# Patient Record
Sex: FEMALE | Race: BLACK OR AFRICAN AMERICAN | Employment: OTHER | URBAN - METROPOLITAN AREA
[De-identification: names, ages, dates, MRNs, and addresses within clinical notes are randomized per-mention and may not be internally consistent; named-entity substitution may affect disease eponyms.]

---

## 2017-01-14 ENCOUNTER — HOSPITAL ENCOUNTER (OUTPATIENT)
Dept: CT IMAGING | Age: 62
Discharge: HOME | End: 2017-01-14
Attending: PHYSICIAN ASSISTANT
Payer: MEDICARE

## 2017-01-14 DIAGNOSIS — K57.90 DIVERTICULOSIS: ICD-10-CM

## 2017-01-14 DIAGNOSIS — R19.4 FREQUENT BOWEL MOVEMENTS: ICD-10-CM

## 2017-01-14 DIAGNOSIS — R10.32 ABDOMINAL PAIN, LEFT LOWER QUADRANT: ICD-10-CM

## 2017-01-14 DIAGNOSIS — K59.09 OTHER CONSTIPATION: ICD-10-CM

## 2017-01-14 LAB — CREAT BLD-MCNC: 1.2 MG/DL (ref 0.6–1.3)

## 2017-01-14 PROCEDURE — 74011250636 HC RX REV CODE- 250/636: Performed by: PHYSICIAN ASSISTANT

## 2017-01-14 PROCEDURE — 74011636320 HC RX REV CODE- 636/320: Performed by: PHYSICIAN ASSISTANT

## 2017-01-14 PROCEDURE — 82565 ASSAY OF CREATININE: CPT

## 2017-01-14 PROCEDURE — 74177 CT ABD & PELVIS W/CONTRAST: CPT

## 2017-01-14 RX ORDER — SODIUM CHLORIDE 9 MG/ML
50 INJECTION, SOLUTION INTRAVENOUS
Status: COMPLETED | OUTPATIENT
Start: 2017-01-14 | End: 2017-01-14

## 2017-01-14 RX ORDER — SODIUM CHLORIDE 0.9 % (FLUSH) 0.9 %
10 SYRINGE (ML) INJECTION
Status: COMPLETED | OUTPATIENT
Start: 2017-01-14 | End: 2017-01-14

## 2017-01-14 RX ADMIN — SODIUM CHLORIDE 50 ML/HR: 900 INJECTION, SOLUTION INTRAVENOUS at 13:51

## 2017-01-14 RX ADMIN — Medication 10 ML: at 13:51

## 2017-01-14 RX ADMIN — IOPAMIDOL 100 ML: 755 INJECTION, SOLUTION INTRAVENOUS at 13:50

## 2017-02-14 ENCOUNTER — OFFICE VISIT (OUTPATIENT)
Dept: NEUROLOGY | Age: 62
End: 2017-02-14

## 2017-02-14 VITALS
WEIGHT: 238 LBS | HEIGHT: 67 IN | SYSTOLIC BLOOD PRESSURE: 118 MMHG | OXYGEN SATURATION: 96 % | DIASTOLIC BLOOD PRESSURE: 78 MMHG | HEART RATE: 74 BPM | BODY MASS INDEX: 37.35 KG/M2

## 2017-02-14 DIAGNOSIS — G56.01 CARPAL TUNNEL SYNDROME OF RIGHT WRIST: Primary | ICD-10-CM

## 2017-02-14 DIAGNOSIS — G89.29 CHRONIC RIGHT-SIDED LOW BACK PAIN WITH RIGHT-SIDED SCIATICA: ICD-10-CM

## 2017-02-14 DIAGNOSIS — M54.41 CHRONIC RIGHT-SIDED LOW BACK PAIN WITH RIGHT-SIDED SCIATICA: ICD-10-CM

## 2017-02-14 RX ORDER — GABAPENTIN 400 MG/1
CAPSULE ORAL
Qty: 180 CAP | Refills: 2 | Status: ON HOLD | OUTPATIENT
Start: 2017-02-14 | End: 2020-11-19

## 2017-02-14 NOTE — LETTER
2/14/2017 11:12 AM 
 
Patient:    Ulysses Abate YOB: 1955 Date of Visit:    2/14/2017 Dear Mariola Peterson MD 
 
Thank you for referring Ms. Tasia Baig to me for evaluation/treatment. Below are the relevant portions of my assessment and plan of care. Neurology follow-up note REFERRED BY: 
Mariola Peterson MD 
 
02/14/17 Chief Complaint Patient presents with  Follow-up  
  back/neuropathy Subjective Ulysses Abate is a 64 y.o. female who presented to the neurology office for management of back pain. To recap, the patient has been having back pain since 2014 . He does have intermittent exacerbation of right sided back pain with radiation to the right leg. When this happens, she does have 10/10 in severity, sharp and shooting and then she has to have bed rest and uses ice packs for a week and then it resolves. She is on lyrica 150 mg po bid. She is symptom free at this time. She had a MRI of the L spine and it was normal.  
 
Current Outpatient Prescriptions Medication Sig  
 gabapentin (NEURONTIN) 400 mg capsule 400 mg p.o. 3 times daily and 1200 mg p.o. nightly  diclofenac (VOLTAREN) 1 % gel Apply  to affected area as needed.  carvedilol (COREG) 25 mg tablet Take 25 mg by mouth two (2) times daily (with meals).  bumetanide (BUMEX) 2 mg tablet Take 2 mg by mouth two (2) times a day.  digoxin (LANOXIN) 0.125 mg tablet Take 0.125 mg by mouth daily.  DULoxetine (CYMBALTA) 60 mg capsule Take 60 mg by mouth daily.  linaclotide (LINZESS) 290 mcg cap capsule Take 290 mcg by mouth Daily (before breakfast).  Omeprazole delayed release (PRILOSEC D/R) 20 mg tablet Take 20 mg by mouth two (2) times a day.  zolpidem (AMBIEN) 10 mg tablet Take 10 mg by mouth nightly as needed for Sleep.  temazepam (RESTORIL) 15 mg capsule Take 15 mg by mouth nightly as needed for Sleep.  cholecalciferol, vitamin D3, 2,000 unit tab Take 2,000 Units by mouth.  cyanocobalamin 1,000 mcg tablet Take 1,000 mcg by mouth daily.  colchicine 0.6 mg tablet Take 0.6 mg by mouth daily.  allopurinol (ZYLOPRIM) 300 mg tablet Take 300 mg by mouth daily.  hyoscyamine (ANASPAZ, LEVSIN) 0.125 mg tablet Take 125 mcg by mouth every four (4) hours as needed for Cramping.  albuterol (PROVENTIL HFA, VENTOLIN HFA) 90 mcg/actuation inhaler Take 1 Puff by inhalation as needed.  spironolactone (ALDACTONE) 25 mg tablet Take  by mouth two (2) times a day.  pregabalin (LYRICA) 150 mg capsule Take 150 mg by mouth two (2) times a day.  amLODIPine-benazepril (LOTREL) 5-20 mg per capsule Take 1 Cap by mouth daily. No current facility-administered medications for this visit. REVIEW OF SYSTEMS:  
A ten system review of constitutional, cardiovascular, respiratory, musculoskeletal, endocrine, skin, SHEENT, genitourinary, psychiatric and neurologic systems was obtained and is unremarkable except as stated in HPI EXAMINATION:  
Visit Vitals  /78  Pulse 74  Ht 5' 7\" (1.702 m)  Wt 238 lb (108 kg)  SpO2 96%  BMI 37.28 kg/m2 General:  
General appearance: Pt is in no acute distress Distal pulses are preserved Neurological Examination:  
Mental Status: AAO x3. Speech is fluent. Follows commands, has normal fund of knowledge, attention, short term recall, comprehension and insight. Cranial Nerves: Visual fields are full. PERRL, Extraocular movements are full. Facial sensation intact V1- V3. Facial movement intact, symmetric. Hearing intact to conversation. Palate elevates symmetrically. Shoulder shrug symmetric. Tongue midline. Motor: Strength is 5/5 in all 4 ext. No atrophy. Tone: Normal 
 
Sensation: Decreased vib distally in the right lower ext. Coordination/Cerebellar: Intact to finger-nose-finger Gait: Romberg is negative and casual gait is normal.  
 
Laboratory review:  
Results for orders placed or performed during the hospital encounter of 01/14/17 POC CREATININE Result Value Ref Range Creatinine (POC) 1.2 0.6 - 1.3 MG/DL GFR-AA (POC) 55 (L) >60 ml/min/1.73m2 GFR, non-AA (POC) 46 (L) >60 ml/min/1.73m2 Imaging review:  
8/3/16 MRI L spine Minimal degenerative disease. Mild left L5-S1 neural foraminal stenosis, stable. EMG/nerve conduction study performed in November 2016 is remarkable for right carpal tunnel syndrome. There is no electrophysiological evidence of a right lumbar radiculopathy. Documentation review: 
None Assessment/Plan:  
Abril Raymundo is a 64 y.o. female who presented to the neurology office for management of intermittent back pain with radiation to the right lower ext. MRI of the lumbar spine and EMG/nerve conduction study have been unremarkable for the back pain but it did show right median neuropathy at the wrist.  She is asymptomatic from that. Her back pain has worsened since last visit and this 5-6 out of 10 in severity. She is presently taking gabapentin 300 mg 3 times a day and 1200 mg at night. I do plan to increase the dosage of gabapentin to 400 mg 3 times a day and 1200 mg at night. Follow-up in 3 months ICD-10-CM ICD-9-CM 1. Carpal tunnel syndrome of right wrist G56.01 354.0 2. Chronic right-sided low back pain with right-sided sciatica M54.41 724.2 gabapentin (NEURONTIN) 400 mg capsule G89.29 724.3   
  338.29 Thank you for allowing me to participate in the care of Ms. Aldana. Please feel free to contact me if you have any questions. Nicholas Rausch MD 
Neurologist and Clinical Neurophysiologist 
 
CC: Maru Lewis MD 
Fax: [phone] This note will not be viewable in [address] If you have questions, please do not hesitate to call me.   I look forward to following Ms. Aldana along with you. Sincerely, Jadyn Collins MD

## 2017-02-14 NOTE — PATIENT INSTRUCTIONS
1.  Increase dosage of gabapentin to 400 mg 3 times a day and 1200 mg at night  2. Follow-up in 3 months      A Healthy Lifestyle: Care Instructions  Your Care Instructions  A healthy lifestyle can help you feel good, stay at a healthy weight, and have plenty of energy for both work and play. A healthy lifestyle is something you can share with your whole family. A healthy lifestyle also can lower your risk for serious health problems, such as high blood pressure, heart disease, and diabetes. You can follow a few steps listed below to improve your health and the health of your family. Follow-up care is a key part of your treatment and safety. Be sure to make and go to all appointments, and call your doctor if you are having problems. Its also a good idea to know your test results and keep a list of the medicines you take. How can you care for yourself at home? · Do not eat too much sugar, fat, or fast foods. You can still have dessert and treats now and then. The goal is moderation. · Start small to improve your eating habits. Pay attention to portion sizes, drink less juice and soda pop, and eat more fruits and vegetables. ¨ Eat a healthy amount of food. A 3-ounce serving of meat, for example, is about the size of a deck of cards. Fill the rest of your plate with vegetables and whole grains. ¨ Limit the amount of soda and sports drinks you have every day. Drink more water when you are thirsty. ¨ Eat at least 5 servings of fruits and vegetables every day. It may seem like a lot, but it is not hard to reach this goal. A serving or helping is 1 piece of fruit, 1 cup of vegetables, or 2 cups of leafy, raw vegetables. Have an apple or some carrot sticks as an afternoon snack instead of a candy bar. Try to have fruits and/or vegetables at every meal.  · Make exercise part of your daily routine. You may want to start with simple activities, such as walking, bicycling, or slow swimming.  Try to be active 30 to 60 minutes every day. You do not need to do all 30 to 60 minutes all at once. For example, you can exercise 3 times a day for 10 or 20 minutes. Moderate exercise is safe for most people, but it is always a good idea to talk to your doctor before starting an exercise program.  · Keep moving. Sandra Limb the lawn, work in the garden, or [employer]. Take the stairs instead of the elevator at work. · If you smoke, quit. People who smoke have an increased risk for heart attack, stroke, cancer, and other lung illnesses. Quitting is hard, but there are ways to boost your chance of quitting tobacco for good. ¨ Use nicotine gum, patches, or lozenges. ¨ Ask your doctor about stop-smoking programs and medicines. ¨ Keep trying. In addition to reducing your risk of diseases in the future, you will notice some benefits soon after you stop using tobacco. If you have shortness of breath or asthma symptoms, they will likely get better within a few weeks after you quit. · Limit how much alcohol you drink. Moderate amounts of alcohol (up to 2 drinks a day for men, 1 drink a day for women) are okay. But drinking too much can lead to liver problems, high blood pressure, and other health problems. Family health  If you have a family, there are many things you can do together to improve your health. · Eat meals together as a family as often as possible. · Eat healthy foods. This includes fruits, vegetables, lean meats and dairy, and whole grains. · Include your family in your fitness plan. Most people think of activities such as jogging or tennis as the way to fitness, but there are many ways you and your family can be more active. Anything that makes you breathe hard and gets your heart pumping is exercise. Here are some tips:  ¨ Walk to do errands or to take your child to school or the bus. ¨ Go for a family bike ride after dinner instead of watching TV. Where can you learn more?   Go to http://jaspreet-thierno.info/. Enter I769 in the search box to learn more about \"A Healthy Lifestyle: Care Instructions. \"  Current as of: July 26, 2016  Content Version: 11.1  © 3102-8675 [employer]. Care instructions adapted under license by [employer] (which disclaims liability or warranty for this information). If you have questions about a medical condition or this instruction, always ask your healthcare professional. [address] any warranty or liability for your use of this information.

## 2017-02-14 NOTE — PROGRESS NOTES
Neurology follow-up note      REFERRED BY:  Gino Mendoza MD    02/14/17    Chief Complaint   Patient presents with    Follow-up     back/neuropathy       Subjective  Dio Landa is a 64 y.o. female who presented to the neurology office for management of back pain. To recap, the patient has been having back pain since 2014 . He does have intermittent exacerbation of right sided back pain with radiation to the right leg. When this happens, she does have 10/10 in severity, sharp and shooting and then she has to have bed rest and uses ice packs for a week and then it resolves. She is on lyrica 150 mg po bid. She is symptom free at this time. She had a MRI of the L spine and it was normal.     Current Outpatient Prescriptions   Medication Sig    gabapentin (NEURONTIN) 400 mg capsule 400 mg p.o. 3 times daily and 1200 mg p.o. nightly    diclofenac (VOLTAREN) 1 % gel Apply  to affected area as needed.  carvedilol (COREG) 25 mg tablet Take 25 mg by mouth two (2) times daily (with meals).  bumetanide (BUMEX) 2 mg tablet Take 2 mg by mouth two (2) times a day.  digoxin (LANOXIN) 0.125 mg tablet Take 0.125 mg by mouth daily.  DULoxetine (CYMBALTA) 60 mg capsule Take 60 mg by mouth daily.  linaclotide (LINZESS) 290 mcg cap capsule Take 290 mcg by mouth Daily (before breakfast).  Omeprazole delayed release (PRILOSEC D/R) 20 mg tablet Take 20 mg by mouth two (2) times a day.  zolpidem (AMBIEN) 10 mg tablet Take 10 mg by mouth nightly as needed for Sleep.  temazepam (RESTORIL) 15 mg capsule Take 15 mg by mouth nightly as needed for Sleep.  cholecalciferol, vitamin D3, 2,000 unit tab Take 2,000 Units by mouth.  cyanocobalamin 1,000 mcg tablet Take 1,000 mcg by mouth daily.  colchicine 0.6 mg tablet Take 0.6 mg by mouth daily.  allopurinol (ZYLOPRIM) 300 mg tablet Take 300 mg by mouth daily.     hyoscyamine (ANASPAZ, LEVSIN) 0.125 mg tablet Take 125 mcg by mouth every four (4) hours as needed for Cramping.  albuterol (PROVENTIL HFA, VENTOLIN HFA) 90 mcg/actuation inhaler Take 1 Puff by inhalation as needed.  spironolactone (ALDACTONE) 25 mg tablet Take  by mouth two (2) times a day.  pregabalin (LYRICA) 150 mg capsule Take 150 mg by mouth two (2) times a day.  amLODIPine-benazepril (LOTREL) 5-20 mg per capsule Take 1 Cap by mouth daily. No current facility-administered medications for this visit. REVIEW OF SYSTEMS:   A ten system review of constitutional, cardiovascular, respiratory, musculoskeletal, endocrine, skin, SHEENT, genitourinary, psychiatric and neurologic systems was obtained and is unremarkable except as stated in HPI     EXAMINATION:   Visit Vitals    /78    Pulse 74    Ht 5' 7\" (1.702 m)    Wt 238 lb (108 kg)    SpO2 96%    BMI 37.28 kg/m2        General:   General appearance: Pt is in no acute distress   Distal pulses are preserved    Neurological Examination:   Mental Status: AAO x3. Speech is fluent. Follows commands, has normal fund of knowledge, attention, short term recall, comprehension and insight. Cranial Nerves: Visual fields are full. PERRL, Extraocular movements are full. Facial sensation intact V1- V3. Facial movement intact, symmetric. Hearing intact to conversation. Palate elevates symmetrically. Shoulder shrug symmetric. Tongue midline. Motor: Strength is 5/5 in all 4 ext. No atrophy. Tone: Normal    Sensation: Decreased vib distally in the right lower ext. Coordination/Cerebellar: Intact to finger-nose-finger     Gait: Romberg is negative and casual gait is normal.     Laboratory review:   Results for orders placed or performed during the hospital encounter of 01/14/17   POC CREATININE   Result Value Ref Range    Creatinine (POC) 1.2 0.6 - 1.3 MG/DL    GFR-AA (POC) 55 (L) >60 ml/min/1.73m2    GFR, non-AA (POC) 46 (L) >60 ml/min/1.73m2       Imaging review:   8/3/16  MRI L spine  Minimal degenerative disease. Mild left L5-S1 neural foraminal stenosis, stable. EMG/nerve conduction study performed in November 2016 is remarkable for right carpal tunnel syndrome. There is no electrophysiological evidence of a right lumbar radiculopathy. Documentation review:  None    Assessment/Plan:   Blanca Chakraborty is a 64 y.o. female who presented to the neurology office for management of intermittent back pain with radiation to the right lower ext. MRI of the lumbar spine and EMG/nerve conduction study have been unremarkable for the back pain but it did show right median neuropathy at the wrist.  She is asymptomatic from that. Her back pain has worsened since last visit and this 5-6 out of 10 in severity. She is presently taking gabapentin 300 mg 3 times a day and 1200 mg at night. I do plan to increase the dosage of gabapentin to 400 mg 3 times a day and 1200 mg at night. Follow-up in 3 months       ICD-10-CM ICD-9-CM    1. Carpal tunnel syndrome of right wrist G56.01 354.0    2. Chronic right-sided low back pain with right-sided sciatica M54.41 724.2 gabapentin (NEURONTIN) 400 mg capsule    G89.29 724.3      338.29             Thank you for allowing me to participate in the care of Ms. Aldana. Please feel free to contact me if you have any questions. Janet Cooper MD  Neurologist and Clinical Neurophysiologist    CC: Colleen Lyons MD  Fax: [phone]    This note will not be viewable in [address]

## 2017-02-14 NOTE — MR AVS SNAPSHOT
Visit Information Date & Time Provider Department Dept. Phone Encounter #  
 2/14/2017 10:40 AM Brian Woodward MD [facility] [phone] [id number] Upcoming Health Maintenance Date Due Hepatitis C Screening 1955 Pneumococcal 19-64 Medium Risk (1 of 1 - PPSV23) 10/17/1974 DTaP/Tdap/Td series (1 - Tdap) 10/17/1976 PAP AKA CERVICAL CYTOLOGY 10/17/1976 BREAST CANCER SCRN MAMMOGRAM 10/17/2005 FOBT Q 1 YEAR AGE 50-75 10/17/2005 ZOSTER VACCINE AGE 60> 10/17/2015 INFLUENZA AGE 9 TO ADULT 8/1/2016 Allergies as of 2/14/2017  Review Complete On: 2/14/2017 By: Gwen Danielle Severity Noted Reaction Type Reactions Codeine  05/13/2010    Itching Demerol [Meperidine]  08/27/2012    Nausea Only Ivp [Fd And C Blue No.1]  05/13/2010    Itching Minoxidil  05/13/2010    Itching Penicillamine  05/13/2010   Side Effect Itching, Other (comments) Drops blood pressure Percocet [Oxycodone-acetaminophen]  06/19/2014   Systemic Nausea and Vomiting Current Immunizations  Never Reviewed No immunizations on file. Not reviewed this visit Vitals BP Pulse Height(growth percentile) Weight(growth percentile) SpO2 BMI  
 118/78 74 5' 7\" (1.702 m) 238 lb (108 kg) 96% 37.28 kg/m2 OB Status Smoking Status Hysterectomy Never Smoker BMI and BSA Data Body Mass Index Body Surface Area  
 37.28 kg/m 2 2.26 m 2 Preferred Pharmacy Pharmacy Name Phone [address] [place] [address] [phone] Your Updated Medication List  
  
   
This list is accurate as of: 2/14/17 10:56 AM.  Always use your most recent med list.  
  
  
  
  
 albuterol 90 mcg/actuation inhaler Commonly known as:  PROVENTIL HFA, VENTOLIN HFA, PROAIR HFA Take 1 Puff by inhalation as needed. allopurinol 300 mg tablet Commonly known as:  Jaky Tabor  
 Take 300 mg by mouth daily. amLODIPine-benazepril 5-20 mg per capsule Commonly known as:  Yesenia Muff Take 1 Cap by mouth daily. bumetanide 2 mg tablet Commonly known as:  Nataliya Aliza Take 2 mg by mouth two (2) times a day. carvedilol 25 mg tablet Commonly known as:  Sandra Thurman Take 25 mg by mouth two (2) times daily (with meals). cholecalciferol (vitamin D3) 2,000 unit Tab Take 2,000 Units by mouth.  
  
 colchicine 0.6 mg tablet Take 0.6 mg by mouth daily. cyanocobalamin 1,000 mcg tablet Take 1,000 mcg by mouth daily. digoxin 0.125 mg tablet Commonly known as:  LANOXIN Take 0.125 mg by mouth daily. DULoxetine 60 mg capsule Commonly known as:  CYMBALTA Take 60 mg by mouth daily. hyoscyamine 0.125 mg tablet Commonly known as:  Lazarus Fredrick Take 125 mcg by mouth every four (4) hours as needed for Cramping.  
  
 linaclotide 290 mcg Cap capsule Commonly known as:  [place] Take 290 mcg by mouth Daily (before breakfast). LYRICA 150 mg capsule Generic drug:  pregabalin Take 150 mg by mouth two (2) times a day. NEURONTIN 300 mg capsule Generic drug:  gabapentin Take 300 mg by mouth daily. Indications: 1200 mg at bedtime daily Omeprazole delayed release 20 mg tablet Commonly known as:  PRILOSEC D/R Take 20 mg by mouth two (2) times a day. spironolactone 25 mg tablet Commonly known as:  ALDACTONE Take  by mouth two (2) times a day. temazepam 15 mg capsule Commonly known as:  RESTORIL Take 15 mg by mouth nightly as needed for Sleep. VOLTAREN 1 % Gel Generic drug:  diclofenac Apply  to affected area as needed. zolpidem 10 mg tablet Commonly known as:  AMBIEN Take 10 mg by mouth nightly as needed for Sleep. Patient Instructions 1. Increase dosage of gabapentin to 400 mg 3 times a day and 1200 mg at night 2. Follow-up in 3 months A Healthy Lifestyle: Care Instructions Your Care Instructions A healthy lifestyle can help you feel good, stay at a healthy weight, and have plenty of energy for both work and play. A healthy lifestyle is something you can share with your whole family. A healthy lifestyle also can lower your risk for serious health problems, such as high blood pressure, heart disease, and diabetes. You can follow a few steps listed below to improve your health and the health of your family. Follow-up care is a key part of your treatment and safety. Be sure to make and go to all appointments, and call your doctor if you are having problems. Its also a good idea to know your test results and keep a list of the medicines you take. How can you care for yourself at home? · Do not eat too much sugar, fat, or fast foods. You can still have dessert and treats now and then. The goal is moderation. · Start small to improve your eating habits. Pay attention to portion sizes, drink less juice and soda pop, and eat more fruits and vegetables. ¨ Eat a healthy amount of food. A 3-ounce serving of meat, for example, is about the size of a deck of cards. Fill the rest of your plate with vegetables and whole grains. ¨ Limit the amount of soda and sports drinks you have every day. Drink more water when you are thirsty. ¨ Eat at least 5 servings of fruits and vegetables every day. It may seem like a lot, but it is not hard to reach this goal. A serving or helping is 1 piece of fruit, 1 cup of vegetables, or 2 cups of leafy, raw vegetables. Have an apple or some carrot sticks as an afternoon snack instead of a candy bar. Try to have fruits and/or vegetables at every meal. 
· Make exercise part of your daily routine. You may want to start with simple activities, such as walking, bicycling, or slow swimming. Try to be active 30 to 60 minutes every day.  You do not need to do all 30 to 60 minutes all at once. For example, you can exercise 3 times a day for 10 or 20 minutes. Moderate exercise is safe for most people, but it is always a good idea to talk to your doctor before starting an exercise program. 
· Keep moving. [place] the lawn, work in the garden, or Web[employer]. Take the stairs instead of the elevator at work. · If you smoke, quit. People who smoke have an increased risk for heart attack, stroke, cancer, and other lung illnesses. Quitting is hard, but there are ways to boost your chance of quitting tobacco for good. ¨ Use nicotine gum, patches, or lozenges. ¨ Ask your doctor about stop-smoking programs and medicines. ¨ Keep trying. In addition to reducing your risk of diseases in the future, you will notice some benefits soon after you stop using tobacco. If you have shortness of breath or asthma symptoms, they will likely get better within a few weeks after you quit. · Limit how much alcohol you drink. Moderate amounts of alcohol (up to 2 drinks a day for men, 1 drink a day for women) are okay. But drinking too much can lead to liver problems, high blood pressure, and other health problems. Family health If you have a family, there are many things you can do together to improve your health. · Eat meals together as a family as often as possible. · Eat healthy foods. This includes fruits, vegetables, lean meats and dairy, and whole grains. · Include your family in your fitness plan. Most people think of activities such as jogging or tennis as the way to fitness, but there are many ways you and your family can be more active. Anything that makes you breathe hard and gets your heart pumping is exercise. Here are some tips: 
¨ Walk to do errands or to take your child to school or the bus. ¨ Go for a family bike ride after dinner instead of watching TV. Where can you learn more? Go to http://jaspreet-thierno.info/. Enter I536 in the search box to learn more about \"A Healthy Lifestyle: Care Instructions. \" Current as of: July 26, 2016 Content Version: 11.1 © 7825-4525 [employer]. Care instructions adapted under license by [employer] (which disclaims liability or warranty for this information). If you have questions about a medical condition or this instruction, always ask your healthcare professional. [address] any warranty or liability for your use of this information. Introducing [facility]! Dear Navi Altamirano: 
Thank you for requesting a Inspiris account. Our records indicate that you already have an active Inspiris account. You can access your account anytime at [email or web address] Did you know that you can access your hospital and ER discharge instructions at any time in Inspiris? You can also review all of your test results from your hospital stay or ER visit. Additional Information If you have questions, please visit the Frequently Asked Questions section of the Inspiris website at [email or web address] Remember, Inspiris is NOT to be used for urgent needs. For medical emergencies, dial 911. Now available from your iPhone and Android! Please provide this summary of care documentation to your next provider. Your primary care clinician is listed as Ani Farrell. If you have any questions after today's visit, please call [phone].

## 2017-03-22 RX ORDER — LIDOCAINE 50 MG/G
1 PATCH TOPICAL
COMMUNITY

## 2017-03-23 ENCOUNTER — ANESTHESIA EVENT (OUTPATIENT)
Dept: SURGERY | Age: 62
End: 2017-03-23
Payer: MEDICARE

## 2017-03-23 ENCOUNTER — HOSPITAL ENCOUNTER (OUTPATIENT)
Dept: SURGERY | Age: 62
Setting detail: OUTPATIENT SURGERY
Discharge: HOME | End: 2017-03-23
Payer: MEDICARE

## 2017-03-23 VITALS
TEMPERATURE: 98.4 F | OXYGEN SATURATION: 95 % | DIASTOLIC BLOOD PRESSURE: 67 MMHG | RESPIRATION RATE: 16 BRPM | SYSTOLIC BLOOD PRESSURE: 123 MMHG | HEART RATE: 77 BPM

## 2017-03-23 LAB
ANION GAP BLD CALC-SCNC: 7 MMOL/L (ref 5–15)
BUN SERPL-MCNC: 22 MG/DL (ref 6–20)
BUN/CREAT SERPL: 18 (ref 12–20)
CALCIUM SERPL-MCNC: 9.7 MG/DL (ref 8.5–10.1)
CHLORIDE SERPL-SCNC: 105 MMOL/L (ref 97–108)
CO2 SERPL-SCNC: 30 MMOL/L (ref 21–32)
CREAT SERPL-MCNC: 1.2 MG/DL (ref 0.55–1.02)
GLUCOSE SERPL-MCNC: 93 MG/DL (ref 65–100)
POTASSIUM SERPL-SCNC: 3.9 MMOL/L (ref 3.5–5.1)
SODIUM SERPL-SCNC: 142 MMOL/L (ref 136–145)

## 2017-03-23 PROCEDURE — 36415 COLL VENOUS BLD VENIPUNCTURE: CPT | Performed by: ANESTHESIOLOGY

## 2017-03-23 PROCEDURE — 80048 BASIC METABOLIC PNL TOTAL CA: CPT | Performed by: ANESTHESIOLOGY

## 2017-03-24 ENCOUNTER — ANESTHESIA (OUTPATIENT)
Dept: SURGERY | Age: 62
End: 2017-03-24
Payer: MEDICARE

## 2017-03-24 ENCOUNTER — HOSPITAL ENCOUNTER (OUTPATIENT)
Age: 62
Setting detail: OUTPATIENT SURGERY
Discharge: HOME | End: 2017-03-24
Attending: SPECIALIST | Admitting: SPECIALIST
Payer: MEDICARE

## 2017-03-24 VITALS
HEIGHT: 67 IN | WEIGHT: 233 LBS | BODY MASS INDEX: 36.57 KG/M2 | DIASTOLIC BLOOD PRESSURE: 68 MMHG | RESPIRATION RATE: 12 BRPM | OXYGEN SATURATION: 100 % | TEMPERATURE: 97.6 F | SYSTOLIC BLOOD PRESSURE: 108 MMHG | HEART RATE: 70 BPM

## 2017-03-24 PROBLEM — R19.7 DIARRHEA: Status: ACTIVE | Noted: 2017-03-24

## 2017-03-24 PROBLEM — R10.13 EPIGASTRIC PAIN: Status: ACTIVE | Noted: 2017-03-24

## 2017-03-24 PROCEDURE — 77030021352 HC CBL LD SYS DISP COVD -B: Performed by: SPECIALIST

## 2017-03-24 PROCEDURE — 74011000250 HC RX REV CODE- 250

## 2017-03-24 PROCEDURE — 88312 SPECIAL STAINS GROUP 1: CPT | Performed by: SPECIALIST

## 2017-03-24 PROCEDURE — 77030019988 HC FCPS ENDOSC DISP BSC -B: Performed by: SPECIALIST

## 2017-03-24 PROCEDURE — 76210000046 HC AMBSU PH II REC FIRST 0.5 HR: Performed by: SPECIALIST

## 2017-03-24 PROCEDURE — 76060000073 HC AMB SURG ANES FIRST 0.5 HR: Performed by: SPECIALIST

## 2017-03-24 PROCEDURE — 76210000040 HC AMBSU PH I REC FIRST 0.5 HR: Performed by: SPECIALIST

## 2017-03-24 PROCEDURE — 88112 CYTOPATH CELL ENHANCE TECH: CPT | Performed by: SPECIALIST

## 2017-03-24 PROCEDURE — 74011250636 HC RX REV CODE- 250/636: Performed by: ANESTHESIOLOGY

## 2017-03-24 PROCEDURE — 77030020255 HC SOL INJ LR 1000ML BG: Performed by: SPECIALIST

## 2017-03-24 PROCEDURE — 76030000002 HC AMB SURG OR TIME FIRST 0.: Performed by: SPECIALIST

## 2017-03-24 PROCEDURE — 74011250636 HC RX REV CODE- 250/636

## 2017-03-24 PROCEDURE — 88305 TISSUE EXAM BY PATHOLOGIST: CPT | Performed by: SPECIALIST

## 2017-03-24 RX ORDER — SODIUM CHLORIDE 9 MG/ML
75 INJECTION, SOLUTION INTRAVENOUS CONTINUOUS
Status: DISCONTINUED | OUTPATIENT
Start: 2017-03-24 | End: 2017-03-24 | Stop reason: HOSPADM

## 2017-03-24 RX ORDER — SODIUM CHLORIDE 0.9 % (FLUSH) 0.9 %
5-10 SYRINGE (ML) INJECTION AS NEEDED
Status: DISCONTINUED | OUTPATIENT
Start: 2017-03-24 | End: 2017-03-24 | Stop reason: HOSPADM

## 2017-03-24 RX ORDER — SODIUM CHLORIDE, SODIUM LACTATE, POTASSIUM CHLORIDE, CALCIUM CHLORIDE 600; 310; 30; 20 MG/100ML; MG/100ML; MG/100ML; MG/100ML
25 INJECTION, SOLUTION INTRAVENOUS CONTINUOUS
Status: DISCONTINUED | OUTPATIENT
Start: 2017-03-24 | End: 2017-03-24 | Stop reason: HOSPADM

## 2017-03-24 RX ORDER — SODIUM CHLORIDE 0.9 % (FLUSH) 0.9 %
5-10 SYRINGE (ML) INJECTION EVERY 8 HOURS
Status: DISCONTINUED | OUTPATIENT
Start: 2017-03-24 | End: 2017-03-24 | Stop reason: HOSPADM

## 2017-03-24 RX ORDER — ONDANSETRON 2 MG/ML
4 INJECTION INTRAMUSCULAR; INTRAVENOUS AS NEEDED
Status: DISCONTINUED | OUTPATIENT
Start: 2017-03-24 | End: 2017-03-24 | Stop reason: HOSPADM

## 2017-03-24 RX ORDER — LIDOCAINE HYDROCHLORIDE 10 MG/ML
0.1 INJECTION, SOLUTION EPIDURAL; INFILTRATION; INTRACAUDAL; PERINEURAL AS NEEDED
Status: DISCONTINUED | OUTPATIENT
Start: 2017-03-24 | End: 2017-03-24 | Stop reason: HOSPADM

## 2017-03-24 RX ORDER — FLUMAZENIL 0.1 MG/ML
0.2 INJECTION INTRAVENOUS
Status: DISCONTINUED | OUTPATIENT
Start: 2017-03-24 | End: 2017-03-24 | Stop reason: HOSPADM

## 2017-03-24 RX ORDER — FLUCONAZOLE 100 MG/1
100 TABLET ORAL DAILY
Qty: 14 TAB | Refills: 0 | Status: SHIPPED | OUTPATIENT
Start: 2017-03-24 | End: 2017-04-07

## 2017-03-24 RX ORDER — PROPOFOL 10 MG/ML
INJECTION, EMULSION INTRAVENOUS AS NEEDED
Status: DISCONTINUED | OUTPATIENT
Start: 2017-03-24 | End: 2017-03-24 | Stop reason: HOSPADM

## 2017-03-24 RX ORDER — ATROPINE SULFATE 0.1 MG/ML
0.5 INJECTION INTRAVENOUS
Status: DISCONTINUED | OUTPATIENT
Start: 2017-03-24 | End: 2017-03-24 | Stop reason: HOSPADM

## 2017-03-24 RX ORDER — FENTANYL CITRATE 50 UG/ML
25 INJECTION, SOLUTION INTRAMUSCULAR; INTRAVENOUS
Status: DISCONTINUED | OUTPATIENT
Start: 2017-03-24 | End: 2017-03-24 | Stop reason: HOSPADM

## 2017-03-24 RX ORDER — DIPHENHYDRAMINE HYDROCHLORIDE 50 MG/ML
12.5 INJECTION, SOLUTION INTRAMUSCULAR; INTRAVENOUS AS NEEDED
Status: DISCONTINUED | OUTPATIENT
Start: 2017-03-24 | End: 2017-03-24 | Stop reason: HOSPADM

## 2017-03-24 RX ORDER — LIDOCAINE HYDROCHLORIDE 20 MG/ML
INJECTION, SOLUTION EPIDURAL; INFILTRATION; INTRACAUDAL; PERINEURAL AS NEEDED
Status: DISCONTINUED | OUTPATIENT
Start: 2017-03-24 | End: 2017-03-24 | Stop reason: HOSPADM

## 2017-03-24 RX ORDER — COLCHICINE 0.6 MG/1
0.6 TABLET ORAL DAILY
Qty: 1 TAB | Refills: 0 | Status: ON HOLD | OUTPATIENT
Start: 2017-04-15 | End: 2020-11-19

## 2017-03-24 RX ORDER — DEXTROMETHORPHAN/PSEUDOEPHED 2.5-7.5/.8
1.2 DROPS ORAL
Status: DISCONTINUED | OUTPATIENT
Start: 2017-03-24 | End: 2017-03-24 | Stop reason: HOSPADM

## 2017-03-24 RX ADMIN — PROPOFOL 220 MG: 10 INJECTION, EMULSION INTRAVENOUS at 10:26

## 2017-03-24 RX ADMIN — LIDOCAINE HYDROCHLORIDE 40 MG: 20 INJECTION, SOLUTION EPIDURAL; INFILTRATION; INTRACAUDAL; PERINEURAL at 10:06

## 2017-03-24 RX ADMIN — SODIUM CHLORIDE, SODIUM LACTATE, POTASSIUM CHLORIDE, AND CALCIUM CHLORIDE 25 ML/HR: 600; 310; 30; 20 INJECTION, SOLUTION INTRAVENOUS at 08:30

## 2017-03-24 NOTE — PERIOP NOTES
Patient: Daysi Crews MRN: [id number]  SSN: [id number]   YOB: 1955  Age: 64 y.o. Sex: female     Patient is status post Procedure(s):  ESOPHAGOGASTRODUODENOSCOPY (EGD)  FLEXIBLE SIGMOIDOSCOPY   ESOPHAGOGASTRODUODENAL (EGD) BIOPSY  ENDOSCOPIC ESOPHAGEAL BRUSHING  COLON BIOPSY. Surgeon(s) and Role:     * Karen Jaramillo MD - Primary                      Peripheral IV 03/24/17 Right Wrist (Active)   Site Assessment Clean, dry, & intact 3/24/2017  8:37 AM   Phlebitis Assessment 0 3/24/2017  8:37 AM   Infiltration Assessment 0 3/24/2017  8:37 AM   Dressing Status New 3/24/2017  8:37 AM   Dressing Type Tape;Transparent 3/24/2017  8:37 AM   Hub Color/Line Status Blue; Infusing 3/24/2017  8:37 AM

## 2017-03-24 NOTE — ANESTHESIA PREPROCEDURE EVALUATION
Anesthetic History   No history of anesthetic complications            Review of Systems / Medical History  Patient summary reviewed, nursing notes reviewed and pertinent labs reviewed    Pulmonary        Sleep apnea: CPAP    Asthma        Neuro/Psych         TIA and psychiatric history (depression)     Cardiovascular    Hypertension          CAD    Exercise tolerance: >4 METS  Comments: Nonischemic Cardiomyopathy  Last EF 45-50%   GI/Hepatic/Renal     GERD: well controlled           Endo/Other        Arthritis     Other Findings   Comments: Fibromyalgia  Chronic pain           Physical Exam    Airway  Mallampati: I  TM Distance: 4 - 6 cm  Neck ROM: normal range of motion   Mouth opening: Normal     Cardiovascular    Rhythm: regular  Rate: normal      Pertinent negatives: No murmur   Dental  No notable dental hx       Pulmonary  Breath sounds clear to auscultation               Abdominal  GI exam deferred       Other Findings            Anesthetic Plan    ASA: 2  Anesthesia type: general and total IV anesthesia          Induction: Intravenous      Took BB yesterday at 2100

## 2017-03-24 NOTE — DISCHARGE INSTRUCTIONS
Daysi Crews  [id number]  1955              Procedure  Discharge Instructions:      Discomfort:  Redness at IV site- apply warm compress to area; if redness or soreness persist- contact your physician  There may be a slight amount of blood passed from the rectum  Gaseous discomfort- walking, belching will help relieve any discomfort  You may not operate a vehicle for 24 hours  You may not engage in an occupation involving machinery or appliances for rest of today  You may not drink alcoholic beverages for at least 24hours  Avoid making any critical decisions for at least 24 hour  DIET:   You may resume your normal diet today. You should not overeat or \"feast\" today as your abdomen may become distended or uncomfortable. MEDICATIONS:   I reconciled this list from the list you gave us when you came today for the procedure. Please clarify with me, your primary care physician and the nurse who is discharging you if we have any discrepancies. Aspirin and or non-steroidal medication (Ibuprofen, Motrin, naproxen, etc.) is ok in limited quantities. ACTIVITY:  You may resume your normal daily activities it is recommended that you spend the remainder of the day resting -  avoid any strenuous activity. CALL M.D. ANY SIGN OF:  Increasing pain, nausea, vomiting  Abdominal distension (swelling)  New increased bleeding (oral or rectal)  Fever (chills)  Pain in chest area  Bloody discharge from nose or mouth  Shortness of breath          Follow-up Instructions:   Call Dr. Asia Gould for the results of  biopsy in approximately one week  Telephone #  [phone]  Follow up visit Dr Christal Faulkner as previously scheduled. Stop your colchicine while on the new med diflucan for the yeast infection. Take diflucan first dose tonight.       Karen Jaramillo MD  10:33 AM  3/24/2017       DO NOT TAKE SLEEPING MEDICATIONS OR ANTIANXIETY MEDICATIONS WHILE TAKING NARCOTIC PAIN MEDICATIONS,  ESPECIALLY THE NIGHT OF ANESTHESIA. CPAP PATIENTS BE SURE TO WEAR MACHINE WHENEVER NAPPING OR SLEEPING. DISCHARGE SUMMARY from Nurse    The following personal items collected during your admission are returned to you:   Dental Appliance: Dental Appliances: None  Vision: Visual Aid: Glasses  Hearing Aid:    Jewelry: Jewelry: None  Clothing: Clothing: Footwear, Pants, Shirt, Socks, Undergarments, With patient  Other Valuables: Other Valuables: None  Valuables sent to safe:        PATIENT INSTRUCTIONS:    After General Anesthesia or Intravenous Sedation, for 24 hours or while taking prescription Narcotics:        Someone should be with you for the next 24 hours. For your own safety, a responsible adult must drive you home. · Limit your activities  · Recommended activity: Rest today, up with assistance today. Do not climb stairs or shower unattended for the next 24 hours. · Please start with a soft bland diet and advance as tolerated (no nausea) to regular diet. · If you have a sore throat you should try the following: fluids, warm salt water gargles, or throat lozenges. If it does not improve after several days please follow up with your primary physician. · Do not drive and operate hazardous machinery  · Do not make important personal or business decisions  · Do  not drink alcoholic beverages  · If you have not urinated within 8 hours after discharge, please contact your surgeon on call. Report the following to your surgeon:  · Excessive pain, swelling, redness or odor of or around the surgical area  · Temperature over 100.5  · Nausea and vomiting lasting longer than 4 hours or if unable to take medications  · Any signs of decreased circulation or nerve impairment to extremity: change in color, persistent  numbness, tingling, coldness or increase pain      · You will receive a Post Operative Call from one of the Recovery Room Nurses on the day after your surgery to check on you.  It is very important for us to know how you are recovering after your surgery. If you have an issue or need to speak with someone, please call your surgeon, do not wait for the post operative call. · You may receive an e-mail or letter in the mail from [facility] regarding your experience with us in the Ambulatory Surgery Unit. Your feedback is valuable to us and we appreciate your participation in the survey. · If the above instructions are not adequate, please contact Jamshid Ramírez RN, [facility] anesthesia Nurse Manager or our Anesthesiologist, at [phone]. If you are having problems after your surgery, call the physician at his office number. · We wish you a speedy recovery ? What to do at Home:      *  Please give a list of your current medications to your Primary Care Provider. *  Please update this list whenever your medications are discontinued, doses are      changed, or new medications (including over-the-counter products) are added. *  Please carry medication information at all times in case of emergency situations. These are general instructions for a healthy lifestyle:    No smoking/ No tobacco products/ Avoid exposure to second hand smoke    Surgeon General's Warning:  Quitting smoking now greatly reduces serious risk to your health. Obesity, smoking, and sedentary lifestyle greatly increases your risk for illness    A healthy diet, regular physical exercise & weight monitoring are important for maintaining a healthy lifestyle    You may be retaining fluid if you have a history of heart failure or if you experience any of the following symptoms:  Weight gain of 3 pounds or more overnight or 5 pounds in a week, increased swelling in our hands or feet or shortness of breath while lying flat in bed. Please call your doctor as soon as you notice any of these symptoms; do not wait until your next office visit.     Recognize signs and symptoms of STROKE:    F-face looks uneven    A-arms unable to move or move even    S-speech slurred or non-existent    T-time-call 911 as soon as signs and symptoms begin-DO NOT go       Back to bed or wait to see if you get better-TIME IS BRAIN. If you have not received your influenza and/or pneumococcal vaccine, please follow up with your primary care physician. The discharge information has been reviewed with the patient and caregiver. The patient and caregiver verbalized understanding.

## 2017-03-24 NOTE — PERIOP NOTES
Daysi Crews  1955  [id number]    Situation:  Verbal report given from: ERMELINDA Alainz RN and MELANY Kang CRNA  Procedure: Procedure(s):  ESOPHAGOGASTRODUODENOSCOPY (EGD)  FLEXIBLE SIGMOIDOSCOPY   ESOPHAGOGASTRODUODENAL (EGD) BIOPSY  ENDOSCOPIC ESOPHAGEAL BRUSHING  COLON BIOPSY    Background:    Preoperative diagnosis: LLQ PAIN, IRREGULAR BOWEL HABITS, DIVERTICULITIS    Postoperative diagnosis: GASTRIC POLYP, ESOPHAGEAL CANDIDA, POSSIBLE BARRETTS, FORMED STOOL AT 15CM    :  Dr. Asia Gould    Assistant(s): Circ-1: Cooper Brito RN  Circ-2: Haritha Higginbotham RN  Scrub Tech-1: [facility] [employer]    Specimens:   ID Type Source Tests Collected by Time Destination   1 : DUODENUM BIOPSY Preservative Duodenum  Karen Jaramillo MD 3/24/2017 1010 Pathology   2 : STOMACH BIOPSY Preservative Stomach  Karen Jaramillo MD 3/24/2017 1013 Pathology   3 : [address] [place]  Karen Jaramillo MD 3/24/2017 1016 Pathology   4 : MID ESOPHAGUS BIOPSY Preservative Esophagus, Mid  Karen Jaramillo MD 3/24/2017 1017 Pathology   5 : RECTOSIGMOID BIOPSY Preservative Colon, Amanda Murray MD 3/24/2017 1022 Pathology   1 : ESOPHAGEAL BRUSHINGS Fresh Esophageal Brushing  Karen Jaramillo MD 3/24/2017 1015 Cytology       Assessment:  Intra-procedure medications   Propofol 220 mg      Anesthesia gave intra-procedure sedation and medications, see anesthesia flow sheet     Intravenous fluids: LR@ KVO     Vital signs stable     Abdominal assessment: round and soft       Recommendation:    Permission to share finding with family or friend yes    All side rails up, bed in low position, wheels locked. Nurse at bedside. 1052 D/C to home via w/c accompanied to car per RN.  All belongings (clothing, glasses) with pt

## 2017-03-24 NOTE — H&P
Pre-endoscopy H and P    The patient was seen and examined in the [facility] pre op. The airway was assessed and docuemented. The problem list, past medical history, and medications were reviewed. The history is:  Pain has come and gone in the past but has been constant for 3 months. At first it was LLQ. Now it seems more LUQ. It keeps her awake. She can't eat or leave the house due to pain. At last visit, I empirically Rx'd Cipro and Flagyl for possible diverticulitis. It has not helped pain at all. In fact it seems to be causing diarrhea and urgency and Flagyl is causing metallic taste and queasiness. No vomiting. CT abd/pelvis with contrast 1/14/17 - IMPRESSION:1. Extensive diverticulosis coli. No evidence of diverticulitis. 2. Small nonobstructive stone in the left kidney. No hydronephrosis. 3. Status post hysterectomy and right oophorectomy. 2.3 cm cyst in the left ovary. She followed up with OBGYN. They saw something on left side but were not sure what it was. ?Bowel but it didn't move. It didn't look like a cyst. She was referred back here and is to follow up with OBGYN in 6 weeks. Discussed laparoscopy if it doesn't resolve. Colonoscopy 8/23/16 - Findings:1. Scope advanced to the cecum. 2. Preparation was fair with diffuse stool balls in the area of severe sigmoiddiverticulosis. 3. Small internal hemorrhoids. 4. No polyps seen. 10 year recall. She has taken Linzess 290mcg for hx of constipation. She had run out of it so we Rx'd it at last visit. When she takes it she has to stay home due to diarrhea. She takes it on an empty stomach. She is taking it twice a week. Since taking Linzess and having more frequent stools, pain has not improved at all. She is stooling 4-5 times per day. [place] type 6-7. No nocturnal stools. No blood or mucous in stools. The odor is not like it was before (less foul). Stool is darker but not black. She does not feel any better.  She feels like she's been in a boxing match, like someone was pummeling her stomach. The past few days, she feels like there is a baby's arm moving in LUQ. When she eats, she gets pain in LUQ. It gets hard and swells. She is to the point she doesn't like to eat. If she bends over or gets out of car, her abd really hurts. EGD scheduled 4/21/17. When she lays in bed, she can't lay flat in back or on side due to cramping in left back. She has a sleep number bed and has tried changing the numbers. Has tried heating pad. She is retired and denies muscle strain. No blood in urine. She has seen Dr. Viridiana Hernandez in the past for crystals she saw in urine but urinalysis was reportedly negative. She does have fibromyalgia. Patient Active Problem List   Diagnosis Code    Knee osteoarthritis M17.9    Chronic systolic heart failure (HCC) I50.22     Social History     Social History    Marital status:      Spouse name: N/A    Number of children: N/A    Years of education: N/A     Occupational History    Not on file. Social History Main Topics    Smoking status: Never Smoker    Smokeless tobacco: Never Used    Alcohol use No    Drug use: No    Sexual activity: No     Other Topics Concern    Not on file     Social History Narrative     Past Medical History:   Diagnosis Date    Arrhythmia     per pt, treated with med    Arthritis     Asthma     CAD (coronary artery disease)     EF=33 1/3    Chronic pain     fibromyalgia    GERD (gastroesophageal reflux disease)     ibs    Heart failure (HCC)     cardiomyopathy    Hypertension     Psychiatric disorder     h/o severe depression    Stroke ([facility] [address]     possible TIA    Unspecified sleep apnea     wears CPAP     The patient has a family history of na    Prior to Admission Medications   Prescriptions Last Dose Informant Patient Reported? Taking? DULoxetine (CYMBALTA) 60 mg capsule   Yes Yes   Sig: Take 60 mg by mouth daily.    Omeprazole delayed release (PRILOSEC D/R) 20 mg tablet 3/23/2017 at Unknown time  Yes Yes   Sig: Take 20 mg by mouth two (2) times a day. albuterol (PROVENTIL HFA, VENTOLIN HFA) 90 mcg/actuation inhaler 2017 at Unknown time  Yes Yes   Sig: Take 1 Puff by inhalation as needed. allopurinol (ZYLOPRIM) 300 mg tablet 3/23/2017 at Unknown time  Yes Yes   Sig: Take 300 mg by mouth daily. amLODIPine-benazepril (LOTREL) 5-20 mg per capsule 3/23/2017 at Unknown time  Yes Yes   Sig: Take 1 Cap by mouth daily. bumetanide (BUMEX) 2 mg tablet 3/23/2017 at Unknown time  Yes Yes   Sig: Take 2 mg by mouth two (2) times a day. carvedilol (COREG) 25 mg tablet 3/23/2017 at 2100  Yes Yes   Sig: Take 25 mg by mouth two (2) times daily (with meals). cholecalciferol, vitamin D3, 2,000 unit tab 3/23/2017 at Unknown time  Yes Yes   Sig: Take 2,000 Units by mouth daily. colchicine 0.6 mg tablet Unknown at Unknown time  Yes No   Sig: Take 0.6 mg by mouth daily. cyanocobalamin 1,000 mcg tablet 3/23/2017 at Unknown time  Yes Yes   Sig: Take 1,000 mcg by mouth daily. diclofenac (VOLTAREN) 1 % gel Unknown at Unknown time  Yes No   Sig: Apply  to affected area as needed. digoxin (LANOXIN) 0.125 mg tablet 3/22/2017  Yes Yes   Sig: Take 0.125 mg by mouth daily. gabapentin (NEURONTIN) 400 mg capsule 3/17/2017 at Unknown time  No Yes   Si mg p.o. 3 times daily and 1200 mg p.o. nightly   hyoscyamine (ANASPAZ, LEVSIN) 0.125 mg tablet Unknown at Unknown time  Yes No   Sig: Take 125 mcg by mouth every four (4) hours as needed for Cramping.   lidocaine (LIDODERM) 5 % Unknown at Unknown time  Yes No   Si Patch by TransDERmal route every twenty-four (24) hours. Apply patch to the affected area for 12 hours a day and remove for 12 hours a day. linaclotide (LINZESS) 290 mcg cap capsule 3/23/2017 at Unknown time  Yes Yes   Sig: Take 290 mcg by mouth Daily (before breakfast).    pregabalin (LYRICA) 150 mg capsule 3/23/2017 at Unknown time  Yes Yes   Sig: Take 150 mg by mouth two (2) times a day. spironolactone (ALDACTONE) 25 mg tablet 3/23/2017 at Unknown time  Yes Yes   Sig: Take  by mouth two (2) times a day. temazepam (RESTORIL) 15 mg capsule 3/23/2017 at Unknown time  Yes Yes   Sig: Take 15 mg by mouth nightly as needed for Sleep.   zolpidem (AMBIEN) 10 mg tablet 3/23/2017 at Unknown time  Yes Yes   Sig: Take 10 mg by mouth nightly as needed for Sleep. Facility-Administered Medications: None           The review of systems is:  negative for shortness of breath or chest pain      The heart, lungs, and mental status were satisfactory for the administration of anesthesia sedation and for the procedure. I discussed with the patient the objectives, risks, consequences and alternatives to the procedure.       Brian Fair MD  3/24/2017  8:56 AM

## 2017-03-24 NOTE — IP AVS SNAPSHOT
[address] [facility]. 
[phone] Patient: Shannan Cartwright MRN: [id number] :1955 You are allergic to the following Allergen Reactions Codeine Itching Demerol (Meperidine) Nausea Only Ivp (Fd And C Blue No.1) Itching Minoxidil Itching Penicillamine Itching Other (comments) Drops blood pressure Percocet (Oxycodone-Acetaminophen) Nausea and Vomiting Recent Documentation Height Weight Breastfeeding? BMI OB Status Smoking Status 1.702 m 105.7 kg No 36.49 kg/m2 Hysterectomy Never Smoker Emergency Contacts Name Discharge Info Relation Home Work Mobile ,April  Daughter [21] [phone] [id number] [facility] CAREGIVER [3] Spouse [3] [phone] About your hospitalization You were admitted on:  2017 You last received care in the:  [facility] PACU You were discharged on:  2017 Unit phone number:  [phone] Why you were hospitalized Your primary diagnosis was:  Not on File Your diagnoses also included:  Diarrhea, Epigastric Pain Providers Seen During Your Hospitalizations Provider Role Specialty Primary office phone Renetta Davenport MD Attending Provider Gastroenterology [phone] Your Primary Care Physician (PCP) Primary Care Physician Office Phone Office Fax Franciscael Record A [phone] Follow-up Information Follow up With Details Comments Contact Info Flossie Goldberg, MD [address] 
 
[facility] [phone] 
[phone] Current Discharge Medication List  
  
START taking these medications Dose & Instructions Dispensing Information Comments Morning Noon Evening Bedtime  
 fluconazole 100 mg tablet Commonly known as:  DIFLUCAN  
   
 Your last dose was: Your next dose is:    
   
   
 Dose:  100 mg Take 1 Tab by mouth daily for 14 days. FDA advises cautious prescribing of oral fluconazole in pregnancy. Quantity:  14 Tab Refills:  0 CONTINUE these medications which have NOT CHANGED Dose & Instructions Dispensing Information Comments Morning Noon Evening Bedtime  
 albuterol 90 mcg/actuation inhaler Commonly known as:  PROVENTIL HFA, VENTOLIN HFA, PROAIR HFA Your last dose was: Your next dose is:    
   
   
 Dose:  1 Puff Take 1 Puff by inhalation as needed. Refills:  0  
     
   
   
   
  
 allopurinol 300 mg tablet Commonly known as:  Too Needle Your last dose was: Your next dose is:    
   
   
 Dose:  300 mg Take 300 mg by mouth daily. Refills:  0  
     
   
   
   
  
 amLODIPine-benazepril 5-20 mg per capsule Commonly known as:  Jayme Dubin Your last dose was: Your next dose is:    
   
   
 Dose:  1 Cap Take 1 Cap by mouth daily. Refills:  0  
     
   
   
   
  
 bumetanide 2 mg tablet Commonly known as:  Mathew Maury Your last dose was: Your next dose is:    
   
   
 Dose:  2 mg Take 2 mg by mouth two (2) times a day. Refills:  0  
     
   
   
   
  
 carvedilol 25 mg tablet Commonly known as:  Layman Godfrey Your last dose was: Your next dose is:    
   
   
 Dose:  25 mg Take 25 mg by mouth two (2) times daily (with meals). Refills:  0  
     
   
   
   
  
 cholecalciferol (vitamin D3) 2,000 unit Tab Your last dose was: Your next dose is:    
   
   
 Dose:  2000 Units Take 2,000 Units by mouth daily. Refills:  0  
     
   
   
   
  
 colchicine 0.6 mg tablet Start taking on:  4/15/2017 Your last dose was: Your next dose is:    
   
   
 Dose:  0.6 mg Take 1 Tab by mouth daily. Quantity:  1 Tab Refills:  0 cyanocobalamin 1,000 mcg tablet Your last dose was: Your next dose is:    
   
   
 Dose:  1000 mcg Take 1,000 mcg by mouth daily. Refills:  0  
     
   
   
   
  
 digoxin 0.125 mg tablet Commonly known as:  LANOXIN Your last dose was: Your next dose is:    
   
   
 Dose:  0.125 mg Take 0.125 mg by mouth daily. Refills:  0 DULoxetine 60 mg capsule Commonly known as:  CYMBALTA Your last dose was: Your next dose is:    
   
   
 Dose:  60 mg Take 60 mg by mouth daily. Refills:  0  
     
   
   
   
  
 gabapentin 400 mg capsule Commonly known as:  NEURONTIN Your last dose was: Your next dose is:    
   
   
 400 mg p.o. 3 times daily and 1200 mg p.o. nightly Quantity:  180 Cap Refills:  2  
     
   
   
   
  
 hyoscyamine 0.125 mg tablet Commonly known as:  Marlane Mate Your last dose was: Your next dose is:    
   
   
 Dose:  125 mcg Take 125 mcg by mouth every four (4) hours as needed for Cramping. Refills:  0  
     
   
   
   
  
 lidocaine 5 % Commonly known as:  Malva Eddie Your last dose was: Your next dose is:    
   
   
 Dose:  1 Patch 1 Patch by TransDERmal route every twenty-four (24) hours. Apply patch to the affected area for 12 hours a day and remove for 12 hours a day. Refills:  0  
     
   
   
   
  
 linaclotide 290 mcg Cap capsule Commonly known as:  Yola Hurley Your last dose was: Your next dose is:    
   
   
 Dose:  290 mcg Take 290 mcg by mouth Daily (before breakfast). Refills:  0 LYRICA 150 mg capsule Generic drug:  pregabalin Your last dose was: Your next dose is:    
   
   
 Dose:  150 mg Take 150 mg by mouth two (2) times a day. Refills:  0 Omeprazole delayed release 20 mg tablet Commonly known as:  PRILOSEC D/R Your last dose was: Your next dose is:    
   
   
 Dose:  20 mg Take 20 mg by mouth two (2) times a day. Refills:  0  
     
   
   
   
  
 spironolactone 25 mg tablet Commonly known as:  ALDACTONE Your last dose was: Your next dose is: Take  by mouth two (2) times a day. Refills:  0  
     
   
   
   
  
 temazepam 15 mg capsule Commonly known as:  RESTORIL Your last dose was: Your next dose is:    
   
   
 Dose:  15 mg Take 15 mg by mouth nightly as needed for Sleep. Refills:  0 VOLTAREN 1 % Gel Generic drug:  diclofenac Your last dose was: Your next dose is:    
   
   
 Apply  to affected area as needed. Refills:  0  
     
   
   
   
  
 zolpidem 10 mg tablet Commonly known as:  AMBIEN Your last dose was: Your next dose is:    
   
   
 Dose:  10 mg Take 10 mg by mouth nightly as needed for Sleep. Refills:  0 Where to Get Your Medications Information on where to get these meds will be given to you by the nurse or doctor. ! Ask your nurse or doctor about these medications  
  colchicine 0.6 mg tablet  
 fluconazole 100 mg tablet Discharge Instructions Emre Huntley [id number] 
1955 Procedure  Discharge Instructions:   
 
Discomfort: 
Redness at IV site- apply warm compress to area; if redness or soreness persist- contact your physician There may be a slight amount of blood passed from the rectum Gaseous discomfort- walking, belching will help relieve any discomfort You may not operate a vehicle for 24 hours You may not engage in an occupation involving machinery or appliances for rest of today You may not drink alcoholic beverages for at least 24hours Avoid making any critical decisions for at least 24 hour DIET: 
 You may resume your normal diet today.   You should not overeat or \"feast\" today as your abdomen may become distended or uncomfortable. MEDICATIONS: 
 I reconciled this list from the list you gave us when you came today for the procedure. Please clarify with me, your primary care physician and the nurse who is discharging you if we have any discrepancies. Aspirin and or non-steroidal medication (Ibuprofen, Motrin, naproxen, etc.) is ok in limited quantities. ACTIVITY: 
You may resume your normal daily activities it is recommended that you spend the remainder of the day resting -  avoid any strenuous activity. CALL M.D. ANY SIGN OF: Increasing pain, nausea, vomiting Abdominal distension (swelling) New increased bleeding (oral or rectal) Fever (chills) Pain in chest area Bloody discharge from nose or mouth Shortness of breath Follow-up Instructions: 
 Call Dr. Dash Velazco for the results of  biopsy in approximately one week Telephone #  [phone] Follow up visit Dr Alcira Perry as previously scheduled. Stop your colchicine while on the new med diflucan for the yeast infection. Take diflucan first dose tonight. Jun Ruvalcaba MD 
10:33 AM 
3/24/2017 DO NOT TAKE SLEEPING MEDICATIONS OR ANTIANXIETY MEDICATIONS WHILE TAKING NARCOTIC PAIN MEDICATIONS,  ESPECIALLY THE NIGHT OF ANESTHESIA. CPAP PATIENTS BE SURE TO WEAR MACHINE WHENEVER NAPPING OR SLEEPING. DISCHARGE SUMMARY from Nurse The following personal items collected during your admission are returned to you:  
Dental Appliance: Dental Appliances: None Vision: Visual Aid: Glasses Hearing Aid:   
Jewelry: Jewelry: None Clothing: Clothing: Footwear, Pants, Shirt, Socks, Undergarments, With patient Other Valuables: Other Valuables: None Valuables sent to safe:   
 
 
PATIENT INSTRUCTIONS: 
 
 
F-face looks uneven A-arms unable to move or move even S-speech slurred or non-existent T-time-call 911 as soon as signs and symptoms begin-DO NOT go Back to bed or wait to see if you get better-TIME IS BRAIN. If you have not received your influenza and/or pneumococcal vaccine, please follow up with your primary care physician. The discharge information has been reviewed with the patient and caregiver. The patient and caregiver verbalized understanding. Discharge Instructions Attachments/References FLUCONAZOLE (BY MOUTH) (ENGLISH) COLCHICINE (BY MOUTH) (ENGLISH) Discharge Orders None Introducing [facility] [place] [facility]! Dear Cele Feldman: 
Thank you for requesting a [email or web address] account. Our records indicate that you already have an active [email or web address] account. You can access your account anytime at [email or web address] Did you know that you can access your hospital and ER discharge instructions at any time in [email or web address]? You can also review all of your test results from your hospital stay or ER visit. Additional Information If you have questions, please visit the Frequently Asked Questions section of the [email or web address] website at [email or web address] Remember, [email or web address] is NOT to be used for urgent needs. For medical emergencies, dial 911. Now available from your iPhone and Android! General Information Please provide this summary of care documentation to your next provider. Patient Signature:  ____________________________________________________________ Date:  ____________________________________________________________  
  
Reena Susan Provider Signature:  ____________________________________________________________ Date:  ____________________________________________________________ More Information Fluconazole (By mouth) Fluconazole (avms-EVE-c-zole) Prevents and treats fungal infections. Brand Name(s):Diflucan There may be other brand names for this medicine. When This Medicine Should Not Be Used: This medicine is not right for everyone. Do not use it if you had an allergic reaction to fluconazole, or if you are pregnant. How to Use This Medicine:  
Liquid, Tablet · Your doctor will tell you how much medicine to use. Do not use more than directed. · Oral liquid: Shake well just before each use. Measure the oral liquid medicine with a marked measuring spoon, oral syringe, or medicine cup. · Take all of the medicine in your prescription to clear up your infection, even if you feel better after the first few doses. · Read and follow the patient instructions that come with this medicine. Talk to your doctor or pharmacist if you have any questions. · Missed dose: Take a dose as soon as you remember. If it is almost time for your next dose, wait until then and take a regular dose. Do not take extra medicine to make up for a missed dose. · Store the medicine in a closed container at room temperature, away from heat, moisture, and direct light. Store the oral liquid in the refrigerator or at room temperature and use it within 14 days. Do not freeze. Drugs and Foods to Avoid: Ask your doctor or pharmacist before using any other medicine, including over-the-counter medicines, vitamins, and herbal products. · Do not use this medicine together with astemizole, cisapride, erythromycin, pimozide, quinidine, or terfenadine. · Some foods and medicines can affect how fluconazole works. Tell your doctor if you are using cimetidine, midazolam, prednisone, rifabutin, rifampin, theophylline, tofacitinib, triazolam, vitamin A supplements, or voriconazole. Also tell your doctor if you are using any of the following: ¨ A blood thinner (such as warfarin) ¨ A diuretic or \"water pill\" (such as hydrochlorothiazide), or blood pressure medicine (such as amlodipine, felodipine, isradipine, losartan, nifedipine) ¨ Birth control pills ¨ Cancer medicine (cyclophosphamide, vinblastine, vincristine) ¨ Diabetes medicine that you take by mouth (glipizide, glyburide, tolbutamide) ¨ Medicine to lower cholesterol (atorvastatin, fluvastatin, simvastatin) ¨ Medicine to treat depression (amitriptyline, nortriptyline) ¨ Medicine to treat HIV/AIDS (saquinavir, zidovudine) ¨ Medicine to treat malaria (halofantrine) ¨ Medicine to treat seizures (carbamazepine, phenytoin) ¨ Medicine that weakens the immune system (cyclosporine, sirolimus, tacrolimus) ¨ Narcotic pain medicine (alfentanil, fentanyl, methadone) ¨ Pain or arthritis medicine (aspirin, celecoxib, diclofenac, ibuprofen, naproxen) Warnings While Using This Medicine: · It is not safe to take this medicine during pregnancy. It could harm an unborn baby. Tell your doctor right away if you become pregnant. · Tell your doctor if you are breastfeeding, or if you have kidney disease, liver disease, heart disease, heart rhythm problems, cancer, or HIV/AIDS. · This medicine may cause the following problems:  
¨ Liver problems ¨ Serious skin reactions ¨ Changes in heart rhythm, such as a condition called QT prolongation · This medicine may make you dizzy or drowsy. Do not drive or do anything that could be dangerous until you know how this medicine affects you. · Call your doctor if your symptoms do not improve or if they get worse. · Keep all medicine out of the reach of children. Never share your medicine with anyone. Possible Side Effects While Using This Medicine:  
Call your doctor right away if you notice any of these side effects: · Allergic reaction: Itching or hives, swelling in your face or hands, swelling or tingling in your mouth or throat, chest tightness, trouble breathing · Blistering, peeling, or red skin rash · Dark urine or pale stools, nausea, vomiting, loss of appetite, stomach pain, yellow skin or eyes · Fast, pounding, or uneven heartbeat · Unusual bleeding, bruising, or weakness If you notice these less serious side effects, talk with your doctor:  
· Headache · Mild nausea, vomiting, stomach pain, or diarrhea If you notice other side effects that you think are caused by this medicine, tell your doctor. Call your doctor for medical advice about side effects. You may report side effects to FDA at 8-621-FDA-4825 © 2016 [address] is for End User's use only and may not be sold, redistributed or otherwise used for commercial purposes. The above information is an  only. It is not intended as medical advice for individual conditions or treatments. Talk to your doctor, nurse or pharmacist before following any medical regimen to see if it is safe and effective for you. Colchicine (By mouth) Colchicine (EVW-etf-rjve) Treats and prevents gout attacks. Also treats familial Mediterranean fever (FMF). Brand Name(s):Colcrys, Charlion Due There may be other brand names for this medicine. When This Medicine Should Not Be Used: This medicine is generally considered safe for most people. Talk to your doctor if you have concerns. How to Use This Medicine:  
Capsule, Tablet · Take your medicine as directed. Your dose may need to be changed several times to find what works best for you. · Keep using this medicine for the full treatment time, even if you feel better after the first few doses. · This medicine should come with a Medication Guide. Ask your pharmacist for a copy if you do not have one. · Missed dose: Take a dose as soon as you remember. If it is almost time for your next dose, wait until then and take a regular dose. Do not take extra medicine to make up for a missed dose. · Store the medicine in a closed container at room temperature, away from heat, moisture, and direct light. Drugs and Foods to Avoid: Ask your doctor or pharmacist before using any other medicine, including over-the-counter medicines, vitamins, and herbal products. · Some medicines and foods can affect how colchicine works. Tell your doctor if you are using any of the following: 
¨ Aprepitant, cyclosporine, digoxin, diltiazem, nefazodone, ranolazine, verapamil ¨ Medicine to treat HIV or AIDS ¨ Medicine to treat an infection (such as clarithromycin, erythromycin, itraconazole, ketoconazole) ¨ Medicine to lower cholesterol (such as atorvastatin, bezafibrate, fenofibrate, fenofibric acid, fluvastatin, gemfibrozil, lovastatin, pravastatin, simvastatin) · Do not eat grapefruit or drink grapefruit juice while you are using this medicine. Warnings While Using This Medicine: · Tell your doctor if you are pregnant or breastfeeding, or if you have kidney disease, liver disease, anemia, bleeding problems, or muscle problems. · This medicine may cause muscle problems. · This medicine may make you bleed, bruise, or get infections more easily. Take precautions to prevent illness and injury. Wash your hands often. · Your doctor will do lab tests at regular visits to check on the effects of this medicine. Keep all appointments. · Keep all medicine out of the reach of children. Never share your medicine with anyone. Possible Side Effects While Using This Medicine:  
Call your doctor right away if you notice any of these side effects: · Allergic reaction: Itching or hives, swelling in your face or hands, swelling or tingling in your mouth or throat, chest tightness, trouble breathing · Bloody or black tarry stools, red or dark brown urine · Fever, chills, cough, sore throat, body aches · Muscle pain, tenderness, or weakness · Numbness or tingling in your hands or feet · Pale or gray lips, tongue, or palms · Severe diarrhea or vomiting · Unusual bleeding, bruising, or weakness If you notice these less serious side effects, talk with your doctor: · Mild diarrhea or vomiting, nausea, stomach pain or cramps If you notice other side effects that you think are caused by this medicine, tell your doctor. Call your doctor for medical advice about side effects. You may report side effects to FDA at 9-029-FDA-9167 © 2016 [address] is for End User's use only and may not be sold, redistributed or otherwise used for commercial purposes. The above information is an  only. It is not intended as medical advice for individual conditions or treatments.  Talk to your doctor, nurse or pharmacist before following any medical regimen to see if it is safe and effective for you.

## 2017-03-24 NOTE — ANESTHESIA POSTPROCEDURE EVALUATION
Post-Anesthesia Evaluation and Assessment    Patient: Derrick Ortega MRN: [id number]  SSN: [id number]    YOB: 1955  Age: 64 y.o. Sex: female       Cardiovascular Function/Vital Signs  Visit Vitals    /68    Pulse 70    Temp 36.4 °C (97.6 °F)    Resp 12    Ht 5' 7\" (1.702 m)    Wt 105.7 kg (233 lb)    SpO2 100%    Breastfeeding No    BMI 36.49 kg/m2       Patient is status post general, total IV anesthesia anesthesia for Procedure(s):  ESOPHAGOGASTRODUODENOSCOPY (EGD)  FLEXIBLE SIGMOIDOSCOPY   ESOPHAGOGASTRODUODENAL (EGD) BIOPSY  ENDOSCOPIC ESOPHAGEAL BRUSHING  COLON BIOPSY. Nausea/Vomiting: None    Postoperative hydration reviewed and adequate. Pain:  Pain Scale 1: Numeric (0 - 10) (03/24/17 1047)  Pain Intensity 1: 0 (03/24/17 1047)   Managed    Neurological Status:   Neuro  Neurologic State: Alert (03/24/17 1047)  LUE Motor Response: Purposeful (03/24/17 1047)  LLE Motor Response: Purposeful (03/24/17 1047)  RUE Motor Response: Purposeful (03/24/17 1047)  RLE Motor Response: Purposeful (03/24/17 1047)   At baseline    Mental Status and Level of Consciousness: Arousable    Pulmonary Status:   O2 Device: Room air (03/24/17 1032)   Adequate oxygenation and airway patent    Complications related to anesthesia: None    Post-anesthesia assessment completed.  No concerns    Signed By: Moustapha Higginbotham MD     March 24, 2017

## 2017-05-03 ENCOUNTER — HOSPITAL ENCOUNTER (OUTPATIENT)
Age: 62
Setting detail: OUTPATIENT SURGERY
Discharge: HOME | End: 2017-05-03
Attending: SPECIALIST | Admitting: SPECIALIST
Payer: MEDICARE

## 2017-05-03 VITALS
OXYGEN SATURATION: 100 % | SYSTOLIC BLOOD PRESSURE: 141 MMHG | HEIGHT: 67 IN | HEART RATE: 67 BPM | BODY MASS INDEX: 36.88 KG/M2 | RESPIRATION RATE: 18 BRPM | DIASTOLIC BLOOD PRESSURE: 72 MMHG | WEIGHT: 235 LBS

## 2017-05-03 PROCEDURE — 76040000019: Performed by: SPECIALIST

## 2017-05-03 PROCEDURE — 77030020268 HC MISC GENERAL SUPPLY: Performed by: SPECIALIST

## 2017-05-03 NOTE — IP AVS SNAPSHOT
[address] [facility] 
[phone] Patient: Sinai Bunch MRN: [id number] :1955 You are allergic to the following Allergen Reactions Codeine Itching Demerol (Meperidine) Nausea Only Ivp (Fd And C Blue No.1) Itching Minoxidil Itching Penicillamine Itching Other (comments) Drops blood pressure Percocet (Oxycodone-Acetaminophen) Nausea and Vomiting Recent Documentation Height Weight Breastfeeding? BMI OB Status Smoking Status 1.702 m 106.6 kg No 36.81 kg/m2 Hysterectomy Never Smoker Emergency Contacts Name Discharge Info Relation Home Work Mobile ,April  Daughter [21] [phone] [address] [facility] CAREGIVER [3] Spouse [3] [phone] About your hospitalization You were admitted on:  May 3, 2017 You last received care in the:  [facility] You were discharged on:  May 3, 2017 Unit phone number:  [phone] Why you were hospitalized Your primary diagnosis was:  Not on File Providers Seen During Your Hospitalizations Provider Role Specialty Primary office phone Reed Delcid MD Attending Provider Gastroenterology [phone] Your Primary Care Physician (PCP) Primary Care Physician Office Phone Office Fax Belén WEBER [phone] Follow-up Information None Your Appointments Monday May 15, 2017  1:40 PM EDT Follow Up with Kaylee Cool MD  
Neurology Clinic at [address], 
[address] [place] [address], [address] [facility]  
[phone] Current Discharge Medication List  
  
ASK your doctor about these medications Dose & Instructions Dispensing Information Comments Morning Noon Evening Bedtime albuterol 90 mcg/actuation inhaler Commonly known as:  PROVENTIL HFA, VENTOLIN HFA, PROAIR HFA Your last dose was: Your next dose is:    
   
   
 Dose:  1 Puff Take 1 Puff by inhalation as needed. Refills:  0  
     
   
   
   
  
 allopurinol 300 mg tablet Commonly known as:  Jeovany Wallacen Your last dose was: Your next dose is:    
   
   
 Dose:  300 mg Take 300 mg by mouth daily. Refills:  0  
     
   
   
   
  
 amLODIPine-benazepril 5-20 mg per capsule Commonly known as:  Robyn Kevan Your last dose was: Your next dose is:    
   
   
 Dose:  1 Cap Take 1 Cap by mouth daily. Refills:  0  
     
   
   
   
  
 bumetanide 2 mg tablet Commonly known as:  Beloit Golds Your last dose was: Your next dose is:    
   
   
 Dose:  2 mg Take 2 mg by mouth two (2) times a day. Refills:  0  
     
   
   
   
  
 carvedilol 25 mg tablet Commonly known as:  Urban Paulette Your last dose was: Your next dose is:    
   
   
 Dose:  25 mg Take 25 mg by mouth two (2) times daily (with meals). Refills:  0  
     
   
   
   
  
 cholecalciferol (vitamin D3) 2,000 unit Tab Your last dose was: Your next dose is:    
   
   
 Dose:  2000 Units Take 2,000 Units by mouth daily. Refills:  0  
     
   
   
   
  
 colchicine 0.6 mg tablet Your last dose was: Your next dose is:    
   
   
 Dose:  0.6 mg Take 1 Tab by mouth daily. Quantity:  1 Tab Refills:  0  
     
   
   
   
  
 cyanocobalamin 1,000 mcg tablet Your last dose was: Your next dose is:    
   
   
 Dose:  1000 mcg Take 1,000 mcg by mouth daily. Refills:  0  
     
   
   
   
  
 digoxin 0.125 mg tablet Commonly known as:  LANOXIN Your last dose was: Your next dose is:    
   
   
 Dose:  0.125 mg Take 0.125 mg by mouth daily. Refills:  0 DULoxetine 60 mg capsule Commonly known as:  CYMBALTA Your last dose was: Your next dose is:    
   
   
 Dose:  60 mg Take 60 mg by mouth daily. Refills:  0  
     
   
   
   
  
 gabapentin 400 mg capsule Commonly known as:  NEURONTIN Your last dose was: Your next dose is:    
   
   
 400 mg p.o. 3 times daily and 1200 mg p.o. nightly Quantity:  180 Cap Refills:  2  
     
   
   
   
  
 hyoscyamine 0.125 mg tablet Commonly known as:  Marianne Newer Your last dose was: Your next dose is:    
   
   
 Dose:  125 mcg Take 125 mcg by mouth every four (4) hours as needed for Cramping. Refills:  0  
     
   
   
   
  
 lidocaine 5 % Commonly known as:  Sandra Linker Your last dose was: Your next dose is:    
   
   
 Dose:  1 Patch 1 Patch by TransDERmal route every twenty-four (24) hours. Apply patch to the affected area for 12 hours a day and remove for 12 hours a day. Refills:  0  
     
   
   
   
  
 linaclotide 290 mcg Cap capsule Commonly known as:  Minor Harjeet Your last dose was: Your next dose is:    
   
   
 Dose:  290 mcg Take 290 mcg by mouth Daily (before breakfast). Refills:  0 Liraglutide 0.6 mg/0.1 mL (18 mg/3 mL) sub-q pen Commonly known as:  Allison Bucy Your last dose was: Your next dose is:    
   
   
 Dose:  0.6 mg  
0.6 mg by SubCUTAneous route daily. Refills:  0 LYRICA 150 mg capsule Generic drug:  pregabalin Your last dose was: Your next dose is:    
   
   
 Dose:  150 mg Take 150 mg by mouth two (2) times a day. Refills:  0 Omeprazole delayed release 20 mg tablet Commonly known as:  PRILOSEC D/R Your last dose was: Your next dose is:    
   
   
 Dose:  20 mg Take 20 mg by mouth two (2) times a day. Refills:  0  
     
   
   
   
  
 spironolactone 25 mg tablet Commonly known as:  ALDACTONE Your last dose was: Your next dose is: Take  by mouth two (2) times a day. Refills:  0  
     
   
   
   
  
 temazepam 15 mg capsule Commonly known as:  RESTORIL Your last dose was: Your next dose is:    
   
   
 Dose:  15 mg Take 15 mg by mouth nightly as needed for Sleep. Refills:  0 VOLTAREN 1 % Gel Generic drug:  diclofenac Your last dose was: Your next dose is:    
   
   
 Apply  to affected area as needed. Refills:  0  
     
   
   
   
  
 zolpidem 10 mg tablet Commonly known as:  AMBIEN Your last dose was: Your next dose is:    
   
   
 Dose:  10 mg Take 10 mg by mouth nightly as needed for Sleep. Refills:  0 Discharge Instructions Roni Chatterjee [id number] 
1955 MANOMETRY DISCHARGE INSTRUCTION You may resume your regular diet as tolerated. You may resume your normal daily activities. Call your Physician if you have any complications or questions. [facility] Activation Thank you for requesting access to [facility]. Please follow the instructions below to securely access and download your online medical record. [facility] allows you to send messages to your doctor, view your test results, renew your prescriptions, schedule appointments, and more. How Do I Sign Up? 1. In your internet browser, go to [email or web address] 
2. Click on the First Time User? Click Here link in the Sign In box. You will be redirect to the New Member Sign Up page. 3. Enter your [facility] Access Code exactly as it appears below. You will not need to use this code after youve completed the sign-up process. If you do not sign up before the expiration date, you must request a new code. [facility] Access Code: Activation code not generated Current [facility] Status: Active (This is the date your [facility] access code will ) 4. Enter the last four digits of your Social Security Number (xxxx) and Date of Birth (mm/dd/yyyy) as indicated and click Submit. You will be taken to the next sign-up page. 5. Create a [employer] ID. This will be your [employer] login ID and cannot be changed, so think of one that is secure and easy to remember. 6. Create a [employer] password. You can change your password at any time. 7. Enter your Password Reset Question and Answer. This can be used at a later time if you forget your password. 8. Enter your e-mail address. You will receive e-mail notification when new information is available in [address] 9. Click Sign Up. You can now view and download portions of your medical record. 10. Click the Download Summary menu link to download a portable copy of your medical information. Additional Information If you have questions, please visit the Frequently Asked Questions section of the [employer] website at [email or web address] Remember, [employer] is NOT to be used for urgent needs. For medical emergencies, dial 911. Discharge Orders None [facility]! Dear Yeison Mccartney: 
Thank you for requesting a [employer] account. Our records indicate that you already have an active [employer] account. You can access your account anytime at [email or web address] Did you know that you can access your hospital and ER discharge instructions at any time in [employer]? You can also review all of your test results from your hospital stay or ER visit. Additional Information If you have questions, please visit the Frequently Asked Questions section of the [employer] website at [email or web address] Remember, [employer] is NOT to be used for urgent needs. For medical emergencies, dial 911. Now available from your iPhone and Android! General Information Please provide this summary of care documentation to your next provider. Patient Signature:  ____________________________________________________________ Date:  ____________________________________________________________  
  
Kameron Nemesio Provider Signature:  ____________________________________________________________ Date:  ____________________________________________________________

## 2017-05-03 NOTE — PERIOP NOTES
Rectal exam done by Janae Mejia RN. Anal manometry catheter inserted into rectum. Manometry procedure complete. Catheter inserted into rectum. Balloon filled with 40cc of luke warm H2O, and pt escorted to bathroom for 3 min expulsion test.  Pt was not able to expel balloon. Balloon deflated and catheter removed. Pt tolerated procedures well.

## 2017-05-03 NOTE — IP AVS SNAPSHOT
Current Discharge Medication List  
  
ASK your doctor about these medications Dose & Instructions Dispensing Information Comments Morning Noon Evening Bedtime  
 albuterol 90 mcg/actuation inhaler Commonly known as:  PROVENTIL HFA, VENTOLIN HFA, PROAIR HFA Your last dose was: Your next dose is:    
   
   
 Dose:  1 Puff Take 1 Puff by inhalation as needed. Refills:  0  
     
   
   
   
  
 allopurinol 300 mg tablet Commonly known as:  Saul Ernst Your last dose was: Your next dose is:    
   
   
 Dose:  300 mg Take 300 mg by mouth daily. Refills:  0  
     
   
   
   
  
 amLODIPine-benazepril 5-20 mg per capsule Commonly known as:  Yousif Villagran Your last dose was: Your next dose is:    
   
   
 Dose:  1 Cap Take 1 Cap by mouth daily. Refills:  0  
     
   
   
   
  
 bumetanide 2 mg tablet Commonly known as:  Atiya Vela Your last dose was: Your next dose is:    
   
   
 Dose:  2 mg Take 2 mg by mouth two (2) times a day. Refills:  0  
     
   
   
   
  
 carvedilol 25 mg tablet Commonly known as:  Tyra Bosworth Your last dose was: Your next dose is:    
   
   
 Dose:  25 mg Take 25 mg by mouth two (2) times daily (with meals). Refills:  0  
     
   
   
   
  
 cholecalciferol (vitamin D3) 2,000 unit Tab Your last dose was: Your next dose is:    
   
   
 Dose:  2000 Units Take 2,000 Units by mouth daily. Refills:  0  
     
   
   
   
  
 colchicine 0.6 mg tablet Your last dose was: Your next dose is:    
   
   
 Dose:  0.6 mg Take 1 Tab by mouth daily. Quantity:  1 Tab Refills:  0  
     
   
   
   
  
 cyanocobalamin 1,000 mcg tablet Your last dose was: Your next dose is:    
   
   
 Dose:  1000 mcg Take 1,000 mcg by mouth daily. Refills:  0  
     
   
   
   
  
 digoxin 0.125 mg tablet Commonly known as:  LANOXIN  
   
 Your last dose was: Your next dose is:    
   
   
 Dose:  0.125 mg Take 0.125 mg by mouth daily. Refills:  0 DULoxetine 60 mg capsule Commonly known as:  CYMBALTA Your last dose was: Your next dose is:    
   
   
 Dose:  60 mg Take 60 mg by mouth daily. Refills:  0  
     
   
   
   
  
 gabapentin 400 mg capsule Commonly known as:  NEURONTIN Your last dose was: Your next dose is:    
   
   
 400 mg p.o. 3 times daily and 1200 mg p.o. nightly Quantity:  180 Cap Refills:  2  
     
   
   
   
  
 hyoscyamine 0.125 mg tablet Commonly known as:  Julius Plant Your last dose was: Your next dose is:    
   
   
 Dose:  125 mcg Take 125 mcg by mouth every four (4) hours as needed for Cramping. Refills:  0  
     
   
   
   
  
 lidocaine 5 % Commonly known as:  [place] Your last dose was: Your next dose is:    
   
   
 Dose:  1 Patch 1 Patch by TransDERmal route every twenty-four (24) hours. Apply patch to the affected area for 12 hours a day and remove for 12 hours a day. Refills:  0  
     
   
   
   
  
 linaclotide 290 mcg Cap capsule Commonly known as:  Alicia Pierini Your last dose was: Your next dose is:    
   
   
 Dose:  290 mcg Take 290 mcg by mouth Daily (before breakfast). Refills:  0 Liraglutide 0.6 mg/0.1 mL (18 mg/3 mL) sub-q pen Commonly known as:  Tipton Alt Your last dose was: Your next dose is:    
   
   
 Dose:  0.6 mg  
0.6 mg by SubCUTAneous route daily. Refills:  0 LYRICA 150 mg capsule Generic drug:  pregabalin Your last dose was: Your next dose is:    
   
   
 Dose:  150 mg Take 150 mg by mouth two (2) times a day. Refills:  0 Omeprazole delayed release 20 mg tablet Commonly known as:  PRILOSEC D/R Your last dose was: Your next dose is:    
   
   
 Dose:  20 mg Take 20 mg by mouth two (2) times a day. Refills:  0  
     
   
   
   
  
 spironolactone 25 mg tablet Commonly known as:  ALDACTONE Your last dose was: Your next dose is: Take  by mouth two (2) times a day. Refills:  0  
     
   
   
   
  
 temazepam 15 mg capsule Commonly known as:  RESTORIL Your last dose was: Your next dose is:    
   
   
 Dose:  15 mg Take 15 mg by mouth nightly as needed for Sleep. Refills:  0 VOLTAREN 1 % Gel Generic drug:  diclofenac Your last dose was: Your next dose is:    
   
   
 Apply  to affected area as needed. Refills:  0  
     
   
   
   
  
 zolpidem 10 mg tablet Commonly known as:  AMBIEN Your last dose was: Your next dose is:    
   
   
 Dose:  10 mg Take 10 mg by mouth nightly as needed for Sleep. Refills:  0

## 2017-05-04 NOTE — OP NOTES
[address], [address]   OP NOTE       Name:  Shiela Aschoff   MR#:  [id number]   :  1955   Account #:  [de-identified]    Surgery Date:  2017   Date of Adm:  2017       PREOPERATIVE DIAGNOSES    1. Incontinence of feces. 2. Incomplete passage of stool. POSTOPERATIVE DIAGNOSES:    1. Weak voluntary squeeze. 2. No relaxation of sphincter with push maneuver. 3. Failed balloon expulsion. 4. Abnormal sensory findings. See below. PROCEDURES PLANNED    1. Anorectal manometry. 2. Assessment of anorectal function with balloon. ANESTHESIA: None. SPECIMENS: None. ESTIMATED BLOOD LOSS: None. DESCRIPTION OF PROCEDURE: High-resolution anorectal   manometry was performed by the nursing staff with subsequent   interpretation by Dr. Katherin Laureano. Sphincter pressures are measured, rectal   and abdominal reference, mean and max. Normals are above 30   mmHg. Maximum rectal reference pressure 44.3, mean 39.4. Maximum   abdominal reference pressure 53.0, mean 48.0. The patient is then   asked to voluntarily squeeze. Squeeze pressure should increase by   more than 30 mmHg and sustain for more than 10 seconds. Squeeze   pressure is increased to 59.3 mmHg by rectal reference and 69.1   mmHg by abdominal reference. She sustained squeeze for 21.3   minutes. The patient is then asked to push or bear down. Residual anal   pressure is 42.7 mmHg by abdominal reference. This is scored as a   1% relaxation. Intrarectal pressure at this time is 50 mmHg for a   rectoanal pressure differential of -27.6 mmHg. The balloon is then utilized. Balloon expulsion has failed. Rectal anal   inhibitory reflex is present. The first sensation to rectal filling is at 60   mL. Normal should be less than 20 mL. The urge to defecate is at 80   mL, which is normal. Maximum discomfort is at 120 mL, which is low. Normal should be about 180 mL.       The sensory findings demonstrate impaired sensation to first filling, but   heightened sensation to ongoing filling. This may represent chronic   stool in the vault. It may represent an afferent sensory defect. The   failure to voluntarily squeeze may represent a focal weakness or a   more global weakness. Consider transanal ultrasound to look for   defect in the muscle. Failed balloon expulsion and failure to relax with   push maneuver demonstrates some degree of anismus. I recommend the followin. Physical therapy to focus on improving pelvic floor strengthening   and anal relaxation with push as well as improving sensitivity and   increasing squeeze pressure. 2. Defecating MRI. 3. Anal ultrasound as above. If a specific cause of incontinence is   found, consider addressing that specifically as the impaired push   maneuver may be a secondary phenomenon.          MD TAYLOR Purvis / SAMPSON   D:  2017   09:49   T:  2017   10:15   Job #:  [id number]

## 2017-05-15 ENCOUNTER — OFFICE VISIT (OUTPATIENT)
Dept: NEUROLOGY | Age: 62
End: 2017-05-15

## 2017-05-15 VITALS
BODY MASS INDEX: 37.73 KG/M2 | HEART RATE: 76 BPM | WEIGHT: 240.4 LBS | DIASTOLIC BLOOD PRESSURE: 70 MMHG | SYSTOLIC BLOOD PRESSURE: 118 MMHG | HEIGHT: 67 IN

## 2017-05-15 DIAGNOSIS — G56.01 CARPAL TUNNEL SYNDROME OF RIGHT WRIST: ICD-10-CM

## 2017-05-15 DIAGNOSIS — M54.41 CHRONIC RIGHT-SIDED LOW BACK PAIN WITH RIGHT-SIDED SCIATICA: Primary | ICD-10-CM

## 2017-05-15 DIAGNOSIS — G89.29 CHRONIC RIGHT-SIDED LOW BACK PAIN WITH RIGHT-SIDED SCIATICA: Primary | ICD-10-CM

## 2017-05-15 NOTE — LETTER
5/15/2017 2:11 PM 
 
Patient:    Sinai Bunch YOB: 1955 Date of Visit:    5/15/2017 Dear Howard Cameron MD 
 
Thank you for referring Ms. Jeremy Farrell to me for evaluation/treatment. Below are the relevant portions of my assessment and plan of care. Neurology follow-up note REFERRED BY: 
Howard Cameron MD 
 
05/15/17 Chief Complaint Patient presents with  Follow-up Neuropathy some what better Subjective Sinai Bunch is a 64 y.o. female who presented to the neurology office for management of back pain. To recap, the patient has been having back pain since 2014 . He does have intermittent exacerbation of right sided back pain with radiation to the right leg. When this happens, she does have 10/10 in severity, sharp and shooting and then she has to have bed rest and uses ice packs for a week and then it resolves. She is on lyrica 150 mg po bid and gabapentin 400 mg p.o. 3 times daily plus additional 1200 mg p.o. nightly. She did have exacerbation of her symptoms a week ago and is recovering from that. She had a MRI of the L spine and it was normal.  
 
Current Outpatient Prescriptions Medication Sig  Liraglutide (VICTOZA) 0.6 mg/0.1 mL (18 mg/3 mL) sub-q pen 0.6 mg by SubCUTAneous route daily.  colchicine 0.6 mg tablet Take 1 Tab by mouth daily.  lidocaine (LIDODERM) 5 % 1 Patch by TransDERmal route every twenty-four (24) hours. Apply patch to the affected area for 12 hours a day and remove for 12 hours a day.  gabapentin (NEURONTIN) 400 mg capsule 400 mg p.o. 3 times daily and 1200 mg p.o. nightly  diclofenac (VOLTAREN) 1 % gel Apply  to affected area as needed.  amLODIPine-benazepril (LOTREL) 5-20 mg per capsule Take 1 Cap by mouth daily.  carvedilol (COREG) 25 mg tablet Take 25 mg by mouth two (2) times daily (with meals).  bumetanide (BUMEX) 2 mg tablet Take 2 mg by mouth two (2) times a day.  digoxin (LANOXIN) 0.125 mg tablet Take 0.125 mg by mouth daily.  DULoxetine (CYMBALTA) 60 mg capsule Take 60 mg by mouth daily.  linaclotide (LINZESS) 290 mcg cap capsule Take 290 mcg by mouth Daily (before breakfast).  Omeprazole delayed release (PRILOSEC D/R) 20 mg tablet Take 20 mg by mouth two (2) times a day.  zolpidem (AMBIEN) 10 mg tablet Take 10 mg by mouth nightly as needed for Sleep.  temazepam (RESTORIL) 15 mg capsule Take 15 mg by mouth nightly as needed for Sleep.  cholecalciferol, vitamin D3, 2,000 unit tab Take 2,000 Units by mouth daily.  cyanocobalamin 1,000 mcg tablet Take 1,000 mcg by mouth daily.  allopurinol (ZYLOPRIM) 300 mg tablet Take 300 mg by mouth daily.  hyoscyamine (ANASPAZ, LEVSIN) 0.125 mg tablet Take 125 mcg by mouth every four (4) hours as needed for Cramping.  albuterol (PROVENTIL HFA, VENTOLIN HFA) 90 mcg/actuation inhaler Take 1 Puff by inhalation as needed.  spironolactone (ALDACTONE) 25 mg tablet Take  by mouth two (2) times a day.  pregabalin (LYRICA) 150 mg capsule Take 150 mg by mouth two (2) times a day. No current facility-administered medications for this visit. REVIEW OF SYSTEMS:  
A ten system review of constitutional, cardiovascular, respiratory, musculoskeletal, endocrine, skin, SHEENT, genitourinary, psychiatric and neurologic systems was obtained and is unremarkable except as stated in HPI EXAMINATION:  
Visit Vitals  /70  Pulse 76  Ht 5' 7\" (1.702 m)  Wt 240 lb 6.4 oz (109 kg)  BMI 37.65 kg/m2 General:  
General appearance: Pt is in no acute distress Distal pulses are preserved Neurological Examination:  
Mental Status: AAO x3. Speech is fluent. Follows commands, has normal fund of knowledge, attention, short term recall, comprehension and insight. Cranial Nerves: Visual fields are full.  PERRL, Extraocular movements are full. Facial sensation intact V1- V3. Facial movement intact, symmetric. Hearing intact to conversation. Palate elevates symmetrically. Shoulder shrug symmetric. Tongue midline. Motor: Strength is 5/5 in all 4 ext. No atrophy. Tone: Normal 
 
Sensation: Decreased vib distally in the right lower ext. Coordination/Cerebellar: Intact to finger-nose-finger Gait: Casual gait is normal.  
 
Laboratory review:  
Results for orders placed or performed during the hospital encounter of 03/23/17 METABOLIC PANEL, BASIC Result Value Ref Range Sodium 142 136 - 145 mmol/L Potassium 3.9 3.5 - 5.1 mmol/L Chloride 105 97 - 108 mmol/L  
 CO2 30 21 - 32 mmol/L Anion gap 7 5 - 15 mmol/L Glucose 93 65 - 100 mg/dL BUN 22 (H) 6 - 20 MG/DL Creatinine 1.20 (H) 0.55 - 1.02 MG/DL  
 BUN/Creatinine ratio 18 12 - 20 GFR est AA 55 (L) >60 ml/min/1.73m2 GFR est non-AA 46 (L) >60 ml/min/1.73m2 Calcium 9.7 8.5 - 10.1 MG/DL Imaging review:  
8/3/16 MRI L spine Minimal degenerative disease. Mild left L5-S1 neural foraminal stenosis, stable. EMG/nerve conduction study performed in November 2016 is remarkable for right carpal tunnel syndrome. There is no electrophysiological evidence of a right lumbar radiculopathy. Documentation review: 
None Assessment/Plan:  
Bryanna Severino is a 64 y.o. female who presented to the neurology office for management of intermittent back pain with radiation to the right lower ext. MRI of the lumbar spine and EMG/nerve conduction study have been unremarkable for the back pain but it did show right median neuropathy at the wrist.  She is asymptomatic from that. Her back pain has worsened and now is improving. I do plan to continue gabapentin to 400 mg 3 times a day and 1200 mg at night along with Lyrica 150 mg p.o. 2 times daily Follow-up in 6 months ICD-10-CM ICD-9-CM 1.  Chronic right-sided low back pain with right-sided sciatica M54.41 724.2   
 G89.29 724.3   
  338.29   
2. Carpal tunnel syndrome of right wrist G56.01 354.0 Over 25 minutes was spent with the patient and family of which > 50% of the visit was spent counseling on diagnosis, management, and treatment of the diagnosis Thank you for allowing me to participate in the care of Ms. Aldana. Please feel free to contact me if you have any questions. Ayana Welch MD 
Neurologist and Clinical Neurophysiologist 
 
CC: Rene Ayala MD 
Fax: [phone] This note will not be viewable in [address] If you have questions, please do not hesitate to call me. I look forward to following Ms. Aldana along with you. Sincerely, Ayana Welch MD

## 2017-05-15 NOTE — MR AVS SNAPSHOT
Visit Information Date & Time Provider Department Dept. Phone Encounter #  
 5/15/2017  1:40 PM Chantell Crockett MD [facility] [phone] [id number] Upcoming Health Maintenance Date Due Hepatitis C Screening 1955 Pneumococcal 19-64 Medium Risk (1 of 1 - PPSV23) 10/17/1974 DTaP/Tdap/Td series (1 - Tdap) 10/17/1976 PAP AKA CERVICAL CYTOLOGY 10/17/1976 BREAST CANCER SCRN MAMMOGRAM 10/17/2005 FOBT Q 1 YEAR AGE 50-75 10/17/2005 ZOSTER VACCINE AGE 60> 10/17/2015 INFLUENZA AGE 9 TO ADULT 8/1/2017 Allergies as of 5/15/2017  Review Complete On: 5/15/2017 By: Cortez Navarro Severity Noted Reaction Type Reactions Codeine  05/13/2010    Itching Demerol [Meperidine]  08/27/2012    Nausea Only Ivp [Fd And C Blue No.1]  05/13/2010    Itching Minoxidil  05/13/2010    Itching Penicillamine  05/13/2010   Side Effect Itching, Other (comments) Drops blood pressure Percocet [Oxycodone-acetaminophen]  06/19/2014   Systemic Nausea and Vomiting Current Immunizations  Never Reviewed No immunizations on file. Not reviewed this visit You Were Diagnosed With   
  
 Codes Comments Chronic right-sided low back pain with right-sided sciatica    -  Primary ICD-10-CM: M54.41, G89.29 ICD-9-CM: 724.2, 724.3, 338.29 Carpal tunnel syndrome of right wrist     ICD-10-CM: G56.01 
ICD-9-CM: 354.0 Vitals BP Pulse Height(growth percentile) Weight(growth percentile) BMI OB Status 118/70 76 5' 7\" (1.702 m) 240 lb 6.4 oz (109 kg) 37.65 kg/m2 Hysterectomy Smoking Status Never Smoker BMI and BSA Data Body Mass Index Body Surface Area  
 37.65 kg/m 2 2.27 m 2 Preferred Pharmacy Pharmacy Name Phone [address] [phone] Your Updated Medication List  
  
   
 This list is accurate as of: 5/15/17  1:54 PM.  Always use your most recent med list.  
  
  
  
  
 albuterol 90 mcg/actuation inhaler Commonly known as:  PROVENTIL HFA, VENTOLIN HFA, PROAIR HFA Take 1 Puff by inhalation as needed. allopurinol 300 mg tablet Commonly known as:  Wharton Calender Take 300 mg by mouth daily. amLODIPine-benazepril 5-20 mg per capsule Commonly known as:  Lyndal Shines Take 1 Cap by mouth daily. bumetanide 2 mg tablet Commonly known as:  Beckey Pancoast Take 2 mg by mouth two (2) times a day. carvedilol 25 mg tablet Commonly known as:  Jaime Shy Take 25 mg by mouth two (2) times daily (with meals). cholecalciferol (vitamin D3) 2,000 unit Tab Take 2,000 Units by mouth daily. colchicine 0.6 mg tablet Take 1 Tab by mouth daily. cyanocobalamin 1,000 mcg tablet Take 1,000 mcg by mouth daily. digoxin 0.125 mg tablet Commonly known as:  LANOXIN Take 0.125 mg by mouth daily. DULoxetine 60 mg capsule Commonly known as:  CYMBALTA Take 60 mg by mouth daily. gabapentin 400 mg capsule Commonly known as:  NEURONTIN  
400 mg p.o. 3 times daily and 1200 mg p.o. nightly  
  
 hyoscyamine 0.125 mg tablet Commonly known as:  Morris Plant Take 125 mcg by mouth every four (4) hours as needed for Cramping.  
  
 lidocaine 5 % Commonly known as:  LIDODERM  
1 Patch by TransDERmal route every twenty-four (24) hours. Apply patch to the affected area for 12 hours a day and remove for 12 hours a day. linaclotide 290 mcg Cap capsule Commonly known as:  Alicia Pierini Take 290 mcg by mouth Daily (before breakfast). Liraglutide 0.6 mg/0.1 mL (18 mg/3 mL) sub-q pen Commonly known as:  VICTOZA  
0.6 mg by SubCUTAneous route daily. LYRICA 150 mg capsule Generic drug:  pregabalin Take 150 mg by mouth two (2) times a day. Omeprazole delayed release 20 mg tablet Commonly known as:  PRILOSEC D/R  
 Take 20 mg by mouth two (2) times a day. spironolactone 25 mg tablet Commonly known as:  ALDACTONE Take  by mouth two (2) times a day. temazepam 15 mg capsule Commonly known as:  RESTORIL Take 15 mg by mouth nightly as needed for Sleep. VOLTAREN 1 % Gel Generic drug:  diclofenac Apply  to affected area as needed. zolpidem 10 mg tablet Commonly known as:  AMBIEN Take 10 mg by mouth nightly as needed for Sleep. Patient Instructions 1. Follow-up in 6 months A Healthy Lifestyle: Care Instructions Your Care Instructions A healthy lifestyle can help you feel good, stay at a healthy weight, and have plenty of energy for both work and play. A healthy lifestyle is something you can share with your whole family. A healthy lifestyle also can lower your risk for serious health problems, such as high blood pressure, heart disease, and diabetes. You can follow a few steps listed below to improve your health and the health of your family. Follow-up care is a key part of your treatment and safety. Be sure to make and go to all appointments, and call your doctor if you are having problems. Its also a good idea to know your test results and keep a list of the medicines you take. How can you care for yourself at home? · Do not eat too much sugar, fat, or fast foods. You can still have dessert and treats now and then. The goal is moderation. · Start small to improve your eating habits. Pay attention to portion sizes, drink less juice and soda pop, and eat more fruits and vegetables. ¨ Eat a healthy amount of food. A 3-ounce serving of meat, for example, is about the size of a deck of cards. Fill the rest of your plate with vegetables and whole grains. ¨ Limit the amount of soda and sports drinks you have every day. Drink more water when you are thirsty. ¨ Eat at least 5 servings of fruits and vegetables every day.  It may seem like a lot, but it is not hard to reach this goal. A serving or helping is 1 piece of fruit, 1 cup of vegetables, or 2 cups of leafy, raw vegetables. Have an apple or some carrot sticks as an afternoon snack instead of a candy bar. Try to have fruits and/or vegetables at every meal. 
· Make exercise part of your daily routine. You may want to start with simple activities, such as walking, bicycling, or slow swimming. Try to be active 30 to 60 minutes every day. You do not need to do all 30 to 60 minutes all at once. For example, you can exercise 3 times a day for 10 or 20 minutes. Moderate exercise is safe for most people, but it is always a good idea to talk to your doctor before starting an exercise program. 
· Keep moving. Juan Wally the lawn, work in the garden, or [place]. Take the stairs instead of the elevator at work. · If you smoke, quit. People who smoke have an increased risk for heart attack, stroke, cancer, and other lung illnesses. Quitting is hard, but there are ways to boost your chance of quitting tobacco for good. ¨ Use nicotine gum, patches, or lozenges. ¨ Ask your doctor about stop-smoking programs and medicines. ¨ Keep trying. In addition to reducing your risk of diseases in the future, you will notice some benefits soon after you stop using tobacco. If you have shortness of breath or asthma symptoms, they will likely get better within a few weeks after you quit. · Limit how much alcohol you drink. Moderate amounts of alcohol (up to 2 drinks a day for men, 1 drink a day for women) are okay. But drinking too much can lead to liver problems, high blood pressure, and other health problems. Family health If you have a family, there are many things you can do together to improve your health. · Eat meals together as a family as often as possible. · Eat healthy foods. This includes fruits, vegetables, lean meats and dairy, and whole grains. · Include your family in your fitness plan. Most people think of activities such as jogging or tennis as the way to fitness, but there are many ways you and your family can be more active. Anything that makes you breathe hard and gets your heart pumping is exercise. Here are some tips: 
¨ Walk to do errands or to take your child to school or the bus. ¨ Go for a family bike ride after dinner instead of watching TV. Where can you learn more? Go to http://jaspreet-thierno.info/. Enter G218 in the search box to learn more about \"A Healthy Lifestyle: Care Instructions. \" Current as of: July 26, 2016 Content Version: 11.2 © 9035-1490 [employer]. Care instructions adapted under license by [employer] (which disclaims liability or warranty for this information). If you have questions about a medical condition or this instruction, always ask your healthcare professional. [place] [address] any warranty or liability for your use of this information. Introducing [address]! Dear Anand Edmonds: 
Thank you for requesting a DueDil account. Our records indicate that you already have an active DueDil account. You can access your account anytime at [email or web address] Did you know that you can access your hospital and ER discharge instructions at any time in DueDil? You can also review all of your test results from your hospital stay or ER visit. Additional Information If you have questions, please visit the Frequently Asked Questions section of the DueDil website at [email or web address] Remember, DueDil is NOT to be used for urgent needs. For medical emergencies, dial 911. Now available from your iPhone and Android! Please provide this summary of care documentation to your next provider. Your primary care clinician is listed as Andrews Martin.  If you have any questions after today's visit, please call [phone].

## 2017-05-15 NOTE — PATIENT INSTRUCTIONS
1.  Follow-up in 6 months      A Healthy Lifestyle: Care Instructions  Your Care Instructions  A healthy lifestyle can help you feel good, stay at a healthy weight, and have plenty of energy for both work and play. A healthy lifestyle is something you can share with your whole family. A healthy lifestyle also can lower your risk for serious health problems, such as high blood pressure, heart disease, and diabetes. You can follow a few steps listed below to improve your health and the health of your family. Follow-up care is a key part of your treatment and safety. Be sure to make and go to all appointments, and call your doctor if you are having problems. Its also a good idea to know your test results and keep a list of the medicines you take. How can you care for yourself at home? · Do not eat too much sugar, fat, or fast foods. You can still have dessert and treats now and then. The goal is moderation. · Start small to improve your eating habits. Pay attention to portion sizes, drink less juice and soda pop, and eat more fruits and vegetables. ¨ Eat a healthy amount of food. A 3-ounce serving of meat, for example, is about the size of a deck of cards. Fill the rest of your plate with vegetables and whole grains. ¨ Limit the amount of soda and sports drinks you have every day. Drink more water when you are thirsty. ¨ Eat at least 5 servings of fruits and vegetables every day. It may seem like a lot, but it is not hard to reach this goal. A serving or helping is 1 piece of fruit, 1 cup of vegetables, or 2 cups of leafy, raw vegetables. Have an apple or some carrot sticks as an afternoon snack instead of a candy bar. Try to have fruits and/or vegetables at every meal.  · Make exercise part of your daily routine. You may want to start with simple activities, such as walking, bicycling, or slow swimming. Try to be active 30 to 60 minutes every day. You do not need to do all 30 to 60 minutes all at once.  For example, you can exercise 3 times a day for 10 or 20 minutes. Moderate exercise is safe for most people, but it is always a good idea to talk to your doctor before starting an exercise program.  · Keep moving. Cecilia Cambridge the lawn, work in the garden, or [place]. Take the stairs instead of the elevator at work. · If you smoke, quit. People who smoke have an increased risk for heart attack, stroke, cancer, and other lung illnesses. Quitting is hard, but there are ways to boost your chance of quitting tobacco for good. ¨ Use nicotine gum, patches, or lozenges. ¨ Ask your doctor about stop-smoking programs and medicines. ¨ Keep trying. In addition to reducing your risk of diseases in the future, you will notice some benefits soon after you stop using tobacco. If you have shortness of breath or asthma symptoms, they will likely get better within a few weeks after you quit. · Limit how much alcohol you drink. Moderate amounts of alcohol (up to 2 drinks a day for men, 1 drink a day for women) are okay. But drinking too much can lead to liver problems, high blood pressure, and other health problems. Family health  If you have a family, there are many things you can do together to improve your health. · Eat meals together as a family as often as possible. · Eat healthy foods. This includes fruits, vegetables, lean meats and dairy, and whole grains. · Include your family in your fitness plan. Most people think of activities such as jogging or tennis as the way to fitness, but there are many ways you and your family can be more active. Anything that makes you breathe hard and gets your heart pumping is exercise. Here are some tips:  ¨ Walk to do errands or to take your child to school or the bus. ¨ Go for a family bike ride after dinner instead of watching TV. Where can you learn more? Go to http://jaspreet-thierno.info/.   Enter X316 in the search box to learn more about \"A Healthy Lifestyle: Care Instructions. \"  Current as of: July 26, 2016  Content Version: 11.2  © 6409-1485 [employer], [employer]. Care instructions adapted under license by [employer] (which disclaims liability or warranty for this information). If you have questions about a medical condition or this instruction, always ask your healthcare professional. [place] [address] any warranty or liability for your use of this information.

## 2017-05-15 NOTE — PROGRESS NOTES
Neurology follow-up note      REFERRED BY:  Nery Chavez MD    05/15/17    Chief Complaint   Patient presents with    Follow-up     Neuropathy some what better       Subjective  Clarisa Balderas is a 64 y.o. female who presented to the neurology office for management of back pain. To recap, the patient has been having back pain since 2014 . He does have intermittent exacerbation of right sided back pain with radiation to the right leg. When this happens, she does have 10/10 in severity, sharp and shooting and then she has to have bed rest and uses ice packs for a week and then it resolves. She is on lyrica 150 mg po bid and gabapentin 400 mg p.o. 3 times daily plus additional 1200 mg p.o. nightly. She did have exacerbation of her symptoms a week ago and is recovering from that. She had a MRI of the L spine and it was normal.     Current Outpatient Prescriptions   Medication Sig    Liraglutide (VICTOZA) 0.6 mg/0.1 mL (18 mg/3 mL) sub-q pen 0.6 mg by SubCUTAneous route daily.  colchicine 0.6 mg tablet Take 1 Tab by mouth daily.  lidocaine (LIDODERM) 5 % 1 Patch by TransDERmal route every twenty-four (24) hours. Apply patch to the affected area for 12 hours a day and remove for 12 hours a day.  gabapentin (NEURONTIN) 400 mg capsule 400 mg p.o. 3 times daily and 1200 mg p.o. nightly    diclofenac (VOLTAREN) 1 % gel Apply  to affected area as needed.  amLODIPine-benazepril (LOTREL) 5-20 mg per capsule Take 1 Cap by mouth daily.  carvedilol (COREG) 25 mg tablet Take 25 mg by mouth two (2) times daily (with meals).  bumetanide (BUMEX) 2 mg tablet Take 2 mg by mouth two (2) times a day.  digoxin (LANOXIN) 0.125 mg tablet Take 0.125 mg by mouth daily.  DULoxetine (CYMBALTA) 60 mg capsule Take 60 mg by mouth daily.  linaclotide (LINZESS) 290 mcg cap capsule Take 290 mcg by mouth Daily (before breakfast).     Omeprazole delayed release (PRILOSEC D/R) 20 mg tablet Take 20 mg by mouth two (2) times a day.  zolpidem (AMBIEN) 10 mg tablet Take 10 mg by mouth nightly as needed for Sleep.  temazepam (RESTORIL) 15 mg capsule Take 15 mg by mouth nightly as needed for Sleep.  cholecalciferol, vitamin D3, 2,000 unit tab Take 2,000 Units by mouth daily.  cyanocobalamin 1,000 mcg tablet Take 1,000 mcg by mouth daily.  allopurinol (ZYLOPRIM) 300 mg tablet Take 300 mg by mouth daily.  hyoscyamine (ANASPAZ, LEVSIN) 0.125 mg tablet Take 125 mcg by mouth every four (4) hours as needed for Cramping.  albuterol (PROVENTIL HFA, VENTOLIN HFA) 90 mcg/actuation inhaler Take 1 Puff by inhalation as needed.  spironolactone (ALDACTONE) 25 mg tablet Take  by mouth two (2) times a day.  pregabalin (LYRICA) 150 mg capsule Take 150 mg by mouth two (2) times a day. No current facility-administered medications for this visit. REVIEW OF SYSTEMS:   A ten system review of constitutional, cardiovascular, respiratory, musculoskeletal, endocrine, skin, SHEENT, genitourinary, psychiatric and neurologic systems was obtained and is unremarkable except as stated in HPI     EXAMINATION:   Visit Vitals    /70    Pulse 76    Ht 5' 7\" (1.702 m)    Wt 240 lb 6.4 oz (109 kg)    BMI 37.65 kg/m2        General:   General appearance: Pt is in no acute distress   Distal pulses are preserved    Neurological Examination:   Mental Status: AAO x3. Speech is fluent. Follows commands, has normal fund of knowledge, attention, short term recall, comprehension and insight. Cranial Nerves: Visual fields are full. PERRL, Extraocular movements are full. Facial sensation intact V1- V3. Facial movement intact, symmetric. Hearing intact to conversation. Palate elevates symmetrically. Shoulder shrug symmetric. Tongue midline. Motor: Strength is 5/5 in all 4 ext. No atrophy. Tone: Normal    Sensation: Decreased vib distally in the right lower ext.     Coordination/Cerebellar: Intact to finger-nose-finger     Gait: Casual gait is normal.     Laboratory review:   Results for orders placed or performed during the hospital encounter of 47/91/32   METABOLIC PANEL, BASIC   Result Value Ref Range    Sodium 142 136 - 145 mmol/L    Potassium 3.9 3.5 - 5.1 mmol/L    Chloride 105 97 - 108 mmol/L    CO2 30 21 - 32 mmol/L    Anion gap 7 5 - 15 mmol/L    Glucose 93 65 - 100 mg/dL    BUN 22 (H) 6 - 20 MG/DL    Creatinine 1.20 (H) 0.55 - 1.02 MG/DL    BUN/Creatinine ratio 18 12 - 20      GFR est AA 55 (L) >60 ml/min/1.73m2    GFR est non-AA 46 (L) >60 ml/min/1.73m2    Calcium 9.7 8.5 - 10.1 MG/DL       Imaging review:   8/3/16  MRI L spine  Minimal degenerative disease. Mild left L5-S1 neural foraminal stenosis, stable. EMG/nerve conduction study performed in November 2016 is remarkable for right carpal tunnel syndrome. There is no electrophysiological evidence of a right lumbar radiculopathy. Documentation review:  None    Assessment/Plan:   Davis Pardo is a 64 y.o. female who presented to the neurology office for management of intermittent back pain with radiation to the right lower ext. MRI of the lumbar spine and EMG/nerve conduction study have been unremarkable for the back pain but it did show right median neuropathy at the wrist.  She is asymptomatic from that. Her back pain has worsened and now is improving. I do plan to continue gabapentin to 400 mg 3 times a day and 1200 mg at night along with Lyrica 150 mg p.o. 2 times daily    Follow-up in 6 months       ICD-10-CM ICD-9-CM    1. Chronic right-sided low back pain with right-sided sciatica M54.41 724.2     G89.29 724.3      338.29    2. Carpal tunnel syndrome of right wrist G56.01 354.0      Over 25 minutes was spent with the patient and family of which > 50% of the visit was spent counseling on diagnosis, management, and treatment of the diagnosis         Thank you for allowing me to participate in the care of Ms. Aldana.  Please feel free to contact me if you have any questions. Latasha Sauceda MD  Neurologist and Clinical Neurophysiologist    CC: Chaya Camara MD  Fax: [phone]    This note will not be viewable in [address]

## 2017-06-29 ENCOUNTER — TELEPHONE (OUTPATIENT)
Dept: NEUROLOGY | Age: 62
End: 2017-06-29

## 2017-06-30 ENCOUNTER — HOSPITAL ENCOUNTER (OUTPATIENT)
Dept: GENERAL RADIOLOGY | Age: 62
Discharge: HOME | End: 2017-06-30
Payer: MEDICARE

## 2017-06-30 ENCOUNTER — OFFICE VISIT (OUTPATIENT)
Dept: NEUROLOGY | Age: 62
End: 2017-06-30

## 2017-06-30 VITALS
BODY MASS INDEX: 37.67 KG/M2 | WEIGHT: 240 LBS | SYSTOLIC BLOOD PRESSURE: 110 MMHG | HEART RATE: 70 BPM | DIASTOLIC BLOOD PRESSURE: 70 MMHG | HEIGHT: 67 IN | OXYGEN SATURATION: 98 %

## 2017-06-30 DIAGNOSIS — M62.838 MUSCLE SPASM: ICD-10-CM

## 2017-06-30 DIAGNOSIS — M54.41 CHRONIC RIGHT-SIDED LOW BACK PAIN WITH RIGHT-SIDED SCIATICA: Primary | ICD-10-CM

## 2017-06-30 DIAGNOSIS — M16.11 PRIMARY OSTEOARTHRITIS OF RIGHT HIP: ICD-10-CM

## 2017-06-30 DIAGNOSIS — G89.29 CHRONIC RIGHT-SIDED LOW BACK PAIN WITH RIGHT-SIDED SCIATICA: Primary | ICD-10-CM

## 2017-06-30 PROCEDURE — 73502 X-RAY EXAM HIP UNI 2-3 VIEWS: CPT

## 2017-06-30 NOTE — PATIENT INSTRUCTIONS
you can take the methocarbamol prescription that you have at home for muscle tightness, call the office if you need a new incentive      [facility]   Statement to Patients  April 1, 2014      In an effort to ensure the large volume of patient prescription refills is processed in the most efficient and expeditious manner, we are asking our patients to assist us by calling your Pharmacy for all prescription refills, this will include also your  Mail Order Pharmacy. The pharmacy will contact our office electronically to continue the refill process. Please do not wait until the last minute to call your pharmacy. We need at least 48 hours (2days) to fill prescriptions. We also encourage you to call your pharmacy before going to  your prescription to make sure it is ready. With regard to controlled substance prescription refill requests (narcotic refills) that need to be picked up at our office, we ask your cooperation by providing us with at least 72 hours (3days) notice that you will need a refill. We will not refill narcotic prescription refill requests after 4:00pm on any weekday, Monday through Thursday, or after 2:00pm on Fridays, or on the weekends. We encourage everyone to explore another way of getting your prescription refill request processed using [employer], our patient web portal through our electronic medical record system. [employer] is an efficient and effective way to communicate your medication request directly to the office and  downloadable as an drew on your smart phone . [employer] also features a review functionality that allows you to view your medication list as well as leave messages for your physician. Are you ready to get connected? If so please review the attatched instructions or speak to any of our staff to get you set up right away! Thank you so much for your cooperation.  Should you have any questions please contact our Practice Administrator. The Physicians and Staff,  [facility]          A Healthy Lifestyle: Care Instructions  Your Care Instructions  A healthy lifestyle can help you feel good, stay at a healthy weight, and have plenty of energy for both work and play. A healthy lifestyle is something you can share with your whole family. A healthy lifestyle also can lower your risk for serious health problems, such as high blood pressure, heart disease, and diabetes. You can follow a few steps listed below to improve your health and the health of your family. Follow-up care is a key part of your treatment and safety. Be sure to make and go to all appointments, and call your doctor if you are having problems. Its also a good idea to know your test results and keep a list of the medicines you take. How can you care for yourself at home? · Do not eat too much sugar, fat, or fast foods. You can still have dessert and treats now and then. The goal is moderation. · Start small to improve your eating habits. Pay attention to portion sizes, drink less juice and soda pop, and eat more fruits and vegetables. ¨ Eat a healthy amount of food. A 3-ounce serving of meat, for example, is about the size of a deck of cards. Fill the rest of your plate with vegetables and whole grains. ¨ Limit the amount of soda and sports drinks you have every day. Drink more water when you are thirsty. ¨ Eat at least 5 servings of fruits and vegetables every day. It may seem like a lot, but it is not hard to reach this goal. A serving or helping is 1 piece of fruit, 1 cup of vegetables, or 2 cups of leafy, raw vegetables. Have an apple or some carrot sticks as an afternoon snack instead of a candy bar. Try to have fruits and/or vegetables at every meal.  · Make exercise part of your daily routine. You may want to start with simple activities, such as walking, bicycling, or slow swimming. Try to be active 30 to 60 minutes every day. You do not need to do all 30 to 60 minutes all at once. For example, you can exercise 3 times a day for 10 or 20 minutes. Moderate exercise is safe for most people, but it is always a good idea to talk to your doctor before starting an exercise program.  · Keep moving. Raymundo No the lawn, work in the garden, or [employer]. Take the stairs instead of the elevator at work. · If you smoke, quit. People who smoke have an increased risk for heart attack, stroke, cancer, and other lung illnesses. Quitting is hard, but there are ways to boost your chance of quitting tobacco for good. ¨ Use nicotine gum, patches, or lozenges. ¨ Ask your doctor about stop-smoking programs and medicines. ¨ Keep trying. In addition to reducing your risk of diseases in the future, you will notice some benefits soon after you stop using tobacco. If you have shortness of breath or asthma symptoms, they will likely get better within a few weeks after you quit. · Limit how much alcohol you drink. Moderate amounts of alcohol (up to 2 drinks a day for men, 1 drink a day for women) are okay. But drinking too much can lead to liver problems, high blood pressure, and other health problems. Family health  If you have a family, there are many things you can do together to improve your health. · Eat meals together as a family as often as possible. · Eat healthy foods. This includes fruits, vegetables, lean meats and dairy, and whole grains. · Include your family in your fitness plan. Most people think of activities such as jogging or tennis as the way to fitness, but there are many ways you and your family can be more active. Anything that makes you breathe hard and gets your heart pumping is exercise. Here are some tips:  ¨ Walk to do errands or to take your child to school or the bus. ¨ Go for a family bike ride after dinner instead of watching TV. Where can you learn more? Go to http://jaspreet-thierno.info/.   Enter V964 in the search box to learn more about \"A Healthy Lifestyle: Care Instructions. \"  Current as of: July 26, 2016  Content Version: 11.3  © 2585-0376 [employer]. Care instructions adapted under license by [employer] (which disclaims liability or warranty for this information). If you have questions about a medical condition or this instruction, always ask your healthcare professional. [place] [address] any warranty or liability for your use of this information.

## 2017-06-30 NOTE — PROGRESS NOTES
Date:            2017    Name:  Keven Sauceda  :  1955  MRN:  [id number]     PCP:  Nikki Salgado MD    Chief Complaint   Patient presents with    Follow-up    Leg Pain    Back Pain         HISTORY OF PRESENT ILLNESS:  Odessaorrleticia Delarosa is a 64 y.o., female who presents today for follow up for back pain. EMG was normal aside from carpal tunnel, MRI showed minimal degenerative changes. She has been struggling with this pain for years, it is in the right side of her back and radiates down into her right leg. It was recently well controlled until she had a significant flare yesterday, she was in bed, got up to go to the bathroom and could not move. Pain shot down her hip and leg and she could only drag her foot. She could not sit on the toilet, it hurt so much. She has done a lot of PT for her pain, tries to keep up with exercises and they don't help much. She tried a lidocaine patch when her pain flared yesterday, which did not help. She tries to use ice, heat. Took a Tylenol with this last spell which helped a little. She also got some relief from putting a frozen steak on her hip. Her whole buttock is sore today and pain still limits her walking. She has tried flexeril, cymbalta, lyrica, gabapentin (she is still on the last 3, knows that she is not supposed to take gabapentin and lyrica together). Has seen a chiropractor, tried hydrotherapy, epidural injections. Sees a rheumatologist for gout and arthritis. Saw Dr Tarsha Matute for pain management before and got minimal pain relief from epidural injection so we sent her to us. MRI Results (most recent):    Results from [place] encounter on 16   MRI LUMB SPINE WO CONT   Narrative **Final Report**      ICD Codes / Adm. Diagnosis: 724.2  724.3 / Lumbago  Sciatica  Examination:  MR Janet Gordon CON  - [id number] - Aug  3 2016  7:26AM  Accession No:  [id number]  Reason:  Low back pain, Sciatica      REPORT:  MRI OF THE LUMBAR SPINE WITHOUT CONTRAST: 8/3/2016 7:26 AM    INDICATION:  Low back pain, Sciatica . Lower back pain radiating down right   leg with numbness and inability to move right leg. 5/2/2014. TECHNIQUE: Multiplanar and multisequence MRI was obtained of the lumbar   spine without contrast.    COMPARISON: None. FINDINGS:   Lumbar alignment is normal. Bone marrow signal intensity is normal.   Vertebral body heights are preserved. The visualized cord is normal in   caliber and signal intensity. The conus medullaris terminates at the L1-L2   level. The paraspinal soft tissues are normal.     L1-L2: No herniation or stenosis. L2-L3: No herniation or stenosis. Facet osteoarthritis. L3-L4: Facet osteoarthritis and a minimal disc bulge do not cause canal or   neural foraminal stenosis. L4-L5: No herniation or stenosis. Facet osteoarthritis. L5-S1: Facet osteoarthritis causes mild left neural foraminal stenosis. IMPRESSION: Minimal degenerative disease. Mild left L5-S1 neural foraminal   stenosis, stable. Signing/Reading Doctor: Neda Werner ([id number])    Approved: Neda Werner ([id number])  Aug  3 2016  8:09AM                                    5.15.2017 recap  Arpita Winters is a 64 y.o. female who presented to the neurology office for management of back pain.      To recap, the patient has been having back pain since 2014 . He does have intermittent exacerbation of right sided back pain with radiation to the right leg. When this happens, she does have 10/10 in severity, sharp and shooting and then she has to have bed rest and uses ice packs for a week and then it resolves.  She is on lyrica 150 mg po bid and gabapentin 400 mg p.o. 3 times daily plus additional 1200 mg p.o. nightly.      She did have exacerbation of her symptoms a week ago and is recovering from that.     She had a MRI of the L spine and it was normal.      Current Outpatient Prescriptions   Medication Sig    Liraglutide (VICTOZA) 0.6 mg/0.1 mL (18 mg/3 mL) sub-q pen 0.6 mg by SubCUTAneous route daily.  colchicine 0.6 mg tablet Take 1 Tab by mouth daily.  lidocaine (LIDODERM) 5 % 1 Patch by TransDERmal route every twenty-four (24) hours. Apply patch to the affected area for 12 hours a day and remove for 12 hours a day.  gabapentin (NEURONTIN) 400 mg capsule 400 mg p.o. 3 times daily and 1200 mg p.o. nightly    diclofenac (VOLTAREN) 1 % gel Apply  to affected area as needed.  amLODIPine-benazepril (LOTREL) 5-20 mg per capsule Take 1 Cap by mouth daily.  carvedilol (COREG) 25 mg tablet Take 25 mg by mouth two (2) times daily (with meals).  bumetanide (BUMEX) 2 mg tablet Take 2 mg by mouth two (2) times a day.  digoxin (LANOXIN) 0.125 mg tablet Take 0.125 mg by mouth daily.  DULoxetine (CYMBALTA) 60 mg capsule Take 60 mg by mouth daily.  linaclotide (LINZESS) 290 mcg cap capsule Take 290 mcg by mouth Daily (before breakfast).  Omeprazole delayed release (PRILOSEC D/R) 20 mg tablet Take 20 mg by mouth two (2) times a day.  zolpidem (AMBIEN) 10 mg tablet Take 10 mg by mouth nightly as needed for Sleep.  temazepam (RESTORIL) 15 mg capsule Take 15 mg by mouth nightly as needed for Sleep.  cholecalciferol, vitamin D3, 2,000 unit tab Take 2,000 Units by mouth daily.  cyanocobalamin 1,000 mcg tablet Take 1,000 mcg by mouth daily.  allopurinol (ZYLOPRIM) 300 mg tablet Take 300 mg by mouth daily.  hyoscyamine (ANASPAZ, LEVSIN) 0.125 mg tablet Take 125 mcg by mouth every four (4) hours as needed for Cramping.  albuterol (PROVENTIL HFA, VENTOLIN HFA) 90 mcg/actuation inhaler Take 1 Puff by inhalation as needed.  spironolactone (ALDACTONE) 25 mg tablet Take  by mouth two (2) times a day.  pregabalin (LYRICA) 150 mg capsule Take 150 mg by mouth two (2) times a day. No current facility-administered medications for this visit.       Allergies   Allergen Reactions    Codeine Itching    Demerol [Meperidine] Nausea Only    Ivp [Fd And C Blue No.1] Itching    Minoxidil Itching    Penicillamine Itching and Other (comments)     Drops blood pressure    Percocet [Oxycodone-Acetaminophen] Nausea and Vomiting     Past Medical History:   Diagnosis Date    Arrhythmia     per pt, treated with med    Arthritis     Asthma     CAD (coronary artery disease)     EF=33 1/3    Chronic pain     fibromyalgia    Diarrhea 3/24/2017    Epigastric pain 3/24/2017    GERD (gastroesophageal reflux disease)     ibs    Heart failure (HCC)     cardiomyopathy    Hypertension     Psychiatric disorder     h/o severe depression    Stroke ([facility] [address]     possible TIA    Unspecified sleep apnea     wears CPAP     Past Surgical History:   Procedure Laterality Date    COLONOSCOPY N/A 8/23/2016    COLONOSCOPY performed by Ryan Kovacs MD at hospitals ENDOSCOPY    FLEXIBLE SIGMOIDOSCOPY N/A 3/24/2017    FLEXIBLE SIGMOIDOSCOPY  performed by Radha Alvarado MD at hospitals AMBULATORY OR    HX APPENDECTOMY      HX GYN      hysterectomy    HX HEENT      left eye surgery    HX KNEE ARTHROSCOPY Right     HX KNEE REPLACEMENT Right     HX OTHER SURGICAL      hand surgery left (carpal tunnel, reconstruction of small finger)    SIGMOIDOSCOPY,BIOPSY  3/24/2017          Social History     Social History    Marital status:      Spouse name: N/A    Number of children: N/A    Years of education: N/A     Occupational History    Not on file.      Social History Main Topics    Smoking status: Never Smoker    Smokeless tobacco: Never Used    Alcohol use No    Drug use: No    Sexual activity: No     Other Topics Concern    Not on file     Social History Narrative     Family History   Problem Relation Age of Onset    Diabetes Mother          PHYSICAL EXAMINATION:    Visit Vitals    /70    Pulse 70    Ht 5' 7\" (1.702 m)    Wt 240 lb (108.9 kg)    SpO2 98%    BMI 37.59 kg/m2     General:  Well defined, obese, and groomed individual in no acute distress. Neck: Supple, nontender, no bruits, no pain with resistance to active range of motion. Heart: Regular rate and rhythm, no murmurs, rub, or gallop. Normal S1S2. Lungs:  Clear to auscultation bilaterally with equal chest expansion, no cough, no wheeze  Musculoskeletal:  Extremities revealed no edema and had full range of motion of joints. Psych:  Good mood and bright affect    NEUROLOGICAL EXAMINATION:     Mental Status:   Alert and oriented to person, place, and time with recent and remote memory intact. Attention span and concentration are normal. Speech is fluent with a full fund of knowledge. Cranial Nerves:    II, III, IV, VI:  Visual acuity grossly intact. Pupils are equal, round, and reactive to light. Extra-ocular movements are full and fluid. No ptosis or nystagmus. V-XII: Hearing is grossly intact. Facial features are symmetric, with normal sensation and strength. The palate rises symmetrically and the tongue protrudes midline. Sternocleidomastoids 5/5. Motor Examination: Normal tone, bulk, and strength, 5/5 muscle strength throughout, unable to test right leg due to pain. Coordination:  Finger to nose testing was normal.   No resting or intention tremor  Gait and Station:  Steady while walking and with tandem walking. Normal arm swing. No pronator drift. No muscle wasting or fasciculations noted. ASSESSMENT AND PLAN    ICD-10-CM ICD-9-CM    1. Chronic right-sided low back pain with right-sided sciatica M54.41 724.2 REFERRAL TO PAIN MANAGEMENT    G89.29 724.3      338.29    2. Muscle spasm M62.838 728.85    3. Primary osteoarthritis of right hip M16.11 715.15 XR HIP RT W OR WO PELV 2-3 VWS     60-year-old female seen in follow-up for  Right-sided back pain with sciatica. MRI and EMG were unremarkable, she does have minimal degenerative changes at  L5-S1. Has tried epidural injections, Flexeril, gabapentin, Cymbalta, Lyrica.   She is currently on the last 3 together without much pain relief. Uses ice, heat, hydrotherapy, exercises provided by PT in the past.  Despite all this, she still has periodic flares that are incapacitating. She is suffering from 1 of those today. Also has arthritis, follows with rheumatology. 1.  Discussed with patient that due to  unremarkable MRI and EMG, and lack of response to typical neuropathic pain medication that there are limited amounts of treatment options for us left to try. She would benefit from seeing a good pain management provider, will refer to Dr. Aidan Arellano and discussed that she may benefit from doing PT again  2. We will get x-ray of right hip  to rule out arthritis in the joint  3. Patient can continue current medications, add Flexeril is not working she can try methocarbamol she already has at home    Follow-up in 5 months as scheduled with Dr. Nadeen England, call sooner with concerns      Hellen Love NP    This note was created using voice recognition software. Despite editing, there may be syntax errors.

## 2017-06-30 NOTE — LETTER
2017 3:38 PM 
 
RE:    Davis Pardo [address] [facility] Thank you for agreeing to see [facility] I am referring my patient to you for evaluation of Pain Management. Please see her 
pertinent patient information below. Date:            2017 Name:  Viky Lorenzo 
:  1955 MRN:  [id number] PCP:  Claudetta Lango, MD 
 
Chief Complaint Patient presents with  Follow-up  Leg Pain  Back Pain HISTORY OF PRESENT ILLNESS: 
Davis Pardo is a 64 y.o., female who presents today for follow up for back pain. EMG was normal aside from carpal tunnel, MRI showed minimal degenerative changes. She has been struggling with this pain for years, it is in the right side of her back and radiates down into her right leg. It was recently well controlled until she had a significant flare yesterday, she was in bed, got up to go to the bathroom and could not move. Pain shot down her hip and leg and she could only drag her foot. She could not sit on the toilet, it hurt so much. She has done a lot of PT for her pain, tries to keep up with exercises and they don't help much. She tried a lidocaine patch when her pain flared yesterday, which did not help. She tries to use ice, heat. Took a Tylenol with this last spell which helped a little. She also got some relief from putting a frozen steak on her hip. Her whole buttock is sore today and pain still limits her walking. She has tried flexeril, cymbalta, lyrica, gabapentin (she is still on the last 3, knows that she is not supposed to take gabapentin and lyrica together). Has seen a chiropractor, tried hydrotherapy, epidural injections. Sees a rheumatologist for gout and arthritis. Saw Dr Magdalena Christianson for pain management before and got minimal pain relief from epidural injection so we sent her to us. MRI Results (most recent): 
 
Results from Hospital Encounter encounter on 16 MRI LUMB SPINE WO CONT Narrative **Final Report** 
 
 
ICD Codes / Adm. Diagnosis: 724.2  724.3 / Lumbago  Sciatica Examination:  MR MAVERICK SPINE WO CON  - [id number] - Aug  3 2016  7:26AM 
Accession No:  [id number] Reason:  Low back pain, Sciatica REPORT: 
MRI OF THE LUMBAR SPINE WITHOUT CONTRAST: 8/3/2016 7:26 AM 
 
INDICATION:  Low back pain, Sciatica . Lower back pain radiating down right  
leg with numbness and inability to move right leg. 5/2/2014. TECHNIQUE: Multiplanar and multisequence MRI was obtained of the lumbar  
spine without contrast. 
 
COMPARISON: None. FINDINGS:  
Lumbar alignment is normal. Bone marrow signal intensity is normal.  
Vertebral body heights are preserved. The visualized cord is normal in  
caliber and signal intensity. The conus medullaris terminates at the L1-L2  
level. The paraspinal soft tissues are normal.  
 
L1-L2: No herniation or stenosis. L2-L3: No herniation or stenosis. Facet osteoarthritis. L3-L4: Facet osteoarthritis and a minimal disc bulge do not cause canal or  
neural foraminal stenosis. L4-L5: No herniation or stenosis. Facet osteoarthritis. L5-S1: Facet osteoarthritis causes mild left neural foraminal stenosis. IMPRESSION: Minimal degenerative disease. Mild left L5-S1 neural foraminal  
stenosis, stable. Signing/Reading Doctor: Brady Aleman ([id number]) Approved: Brady Aleman ([id number])  Aug  3 2016  8:09AM                   
 
 
   
 
 
 
 
5.15.2017 cornelio New is a 64 y.o. female who presented to the neurology office for management of back pain.  
  
To cornelio, the patient has been having back pain since 2014 . He does have intermittent exacerbation of right sided back pain with radiation to the right leg. When this happens, she does have 10/10 in severity, sharp and shooting and then she has to have bed rest and uses ice packs for a week and then it resolves.  She is on lyrica 150 mg po bid and gabapentin 400 mg p.o. 3 times daily plus additional 1200 mg p.o. nightly.  
  
She did have exacerbation of her symptoms a week ago and is recovering from that. 
  
She had a MRI of the L spine and it was normal.  
  
Current Outpatient Prescriptions Medication Sig  Liraglutide (VICTOZA) 0.6 mg/0.1 mL (18 mg/3 mL) sub-q pen 0.6 mg by SubCUTAneous route daily.  colchicine 0.6 mg tablet Take 1 Tab by mouth daily.  lidocaine (LIDODERM) 5 % 1 Patch by TransDERmal route every twenty-four (24) hours. Apply patch to the affected area for 12 hours a day and remove for 12 hours a day.  gabapentin (NEURONTIN) 400 mg capsule 400 mg p.o. 3 times daily and 1200 mg p.o. nightly  diclofenac (VOLTAREN) 1 % gel Apply  to affected area as needed.  amLODIPine-benazepril (LOTREL) 5-20 mg per capsule Take 1 Cap by mouth daily.  carvedilol (COREG) 25 mg tablet Take 25 mg by mouth two (2) times daily (with meals).  bumetanide (BUMEX) 2 mg tablet Take 2 mg by mouth two (2) times a day.  digoxin (LANOXIN) 0.125 mg tablet Take 0.125 mg by mouth daily.  DULoxetine (CYMBALTA) 60 mg capsule Take 60 mg by mouth daily.  linaclotide (LINZESS) 290 mcg cap capsule Take 290 mcg by mouth Daily (before breakfast).  Omeprazole delayed release (PRILOSEC D/R) 20 mg tablet Take 20 mg by mouth two (2) times a day.  zolpidem (AMBIEN) 10 mg tablet Take 10 mg by mouth nightly as needed for Sleep.  temazepam (RESTORIL) 15 mg capsule Take 15 mg by mouth nightly as needed for Sleep.  cholecalciferol, vitamin D3, 2,000 unit tab Take 2,000 Units by mouth daily.  cyanocobalamin 1,000 mcg tablet Take 1,000 mcg by mouth daily.  allopurinol (ZYLOPRIM) 300 mg tablet Take 300 mg by mouth daily.  hyoscyamine (ANASPAZ, LEVSIN) 0.125 mg tablet Take 125 mcg by mouth every four (4) hours as needed for Cramping.  albuterol (PROVENTIL HFA, VENTOLIN HFA) 90 mcg/actuation inhaler Take 1 Puff by inhalation as needed.  spironolactone (ALDACTONE) 25 mg tablet Take  by mouth two (2) times a day.  pregabalin (LYRICA) 150 mg capsule Take 150 mg by mouth two (2) times a day. No current facility-administered medications for this visit. Allergies Allergen Reactions  Codeine Itching  Demerol [Meperidine] Nausea Only  Ivp [Fd And C Blue No.1] Itching  Minoxidil Itching  Penicillamine Itching and Other (comments) Drops blood pressure  Percocet [Oxycodone-Acetaminophen] Nausea and Vomiting Past Medical History:  
Diagnosis Date  Arrhythmia   
 per pt, treated with med  Arthritis  Asthma  CAD (coronary artery disease) EF=33 1/3  Chronic pain   
 fibromyalgia  Diarrhea 3/24/2017  Epigastric pain 3/24/2017  GERD (gastroesophageal reflux disease)   
 ibs  
 Heart failure (HCC)   
 cardiomyopathy  Hypertension  Psychiatric disorder   
 h/o severe depression  Stroke [facility])   
 possible TIA  Unspecified sleep apnea   
 wears CPAP Past Surgical History:  
Procedure Laterality Date  COLONOSCOPY N/A 8/23/2016 COLONOSCOPY performed by Teresa Souza MD at [facility] ENDOSCOPY  FLEXIBLE SIGMOIDOSCOPY N/A 3/24/2017 FLEXIBLE SIGMOIDOSCOPY  performed by Mercedez Coleman MD at [facility] AMBULATORY OR  
 HX APPENDECTOMY  HX GYN    
 hysterectomy  HX HEENT    
 left eye surgery  HX KNEE ARTHROSCOPY Right  HX KNEE REPLACEMENT Right  HX OTHER SURGICAL    
 hand surgery left (carpal tunnel, reconstruction of small finger)  SIGMOIDOSCOPY,BIOPSY  3/24/2017 Social History Social History  Marital status:  Spouse name: N/A  
 Number of children: N/A  
 Years of education: N/A Occupational History  Not on file. Social History Main Topics  Smoking status: Never Smoker  Smokeless tobacco: Never Used  Alcohol use No  
 Drug use: No  
 Sexual activity: No  
 
Other Topics Concern  Not on file Social History Narrative Family History Problem Relation Age of Onset  Diabetes Mother PHYSICAL EXAMINATION:   
Visit Vitals  /70  Pulse 70  
 Ht 5' 7\" (1.702 m)  Wt 240 lb (108.9 kg)  SpO2 98%  BMI 37.59 kg/m2 General:  Well defined, obese, and groomed individual in no acute distress. Neck: Supple, nontender, no bruits, no pain with resistance to active range of motion. Heart: Regular rate and rhythm, no murmurs, rub, or gallop. Normal S1S2. Lungs:  Clear to auscultation bilaterally with equal chest expansion, no cough, no wheeze Musculoskeletal:  Extremities revealed no edema and had full range of motion of joints. Psych:  Good mood and bright affect NEUROLOGICAL EXAMINATION:    
Mental Status:   Alert and oriented to person, place, and time with recent and remote memory intact. Attention span and concentration are normal. Speech is fluent with a full fund of knowledge. Cranial Nerves:   
II, III, IV, VI:  Visual acuity grossly intact. Pupils are equal, round, and reactive to light. Extra-ocular movements are full and fluid. No ptosis or nystagmus. V-XII: Hearing is grossly intact. Facial features are symmetric, with normal sensation and strength. The palate rises symmetrically and the tongue protrudes midline. Sternocleidomastoids 5/5. Motor Examination: Normal tone, bulk, and strength, 5/5 muscle strength throughout, unable to test right leg due to pain. Coordination:  Finger to nose testing was normal.   No resting or intention tremor Gait and Station:  Steady while walking and with tandem walking. Normal arm swing. No pronator drift. No muscle wasting or fasciculations noted. ASSESSMENT AND PLAN 
  ICD-10-CM ICD-9-CM 1. Chronic right-sided low back pain with right-sided sciatica M54.41 724.2 REFERRAL TO PAIN MANAGEMENT  
 G89.29 724.3   
  338.29   
2.  Muscle spasm M62.838 728.85   
 3. Primary osteoarthritis of right hip M16.11 715.15 XR HIP RT W OR WO PELV 2-3 VWS  
 
60-year-old female seen in follow-up for  Right-sided back pain with sciatica. MRI and EMG were unremarkable, she does have minimal degenerative changes at  L5-S1. Has tried epidural injections, Flexeril, gabapentin, Cymbalta, Lyrica. She is currently on the last 3 together without much pain relief. Uses ice, heat, hydrotherapy, exercises provided by PT in the past.  Despite all this, she still has periodic flares that are incapacitating. She is suffering from 1 of those today. Also has arthritis, follows with rheumatology. 1.  Discussed with patient that due to  unremarkable MRI and EMG, and lack of response to typical neuropathic pain medication that there are limited amounts of treatment options for us left to try. She would benefit from seeing a good pain management provider, will refer to Dr. Steph Pham and discussed that she may benefit from doing PT again 2. We will get x-ray of right hip  to rule out arthritis in the joint 3. Patient can continue current medications, add Flexeril is not working she can try methocarbamol she already has at home Follow-up in 5 months as scheduled with Dr. Erin Osborne, call sooner with concerns Jori Johnson NP This note was created using voice recognition software. Despite editing, there may be syntax errors. I appreciate your assistance in Ms. Aldana's care  and look forward to your findings and recommendations. Sincerely, Florencio Jalloh NP Patient Registration [address] [facility] Contact Information Patient ID: [id number] Last Name: Milind Kaur Name: 
First Name: [facility] Phone: 
Middle Name: D Employer Information Sex: F YOB: 1955 Name: 
Social Security No.: [id number] Phone: 
Address: [address] Guarantor Information (to whom statements are sent) Zip: [address] Name: Felecia Link City: [address] [place] [address] Street: [place] Address: [address] [place] [address], [address] [place] [address] Street Home Phone: [phone] Phone: ( ) _______ - ______________ Work Phone: Other: 
Mobile Phone: [phone] Patient Referred by: ______________________ Marital Status:  Patient PCP: ________________________ Primary Insurance Information Insurance Plan Name: [employer] ([address] [place] [address],[address]) Address to [place] [address] Insurance Phone Number: [phone] [address] Policy Information Policy Rudd Patient's relationship to policy rudd: Self Last Name:IVETTE 
ID/Certification No.: [id number] First Name:SANDRITA Policy/Group No.: Middle Name: NANCY Issue Date: 11/01/2005 Address:[address] [place] [address] LN Exp Date: [facility]:[place] State:[place] [id number] Copay Amount: ___________________ Social Sec Number: [id number] Co-insurance Percent:__________________________________ YOB: 1955 Sex: ______________ Employer: 
 
Secondary Insurance Information Insurance Plan Name: [facility] - Medicare Supplement) Address to Send Claims: [place] Insurance Phone Number: [phone] [address] [place] [address], [address] [place] [address] Policy Information Policy Rudd Patient's relationship to policy rudd: Self Last Name:IVETTE 
ID/Certification No.: [id number] First Name:SANDRITA Policy/Group No.: Middle Name: D Issue Date: Address:[address] [place] [address] LN Exp Date: [facility]: [facility] State:[place] [id number] Copay Amount: ______________________ Social Sec Number: [id number] Co-insurance Percent:__________________________________ YOB: 1955 Sex: ____________ Employer: 
ASSIGNMENT AND RELEASE: 
I hereby assign my insurance benefits to be paid directly to the physician. I understand that I am financially responsible for all non-covered services. I authorize the physician to release any information required to process this claim.  
Signed_____________________________________________________________________ __________________________ Date:_________________________

## 2017-06-30 NOTE — MR AVS SNAPSHOT
Visit Information Date & Time Provider Department Dept. Phone Encounter #  
 6/30/2017  1:00 PM Anum Christopher NP [facility] [phone] [id number] Your Appointments 11/15/2017  1:40 PM  
Follow Up with Conrad Grover MD  
[facility] [address]) Appt Note: [address], 
[address], [address] [id number]  
[address], [address], [address] [address] Upcoming Health Maintenance Date Due Hepatitis C Screening 1955 Pneumococcal 19-64 Medium Risk (1 of 1 - PPSV23) 10/17/1974 DTaP/Tdap/Td series (1 - Tdap) 10/17/1976 PAP AKA CERVICAL CYTOLOGY 10/17/1976 BREAST CANCER SCRN MAMMOGRAM 10/17/2005 FOBT Q 1 YEAR AGE 50-75 10/17/2005 ZOSTER VACCINE AGE 60> 10/17/2015 INFLUENZA AGE 9 TO ADULT 8/1/2017 Allergies as of 6/30/2017  Review Complete On: 6/30/2017 By: Avelina Wetzel LPN Severity Noted Reaction Type Reactions Codeine  05/13/2010    Itching Demerol [Meperidine]  08/27/2012    Nausea Only Ivp [Fd And C Blue No.1]  05/13/2010    Itching Minoxidil  05/13/2010    Itching Penicillamine  05/13/2010   Side Effect Itching, Other (comments) Drops blood pressure Percocet [Oxycodone-acetaminophen]  06/19/2014   Systemic Nausea and Vomiting Current Immunizations  Never Reviewed No immunizations on file. Not reviewed this visit You Were Diagnosed With   
  
 Codes Comments Chronic right-sided low back pain with right-sided sciatica    -  Primary ICD-10-CM: M54.41, G89.29 ICD-9-CM: 724.2, 724.3, 338.29 Muscle spasm     ICD-10-CM: M42.155 ICD-9-CM: 728.85 Primary osteoarthritis of right hip     ICD-10-CM: M16.11 
ICD-9-CM: 715.15 Vitals BP Pulse Height(growth percentile) Weight(growth percentile) SpO2 BMI 110/70 70 5' 7\" (1.702 m) 240 lb (108.9 kg) 98% 37.59 kg/m2 OB Status Smoking Status Hysterectomy Never Smoker Vitals History BMI and BSA Data Body Mass Index Body Surface Area  
 37.59 kg/m 2 2.27 m 2 Preferred Pharmacy Pharmacy Name Phone [facility] [phone] Your Updated Medication List  
  
   
This list is accurate as of: 6/30/17  1:24 PM.  Always use your most recent med list.  
  
  
  
  
 albuterol 90 mcg/actuation inhaler Commonly known as:  PROVENTIL HFA, VENTOLIN HFA, PROAIR HFA Take 1 Puff by inhalation as needed. allopurinol 300 mg tablet Commonly known as:  Ruma Maxon Take 300 mg by mouth daily. amLODIPine-benazepril 5-20 mg per capsule Commonly known as:  Salima Villa Take 1 Cap by mouth daily. bumetanide 2 mg tablet Commonly known as:  Johan Ludwig Take 2 mg by mouth two (2) times a day. carvedilol 25 mg tablet Commonly known as:  Ildefonso Pulse Take 25 mg by mouth two (2) times daily (with meals). cholecalciferol (vitamin D3) 2,000 unit Tab Take 2,000 Units by mouth daily. colchicine 0.6 mg tablet Take 1 Tab by mouth daily. cyanocobalamin 1,000 mcg tablet Take 1,000 mcg by mouth daily. digoxin 0.125 mg tablet Commonly known as:  LANOXIN Take 0.125 mg by mouth daily. DULoxetine 60 mg capsule Commonly known as:  CYMBALTA Take 60 mg by mouth daily. gabapentin 400 mg capsule Commonly known as:  NEURONTIN  
400 mg p.o. 3 times daily and 1200 mg p.o. nightly  
  
 hyoscyamine 0.125 mg tablet Commonly known as:  Bill Costain Take 125 mcg by mouth every four (4) hours as needed for Cramping.  
  
 lidocaine 5 % Commonly known as:  LIDODERM  
1 Patch by TransDERmal route every twenty-four (24) hours. Apply patch to the affected area for 12 hours a day and remove for 12 hours a day. linaclotide 290 mcg Cap capsule Commonly known as:  Darolyn Bathe Take 290 mcg by mouth Daily (before breakfast). Liraglutide 0.6 mg/0.1 mL (18 mg/3 mL) sub-q pen Commonly known as:  VICTOZA  
0.6 mg by SubCUTAneous route daily. LYRICA 150 mg capsule Generic drug:  pregabalin Take 150 mg by mouth two (2) times a day. Omeprazole delayed release 20 mg tablet Commonly known as:  PRILOSEC D/R Take 20 mg by mouth two (2) times a day. spironolactone 25 mg tablet Commonly known as:  ALDACTONE Take  by mouth two (2) times a day. temazepam 15 mg capsule Commonly known as:  RESTORIL Take 15 mg by mouth nightly as needed for Sleep. VOLTAREN 1 % Gel Generic drug:  diclofenac Apply  to affected area as needed. zolpidem 10 mg tablet Commonly known as:  AMBIEN Take 10 mg by mouth nightly as needed for Sleep. We Performed the Following REFERRAL TO PAIN MANAGEMENT [RIY072 Custom] Comments: Low back pain with sciatic radiation, minimal degenerative disease on MRI, normal EMG Sheela Carnes DO Interventional spine and pain management [facility], [address] To-Do List   
 06/30/2017 Imaging:  XR HIP RT W OR WO PELV 2-3 VWS Referral Information Referral ID Referred By Referred To  
  
 [id number] Kim Zepeda V Not Available Visits Status Start Date End Date 1 New Request 6/30/17 6/30/18 If your referral has a status of pending review or denied, additional information will be sent to support the outcome of this decision. Patient Instructions   
 you can take the methocarbamol prescription that you have at home for muscle tightness, call the office if you need a new incentive PRESCRIPTION REFILL POLICY [facility] Statement to Patients April 1, 2014 In an effort to ensure the large volume of patient prescription refills is processed in the most efficient and expeditious manner, we are asking our patients to assist us by calling your Pharmacy for all prescription refills, this will include also your  Mail Order Pharmacy. The pharmacy will contact our office electronically to continue the refill process. Please do not wait until the last minute to call your pharmacy. We need at least 48 hours (2days) to fill prescriptions. We also encourage you to call your pharmacy before going to  your prescription to make sure it is ready. With regard to controlled substance prescription refill requests (narcotic refills) that need to be picked up at our office, we ask your cooperation by providing us with at least 72 hours (3days) notice that you will need a refill. We will not refill narcotic prescription refill requests after 4:00pm on any weekday, Monday through Thursday, or after 2:00pm on Fridays, or on the weekends. We encourage everyone to explore another way of getting your prescription refill request processed using [employer], our patient web portal through our electronic medical record system. [employer] is an efficient and effective way to communicate your medication request directly to the office and  downloadable as an drew on your smart phone . [employer] also features a review functionality that allows you to view your medication list as well as leave messages for your physician. Are you ready to get connected? If so please review the attatched instructions or speak to any of our staff to get you set up right away! Thank you so much for your cooperation. Should you have any questions please contact our Practice Administrator. The Physicians and Staff,  [facility] A Healthy Lifestyle: Care Instructions Your Care Instructions A healthy lifestyle can help you feel good, stay at a healthy weight, and have plenty of energy for both work and play.  A healthy lifestyle is something you can share with your whole family. A healthy lifestyle also can lower your risk for serious health problems, such as high blood pressure, heart disease, and diabetes. You can follow a few steps listed below to improve your health and the health of your family. Follow-up care is a key part of your treatment and safety. Be sure to make and go to all appointments, and call your doctor if you are having problems. Its also a good idea to know your test results and keep a list of the medicines you take. How can you care for yourself at home? · Do not eat too much sugar, fat, or fast foods. You can still have dessert and treats now and then. The goal is moderation. · Start small to improve your eating habits. Pay attention to portion sizes, drink less juice and soda pop, and eat more fruits and vegetables. ¨ Eat a healthy amount of food. A 3-ounce serving of meat, for example, is about the size of a deck of cards. Fill the rest of your plate with vegetables and whole grains. ¨ Limit the amount of soda and sports drinks you have every day. Drink more water when you are thirsty. ¨ Eat at least 5 servings of fruits and vegetables every day. It may seem like a lot, but it is not hard to reach this goal. A serving or helping is 1 piece of fruit, 1 cup of vegetables, or 2 cups of leafy, raw vegetables. Have an apple or some carrot sticks as an afternoon snack instead of a candy bar. Try to have fruits and/or vegetables at every meal. 
· Make exercise part of your daily routine. You may want to start with simple activities, such as walking, bicycling, or slow swimming. Try to be active 30 to 60 minutes every day. You do not need to do all 30 to 60 minutes all at once. For example, you can exercise 3 times a day for 10 or 20 minutes.  Moderate exercise is safe for most people, but it is always a good idea to talk to your doctor before starting an exercise program. 
 · Keep moving. Gaby Gold the lawn, work in the garden, or [employer]. Take the stairs instead of the elevator at work. · If you smoke, quit. People who smoke have an increased risk for heart attack, stroke, cancer, and other lung illnesses. Quitting is hard, but there are ways to boost your chance of quitting tobacco for good. ¨ Use nicotine gum, patches, or lozenges. ¨ Ask your doctor about stop-smoking programs and medicines. ¨ Keep trying. In addition to reducing your risk of diseases in the future, you will notice some benefits soon after you stop using tobacco. If you have shortness of breath or asthma symptoms, they will likely get better within a few weeks after you quit. · Limit how much alcohol you drink. Moderate amounts of alcohol (up to 2 drinks a day for men, 1 drink a day for women) are okay. But drinking too much can lead to liver problems, high blood pressure, and other health problems. Family health If you have a family, there are many things you can do together to improve your health. · Eat meals together as a family as often as possible. · Eat healthy foods. This includes fruits, vegetables, lean meats and dairy, and whole grains. · Include your family in your fitness plan. Most people think of activities such as jogging or tennis as the way to fitness, but there are many ways you and your family can be more active. Anything that makes you breathe hard and gets your heart pumping is exercise. Here are some tips: 
¨ Walk to do errands or to take your child to school or the bus. ¨ Go for a family bike ride after dinner instead of watching TV. Where can you learn more? Go to http://jaspreet-thierno.info/. Enter G535 in the search box to learn more about \"A Healthy Lifestyle: Care Instructions. \" Current as of: July 26, 2016 Content Version: 11.3 © 2394-1541 [employer].  Care instructions adapted under license by [address] (which disclaims liability or warranty for this information). If you have questions about a medical condition or this instruction, always ask your healthcare professional. [address] any warranty or liability for your use of this information. Introducing [facility]! Dear Rylie Do: 
Thank you for requesting a [employer] account. Our records indicate that you already have an active [employer] account. You can access your account anytime at [email or web address] Did you know that you can access your hospital and ER discharge instructions at any time in [employer]? You can also review all of your test results from your hospital stay or ER visit. Additional Information If you have questions, please visit the Frequently Asked Questions section of the [employer] website at [email or web address] Remember, [employer] is NOT to be used for urgent needs. For medical emergencies, dial 911. Now available from your iPhone and Android! Please provide this summary of care documentation to your next provider. Your primary care clinician is listed as Thu Reyes. If you have any questions after today's visit, please call [phone].

## 2017-07-21 ENCOUNTER — APPOINTMENT (OUTPATIENT)
Dept: ULTRASOUND IMAGING | Age: 62
End: 2017-07-21
Attending: INTERNAL MEDICINE
Payer: MEDICARE

## 2017-07-21 ENCOUNTER — ANESTHESIA (OUTPATIENT)
Dept: ENDOSCOPY | Age: 62
End: 2017-07-21
Payer: MEDICARE

## 2017-07-21 ENCOUNTER — ANESTHESIA EVENT (OUTPATIENT)
Dept: ENDOSCOPY | Age: 62
End: 2017-07-21
Payer: MEDICARE

## 2017-07-21 ENCOUNTER — HOSPITAL ENCOUNTER (OUTPATIENT)
Age: 62
Setting detail: OUTPATIENT SURGERY
Discharge: HOME | End: 2017-07-21
Attending: INTERNAL MEDICINE | Admitting: INTERNAL MEDICINE
Payer: MEDICARE

## 2017-07-21 VITALS
RESPIRATION RATE: 18 BRPM | WEIGHT: 240 LBS | SYSTOLIC BLOOD PRESSURE: 133 MMHG | HEART RATE: 62 BPM | BODY MASS INDEX: 37.59 KG/M2 | OXYGEN SATURATION: 100 % | TEMPERATURE: 97.7 F | DIASTOLIC BLOOD PRESSURE: 71 MMHG

## 2017-07-21 PROCEDURE — 74011000258 HC RX REV CODE- 258

## 2017-07-21 PROCEDURE — 74011250636 HC RX REV CODE- 250/636

## 2017-07-21 PROCEDURE — 76060000031 HC ANESTHESIA FIRST 0.5 HR: Performed by: INTERNAL MEDICINE

## 2017-07-21 PROCEDURE — 76040000019: Performed by: INTERNAL MEDICINE

## 2017-07-21 PROCEDURE — 74011000250 HC RX REV CODE- 250

## 2017-07-21 RX ORDER — FENTANYL CITRATE 50 UG/ML
100 INJECTION, SOLUTION INTRAMUSCULAR; INTRAVENOUS
Status: DISCONTINUED | OUTPATIENT
Start: 2017-07-21 | End: 2017-07-21 | Stop reason: HOSPADM

## 2017-07-21 RX ORDER — SODIUM CHLORIDE 9 MG/ML
INJECTION, SOLUTION INTRAVENOUS
Status: DISCONTINUED | OUTPATIENT
Start: 2017-07-21 | End: 2017-07-21 | Stop reason: HOSPADM

## 2017-07-21 RX ORDER — SODIUM CHLORIDE 0.9 % (FLUSH) 0.9 %
5-10 SYRINGE (ML) INJECTION EVERY 8 HOURS
Status: DISCONTINUED | OUTPATIENT
Start: 2017-07-21 | End: 2017-07-21 | Stop reason: HOSPADM

## 2017-07-21 RX ORDER — EPINEPHRINE 0.1 MG/ML
1 INJECTION INTRACARDIAC; INTRAVENOUS
Status: DISCONTINUED | OUTPATIENT
Start: 2017-07-21 | End: 2017-07-21 | Stop reason: HOSPADM

## 2017-07-21 RX ORDER — FLUMAZENIL 0.1 MG/ML
0.2 INJECTION INTRAVENOUS
Status: DISCONTINUED | OUTPATIENT
Start: 2017-07-21 | End: 2017-07-21 | Stop reason: HOSPADM

## 2017-07-21 RX ORDER — NALOXONE HYDROCHLORIDE 0.4 MG/ML
0.4 INJECTION, SOLUTION INTRAMUSCULAR; INTRAVENOUS; SUBCUTANEOUS
Status: DISCONTINUED | OUTPATIENT
Start: 2017-07-21 | End: 2017-07-21 | Stop reason: HOSPADM

## 2017-07-21 RX ORDER — SODIUM CHLORIDE 0.9 % (FLUSH) 0.9 %
5-10 SYRINGE (ML) INJECTION AS NEEDED
Status: DISCONTINUED | OUTPATIENT
Start: 2017-07-21 | End: 2017-07-21 | Stop reason: HOSPADM

## 2017-07-21 RX ORDER — MIDAZOLAM HYDROCHLORIDE 1 MG/ML
.25-1 INJECTION, SOLUTION INTRAMUSCULAR; INTRAVENOUS
Status: DISCONTINUED | OUTPATIENT
Start: 2017-07-21 | End: 2017-07-21 | Stop reason: HOSPADM

## 2017-07-21 RX ORDER — LIDOCAINE HYDROCHLORIDE 20 MG/ML
INJECTION, SOLUTION EPIDURAL; INFILTRATION; INTRACAUDAL; PERINEURAL AS NEEDED
Status: DISCONTINUED | OUTPATIENT
Start: 2017-07-21 | End: 2017-07-21 | Stop reason: HOSPADM

## 2017-07-21 RX ORDER — PROPOFOL 10 MG/ML
INJECTION, EMULSION INTRAVENOUS AS NEEDED
Status: DISCONTINUED | OUTPATIENT
Start: 2017-07-21 | End: 2017-07-21 | Stop reason: HOSPADM

## 2017-07-21 RX ORDER — ATROPINE SULFATE 0.1 MG/ML
0.5 INJECTION INTRAVENOUS
Status: DISCONTINUED | OUTPATIENT
Start: 2017-07-21 | End: 2017-07-21 | Stop reason: HOSPADM

## 2017-07-21 RX ORDER — DEXTROMETHORPHAN/PSEUDOEPHED 2.5-7.5/.8
1.2 DROPS ORAL
Status: DISCONTINUED | OUTPATIENT
Start: 2017-07-21 | End: 2017-07-21 | Stop reason: HOSPADM

## 2017-07-21 RX ORDER — SODIUM CHLORIDE 9 MG/ML
50 INJECTION, SOLUTION INTRAVENOUS CONTINUOUS
Status: DISCONTINUED | OUTPATIENT
Start: 2017-07-21 | End: 2017-07-21 | Stop reason: HOSPADM

## 2017-07-21 RX ADMIN — PROPOFOL 20 MG: 10 INJECTION, EMULSION INTRAVENOUS at 11:17

## 2017-07-21 RX ADMIN — LIDOCAINE HYDROCHLORIDE 50 MG: 20 INJECTION, SOLUTION EPIDURAL; INFILTRATION; INTRACAUDAL; PERINEURAL at 11:16

## 2017-07-21 RX ADMIN — PROPOFOL 20 MG: 10 INJECTION, EMULSION INTRAVENOUS at 11:19

## 2017-07-21 RX ADMIN — PROPOFOL 20 MG: 10 INJECTION, EMULSION INTRAVENOUS at 11:21

## 2017-07-21 RX ADMIN — PROPOFOL 60 MG: 10 INJECTION, EMULSION INTRAVENOUS at 11:16

## 2017-07-21 RX ADMIN — SODIUM CHLORIDE: 9 INJECTION, SOLUTION INTRAVENOUS at 11:10

## 2017-07-21 NOTE — DISCHARGE INSTRUCTIONS
[address]  [address] [facility] [address], [address] [facility]                                  Erika Cruz  [id number]  1955    COLON DISCHARGE INSTRUCTIONS    DISCOMFORT:  Redness at IV site- apply warm compress to area; if redness or soreness persist- contact your physician  There may be a slight amount of blood passed from the rectum  Gaseous discomfort- walking, belching will help relieve any discomfort  You may not operate a vehicle for 12 hours  You may not  engage in an occupation involving machinery or appliances for rest of today  You may not  drink alcoholic beverages for at least 12 hours  Avoid making any critical decisions for at least 24 hour    DIET:   High fiber diet. - however -  remember your colon is empty and a heavy meal will produce gas. Avoid these foods:  vegetables, fried / greasy foods, carbonated drinks for today     ACTIVITY:  It is recommended that you spend the remainder of the day resting -  avoid any strenuous activity. CALL M.D. ANY SIGN OF:   Increasing pain, nausea, vomiting  Abdominal distension (swelling)  New increased bleeding (oral or rectal)  Fever (chills)  Pain in chest area  Bloody discharge from nose or mouth  Shortness of breath  Post procedure diagnosis:  Normal EUS    Follow-up Instructions:    Call Dr. Jace Paiz for any questions or problems. If we took a biopsy please call the office within 2 weeks to discuss your                             pathology results.  Telephone # [phone]

## 2017-07-21 NOTE — H&P
[address] [facility] [address]  [address], [address]   [phone]                                History and Physical     NAME: Harshil Gerard   :  1955   MRN:  [id number]     HPI:  The patient was seen and examined.     Past Surgical History:   Procedure Laterality Date    COLONOSCOPY N/A 2016    COLONOSCOPY performed by Rosa Coffman MD at [place] [address] N/A 3/24/2017    FLEXIBLE SIGMOIDOSCOPY  performed by Adalid Ash MD at [facility] AMBULATORY OR    HX APPENDECTOMY      HX GYN      hysterectomy    HX HEENT      left eye surgery    HX KNEE ARTHROSCOPY Right     HX KNEE REPLACEMENT Right     HX OTHER SURGICAL      hand surgery left (carpal tunnel, reconstruction of small finger)    SIGMOIDOSCOPY,BIOPSY  3/24/2017          Past Medical History:   Diagnosis Date    Arrhythmia     per pt, treated with med    Arthritis     Asthma     CAD (coronary artery disease)     EF=33 1/3    Chronic pain     fibromyalgia    Diarrhea 3/24/2017    Epigastric pain 3/24/2017    GERD (gastroesophageal reflux disease)     ibs    Heart failure (HCC)     cardiomyopathy    Hypertension     Psychiatric disorder     h/o severe depression    Stroke ([facility] [address]     possible TIA    Unspecified sleep apnea     wears CPAP     Social History   Substance Use Topics    Smoking status: Never Smoker    Smokeless tobacco: Never Used    Alcohol use No     Allergies   Allergen Reactions    Codeine Itching    Demerol [Meperidine] Nausea Only    Ivp [Fd And C Blue No.1] Itching    Minoxidil Itching    Penicillamine Itching and Other (comments)     Drops blood pressure    Percocet [Oxycodone-Acetaminophen] Nausea and Vomiting     Family History   Problem Relation Age of Onset    Diabetes Mother      Current Facility-Administered Medications   Medication Dose Route Frequency    0.9% sodium chloride infusion  50 mL/hr IntraVENous CONTINUOUS    sodium chloride (NS) flush 5-10 mL  5-10 mL IntraVENous Q8H    sodium chloride (NS) flush 5-10 mL  5-10 mL IntraVENous PRN    midazolam (VERSED) injection 0.25-10 mg  0.25-10 mg IntraVENous Multiple    fentaNYL citrate (PF) injection 100 mcg  100 mcg IntraVENous MULTIPLE DOSE GIVEN    naloxone (NARCAN) injection 0.4 mg  0.4 mg IntraVENous Multiple    flumazenil (ROMAZICON) 0.1 mg/mL injection 0.2 mg  0.2 mg IntraVENous Multiple    simethicone (MYLICON) 96DX/9.3BM oral drops 80 mg  1.2 mL Oral Multiple    atropine injection 0.5 mg  0.5 mg IntraVENous ONCE PRN    EPINEPHrine (ADRENALIN) 0.1 mg/mL syringe 1 mg  1 mg Endoscopically ONCE PRN         PHYSICAL EXAM:  General: WD, WN. Alert, cooperative, no acute distress    HEENT: NC, Atraumatic. PERRLA, EOMI. Anicteric sclerae. Lungs:  CTA Bilaterally. No Wheezing/Rhonchi/Rales. Heart:  Regular  rhythm,  No murmur, No Rubs, No Gallops  Abdomen: Soft, Non distended, Non tender.  +Bowel sounds, no HSM  Extremities: No c/c/e  Neurologic:  CN 2-12 gi, Alert and oriented X 3. No acute neurological distress   Psych:   Good insight. Not anxious nor agitated. The heart, lungs and mental status were satisfactory for the administration of MAC sedation and for the procedure.       Mallampati score: 2       Assessment:   · Outlet dysfunction constipation    Plan:   · Endoscopic procedure  · MAC sedation   ·

## 2017-07-21 NOTE — ANESTHESIA POSTPROCEDURE EVALUATION
Post-Anesthesia Evaluation and Assessment    Patient: Kiara Melissa MRN: [id number]  SSN: [id number]    YOB: 1955  Age: 64 y.o. Sex: female       Cardiovascular Function/Vital Signs  Visit Vitals    /66    Pulse 64    Temp 36.5 °C (97.7 °F)    Resp 17    Wt 108.9 kg (240 lb)    SpO2 96%    BMI 37.59 kg/m2       Patient is status post MAC anesthesia for Procedure(s):  RECTAL EUS. Nausea/Vomiting: None    Postoperative hydration reviewed and adequate. Pain:  Pain Scale 1: Numeric (0 - 10) (07/21/17 1134)  Pain Intensity 1: 0 (07/21/17 1134)   Managed    Neurological Status: At baseline    Mental Status and Level of Consciousness: Arousable    Pulmonary Status:   O2 Device: Room air (07/21/17 1134)   Adequate oxygenation and airway patent    Complications related to anesthesia: None    Post-anesthesia assessment completed.  No concerns    Signed By: Aneta Garcia MD     July 21, 2017

## 2017-07-21 NOTE — PROCEDURES
[address]  [address], [address]   [phone]                                 Flexible sigmoidoscopy with rectal endoscopic ultrasound     NAME:  John Allen   :   1955   MRN:   [id number]       Date/Time:  2017     Procedure Name: Flexible sigmoidoscopy with rectal endoscopic ultrasound     Indications: Outlet dysfunction constipation    : Rajeev Anderson MD    Referring Provider: -Manan Brady MD, -Kia Quintero MD    Anethesia/Sedation:  MAC anesthesia      Procedure Details   After infom consent was obtained for the procedure, with all risks and benefits of procedure explained the patient was taken to the endoscopy suite and placed in the left lateral decubitus position. Initially passed the rectal radial echoendoscope to 20 cm. The iliac vessels were seen and were normal, and there was no adenopathy appreciated. No perirectal mass, lymph nodes were identified. The endoscope was withdrawn, and then the standard endoscope was passed. The patient tolerated the procedure well. Findings:   Endoscopic exam:   Rectum recto-sigmoid were normal. Small internal hemorrhoids were seen in retroflexion. EUS findings:     Internal anal sphincter: normal  External anal sphincter: normal  Puborectalis: normal   A cyst was seen and could possible be in the left ovary. Thorough examination of left ovary was not performed. No mass lesion or lymph node were seen    Specimen Removed:  None    Complications: None. EBL:  None.     Recommendations:   -Continue current medications  -Follow up with Dr Godfrey Alexandra as scheduled  -Follow up with Ob/Gyn     Rajeev Anderson MD  2017  11:31 AM

## 2017-07-21 NOTE — ANESTHESIA PREPROCEDURE EVALUATION
Anesthetic History   No history of anesthetic complications            Review of Systems / Medical History  Patient summary reviewed, nursing notes reviewed and pertinent labs reviewed    Pulmonary        Sleep apnea    Asthma        Neuro/Psych       CVA  TIA and psychiatric history     Cardiovascular    Hypertension          CAD      Comments: 2014-Left ventricle: Systolic function was normal. Ejection fraction was  estimated to be 45 % in the range of 40 % to 50 %. There were no regional  wall motion abnormalities.      GI/Hepatic/Renal     GERD          Comments: incontinent Endo/Other        Obesity and arthritis     Other Findings            Physical Exam    Airway  Mallampati: II    Neck ROM: normal range of motion   Mouth opening: Normal     Cardiovascular    Rhythm: regular  Rate: normal         Dental  No notable dental hx       Pulmonary  Breath sounds clear to auscultation               Abdominal         Other Findings            Anesthetic Plan    ASA: 3  Anesthesia type: MAC          Induction: Intravenous  Anesthetic plan and risks discussed with: Patient

## 2017-07-21 NOTE — ROUTINE PROCESS
Fernanda Raymundo  1955  [id number]    Situation:  Verbal report received from: Lawrence Valerio RN  Procedure: Procedure(s):  RECTAL EUS    Background:    Preoperative diagnosis: INCONTINENCE OF FECES  Postoperative diagnosis: Normal EUS    :  Dr. Rubén Saleh  Assistant(s): Endoscopy Technician-1: Dakota Robbins IV  Endoscopy RN-1: Libertad Villavicencio RN    Specimens: * No specimens in log *  H. Pylori  no    Assessment:  Intra-procedure medications     Anesthesia gave intra-procedure sedation and medications, see anesthesia flow sheet yes    Intravenous fluids: NS@ KVO     Vital signs stable     Abdominal assessment: round and soft     Recommendation:  Discharge patient per MD order.     Family or Friend   Permission to share finding with family or friend yes

## 2017-07-21 NOTE — PROGRESS NOTES

## 2017-08-29 ENCOUNTER — HOSPITAL ENCOUNTER (OUTPATIENT)
Dept: MRI IMAGING | Age: 62
Discharge: HOME | End: 2017-08-29
Attending: PODIATRIST
Payer: MEDICARE

## 2017-08-29 DIAGNOSIS — M76.822 POSTERIOR TIBIAL TENDINITIS, LEFT: ICD-10-CM

## 2017-08-29 DIAGNOSIS — M79.672 LEFT FOOT PAIN: ICD-10-CM

## 2017-08-29 DIAGNOSIS — M21.962 DEFORMITY, FOOT ACQUIRED, LEFT: ICD-10-CM

## 2017-08-29 PROCEDURE — 73718 MRI LOWER EXTREMITY W/O DYE: CPT

## 2017-11-27 ENCOUNTER — HOSPITAL ENCOUNTER (OUTPATIENT)
Dept: GENERAL RADIOLOGY | Age: 62
Discharge: HOME | End: 2017-11-27
Payer: MEDICARE

## 2017-11-27 DIAGNOSIS — R06.02 SOB (SHORTNESS OF BREATH): ICD-10-CM

## 2017-11-27 PROCEDURE — 71020 XR CHEST PA LAT: CPT

## 2018-03-01 ENCOUNTER — APPOINTMENT (OUTPATIENT)
Dept: GENERAL RADIOLOGY | Age: 63
End: 2018-03-01
Attending: EMERGENCY MEDICINE
Payer: MEDICARE

## 2018-03-01 ENCOUNTER — APPOINTMENT (OUTPATIENT)
Dept: CT IMAGING | Age: 63
End: 2018-03-01
Attending: EMERGENCY MEDICINE
Payer: MEDICARE

## 2018-03-01 ENCOUNTER — HOSPITAL ENCOUNTER (EMERGENCY)
Age: 63
Discharge: HOME | End: 2018-03-02
Attending: EMERGENCY MEDICINE | Admitting: EMERGENCY MEDICINE
Payer: MEDICARE

## 2018-03-01 DIAGNOSIS — N39.0 URINARY TRACT INFECTION WITHOUT HEMATURIA, SITE UNSPECIFIED: ICD-10-CM

## 2018-03-01 DIAGNOSIS — K59.00 CONSTIPATION, UNSPECIFIED CONSTIPATION TYPE: Primary | ICD-10-CM

## 2018-03-01 DIAGNOSIS — R10.32 ABDOMINAL PAIN, LLQ (LEFT LOWER QUADRANT): ICD-10-CM

## 2018-03-01 LAB
ALBUMIN SERPL-MCNC: 3.6 G/DL (ref 3.5–5)
ALBUMIN/GLOB SERPL: 0.9 {RATIO} (ref 1.1–2.2)
ALP SERPL-CCNC: 71 U/L (ref 45–117)
ALT SERPL-CCNC: 21 U/L (ref 12–78)
ANION GAP SERPL CALC-SCNC: 6 MMOL/L (ref 5–15)
APPEARANCE UR: CLEAR
AST SERPL-CCNC: 10 U/L (ref 15–37)
BACTERIA URNS QL MICRO: ABNORMAL /HPF
BASOPHILS # BLD: 0 K/UL (ref 0–0.1)
BASOPHILS NFR BLD: 0 % (ref 0–1)
BILIRUB SERPL-MCNC: 0.2 MG/DL (ref 0.2–1)
BILIRUB UR QL: NEGATIVE
BUN SERPL-MCNC: 19 MG/DL (ref 6–20)
BUN/CREAT SERPL: 17 (ref 12–20)
CALCIUM SERPL-MCNC: 9.7 MG/DL (ref 8.5–10.1)
CHLORIDE SERPL-SCNC: 104 MMOL/L (ref 97–108)
CO2 SERPL-SCNC: 29 MMOL/L (ref 21–32)
COLOR UR: ABNORMAL
CREAT SERPL-MCNC: 1.11 MG/DL (ref 0.55–1.02)
DIFFERENTIAL METHOD BLD: NORMAL
EOSINOPHIL # BLD: 0.2 K/UL (ref 0–0.4)
EOSINOPHIL NFR BLD: 3 % (ref 0–7)
EPITH CASTS URNS QL MICRO: ABNORMAL /LPF
ERYTHROCYTE [DISTWIDTH] IN BLOOD BY AUTOMATED COUNT: 13.6 % (ref 11.5–14.5)
GLOBULIN SER CALC-MCNC: 4 G/DL (ref 2–4)
GLUCOSE SERPL-MCNC: 136 MG/DL (ref 65–100)
GLUCOSE UR STRIP.AUTO-MCNC: NEGATIVE MG/DL
HCT VFR BLD AUTO: 35.7 % (ref 35–47)
HGB BLD-MCNC: 11.7 G/DL (ref 11.5–16)
HGB UR QL STRIP: NEGATIVE
HYALINE CASTS URNS QL MICRO: ABNORMAL /LPF (ref 0–5)
IMM GRANULOCYTES # BLD: 0 K/UL (ref 0–0.04)
IMM GRANULOCYTES NFR BLD AUTO: 0 % (ref 0–0.5)
KETONES UR QL STRIP.AUTO: NEGATIVE MG/DL
LEUKOCYTE ESTERASE UR QL STRIP.AUTO: ABNORMAL
LYMPHOCYTES # BLD: 2 K/UL (ref 0.8–3.5)
LYMPHOCYTES NFR BLD: 26 % (ref 12–49)
MCH RBC QN AUTO: 29.5 PG (ref 26–34)
MCHC RBC AUTO-ENTMCNC: 32.8 G/DL (ref 30–36.5)
MCV RBC AUTO: 90.2 FL (ref 80–99)
MONOCYTES # BLD: 0.6 K/UL (ref 0–1)
MONOCYTES NFR BLD: 7 % (ref 5–13)
NEUTS SEG # BLD: 4.9 K/UL (ref 1.8–8)
NEUTS SEG NFR BLD: 63 % (ref 32–75)
NITRITE UR QL STRIP.AUTO: NEGATIVE
NRBC # BLD: 0 K/UL (ref 0–0.01)
NRBC BLD-RTO: 0 PER 100 WBC
PH UR STRIP: 5.5 [PH] (ref 5–8)
PLATELET # BLD AUTO: 275 K/UL (ref 150–400)
PMV BLD AUTO: 11 FL (ref 8.9–12.9)
POTASSIUM SERPL-SCNC: 3.7 MMOL/L (ref 3.5–5.1)
PROT SERPL-MCNC: 7.6 G/DL (ref 6.4–8.2)
PROT UR STRIP-MCNC: NEGATIVE MG/DL
RBC # BLD AUTO: 3.96 M/UL (ref 3.8–5.2)
RBC #/AREA URNS HPF: ABNORMAL /HPF (ref 0–5)
SODIUM SERPL-SCNC: 139 MMOL/L (ref 136–145)
SP GR UR REFRACTOMETRY: 1.02 (ref 1–1.03)
UA: UC IF INDICATED,UAUC: ABNORMAL
UROBILINOGEN UR QL STRIP.AUTO: 0.2 EU/DL (ref 0.2–1)
WBC # BLD AUTO: 7.7 K/UL (ref 3.6–11)
WBC URNS QL MICRO: ABNORMAL /HPF (ref 0–4)

## 2018-03-01 PROCEDURE — 80162 ASSAY OF DIGOXIN TOTAL: CPT | Performed by: EMERGENCY MEDICINE

## 2018-03-01 PROCEDURE — 74011250637 HC RX REV CODE- 250/637: Performed by: EMERGENCY MEDICINE

## 2018-03-01 PROCEDURE — 96375 TX/PRO/DX INJ NEW DRUG ADDON: CPT

## 2018-03-01 PROCEDURE — 87086 URINE CULTURE/COLONY COUNT: CPT | Performed by: EMERGENCY MEDICINE

## 2018-03-01 PROCEDURE — 85025 COMPLETE CBC W/AUTO DIFF WBC: CPT | Performed by: EMERGENCY MEDICINE

## 2018-03-01 PROCEDURE — 80053 COMPREHEN METABOLIC PANEL: CPT | Performed by: EMERGENCY MEDICINE

## 2018-03-01 PROCEDURE — 99285 EMERGENCY DEPT VISIT HI MDM: CPT

## 2018-03-01 PROCEDURE — 74022 RADEX COMPL AQT ABD SERIES: CPT

## 2018-03-01 PROCEDURE — 96374 THER/PROPH/DIAG INJ IV PUSH: CPT

## 2018-03-01 PROCEDURE — 74011250636 HC RX REV CODE- 250/636: Performed by: EMERGENCY MEDICINE

## 2018-03-01 PROCEDURE — 81001 URINALYSIS AUTO W/SCOPE: CPT | Performed by: EMERGENCY MEDICINE

## 2018-03-01 PROCEDURE — 93005 ELECTROCARDIOGRAM TRACING: CPT

## 2018-03-01 PROCEDURE — 36415 COLL VENOUS BLD VENIPUNCTURE: CPT | Performed by: EMERGENCY MEDICINE

## 2018-03-01 RX ORDER — SODIUM CHLORIDE 0.9 % (FLUSH) 0.9 %
5-10 SYRINGE (ML) INJECTION AS NEEDED
Status: DISCONTINUED | OUTPATIENT
Start: 2018-03-01 | End: 2018-03-02 | Stop reason: HOSPADM

## 2018-03-01 RX ORDER — LEVOFLOXACIN 5 MG/ML
750 INJECTION, SOLUTION INTRAVENOUS
Status: DISCONTINUED | OUTPATIENT
Start: 2018-03-01 | End: 2018-03-01

## 2018-03-01 RX ORDER — ONDANSETRON 2 MG/ML
4 INJECTION INTRAMUSCULAR; INTRAVENOUS
Status: COMPLETED | OUTPATIENT
Start: 2018-03-01 | End: 2018-03-01

## 2018-03-01 RX ORDER — KETOROLAC TROMETHAMINE 30 MG/ML
15 INJECTION, SOLUTION INTRAMUSCULAR; INTRAVENOUS
Status: COMPLETED | OUTPATIENT
Start: 2018-03-01 | End: 2018-03-01

## 2018-03-01 RX ORDER — CIPROFLOXACIN 500 MG/1
500 TABLET ORAL
Status: COMPLETED | OUTPATIENT
Start: 2018-03-01 | End: 2018-03-01

## 2018-03-01 RX ADMIN — ONDANSETRON 4 MG: 2 INJECTION INTRAMUSCULAR; INTRAVENOUS at 22:49

## 2018-03-01 RX ADMIN — KETOROLAC TROMETHAMINE 15 MG: 30 INJECTION, SOLUTION INTRAMUSCULAR at 22:50

## 2018-03-01 RX ADMIN — CIPROFLOXACIN HYDROCHLORIDE 500 MG: 500 TABLET, FILM COATED ORAL at 23:33

## 2018-03-02 VITALS
DIASTOLIC BLOOD PRESSURE: 75 MMHG | OXYGEN SATURATION: 95 % | RESPIRATION RATE: 15 BRPM | HEIGHT: 67 IN | WEIGHT: 248.46 LBS | BODY MASS INDEX: 39 KG/M2 | HEART RATE: 70 BPM | TEMPERATURE: 98.4 F | SYSTOLIC BLOOD PRESSURE: 129 MMHG

## 2018-03-02 LAB
ATRIAL RATE: 85 BPM
CALCULATED P AXIS, ECG09: 37 DEGREES
CALCULATED R AXIS, ECG10: 4 DEGREES
CALCULATED T AXIS, ECG11: 37 DEGREES
DIAGNOSIS, 93000: NORMAL
DIGOXIN SERPL-MCNC: <0.2 NG/ML (ref 0.9–2)
P-R INTERVAL, ECG05: 192 MS
Q-T INTERVAL, ECG07: 386 MS
QRS DURATION, ECG06: 118 MS
QTC CALCULATION (BEZET), ECG08: 459 MS
VENTRICULAR RATE, ECG03: 85 BPM

## 2018-03-02 RX ORDER — DICYCLOMINE HYDROCHLORIDE 20 MG/1
20 TABLET ORAL EVERY 6 HOURS
Qty: 20 TAB | Refills: 0 | Status: SHIPPED | OUTPATIENT
Start: 2018-03-02 | End: 2018-03-08

## 2018-03-02 RX ORDER — CIPROFLOXACIN 500 MG/1
500 TABLET ORAL 2 TIMES DAILY
Qty: 10 TAB | Refills: 0 | Status: SHIPPED | OUTPATIENT
Start: 2018-03-02 | End: 2018-03-07

## 2018-03-02 RX ORDER — ONDANSETRON 4 MG/1
4 TABLET, ORALLY DISINTEGRATING ORAL
Qty: 20 TAB | Refills: 0 | Status: SHIPPED | OUTPATIENT
Start: 2018-03-02 | End: 2018-03-26

## 2018-03-02 RX ORDER — POLYETHYLENE GLYCOL 3350 17 G/17G
17 POWDER, FOR SOLUTION ORAL DAILY
Qty: 510 G | Refills: 0 | Status: SHIPPED | OUTPATIENT
Start: 2018-03-02 | End: 2018-08-09

## 2018-03-02 RX ORDER — FACIAL-BODY WIPES
10 EACH TOPICAL DAILY
Qty: 10 SUPPOSITORY | Refills: 0 | Status: SHIPPED | OUTPATIENT
Start: 2018-03-02 | End: 2018-08-09

## 2018-03-02 NOTE — ED NOTES
Dr. Dayna Escobar at bedside to provide discharge paperwork. Vital signs stable. Pt in no apparent distress at this time. Mental status at baseline. Ambulatory to waiting room with steady gate, discharge paperwork in hand. Accompanied by . Patient declined wheelchair.

## 2018-03-02 NOTE — ED PROVIDER NOTES
EMERGENCY DEPARTMENT HISTORY AND PHYSICAL EXAM      Date: 3/1/2018  Patient Name: Helen Rodríguez    History of Presenting Illness     Chief Complaint   Patient presents with    Abdominal Pain     LLQ pain onset 2/18/18, pt states some mild nausea       History Provided By: Patient    HPI: Helen Rodríguez, 58 y.o. female with PMHx significant for cardiomyopathy, fibromyalgia, HTN, CAD, stroke, asthma, depression, and GERD, presents via wheelchair to the ED with cc of an acute onset of constant, dull with intermittent episodes of stabbing LLQ abdominal pain progressively worsening over the last 2 weeks. The pt reports associated sx of SOB with episodes of stabbing pain, mild nausea, and left lower back pain as well. She expresses that over the last 2 weeks the pt has transitioned into these episodes of stabbing pain noting that she has some left lower back pain intermittently that is relieved by passing flatus. She endorses that her LBM was earlier this afternoon and her discomfort is sometimes exacerbated with movement and has found no relief with Tylenol. The pt discloses a h/o similar sx ~1 year ago and after an EGD and colonoscopy the pt's etiology of sx was still unclear. She notes a h/o a hysterectomy and appendicitis but denies any other GI surgeries. She denies any fevers, chills, chest pain, vomiting, diarrhea, dysuria, hematuria, or weight changes. PCP: Mario Smith MD    There are no other complaints, changes, or physical findings at this time. Current Outpatient Prescriptions   Medication Sig Dispense Refill    plecanatide (TRULANCE) 3 mg tab Take 3 mg by mouth.  colchicine 0.6 mg tablet Take 1 Tab by mouth daily. 1 Tab 0    lidocaine (LIDODERM) 5 % 1 Patch by TransDERmal route every twenty-four (24) hours. Apply patch to the affected area for 12 hours a day and remove for 12 hours a day.  gabapentin (NEURONTIN) 400 mg capsule 400 mg p.o. 3 times daily and 1200 mg p.o. nightly 180 Cap 2    diclofenac (VOLTAREN) 1 % gel Apply  to affected area as needed.  amLODIPine-benazepril (LOTREL) 5-20 mg per capsule Take 1 Cap by mouth daily.  carvedilol (COREG) 25 mg tablet Take 25 mg by mouth two (2) times daily (with meals).  bumetanide (BUMEX) 2 mg tablet Take 2 mg by mouth two (2) times a day.  digoxin (LANOXIN) 0.125 mg tablet Take 0.125 mg by mouth daily.  DULoxetine (CYMBALTA) 60 mg capsule Take 60 mg by mouth daily.  Omeprazole delayed release (PRILOSEC D/R) 20 mg tablet Take 20 mg by mouth two (2) times a day.  zolpidem (AMBIEN) 10 mg tablet Take 10 mg by mouth nightly as needed for Sleep.  temazepam (RESTORIL) 15 mg capsule Take 15 mg by mouth nightly as needed for Sleep.  cholecalciferol, vitamin D3, 2,000 unit tab Take 2,000 Units by mouth daily.  cyanocobalamin 1,000 mcg tablet Take 1,000 mcg by mouth daily.  allopurinol (ZYLOPRIM) 300 mg tablet Take 300 mg by mouth daily.  hyoscyamine (ANASPAZ, LEVSIN) 0.125 mg tablet Take 125 mcg by mouth every four (4) hours as needed for Cramping.  albuterol (PROVENTIL HFA, VENTOLIN HFA) 90 mcg/actuation inhaler Take 1 Puff by inhalation as needed.  spironolactone (ALDACTONE) 25 mg tablet Take  by mouth two (2) times a day.  pregabalin (LYRICA) 150 mg capsule Take 150 mg by mouth two (2) times a day.          Past History     Past Medical History:  Past Medical History:   Diagnosis Date    Arrhythmia     per pt, treated with med    Arthritis     Asthma     CAD (coronary artery disease)     EF=33 1/3    Chronic pain     fibromyalgia    Diarrhea 3/24/2017    Epigastric pain 3/24/2017    GERD (gastroesophageal reflux disease)     ibs    Heart failure (HCC)     cardiomyopathy    Hypertension     Psychiatric disorder     h/o severe depression    Stroke ([facility] [address]     possible TIA    Unspecified sleep apnea     wears CPAP       Past Surgical History:  Past Surgical History:   Procedure Laterality Date    COLONOSCOPY N/A 8/23/2016    COLONOSCOPY performed by Ray Avila MD at [place] [address] N/A 3/24/2017    FLEXIBLE SIGMOIDOSCOPY  performed by Donald Goodrich MD at [facility] AMBULATORY OR    HX APPENDECTOMY      HX GYN      hysterectomy    HX HEENT      left eye surgery    HX KNEE ARTHROSCOPY Right     HX KNEE REPLACEMENT Right     HX OTHER SURGICAL      hand surgery left (carpal tunnel, reconstruction of small finger)    SIGMOIDOSCOPY,BIOPSY  3/24/2017            Family History:  Family History   Problem Relation Age of Onset    Diabetes Mother        Social History:  Social History   Substance Use Topics    Smoking status: Never Smoker    Smokeless tobacco: Never Used    Alcohol use No       Allergies: Allergies   Allergen Reactions    Codeine Itching    Demerol [Meperidine] Nausea Only    Ivp [Fd And C Blue No.1] Itching    Minoxidil Itching    Penicillamine Itching and Other (comments)     Drops blood pressure    Percocet [Oxycodone-Acetaminophen] Nausea and Vomiting         Review of Systems   Review of Systems   Constitutional: Negative. Negative for chills, fever and unexpected weight change. HENT: Negative. Negative for congestion, facial swelling, rhinorrhea, sore throat, trouble swallowing and voice change. Eyes: Negative. Respiratory: Positive for shortness of breath. Negative for apnea, cough, chest tightness and wheezing. Cardiovascular: Negative. Negative for chest pain, palpitations and leg swelling. Gastrointestinal: Positive for abdominal pain (LLQ ) and nausea. Negative for abdominal distention, blood in stool, constipation, diarrhea and vomiting. Endocrine: Negative. Negative for cold intolerance, heat intolerance and polyuria. Genitourinary: Negative. Negative for difficulty urinating, dysuria, flank pain, frequency, hematuria and urgency.    Musculoskeletal: Positive for back pain (left lower ). Negative for arthralgias, myalgias, neck pain and neck stiffness. Skin: Negative. Negative for color change and rash. Neurological: Negative. Negative for dizziness, syncope, facial asymmetry, speech difficulty, weakness, light-headedness, numbness and headaches. Hematological: Negative. Does not bruise/bleed easily. Psychiatric/Behavioral: Negative. Negative for confusion and self-injury. The patient is not nervous/anxious. Physical Exam   Physical Exam   Constitutional: She is oriented to person, place, and time. She appears well-developed and well-nourished. No distress. HENT:   Head: Normocephalic and atraumatic. Mouth/Throat: Oropharynx is clear and moist. No oropharyngeal exudate. Eyes: Conjunctivae and EOM are normal. Pupils are equal, round, and reactive to light. Neck: Normal range of motion. Cardiovascular: Normal rate, regular rhythm and normal heart sounds. Exam reveals no gallop and no friction rub. No murmur heard. Pulmonary/Chest: Effort normal and breath sounds normal. No respiratory distress. She has no wheezes. She has no rales. She exhibits no tenderness. Abdominal: Soft. Bowel sounds are normal. She exhibits no distension and no mass. There is tenderness in the left lower quadrant. There is no rebound, no guarding and no CVA tenderness. Left flank tenderness   Musculoskeletal: Normal range of motion. She exhibits edema (1+ peripheral edema ). She exhibits no tenderness or deformity. Neurological: She is alert and oriented to person, place, and time. She displays normal reflexes. No cranial nerve deficit. She exhibits normal muscle tone. Coordination normal.   Skin: Skin is warm. No rash noted. She is not diaphoretic. Psychiatric: She has a normal mood and affect. Nursing note and vitals reviewed.         Diagnostic Study Results     Labs -     Recent Results (from the past 12 hour(s))   URINALYSIS W/ REFLEX CULTURE    Collection Time: 03/01/18  9:50 PM   Result Value Ref Range    Color YELLOW/STRAW      Appearance CLEAR CLEAR      Specific gravity 1.019 1.003 - 1.030      pH (UA) 5.5 5.0 - 8.0      Protein NEGATIVE  NEG mg/dL    Glucose NEGATIVE  NEG mg/dL    Ketone NEGATIVE  NEG mg/dL    Bilirubin NEGATIVE  NEG      Blood NEGATIVE  NEG      Urobilinogen 0.2 0.2 - 1.0 EU/dL    Nitrites NEGATIVE  NEG      Leukocyte Esterase SMALL (A) NEG      WBC 5-10 0 - 4 /hpf    RBC 0-5 0 - 5 /hpf    Epithelial cells MODERATE (A) FEW /lpf    Bacteria 1+ (A) NEG /hpf    UA:UC IF INDICATED URINE CULTURE ORDERED (A) CNI      Hyaline cast 0-2 0 - 5 /lpf   EKG, 12 LEAD, INITIAL    Collection Time: 03/01/18  9:50 PM   Result Value Ref Range    Ventricular Rate 85 BPM    Atrial Rate 85 BPM    P-R Interval 192 ms    QRS Duration 118 ms    Q-T Interval 386 ms    QTC Calculation (Bezet) 459 ms    Calculated P Axis 37 degrees    Calculated R Axis 4 degrees    Calculated T Axis 37 degrees    Diagnosis       Normal sinus rhythm  Nonspecific intraventricular conduction delay  Nonspecific T wave abnormality  When compared with ECG of 30-JUN-2014 22:56,  Vent.  rate has increased BY  37 BPM  Criteria for Lateral infarct are no longer present  Borderline criteria for Inferior infarct are no longer present  T wave inversion no longer evident in Inferior leads  Nonspecific T wave abnormality has replaced inverted T waves in Lateral leads  QT has lengthened     CBC WITH AUTOMATED DIFF    Collection Time: 03/01/18 10:15 PM   Result Value Ref Range    WBC 7.7 3.6 - 11.0 K/uL    RBC 3.96 3.80 - 5.20 M/uL    HGB 11.7 11.5 - 16.0 g/dL    HCT 35.7 35.0 - 47.0 %    MCV 90.2 80.0 - 99.0 FL    MCH 29.5 26.0 - 34.0 PG    MCHC 32.8 30.0 - 36.5 g/dL    RDW 13.6 11.5 - 14.5 %    PLATELET 556 867 - 663 K/uL    MPV 11.0 8.9 - 12.9 FL    NRBC 0.0 0  WBC    ABSOLUTE NRBC 0.00 0.00 - 0.01 K/uL    NEUTROPHILS 63 32 - 75 %    LYMPHOCYTES 26 12 - 49 %    MONOCYTES 7 5 - 13 %    EOSINOPHILS 3 0 - 7 % BASOPHILS 0 0 - 1 %    IMMATURE GRANULOCYTES 0 0.0 - 0.5 %    ABS. NEUTROPHILS 4.9 1.8 - 8.0 K/UL    ABS. LYMPHOCYTES 2.0 0.8 - 3.5 K/UL    ABS. MONOCYTES 0.6 0.0 - 1.0 K/UL    ABS. EOSINOPHILS 0.2 0.0 - 0.4 K/UL    ABS. BASOPHILS 0.0 0.0 - 0.1 K/UL    ABS. IMM. GRANS. 0.0 0.00 - 0.04 K/UL    DF AUTOMATED     METABOLIC PANEL, COMPREHENSIVE    Collection Time: 03/01/18 10:15 PM   Result Value Ref Range    Sodium 139 136 - 145 mmol/L    Potassium 3.7 3.5 - 5.1 mmol/L    Chloride 104 97 - 108 mmol/L    CO2 29 21 - 32 mmol/L    Anion gap 6 5 - 15 mmol/L    Glucose 136 (H) 65 - 100 mg/dL    BUN 19 6 - 20 MG/DL    Creatinine 1.11 (H) 0.55 - 1.02 MG/DL    BUN/Creatinine ratio 17 12 - 20      GFR est AA >60 >60 ml/min/1.73m2    GFR est non-AA 50 (L) >60 ml/min/1.73m2    Calcium 9.7 8.5 - 10.1 MG/DL    Bilirubin, total 0.2 0.2 - 1.0 MG/DL    ALT (SGPT) 21 12 - 78 U/L    AST (SGOT) 10 (L) 15 - 37 U/L    Alk. phosphatase 71 45 - 117 U/L    Protein, total 7.6 6.4 - 8.2 g/dL    Albumin 3.6 3.5 - 5.0 g/dL    Globulin 4.0 2.0 - 4.0 g/dL    A-G Ratio 0.9 (L) 1.1 - 2.2     DIGOXIN    Collection Time: 03/01/18 10:15 PM   Result Value Ref Range    Digoxin level <0.2 (L) 0.90 - 2.00 NG/ML       Radiologic Studies -   CXR Results  (Last 48 hours)               03/01/18 2248  XR ABD ACUTE W 1 V CHEST Final result    Impression:  IMPRESSION: Constipated pattern. Narrative:  History: Shortness of breath and abdomen pain. EXAM:  XR ABD ACUTE W 1 V CHEST       INDICATION:  sob, abd pain       COMPARISON: None. FINDINGS: The upright chest radiograph demonstrates clear lungs and normal   cardiac and mediastinal contours. There is no pleural effusion or free air under   the diaphragm. Supine and upright views of the abdomen demonstrate moderate stool in the colon. There is no free intraperitoneal air. No soft tissue masses or pathologic   calcifications are identified.  The bones are within normal limits. Medical Decision Making   I am the first provider for this patient. I reviewed the vital signs, available nursing notes, past medical history, past surgical history, family history and social history. Vital Signs-Reviewed the patient's vital signs. Patient Vitals for the past 12 hrs:   Temp Pulse Resp BP SpO2   03/02/18 0015 - 70 15 129/75 95 %   03/01/18 2345 - 75 16 137/82 94 %   03/01/18 2330 - 73 14 120/71 94 %   03/01/18 2315 - 81 18 130/75 94 %   03/01/18 2300 - 76 14 122/78 95 %   03/01/18 2251 - 75 15 - 97 %   03/01/18 2249 - - - 126/75 -   03/01/18 2112 98.4 °F (36.9 °C) 93 16 163/75 96 %       Pulse Oximetry Analysis - 96% on room air    Cardiac Monitor:   Rate: 93 bpm  Rhythm: Normal Sinus Rhythm      EKG interpretation: (Preliminary)  2150  Rhythm: normal sinus rhythm; and regular . Rate (approx.): 85; Axis: normal; MN interval: normal; QRS interval: normal ; ST/T wave: non specific T-wave abnormality; Other findings: non-ischemic. Written by Naren Mercedes ED Scribe, as dictated by Nolberto Ravi MD.    Records Reviewed: Nursing Notes, Old Medical Records, Previous Radiology Studies and Previous Laboratory Studies    Provider Notes (Medical Decision Making):     DDx: obstruction, Diverticulitis, Renal colic, Pyelonephritis. 58year old female presenting with left flank pain and LLQ pain x 2 weeks. The pt is afebrile with stable vitals and her exam is non peritoneal. Will check screening labs, UA, and reassess the need for CT imaging. ED Course:   Initial assessment performed. The patients presenting problems have been discussed, and they are in agreement with the care plan formulated and outlined with them. I have encouraged them to ask questions as they arise throughout their visit. Progress Notes:    11:28 PM  The pt has been re-evaluated. She was updated on lab and imaging findings. Pt is stable for discharge after abx have been completed.      12:09 AM  The pt has been re-evaluated. abx have been finished and the pt is without complaints at this time. She is stable for discharge. Critical Care Time: 0 minutes    Disposition:  Discharge Note:  12:08 AM  The patient is ready for discharge. The patient's signs, symptoms, diagnosis, and discharge instruction have been discussed and the patient has conveyed their understanding. The patient is to follow up as recommended or return to the ER should their symptoms worsen. Plan has been discussed and the patient is in agreement. Written by Mila Alonzo ED Scribe, as dictated by Lianna Rodney MD    PLAN:  1. Current Discharge Medication List        2. Follow-up Information     Follow up With Details Comments Contact Info    [facility] EMERGENCY DEPT   [address] [place], [place] [id number]  [address] [place] [address], [address] [place] [id number]  [phone]      Hanna Deleon MD  As needed, If symptoms worsen [address] [facility] [address] [phone]          Return to ED if worse     Diagnosis     Clinical Impression:   1. Constipation, unspecified constipation type    2. Abdominal pain, LLQ (left lower quadrant)    3. Urinary tract infection without hematuria, site unspecified        Attestations:    Attestation: This note is prepared by Soraida Loja. Clinton, acting as Scribe for Lianna Rodney MD.      Lianna Rodney MD: The scribe's documentation has been prepared under my direction and personally reviewed by me in its entirety. I confirm that the note above accurately reflects all work, treatment, procedures, and medical decision making performed by me. This note will not be viewable in [address]

## 2018-03-02 NOTE — DISCHARGE INSTRUCTIONS
Thank you! Thank you for allowing us to provide you with excellent care today. We hope we addressed all of your concerns and needs. We strive to provide excellent quality care in the Emergency Department. You will receive a survey after your visit to evaluate the care you were provided. Please rate us a level 5 (excellent), as anything less than excellent does not meet our goals. If you feel that you have not received excellent quality care or timely care, please ask to speak to the nurse manager. Please choose us in the future for your continued health care needs. ------------------------------------------------------------------------------------------------------------  The exam and treatment you received in the Emergency Department were for an urgent problem and are not intended as complete care. It is important that you follow up with a doctor, nurse practitioner, or physician assistant for ongoing care. If your symptoms become worse or you do not improve as expected and you are unable to reach your usual health care provider, you should return to the Emergency Department. We are available 24 hours a day. Please take your discharge instructions with you when you go to your follow-up appointment. If you have any problem arranging a follow-up appointment, contact the Emergency Department immediately. If a prescription has been provided, please have it filled as soon as possible to prevent a delay in treatment. Read the entire medication instruction sheet provided to you by the pharmacy. If you have any questions or reservations about taking the medication due to side effects or interactions with other medications, please call your primary care physician or contact the ER to speak with the charge nurse. Make an appointment with your family doctor or the physician you were referred to for follow-up of this visit as instructed on your discharge paperwork, as this is mandatory follow-up. Return to the ER if you are unable to be seen or if you are unable to be seen in a timely manner. If you have any problem arranging the follow-up visit, contact the Emergency Department immediately. Constipation: Care Instructions  Your Care Instructions    Constipation means that you have a hard time passing stools (bowel movements). People pass stools from 3 times a day to once every 3 days. What is normal for you may be different. Constipation may occur with pain in the rectum and cramping. The pain may get worse when you try to pass stools. Sometimes there are small amounts of bright red blood on toilet paper or the surface of stools. This is because of enlarged veins near the rectum (hemorrhoids). A few changes in your diet and lifestyle may help you avoid ongoing constipation. Your doctor may also prescribe medicine to help loosen your stool. Some medicines can cause constipation. These include pain medicines and antidepressants. Tell your doctor about all the medicines you take. Your doctor may want to make a medicine change to ease your symptoms. Follow-up care is a key part of your treatment and safety. Be sure to make and go to all appointments, and call your doctor if you are having problems. It's also a good idea to know your test results and keep a list of the medicines you take. How can you care for yourself at home? · Drink plenty of fluids, enough so that your urine is light yellow or clear like water. If you have kidney, heart, or liver disease and have to limit fluids, talk with your doctor before you increase the amount of fluids you drink. · Include high-fiber foods in your diet each day. These include fruits, vegetables, beans, and whole grains. · Get at least 30 minutes of exercise on most days of the week. Walking is a good choice. You also may want to do other activities, such as running, swimming, cycling, or playing tennis or team sports.   · Take a fiber supplement, such as Citrucel or Metamucil, every day. Read and follow all instructions on the label. · Schedule time each day for a bowel movement. A daily routine may help. Take your time having your bowel movement. · Support your feet with a small step stool when you sit on the toilet. This helps flex your hips and places your pelvis in a squatting position. · Your doctor may recommend an over-the-counter laxative to relieve your constipation. Examples are Milk of Magnesia and MiraLax. Read and follow all instructions on the label. Do not use laxatives on a long-term basis. When should you call for help? Call your doctor now or seek immediate medical care if:  ? · You have new or worse belly pain. ? · You have new or worse nausea or vomiting. ? · You have blood in your stools. ? Watch closely for changes in your health, and be sure to contact your doctor if:  ? · Your constipation is getting worse. ? · You do not get better as expected. Where can you learn more? Go to http://jaspreet-thierno.info/. Enter 21 [id number] in the search box to learn more about \"Constipation: Care Instructions. \"  Current as of: March 20, 2017  Content Version: 11.4  © 3012-6130 [employer]. Care instructions adapted under license by [employer] (which disclaims liability or warranty for this information). If you have questions about a medical condition or this instruction, always ask your healthcare professional. [place] [address] any warranty or liability for your use of this information. Abdominal Pain: Care Instructions  Your Care Instructions    Abdominal pain has many possible causes. Some aren't serious and get better on their own in a few days. Others need more testing and treatment. If your pain continues or gets worse, you need to be rechecked and may need more tests to find out what is wrong. You may need surgery to correct the problem.   Don't ignore new symptoms, such as fever, nausea and vomiting, urination problems, pain that gets worse, and dizziness. These may be signs of a more serious problem. Your doctor may have recommended a follow-up visit in the next 8 to 12 hours. If you are not getting better, you may need more tests or treatment. The doctor has checked you carefully, but problems can develop later. If you notice any problems or new symptoms, get medical treatment right away. Follow-up care is a key part of your treatment and safety. Be sure to make and go to all appointments, and call your doctor if you are having problems. It's also a good idea to know your test results and keep a list of the medicines you take. How can you care for yourself at home? · Rest until you feel better. · To prevent dehydration, drink plenty of fluids, enough so that your urine is light yellow or clear like water. Choose water and other caffeine-free clear liquids until you feel better. If you have kidney, heart, or liver disease and have to limit fluids, talk with your doctor before you increase the amount of fluids you drink. · If your stomach is upset, eat mild foods, such as rice, dry toast or crackers, bananas, and applesauce. Try eating several small meals instead of two or three large ones. · Wait until 48 hours after all symptoms have gone away before you have spicy foods, alcohol, and drinks that contain caffeine. · Do not eat foods that are high in fat. · Avoid anti-inflammatory medicines such as aspirin, ibuprofen (Advil, Motrin), and naproxen (Aleve). These can cause stomach upset. Talk to your doctor if you take daily aspirin for another health problem. When should you call for help? Call 911 anytime you think you may need emergency care. For example, call if:  ? · You passed out (lost consciousness). ? · You pass maroon or very bloody stools. ? · You vomit blood or what looks like coffee grounds. ? · You have new, severe belly pain.    ?Call your doctor now or seek immediate medical care if:  ? · Your pain gets worse, especially if it becomes focused in one area of your belly. ? · You have a new or higher fever. ? · Your stools are black and look like tar, or they have streaks of blood. ? · You have unexpected vaginal bleeding. ? · You have symptoms of a urinary tract infection. These may include:  ¨ Pain when you urinate. ¨ Urinating more often than usual.  ¨ Blood in your urine. ? · You are dizzy or lightheaded, or you feel like you may faint. ? Watch closely for changes in your health, and be sure to contact your doctor if:  ? · You are not getting better after 1 day (24 hours). Where can you learn more? Go to http://jaspreet-thierno.info/. Enter Y163 in the search box to learn more about \"Abdominal Pain: Care Instructions. \"  Current as of: March 20, 2017  Content Version: 11.4  © 4765-9422 [employer]. Care instructions adapted under license by [employer] (which disclaims liability or warranty for this information). If you have questions about a medical condition or this instruction, always ask your healthcare professional. [address] any warranty or liability for your use of this information.

## 2018-03-02 NOTE — ED TRIAGE NOTES
Patient arrives ambulatory to ED with  with a report of lower abdominal pain mostly LLQ pain onset 2/18/18, pt states some mild nausea. Patient denies vomiting.

## 2018-03-03 LAB
BACTERIA SPEC CULT: NORMAL
CC UR VC: NORMAL
SERVICE CMNT-IMP: NORMAL

## 2018-03-06 ENCOUNTER — HOSPITAL ENCOUNTER (OUTPATIENT)
Dept: CT IMAGING | Age: 63
Discharge: HOME | End: 2018-03-06
Attending: INTERNAL MEDICINE
Payer: MEDICARE

## 2018-03-06 DIAGNOSIS — R06.02 SHORTNESS OF BREATH: ICD-10-CM

## 2018-03-06 PROCEDURE — 71250 CT THORAX DX C-: CPT

## 2018-03-26 ENCOUNTER — OFFICE VISIT (OUTPATIENT)
Dept: NEUROLOGY | Age: 63
End: 2018-03-26

## 2018-03-26 VITALS
BODY MASS INDEX: 38.92 KG/M2 | WEIGHT: 248 LBS | SYSTOLIC BLOOD PRESSURE: 124 MMHG | DIASTOLIC BLOOD PRESSURE: 78 MMHG | OXYGEN SATURATION: 95 % | HEART RATE: 80 BPM | HEIGHT: 67 IN

## 2018-03-26 DIAGNOSIS — G56.01 CARPAL TUNNEL SYNDROME OF RIGHT WRIST: ICD-10-CM

## 2018-03-26 DIAGNOSIS — G89.29 CHRONIC RIGHT-SIDED LOW BACK PAIN WITH RIGHT-SIDED SCIATICA: Primary | ICD-10-CM

## 2018-03-26 DIAGNOSIS — M54.41 CHRONIC RIGHT-SIDED LOW BACK PAIN WITH RIGHT-SIDED SCIATICA: Primary | ICD-10-CM

## 2018-03-26 DIAGNOSIS — M62.838 MUSCLE SPASM: ICD-10-CM

## 2018-03-26 RX ORDER — DULOXETIN HYDROCHLORIDE 30 MG/1
30 CAPSULE, DELAYED RELEASE ORAL DAILY
Qty: 30 CAP | Refills: 2 | Status: SHIPPED | OUTPATIENT
Start: 2018-03-26 | End: 2018-08-09

## 2018-03-26 NOTE — LETTER
3/26/2018 1:28 PM 
 
Patient:    Edelmira Echevarria YOB: 1955 Date of Visit:    3/26/2018 Dear Db Farr MD 
 
Thank you for referring Ms. Sofía Gonzalez to me for evaluation/treatment. Below are the relevant portions of my assessment and plan of care. Neurology follow-up note REFERRED BY: 
Db Farr MD 
 
03/26/18 Chief Complaint Patient presents with  Follow-up Neuropathy Subjective Edelmira Echevarria is a 58 y.o. female who presented to the neurology office for management of back pain. To recap, the patient has been having back pain since 2014 . He does have intermittent exacerbation of right sided back pain with radiation to the right leg. When this happens, she does have 10/10 in severity, sharp and shooting and then she has to have bed rest and uses ice packs for a week and then it resolves. She is on lyrica 150 mg po bid and gabapentin 400 mg p.o. 3 times daily plus additional 1200 mg p.o. nightly. She did have exacerbation of her symptoms a week ago and is recovering from that. She had a MRI of the L spine and it was normal.  
 
Current Outpatient Prescriptions Medication Sig  DULoxetine (CYMBALTA) 30 mg capsule Take 1 Cap by mouth daily.  polyethylene glycol (MIRALAX) 17 gram/dose powder Take 17 g by mouth daily. 1 tablespoon with 8 oz of water daily  plecanatide (TRULANCE) 3 mg tab Take 3 mg by mouth.  lidocaine (LIDODERM) 5 % 1 Patch by TransDERmal route every twenty-four (24) hours. Apply patch to the affected area for 12 hours a day and remove for 12 hours a day.  gabapentin (NEURONTIN) 400 mg capsule 400 mg p.o. 3 times daily and 1200 mg p.o. nightly  diclofenac (VOLTAREN) 1 % gel Apply  to affected area as needed.  amLODIPine-benazepril (LOTREL) 5-20 mg per capsule Take 1 Cap by mouth daily.  carvedilol (COREG) 25 mg tablet Take 25 mg by mouth two (2) times daily (with meals).  bumetanide (BUMEX) 2 mg tablet Take 2 mg by mouth two (2) times a day.  DULoxetine (CYMBALTA) 60 mg capsule Take 60 mg by mouth daily.  zolpidem (AMBIEN) 10 mg tablet Take 10 mg by mouth nightly as needed for Sleep.  cholecalciferol, vitamin D3, 2,000 unit tab Take 2,000 Units by mouth daily.  cyanocobalamin 1,000 mcg tablet Take 1,000 mcg by mouth daily.  hyoscyamine (ANASPAZ, LEVSIN) 0.125 mg tablet Take 125 mcg by mouth every four (4) hours as needed for Cramping.  albuterol (PROVENTIL HFA, VENTOLIN HFA) 90 mcg/actuation inhaler Take 1 Puff by inhalation as needed.  spironolactone (ALDACTONE) 25 mg tablet Take  by mouth two (2) times a day.  pregabalin (LYRICA) 150 mg capsule Take 150 mg by mouth two (2) times a day.  bisacodyl (DULCOLAX, BISACODYL,) 10 mg suppository Insert 10 mg into rectum daily.  colchicine 0.6 mg tablet Take 1 Tab by mouth daily. No current facility-administered medications for this visit. REVIEW OF SYSTEMS:  
A ten system review of constitutional, cardiovascular, respiratory, musculoskeletal, endocrine, skin, SHEENT, genitourinary, psychiatric and neurologic systems was obtained and is unremarkable except as stated in HPI EXAMINATION:  
Visit Vitals  /78  Pulse 80  
 Ht 5' 7\" (1.702 m)  Wt 248 lb (112.5 kg)  SpO2 95%  BMI 38.84 kg/m2 General:  
General appearance: Pt is in no acute distress Distal pulses are preserved Neurological Examination:  
Mental Status: AAO x3. Speech is fluent. Follows commands, has normal fund of knowledge, attention, short term recall, comprehension and insight. Cranial Nerves: Visual fields are full. PERRL, Extraocular movements are full. Facial sensation intact V1- V3. Facial movement intact, symmetric. Hearing intact to conversation. Palate elevates symmetrically. Shoulder shrug symmetric. Tongue midline. Motor: Strength is 5/5 in all 4 ext. No atrophy.   
 
Tone: Normal 
 
 Sensation: Decreased vib distally in the right lower ext. Coordination/Cerebellar: Intact to finger-nose-finger Gait: Casual gait is normal.  
 
Laboratory review:  
Results for orders placed or performed during the hospital encounter of 03/01/18 CULTURE, URINE Result Value Ref Range Special Requests: NO SPECIAL REQUESTS Reflexed from [id number] [place] Count <1,000 CFU/ML Culture result: NO GROWTH 1 DAY    
CBC WITH AUTOMATED DIFF Result Value Ref Range WBC 7.7 3.6 - 11.0 K/uL  
 RBC 3.96 3.80 - 5.20 M/uL  
 HGB 11.7 11.5 - 16.0 g/dL HCT 35.7 35.0 - 47.0 % MCV 90.2 80.0 - 99.0 FL  
 MCH 29.5 26.0 - 34.0 PG  
 MCHC 32.8 30.0 - 36.5 g/dL  
 RDW 13.6 11.5 - 14.5 % PLATELET 737 997 - 789 K/uL MPV 11.0 8.9 - 12.9 FL  
 NRBC 0.0 0  WBC ABSOLUTE NRBC 0.00 0.00 - 0.01 K/uL NEUTROPHILS 63 32 - 75 % LYMPHOCYTES 26 12 - 49 % MONOCYTES 7 5 - 13 % EOSINOPHILS 3 0 - 7 % BASOPHILS 0 0 - 1 % IMMATURE GRANULOCYTES 0 0.0 - 0.5 % ABS. NEUTROPHILS 4.9 1.8 - 8.0 K/UL  
 ABS. LYMPHOCYTES 2.0 0.8 - 3.5 K/UL  
 ABS. MONOCYTES 0.6 0.0 - 1.0 K/UL  
 ABS. EOSINOPHILS 0.2 0.0 - 0.4 K/UL  
 ABS. BASOPHILS 0.0 0.0 - 0.1 K/UL  
 ABS. IMM. GRANS. 0.0 0.00 - 0.04 K/UL  
 DF AUTOMATED METABOLIC PANEL, COMPREHENSIVE Result Value Ref Range Sodium 139 136 - 145 mmol/L Potassium 3.7 3.5 - 5.1 mmol/L Chloride 104 97 - 108 mmol/L  
 CO2 29 21 - 32 mmol/L Anion gap 6 5 - 15 mmol/L Glucose 136 (H) 65 - 100 mg/dL BUN 19 6 - 20 MG/DL Creatinine 1.11 (H) 0.55 - 1.02 MG/DL  
 BUN/Creatinine ratio 17 12 - 20 GFR est AA >60 >60 ml/min/1.73m2 GFR est non-AA 50 (L) >60 ml/min/1.73m2 Calcium 9.7 8.5 - 10.1 MG/DL Bilirubin, total 0.2 0.2 - 1.0 MG/DL  
 ALT (SGPT) 21 12 - 78 U/L  
 AST (SGOT) 10 (L) 15 - 37 U/L Alk. phosphatase 71 45 - 117 U/L Protein, total 7.6 6.4 - 8.2 g/dL Albumin 3.6 3.5 - 5.0 g/dL Globulin 4.0 2.0 - 4.0 g/dL A-G Ratio 0.9 (L) 1.1 - 2.2 URINALYSIS W/ REFLEX CULTURE Result Value Ref Range Color YELLOW/STRAW Appearance CLEAR CLEAR Specific gravity 1.019 1.003 - 1.030    
 pH (UA) 5.5 5.0 - 8.0 Protein NEGATIVE  NEG mg/dL Glucose NEGATIVE  NEG mg/dL Ketone NEGATIVE  NEG mg/dL Bilirubin NEGATIVE  NEG Blood NEGATIVE  NEG Urobilinogen 0.2 0.2 - 1.0 EU/dL Nitrites NEGATIVE  NEG Leukocyte Esterase SMALL (A) NEG    
 WBC 5-10 0 - 4 /hpf  
 RBC 0-5 0 - 5 /hpf Epithelial cells MODERATE (A) FEW /lpf Bacteria 1+ (A) NEG /hpf  
 UA:UC IF INDICATED URINE CULTURE ORDERED (A) CNI Hyaline cast 0-2 0 - 5 /lpf DIGOXIN Result Value Ref Range Digoxin level <0.2 (L) 0.90 - 2.00 NG/ML  
EKG, 12 LEAD, INITIAL Result Value Ref Range Ventricular Rate 85 BPM  
 Atrial Rate 85 BPM  
 P-R Interval 192 ms QRS Duration 118 ms Q-T Interval 386 ms QTC Calculation (Bezet) 459 ms Calculated P Axis 37 degrees Calculated R Axis 4 degrees Calculated T Axis 37 degrees Diagnosis Normal sinus rhythm Nonspecific intraventricular conduction delay Nonspecific T wave abnormality Confirmed by Gardenia Calloway ([id number]) on 3/2/2018 8:31:54 AM 
  
 
 
Imaging review:  
8/3/16 MRI L spine Minimal degenerative disease. Mild left L5-S1 neural foraminal stenosis, stable. EMG/nerve conduction study performed in November 2016 is remarkable for right carpal tunnel syndrome. There is no electrophysiological evidence of a right lumbar radiculopathy. Documentation review: 
None Assessment/Plan:  
Hanna He is a 58 y.o. female who presented to the neurology office for management of intermittent back pain with radiation to the right lower ext. MRI of the lumbar spine and EMG/nerve conduction study have been unremarkable for the back pain but it did show right median neuropathy at the wrist.  She is asymptomatic from that. Her back pain has worsened. Continue gabapentin to 400 mg 3 times a day and 1200 mg at night along with Lyrica 150 mg p.o. 2 times daily. The patient is taking Cymbalta 60 mg every day. I do plan to add 30 mg of Cymbalta so that she takes 30 mg in the morning and 60 mg at night. Follow-up in 6 months ICD-10-CM ICD-9-CM 1. Chronic right-sided low back pain with right-sided sciatica M54.41 724.2 DULoxetine (CYMBALTA) 30 mg capsule G89.29 724.3   
  338.29   
2. Muscle spasm M62.838 728.85   
3. Carpal tunnel syndrome of right wrist G56.01 354.0 Over 25 minutes was spent with the patient and family of which > 50% of the visit was spent counseling on diagnosis, management, and treatment of the diagnosis Thank you for allowing me to participate in the care of Ms. Aldana. Please feel free to contact me if you have any questions. Denise Sabillon MD 
Neurologist and Clinical Neurophysiologist 
 
CC: Buddy Barrett MD 
Fax: [phone] This note will not be viewable in [address] If you have questions, please do not hesitate to call me. I look forward to following Ms. Aldana along with you. Sincerely, Denise Sabillon MD

## 2018-03-26 NOTE — PROGRESS NOTES
Neurology follow-up note      REFERRED BY:  Suri Mace MD    03/26/18    Chief Complaint   Patient presents with    Follow-up     Neuropathy       Subjective  Charles Fajardo is a 58 y.o. female who presented to the neurology office for management of back pain. To recap, the patient has been having back pain since 2014 . He does have intermittent exacerbation of right sided back pain with radiation to the right leg. When this happens, she does have 10/10 in severity, sharp and shooting and then she has to have bed rest and uses ice packs for a week and then it resolves. She is on lyrica 150 mg po bid and gabapentin 400 mg p.o. 3 times daily plus additional 1200 mg p.o. nightly. She did have exacerbation of her symptoms a week ago and is recovering from that. She had a MRI of the L spine and it was normal.     Current Outpatient Prescriptions   Medication Sig    DULoxetine (CYMBALTA) 30 mg capsule Take 1 Cap by mouth daily.  polyethylene glycol (MIRALAX) 17 gram/dose powder Take 17 g by mouth daily. 1 tablespoon with 8 oz of water daily    plecanatide (TRULANCE) 3 mg tab Take 3 mg by mouth.  lidocaine (LIDODERM) 5 % 1 Patch by TransDERmal route every twenty-four (24) hours. Apply patch to the affected area for 12 hours a day and remove for 12 hours a day.  gabapentin (NEURONTIN) 400 mg capsule 400 mg p.o. 3 times daily and 1200 mg p.o. nightly    diclofenac (VOLTAREN) 1 % gel Apply  to affected area as needed.  amLODIPine-benazepril (LOTREL) 5-20 mg per capsule Take 1 Cap by mouth daily.  carvedilol (COREG) 25 mg tablet Take 25 mg by mouth two (2) times daily (with meals).  bumetanide (BUMEX) 2 mg tablet Take 2 mg by mouth two (2) times a day.  DULoxetine (CYMBALTA) 60 mg capsule Take 60 mg by mouth daily.  zolpidem (AMBIEN) 10 mg tablet Take 10 mg by mouth nightly as needed for Sleep.  cholecalciferol, vitamin D3, 2,000 unit tab Take 2,000 Units by mouth daily.     cyanocobalamin 1,000 mcg tablet Take 1,000 mcg by mouth daily.  hyoscyamine (ANASPAZ, LEVSIN) 0.125 mg tablet Take 125 mcg by mouth every four (4) hours as needed for Cramping.  albuterol (PROVENTIL HFA, VENTOLIN HFA) 90 mcg/actuation inhaler Take 1 Puff by inhalation as needed.  spironolactone (ALDACTONE) 25 mg tablet Take  by mouth two (2) times a day.  pregabalin (LYRICA) 150 mg capsule Take 150 mg by mouth two (2) times a day.  bisacodyl (DULCOLAX, BISACODYL,) 10 mg suppository Insert 10 mg into rectum daily.  colchicine 0.6 mg tablet Take 1 Tab by mouth daily. No current facility-administered medications for this visit. REVIEW OF SYSTEMS:   A ten system review of constitutional, cardiovascular, respiratory, musculoskeletal, endocrine, skin, SHEENT, genitourinary, psychiatric and neurologic systems was obtained and is unremarkable except as stated in HPI     EXAMINATION:   Visit Vitals    /78    Pulse 80    Ht 5' 7\" (1.702 m)    Wt 248 lb (112.5 kg)    SpO2 95%    BMI 38.84 kg/m2        General:   General appearance: Pt is in no acute distress   Distal pulses are preserved    Neurological Examination:   Mental Status: AAO x3. Speech is fluent. Follows commands, has normal fund of knowledge, attention, short term recall, comprehension and insight. Cranial Nerves: Visual fields are full. PERRL, Extraocular movements are full. Facial sensation intact V1- V3. Facial movement intact, symmetric. Hearing intact to conversation. Palate elevates symmetrically. Shoulder shrug symmetric. Tongue midline. Motor: Strength is 5/5 in all 4 ext. No atrophy. Tone: Normal    Sensation: Decreased vib distally in the right lower ext.     Coordination/Cerebellar: Intact to finger-nose-finger     Gait: Casual gait is normal.     Laboratory review:   Results for orders placed or performed during the hospital encounter of 03/01/18   CULTURE, URINE   Result Value Ref Range    Special Requests: NO SPECIAL REQUESTS  Reflexed from [id number]        [place] Count <1,000 CFU/ML      Culture result: NO GROWTH 1 DAY     CBC WITH AUTOMATED DIFF   Result Value Ref Range    WBC 7.7 3.6 - 11.0 K/uL    RBC 3.96 3.80 - 5.20 M/uL    HGB 11.7 11.5 - 16.0 g/dL    HCT 35.7 35.0 - 47.0 %    MCV 90.2 80.0 - 99.0 FL    MCH 29.5 26.0 - 34.0 PG    MCHC 32.8 30.0 - 36.5 g/dL    RDW 13.6 11.5 - 14.5 %    PLATELET 310 347 - 292 K/uL    MPV 11.0 8.9 - 12.9 FL    NRBC 0.0 0  WBC    ABSOLUTE NRBC 0.00 0.00 - 0.01 K/uL    NEUTROPHILS 63 32 - 75 %    LYMPHOCYTES 26 12 - 49 %    MONOCYTES 7 5 - 13 %    EOSINOPHILS 3 0 - 7 %    BASOPHILS 0 0 - 1 %    IMMATURE GRANULOCYTES 0 0.0 - 0.5 %    ABS. NEUTROPHILS 4.9 1.8 - 8.0 K/UL    ABS. LYMPHOCYTES 2.0 0.8 - 3.5 K/UL    ABS. MONOCYTES 0.6 0.0 - 1.0 K/UL    ABS. EOSINOPHILS 0.2 0.0 - 0.4 K/UL    ABS. BASOPHILS 0.0 0.0 - 0.1 K/UL    ABS. IMM. GRANS. 0.0 0.00 - 0.04 K/UL    DF AUTOMATED     METABOLIC PANEL, COMPREHENSIVE   Result Value Ref Range    Sodium 139 136 - 145 mmol/L    Potassium 3.7 3.5 - 5.1 mmol/L    Chloride 104 97 - 108 mmol/L    CO2 29 21 - 32 mmol/L    Anion gap 6 5 - 15 mmol/L    Glucose 136 (H) 65 - 100 mg/dL    BUN 19 6 - 20 MG/DL    Creatinine 1.11 (H) 0.55 - 1.02 MG/DL    BUN/Creatinine ratio 17 12 - 20      GFR est AA >60 >60 ml/min/1.73m2    GFR est non-AA 50 (L) >60 ml/min/1.73m2    Calcium 9.7 8.5 - 10.1 MG/DL    Bilirubin, total 0.2 0.2 - 1.0 MG/DL    ALT (SGPT) 21 12 - 78 U/L    AST (SGOT) 10 (L) 15 - 37 U/L    Alk.  phosphatase 71 45 - 117 U/L    Protein, total 7.6 6.4 - 8.2 g/dL    Albumin 3.6 3.5 - 5.0 g/dL    Globulin 4.0 2.0 - 4.0 g/dL    A-G Ratio 0.9 (L) 1.1 - 2.2     URINALYSIS W/ REFLEX CULTURE   Result Value Ref Range    Color YELLOW/STRAW      Appearance CLEAR CLEAR      Specific gravity 1.019 1.003 - 1.030      pH (UA) 5.5 5.0 - 8.0      Protein NEGATIVE  NEG mg/dL    Glucose NEGATIVE  NEG mg/dL    Ketone NEGATIVE  NEG mg/dL    Bilirubin NEGATIVE  NEG      Blood NEGATIVE  NEG      Urobilinogen 0.2 0.2 - 1.0 EU/dL    Nitrites NEGATIVE  NEG      Leukocyte Esterase SMALL (A) NEG      WBC 5-10 0 - 4 /hpf    RBC 0-5 0 - 5 /hpf    Epithelial cells MODERATE (A) FEW /lpf    Bacteria 1+ (A) NEG /hpf    UA:UC IF INDICATED URINE CULTURE ORDERED (A) CNI      Hyaline cast 0-2 0 - 5 /lpf   DIGOXIN   Result Value Ref Range    Digoxin level <0.2 (L) 0.90 - 2.00 NG/ML   EKG, 12 LEAD, INITIAL   Result Value Ref Range    Ventricular Rate 85 BPM    Atrial Rate 85 BPM    P-R Interval 192 ms    QRS Duration 118 ms    Q-T Interval 386 ms    QTC Calculation (Bezet) 459 ms    Calculated P Axis 37 degrees    Calculated R Axis 4 degrees    Calculated T Axis 37 degrees    Diagnosis       Normal sinus rhythm  Nonspecific intraventricular conduction delay  Nonspecific T wave abnormality    Confirmed by Thuy Hook ([id number]) on 3/2/2018 8:31:54 AM         Imaging review:   8/3/16  MRI L spine  Minimal degenerative disease. Mild left L5-S1 neural foraminal stenosis, stable. EMG/nerve conduction study performed in November 2016 is remarkable for right carpal tunnel syndrome. There is no electrophysiological evidence of a right lumbar radiculopathy. Documentation review:  None    Assessment/Plan:   Charles Fajardo is a 58 y.o. female who presented to the neurology office for management of intermittent back pain with radiation to the right lower ext. MRI of the lumbar spine and EMG/nerve conduction study have been unremarkable for the back pain but it did show right median neuropathy at the wrist.  She is asymptomatic from that. Her back pain has worsened. Continue gabapentin to 400 mg 3 times a day and 1200 mg at night along with Lyrica 150 mg p.o. 2 times daily. The patient is taking Cymbalta 60 mg every day. I do plan to add 30 mg of Cymbalta so that she takes 30 mg in the morning and 60 mg at night. Follow-up in 6 months       ICD-10-CM ICD-9-CM    1.  Chronic right-sided low back pain with right-sided sciatica M54.41 724.2 DULoxetine (CYMBALTA) 30 mg capsule    G89.29 724.3      338.29    2. Muscle spasm M62.838 728.85    3. Carpal tunnel syndrome of right wrist G56.01 354.0      Over 25 minutes was spent with the patient and family of which > 50% of the visit was spent counseling on diagnosis, management, and treatment of the diagnosis         Thank you for allowing me to participate in the care of Ms. Aldana. Please feel free to contact me if you have any questions. Ellen Salinas MD  Neurologist and Clinical Neurophysiologist    CC: Nicole Anderson MD  Fax: [phone]    This note will not be viewable in [address]

## 2018-03-26 NOTE — MR AVS SNAPSHOT
[address], 
[address], [address] [place] [phone] Patient: Helen Rodríguez MRN: [id number] :1955 Visit Information Date & Time Provider Department Dept. Phone Encounter #  
 3/26/2018 11:40 AM Susan Mendoza MD [facility], [place] [phone] [id number] Upcoming Health Maintenance Date Due Hepatitis C Screening 1955 Pneumococcal 19-64 Medium Risk (1 of 1 - PPSV23) 10/17/1974 DTaP/Tdap/Td series (1 - Tdap) 10/17/1976 PAP AKA CERVICAL CYTOLOGY 10/17/1976 BREAST CANCER SCRN MAMMOGRAM 10/17/2005 FOBT Q 1 YEAR AGE 50-75 10/17/2005 ZOSTER VACCINE AGE 60> 2015 Influenza Age 5 to Adult 2017 MEDICARE YEARLY EXAM 3/14/2018 Allergies as of 3/26/2018  Review Complete On: 3/26/2018 By: Gio Urias Severity Noted Reaction Type Reactions Codeine  2010    Itching Demerol [Meperidine]  2012    Nausea Only Ivp [Fd And C Blue No.1]  2010    Itching Minoxidil  2010    Itching Penicillamine  2010   Side Effect Itching, Other (comments) Drops blood pressure Percocet [Oxycodone-acetaminophen]  2014   Systemic Nausea and Vomiting Current Immunizations  Never Reviewed No immunizations on file. Not reviewed this visit Vitals BP Pulse Height(growth percentile) Weight(growth percentile) SpO2 BMI  
 124/78 80 5' 7\" (1.702 m) 248 lb (112.5 kg) 95% 38.84 kg/m2 OB Status Smoking Status Hysterectomy Never Smoker BMI and BSA Data Body Mass Index Body Surface Area  
 38.84 kg/m 2 2.31 m 2 Preferred Pharmacy Pharmacy Name Phone [address] [phone] Your Updated Medication List  
  
   
This list is accurate as of 3/26/18 12:19 PM.  Always use your most recent med list.  
  
  
  
  
 albuterol 90 mcg/actuation inhaler Commonly known as:  PROVENTIL HFA, VENTOLIN HFA, PROAIR HFA Take 1 Puff by inhalation as needed. allopurinol 300 mg tablet Commonly known as:  Niki Paredes Take 300 mg by mouth daily. amLODIPine-benazepril 5-20 mg per capsule Commonly known as:  Dea Shay Take 1 Cap by mouth daily. bisacodyl 10 mg suppository Commonly known as:  DULCOLAX (BISACODYL) Insert 10 mg into rectum daily. bumetanide 2 mg tablet Commonly known as:  Maegan Dryer Take 2 mg by mouth two (2) times a day. carvedilol 25 mg tablet Commonly known as:  Ruthell Fellers Take 25 mg by mouth two (2) times daily (with meals). cholecalciferol (vitamin D3) 2,000 unit Tab Take 2,000 Units by mouth daily. colchicine 0.6 mg tablet Take 1 Tab by mouth daily. cyanocobalamin 1,000 mcg tablet Take 1,000 mcg by mouth daily. digoxin 0.125 mg tablet Commonly known as:  LANOXIN Take 0.125 mg by mouth daily. DULoxetine 60 mg capsule Commonly known as:  CYMBALTA Take 60 mg by mouth daily. gabapentin 400 mg capsule Commonly known as:  NEURONTIN  
400 mg p.o. 3 times daily and 1200 mg p.o. nightly  
  
 hyoscyamine 0.125 mg tablet Commonly known as:  Liliana Case Take 125 mcg by mouth every four (4) hours as needed for Cramping.  
  
 lidocaine 5 % Commonly known as:  LIDODERM  
1 Patch by TransDERmal route every twenty-four (24) hours. Apply patch to the affected area for 12 hours a day and remove for 12 hours a day. LYRICA 150 mg capsule Generic drug:  pregabalin Take 150 mg by mouth two (2) times a day. Omeprazole delayed release 20 mg tablet Commonly known as:  PRILOSEC D/R Take 20 mg by mouth two (2) times a day. ondansetron 4 mg disintegrating tablet Commonly known as:  ZOFRAN ODT Take 1 Tab by mouth every eight (8) hours as needed for Nausea. polyethylene glycol 17 gram/dose powder Commonly known as:  Criselda Gray Take 17 g by mouth daily. 1 tablespoon with 8 oz of water daily  
  
 spironolactone 25 mg tablet Commonly known as:  ALDACTONE Take  by mouth two (2) times a day. temazepam 15 mg capsule Commonly known as:  RESTORIL Take 15 mg by mouth nightly as needed for Sleep. TRULANCE 3 mg Tab Generic drug:  plecanatide Take 3 mg by mouth. VOLTAREN 1 % Gel Generic drug:  diclofenac Apply  to affected area as needed. zolpidem 10 mg tablet Commonly known as:  AMBIEN Take 10 mg by mouth nightly as needed for Sleep. Patient Instructions A Healthy Lifestyle: Care Instructions Your Care Instructions A healthy lifestyle can help you feel good, stay at a healthy weight, and have plenty of energy for both work and play. A healthy lifestyle is something you can share with your whole family. A healthy lifestyle also can lower your risk for serious health problems, such as high blood pressure, heart disease, and diabetes. You can follow a few steps listed below to improve your health and the health of your family. Follow-up care is a key part of your treatment and safety. Be sure to make and go to all appointments, and call your doctor if you are having problems. It's also a good idea to know your test results and keep a list of the medicines you take. How can you care for yourself at home? · Do not eat too much sugar, fat, or fast foods. You can still have dessert and treats now and then. The goal is moderation. · Start small to improve your eating habits. Pay attention to portion sizes, drink less juice and soda pop, and eat more fruits and vegetables. ¨ Eat a healthy amount of food. A 3-ounce serving of meat, for example, is about the size of a deck of cards. Fill the rest of your plate with vegetables and whole grains. ¨ Limit the amount of soda and sports drinks you have every day. Drink more water when you are thirsty. ¨ Eat at least 5 servings of fruits and vegetables every day. It may seem like a lot, but it is not hard to reach this goal. A serving or helping is 1 piece of fruit, 1 cup of vegetables, or 2 cups of leafy, raw vegetables. Have an apple or some carrot sticks as an afternoon snack instead of a candy bar. Try to have fruits and/or vegetables at every meal. 
· Make exercise part of your daily routine. You may want to start with simple activities, such as walking, bicycling, or slow swimming. Try to be active 30 to 60 minutes every day. You do not need to do all 30 to 60 minutes all at once. For example, you can exercise 3 times a day for 10 or 20 minutes. Moderate exercise is safe for most people, but it is always a good idea to talk to your doctor before starting an exercise program. 
· Keep moving. Marleen Garciaagi the lawn, work in the garden, or [employer]. Take the stairs instead of the elevator at work. · If you smoke, quit. People who smoke have an increased risk for heart attack, stroke, cancer, and other lung illnesses. Quitting is hard, but there are ways to boost your chance of quitting tobacco for good. ¨ Use nicotine gum, patches, or lozenges. ¨ Ask your doctor about stop-smoking programs and medicines. ¨ Keep trying. In addition to reducing your risk of diseases in the future, you will notice some benefits soon after you stop using tobacco. If you have shortness of breath or asthma symptoms, they will likely get better within a few weeks after you quit. · Limit how much alcohol you drink. Moderate amounts of alcohol (up to 2 drinks a day for men, 1 drink a day for women) are okay. But drinking too much can lead to liver problems, high blood pressure, and other health problems. Family health If you have a family, there are many things you can do together to improve your health. · Eat meals together as a family as often as possible. · Eat healthy foods.  This includes fruits, vegetables, lean meats and dairy, and whole grains. · Include your family in your fitness plan. Most people think of activities such as jogging or tennis as the way to fitness, but there are many ways you and your family can be more active. Anything that makes you breathe hard and gets your heart pumping is exercise. Here are some tips: 
¨ Walk to do errands or to take your child to school or the bus. ¨ Go for a family bike ride after dinner instead of watching TV. Where can you learn more? Go to http://jaspreet-thierno.info/. Enter U466 in the search box to learn more about \"A Healthy Lifestyle: Care Instructions. \" Current as of: May 12, 2017 Content Version: 11.4 © 9929-6523 POW. Care instructions adapted under license by [employer] (which disclaims liability or warranty for this information). If you have questions about a medical condition or this instruction, always ask your healthcare professional. [place] [address] any warranty or liability for your use of this information. Please be advised there is a $25 fee for all paperwork to be completed from our  providers. This is to be paid by the patient prior to picking up the completed forms. Introducing [address]! Dear Keith Nguyen: 
Thank you for requesting a [facility] account. Our records indicate that you already have an active [facility] account. You can access your account anytime at [email or web address] Did you know that you can access your hospital and ER discharge instructions at any time in [facility]? You can also review all of your test results from your hospital stay or ER visit. Additional Information If you have questions, please visit the Frequently Asked Questions section of the [employer] website at [email or web address] Remember, [facility] is NOT to be used for urgent needs. For medical emergencies, dial 911. Now available from your iPhone and Android! Please provide this summary of care documentation to your next provider. Your primary care clinician is listed as Alonso Kwan. If you have any questions after today's visit, please call [phone].

## 2018-03-26 NOTE — PATIENT INSTRUCTIONS
A Healthy Lifestyle: Care Instructions  Your Care Instructions    A healthy lifestyle can help you feel good, stay at a healthy weight, and have plenty of energy for both work and play. A healthy lifestyle is something you can share with your whole family. A healthy lifestyle also can lower your risk for serious health problems, such as high blood pressure, heart disease, and diabetes. You can follow a few steps listed below to improve your health and the health of your family. Follow-up care is a key part of your treatment and safety. Be sure to make and go to all appointments, and call your doctor if you are having problems. It's also a good idea to know your test results and keep a list of the medicines you take. How can you care for yourself at home? · Do not eat too much sugar, fat, or fast foods. You can still have dessert and treats now and then. The goal is moderation. · Start small to improve your eating habits. Pay attention to portion sizes, drink less juice and soda pop, and eat more fruits and vegetables. ¨ Eat a healthy amount of food. A 3-ounce serving of meat, for example, is about the size of a deck of cards. Fill the rest of your plate with vegetables and whole grains. ¨ Limit the amount of soda and sports drinks you have every day. Drink more water when you are thirsty. ¨ Eat at least 5 servings of fruits and vegetables every day. It may seem like a lot, but it is not hard to reach this goal. A serving or helping is 1 piece of fruit, 1 cup of vegetables, or 2 cups of leafy, raw vegetables. Have an apple or some carrot sticks as an afternoon snack instead of a candy bar. Try to have fruits and/or vegetables at every meal.  · Make exercise part of your daily routine. You may want to start with simple activities, such as walking, bicycling, or slow swimming. Try to be active 30 to 60 minutes every day. You do not need to do all 30 to 60 minutes all at once.  For example, you can exercise 3 times a day for 10 or 20 minutes. Moderate exercise is safe for most people, but it is always a good idea to talk to your doctor before starting an exercise program.  · Keep moving. Delicia Nam the lawn, work in the garden, or [employer]. Take the stairs instead of the elevator at work. · If you smoke, quit. People who smoke have an increased risk for heart attack, stroke, cancer, and other lung illnesses. Quitting is hard, but there are ways to boost your chance of quitting tobacco for good. ¨ Use nicotine gum, patches, or lozenges. ¨ Ask your doctor about stop-smoking programs and medicines. ¨ Keep trying. In addition to reducing your risk of diseases in the future, you will notice some benefits soon after you stop using tobacco. If you have shortness of breath or asthma symptoms, they will likely get better within a few weeks after you quit. · Limit how much alcohol you drink. Moderate amounts of alcohol (up to 2 drinks a day for men, 1 drink a day for women) are okay. But drinking too much can lead to liver problems, high blood pressure, and other health problems. Family health  If you have a family, there are many things you can do together to improve your health. · Eat meals together as a family as often as possible. · Eat healthy foods. This includes fruits, vegetables, lean meats and dairy, and whole grains. · Include your family in your fitness plan. Most people think of activities such as jogging or tennis as the way to fitness, but there are many ways you and your family can be more active. Anything that makes you breathe hard and gets your heart pumping is exercise. Here are some tips:  ¨ Walk to do errands or to take your child to school or the bus. ¨ Go for a family bike ride after dinner instead of watching TV. Where can you learn more? Go to http://jaspreet-thierno.info/. Enter L162 in the search box to learn more about \"A Healthy Lifestyle: Care Instructions. \"  Current as of: May 12, 2017  Content Version: 11.4  © 5060-4125 Healthwise, txtr. Care instructions adapted under license by [employer] (which disclaims liability or warranty for this information). If you have questions about a medical condition or this instruction, always ask your healthcare professional. [address] any warranty or liability for your use of this information. Please be advised there is a $25 fee for all paperwork to be completed from our  providers. This is to be paid by the patient prior to picking up the completed forms.

## 2018-03-29 ENCOUNTER — TELEPHONE (OUTPATIENT)
Dept: NEUROLOGY | Age: 63
End: 2018-03-29

## 2018-03-29 NOTE — TELEPHONE ENCOUNTER
Spoke with patient and she will come to office to  prescription. Ask patient to bring ID. Patient understood.

## 2018-06-06 ENCOUNTER — APPOINTMENT (OUTPATIENT)
Dept: NUCLEAR MEDICINE | Age: 63
End: 2018-06-06
Attending: EMERGENCY MEDICINE
Payer: MEDICARE

## 2018-06-06 ENCOUNTER — APPOINTMENT (OUTPATIENT)
Dept: GENERAL RADIOLOGY | Age: 63
End: 2018-06-06
Attending: PHYSICIAN ASSISTANT
Payer: MEDICARE

## 2018-06-06 ENCOUNTER — HOSPITAL ENCOUNTER (EMERGENCY)
Age: 63
Discharge: HOME | End: 2018-06-06
Attending: EMERGENCY MEDICINE
Payer: MEDICARE

## 2018-06-06 VITALS
HEART RATE: 101 BPM | DIASTOLIC BLOOD PRESSURE: 57 MMHG | HEIGHT: 67 IN | BODY MASS INDEX: 38.27 KG/M2 | RESPIRATION RATE: 19 BRPM | WEIGHT: 243.83 LBS | TEMPERATURE: 98.6 F | OXYGEN SATURATION: 94 % | SYSTOLIC BLOOD PRESSURE: 99 MMHG

## 2018-06-06 DIAGNOSIS — R09.1 PLEURISY: ICD-10-CM

## 2018-06-06 DIAGNOSIS — J18.9 PNEUMONIA OF RIGHT LOWER LOBE DUE TO INFECTIOUS ORGANISM: Primary | ICD-10-CM

## 2018-06-06 LAB
ALBUMIN SERPL-MCNC: 4.2 G/DL (ref 3.5–5)
ALBUMIN/GLOB SERPL: 1.1 {RATIO} (ref 1.1–2.2)
ALP SERPL-CCNC: 71 U/L (ref 45–117)
ALT SERPL-CCNC: 24 U/L (ref 12–78)
ANION GAP SERPL CALC-SCNC: 7 MMOL/L (ref 5–15)
AST SERPL-CCNC: 12 U/L (ref 15–37)
BASOPHILS # BLD: 0 K/UL (ref 0–0.1)
BASOPHILS NFR BLD: 0 % (ref 0–1)
BILIRUB SERPL-MCNC: 0.3 MG/DL (ref 0.2–1)
BNP SERPL-MCNC: 28 PG/ML (ref 0–125)
BUN SERPL-MCNC: 30 MG/DL (ref 6–20)
BUN/CREAT SERPL: 18 (ref 12–20)
CALCIUM SERPL-MCNC: 10.2 MG/DL (ref 8.5–10.1)
CHLORIDE SERPL-SCNC: 104 MMOL/L (ref 97–108)
CK MB CFR SERPL CALC: NORMAL % (ref 0–2.5)
CK MB SERPL-MCNC: <1 NG/ML (ref 5–25)
CK SERPL-CCNC: 168 U/L (ref 26–192)
CO2 SERPL-SCNC: 29 MMOL/L (ref 21–32)
CREAT SERPL-MCNC: 1.68 MG/DL (ref 0.55–1.02)
D DIMER PPP FEU-MCNC: 0.73 MG/L FEU (ref 0–0.65)
DIFFERENTIAL METHOD BLD: NORMAL
EOSINOPHIL # BLD: 0.1 K/UL (ref 0–0.4)
EOSINOPHIL NFR BLD: 1 % (ref 0–7)
ERYTHROCYTE [DISTWIDTH] IN BLOOD BY AUTOMATED COUNT: 13.6 % (ref 11.5–14.5)
GLOBULIN SER CALC-MCNC: 3.9 G/DL (ref 2–4)
GLUCOSE SERPL-MCNC: 104 MG/DL (ref 65–100)
HCT VFR BLD AUTO: 37.3 % (ref 35–47)
HGB BLD-MCNC: 12.6 G/DL (ref 11.5–16)
IMM GRANULOCYTES # BLD: 0 K/UL (ref 0–0.04)
IMM GRANULOCYTES NFR BLD AUTO: 0 % (ref 0–0.5)
LYMPHOCYTES # BLD: 2.4 K/UL (ref 0.8–3.5)
LYMPHOCYTES NFR BLD: 25 % (ref 12–49)
MCH RBC QN AUTO: 30.7 PG (ref 26–34)
MCHC RBC AUTO-ENTMCNC: 33.8 G/DL (ref 30–36.5)
MCV RBC AUTO: 91 FL (ref 80–99)
MONOCYTES # BLD: 0.7 K/UL (ref 0–1)
MONOCYTES NFR BLD: 7 % (ref 5–13)
NEUTS SEG # BLD: 6.2 K/UL (ref 1.8–8)
NEUTS SEG NFR BLD: 66 % (ref 32–75)
NRBC # BLD: 0 K/UL (ref 0–0.01)
NRBC BLD-RTO: 0 PER 100 WBC
PLATELET # BLD AUTO: 280 K/UL (ref 150–400)
PMV BLD AUTO: 10.7 FL (ref 8.9–12.9)
POTASSIUM SERPL-SCNC: 4 MMOL/L (ref 3.5–5.1)
PROT SERPL-MCNC: 8.1 G/DL (ref 6.4–8.2)
RBC # BLD AUTO: 4.1 M/UL (ref 3.8–5.2)
SODIUM SERPL-SCNC: 140 MMOL/L (ref 136–145)
TROPONIN I SERPL-MCNC: <0.04 NG/ML
WBC # BLD AUTO: 9.4 K/UL (ref 3.6–11)

## 2018-06-06 PROCEDURE — 84484 ASSAY OF TROPONIN QUANT: CPT | Performed by: PHYSICIAN ASSISTANT

## 2018-06-06 PROCEDURE — 93005 ELECTROCARDIOGRAM TRACING: CPT

## 2018-06-06 PROCEDURE — 85379 FIBRIN DEGRADATION QUANT: CPT | Performed by: EMERGENCY MEDICINE

## 2018-06-06 PROCEDURE — A9540 TC99M MAA: HCPCS

## 2018-06-06 PROCEDURE — 96374 THER/PROPH/DIAG INJ IV PUSH: CPT

## 2018-06-06 PROCEDURE — 83880 ASSAY OF NATRIURETIC PEPTIDE: CPT | Performed by: PHYSICIAN ASSISTANT

## 2018-06-06 PROCEDURE — 71046 X-RAY EXAM CHEST 2 VIEWS: CPT

## 2018-06-06 PROCEDURE — 74011250636 HC RX REV CODE- 250/636: Performed by: EMERGENCY MEDICINE

## 2018-06-06 PROCEDURE — 96375 TX/PRO/DX INJ NEW DRUG ADDON: CPT

## 2018-06-06 PROCEDURE — 74011250637 HC RX REV CODE- 250/637: Performed by: EMERGENCY MEDICINE

## 2018-06-06 PROCEDURE — 82550 ASSAY OF CK (CPK): CPT | Performed by: PHYSICIAN ASSISTANT

## 2018-06-06 PROCEDURE — 74011250636 HC RX REV CODE- 250/636

## 2018-06-06 PROCEDURE — 36415 COLL VENOUS BLD VENIPUNCTURE: CPT | Performed by: PHYSICIAN ASSISTANT

## 2018-06-06 PROCEDURE — 99285 EMERGENCY DEPT VISIT HI MDM: CPT

## 2018-06-06 PROCEDURE — 80053 COMPREHEN METABOLIC PANEL: CPT | Performed by: PHYSICIAN ASSISTANT

## 2018-06-06 PROCEDURE — 85025 COMPLETE CBC W/AUTO DIFF WBC: CPT | Performed by: PHYSICIAN ASSISTANT

## 2018-06-06 RX ORDER — OXYCODONE HYDROCHLORIDE 5 MG/1
5 TABLET ORAL
Status: COMPLETED | OUTPATIENT
Start: 2018-06-06 | End: 2018-06-06

## 2018-06-06 RX ORDER — LEVOFLOXACIN 500 MG/1
500 TABLET, FILM COATED ORAL DAILY
Qty: 6 TAB | Refills: 0 | Status: SHIPPED | OUTPATIENT
Start: 2018-06-06 | End: 2018-08-09

## 2018-06-06 RX ORDER — OXYCODONE HYDROCHLORIDE 5 MG/1
5 TABLET ORAL
Qty: 8 TAB | Refills: 0 | Status: SHIPPED | OUTPATIENT
Start: 2018-06-06 | End: 2018-11-12

## 2018-06-06 RX ORDER — LEVOFLOXACIN 750 MG/1
750 TABLET ORAL
Status: COMPLETED | OUTPATIENT
Start: 2018-06-06 | End: 2018-06-06

## 2018-06-06 RX ORDER — PREDNISONE 20 MG/1
40 TABLET ORAL DAILY
Qty: 10 TAB | Refills: 0 | Status: SHIPPED | OUTPATIENT
Start: 2018-06-06 | End: 2018-06-11

## 2018-06-06 RX ORDER — ONDANSETRON 2 MG/ML
INJECTION INTRAMUSCULAR; INTRAVENOUS
Status: DISCONTINUED
Start: 2018-06-06 | End: 2018-06-07 | Stop reason: HOSPADM

## 2018-06-06 RX ORDER — ONDANSETRON 2 MG/ML
4 INJECTION INTRAMUSCULAR; INTRAVENOUS
Status: COMPLETED | OUTPATIENT
Start: 2018-06-06 | End: 2018-06-06

## 2018-06-06 RX ORDER — MORPHINE SULFATE 4 MG/ML
4 INJECTION INTRAVENOUS
Status: COMPLETED | OUTPATIENT
Start: 2018-06-06 | End: 2018-06-06

## 2018-06-06 RX ORDER — MORPHINE SULFATE 10 MG/ML
INJECTION, SOLUTION INTRAMUSCULAR; INTRAVENOUS
Status: COMPLETED
Start: 2018-06-06 | End: 2018-06-06

## 2018-06-06 RX ADMIN — LEVOFLOXACIN 750 MG: 750 TABLET, FILM COATED ORAL at 23:26

## 2018-06-06 RX ADMIN — MORPHINE SULFATE 4 MG: 10 INJECTION INTRAVENOUS at 21:26

## 2018-06-06 RX ADMIN — OXYCODONE HYDROCHLORIDE 5 MG: 5 TABLET ORAL at 23:26

## 2018-06-06 RX ADMIN — ONDANSETRON 4 MG: 2 INJECTION, SOLUTION INTRAMUSCULAR; INTRAVENOUS at 21:25

## 2018-06-06 RX ADMIN — MORPHINE SULFATE 4 MG: 4 INJECTION INTRAVENOUS at 21:22

## 2018-06-06 NOTE — ED PROVIDER NOTES
5:00 PM    I have seen and evaluated this patient in the Express Care portion of triage. Pt c/o back pain, CP, and SOB onset 2 days now. She reports that last night, she took Flexeril with no relief. The patients care will begin now and orders have been placed. This patient will be seen and provided further care in the Emergency Room.     Written by Domingo Abel, a scribe for NARINDER Sotelo. EMERGENCY DEPARTMENT HISTORY AND PHYSICAL EXAM      Date: 6/6/2018  Patient Name: John Allen    History of Presenting Illness     Chief Complaint   Patient presents with    Back Pain     Ambulatory w/ referral from  for CHF exacerbation from chest xray. Pt reports R sided back pain that started this morning & radiates around to R chest. Pt also reports increased SOB & fatigue.  Shortness of Breath    Fatigue       History Provided By: Patient    HPI: John Allen, 58 y.o. female with PMHx significant for heart failure, arthitis, HTN, CAD, stroke, asthma, arrhythmia, GERD, and diarrhea, presents ambulatory to the ED with cc of new onset R side back pain that started this morning alongside SOB. Per the pt, her back pain is worse with deep inspiration. She informs that Toradol helped her pain. She states that her SOB is secondary to movement. She reports that she has no hx of blood clots. Pt states has a hx of heart problems. Pt reports that she is allergic to IV dye. She denies having any recent traveling. Pt specifically denies having any abd pain, fever, or coughing. Pt does not endorse smoking tobacco and does not drink alcohol. Chief Complaint: back pain  Duration: today Hours  Location: R back  Modifying Factors: Toradol  Associated Symptoms:  SOB     There are no other complaints, changes, or physical findings at this time. PCP: Kia Quintero MD    Current Outpatient Prescriptions   Medication Sig Dispense Refill    DULoxetine (CYMBALTA) 30 mg capsule Take 1 Cap by mouth daily.  30 Cap 2    polyethylene glycol (MIRALAX) 17 gram/dose powder Take 17 g by mouth daily. 1 tablespoon with 8 oz of water daily 510 g 0    bisacodyl (DULCOLAX, BISACODYL,) 10 mg suppository Insert 10 mg into rectum daily. 10 Suppository 0    plecanatide (TRULANCE) 3 mg tab Take 3 mg by mouth.  colchicine 0.6 mg tablet Take 1 Tab by mouth daily. 1 Tab 0    lidocaine (LIDODERM) 5 % 1 Patch by TransDERmal route every twenty-four (24) hours. Apply patch to the affected area for 12 hours a day and remove for 12 hours a day.  gabapentin (NEURONTIN) 400 mg capsule 400 mg p.o. 3 times daily and 1200 mg p.o. nightly 180 Cap 2    diclofenac (VOLTAREN) 1 % gel Apply  to affected area as needed.  amLODIPine-benazepril (LOTREL) 5-20 mg per capsule Take 1 Cap by mouth daily.  carvedilol (COREG) 25 mg tablet Take 25 mg by mouth two (2) times daily (with meals).  bumetanide (BUMEX) 2 mg tablet Take 2 mg by mouth two (2) times a day.  DULoxetine (CYMBALTA) 60 mg capsule Take 60 mg by mouth daily.  zolpidem (AMBIEN) 10 mg tablet Take 10 mg by mouth nightly as needed for Sleep.  cholecalciferol, vitamin D3, 2,000 unit tab Take 2,000 Units by mouth daily.  cyanocobalamin 1,000 mcg tablet Take 1,000 mcg by mouth daily.  hyoscyamine (ANASPAZ, LEVSIN) 0.125 mg tablet Take 125 mcg by mouth every four (4) hours as needed for Cramping.  albuterol (PROVENTIL HFA, VENTOLIN HFA) 90 mcg/actuation inhaler Take 1 Puff by inhalation as needed.  spironolactone (ALDACTONE) 25 mg tablet Take  by mouth two (2) times a day.  pregabalin (LYRICA) 150 mg capsule Take 150 mg by mouth two (2) times a day.          Past History     Past Medical History:  Past Medical History:   Diagnosis Date    Arrhythmia     per pt, treated with med    Arthritis     Asthma     CAD (coronary artery disease)     EF=33 1/3    Chronic pain     fibromyalgia    Diarrhea 3/24/2017    Epigastric pain 3/24/2017    GERD (gastroesophageal reflux disease)     ibs    Heart failure (HCC)     cardiomyopathy    Hypertension     Psychiatric disorder     h/o severe depression    Stroke ([facility] [address]     possible TIA    Unspecified sleep apnea     wears CPAP       Past Surgical History:  Past Surgical History:   Procedure Laterality Date    COLONOSCOPY N/A 8/23/2016    COLONOSCOPY performed by Sung Sheikh MD at [place] [address] N/A 3/24/2017    FLEXIBLE SIGMOIDOSCOPY  performed by Becca Meadows MD at [facility] AM[facility] OR    HX APPENDECTOMY      HX GYN      hysterectomy    HX HEENT      left eye surgery    HX KNEE ARTHROSCOPY Right     HX KNEE REPLACEMENT Right     HX OTHER SURGICAL      hand surgery left (carpal tunnel, reconstruction of small finger)    SIGMOIDOSCOPY,BIOPSY  3/24/2017            Family History:  Family History   Problem Relation Age of Onset    Diabetes Mother        Social History:  Social History   Substance Use Topics    Smoking status: Never Smoker    Smokeless tobacco: Never Used    Alcohol use No       Allergies: Allergies   Allergen Reactions    Codeine Itching    Demerol [Meperidine] Nausea Only    Ivp [Fd And C Blue No.1] Itching    Minoxidil Itching    Penicillamine Itching and Other (comments)     Drops blood pressure    Percocet [Oxycodone-Acetaminophen] Nausea and Vomiting         Review of Systems   Review of Systems   Constitutional: Negative. Negative for appetite change, chills, fatigue and fever. HENT: Negative. Negative for congestion, rhinorrhea, sinus pressure and sore throat. Eyes: Negative. Respiratory: Positive for shortness of breath (with movement). Negative for cough, choking, chest tightness and wheezing. Cardiovascular: Negative. Negative for chest pain, palpitations and leg swelling. Gastrointestinal: Negative for abdominal pain, constipation, diarrhea, nausea and vomiting. Endocrine: Negative. Genitourinary: Negative.   Negative for difficulty urinating, dysuria, flank pain and urgency. Musculoskeletal: Positive for back pain (R side). Skin: Negative. Neurological: Negative. Negative for dizziness, speech difficulty, weakness, light-headedness, numbness and headaches. Psychiatric/Behavioral: Negative. All other systems reviewed and are negative. Physical Exam   Physical Exam   Constitutional: She is oriented to person, place, and time. She appears well-developed and well-nourished. No distress. HENT:   Head: Normocephalic and atraumatic. Mouth/Throat: Oropharynx is clear and moist. No oropharyngeal exudate. Eyes: Conjunctivae and EOM are normal. Pupils are equal, round, and reactive to light. Neck: Normal range of motion. Neck supple. No JVD present. No tracheal deviation present. Cardiovascular: Normal rate, regular rhythm, normal heart sounds and intact distal pulses. No murmur heard. Pulmonary/Chest: Effort normal and breath sounds normal. No stridor. No respiratory distress. She has no wheezes. She has no rales. She exhibits no tenderness. Abdominal: Soft. She exhibits no distension. There is no tenderness. There is no rebound and no guarding. Musculoskeletal: Normal range of motion. She exhibits no edema or tenderness. Neurological: She is alert and oriented to person, place, and time. No cranial nerve deficit. No gross motor or sensory deficits    Skin: Skin is warm and dry. She is not diaphoretic. Psychiatric: She has a normal mood and affect. Her behavior is normal.   Nursing note and vitals reviewed.       Diagnostic Study Results     Labs -     Recent Results (from the past 12 hour(s))   EKG, 12 LEAD, INITIAL    Collection Time: 06/06/18  4:55 PM   Result Value Ref Range    Ventricular Rate 95 BPM    Atrial Rate 95 BPM    P-R Interval 176 ms    QRS Duration 114 ms    Q-T Interval 364 ms    QTC Calculation (Bezet) 457 ms    Calculated P Axis 39 degrees    Calculated R Axis -9 degrees Calculated T Axis 44 degrees    Diagnosis       Sinus rhythm with occasional premature ventricular complexes  Otherwise normal ECG  When compared with ECG of 01-MAR-2018 21:50,  premature ventricular complexes are now present     NT-PRO BNP    Collection Time: 06/06/18  5:34 PM   Result Value Ref Range    NT pro-BNP 28 0 - 490 PG/ML   METABOLIC PANEL, COMPREHENSIVE    Collection Time: 06/06/18  5:34 PM   Result Value Ref Range    Sodium 140 136 - 145 mmol/L    Potassium 4.0 3.5 - 5.1 mmol/L    Chloride 104 97 - 108 mmol/L    CO2 29 21 - 32 mmol/L    Anion gap 7 5 - 15 mmol/L    Glucose 104 (H) 65 - 100 mg/dL    BUN 30 (H) 6 - 20 MG/DL    Creatinine 1.68 (H) 0.55 - 1.02 MG/DL    BUN/Creatinine ratio 18 12 - 20      GFR est AA 37 (L) >60 ml/min/1.73m2    GFR est non-AA 31 (L) >60 ml/min/1.73m2    Calcium 10.2 (H) 8.5 - 10.1 MG/DL    Bilirubin, total 0.3 0.2 - 1.0 MG/DL    ALT (SGPT) 24 12 - 78 U/L    AST (SGOT) 12 (L) 15 - 37 U/L    Alk. phosphatase 71 45 - 117 U/L    Protein, total 8.1 6.4 - 8.2 g/dL    Albumin 4.2 3.5 - 5.0 g/dL    Globulin 3.9 2.0 - 4.0 g/dL    A-G Ratio 1.1 1.1 - 2.2     CBC WITH AUTOMATED DIFF    Collection Time: 06/06/18  5:34 PM   Result Value Ref Range    WBC 9.4 3.6 - 11.0 K/uL    RBC 4.10 3.80 - 5.20 M/uL    HGB 12.6 11.5 - 16.0 g/dL    HCT 37.3 35.0 - 47.0 %    MCV 91.0 80.0 - 99.0 FL    MCH 30.7 26.0 - 34.0 PG    MCHC 33.8 30.0 - 36.5 g/dL    RDW 13.6 11.5 - 14.5 %    PLATELET 438 632 - 841 K/uL    MPV 10.7 8.9 - 12.9 FL    NRBC 0.0 0  WBC    ABSOLUTE NRBC 0.00 0.00 - 0.01 K/uL    NEUTROPHILS 66 32 - 75 %    LYMPHOCYTES 25 12 - 49 %    MONOCYTES 7 5 - 13 %    EOSINOPHILS 1 0 - 7 %    BASOPHILS 0 0 - 1 %    IMMATURE GRANULOCYTES 0 0.0 - 0.5 %    ABS. NEUTROPHILS 6.2 1.8 - 8.0 K/UL    ABS. LYMPHOCYTES 2.4 0.8 - 3.5 K/UL    ABS. MONOCYTES 0.7 0.0 - 1.0 K/UL    ABS. EOSINOPHILS 0.1 0.0 - 0.4 K/UL    ABS. BASOPHILS 0.0 0.0 - 0.1 K/UL    ABS. IMM.  GRANS. 0.0 0.00 - 0.04 K/UL    DF AUTOMATED     TROPONIN I    Collection Time: 06/06/18  5:34 PM   Result Value Ref Range    Troponin-I, Qt. <0.04 <0.05 ng/mL   CK W/ CKMB & INDEX    Collection Time: 06/06/18  5:34 PM   Result Value Ref Range     26 - 192 U/L    CK - MB <1.0 <3.6 NG/ML    CK-MB Index Cannot be calculated 0 - 2.5     D DIMER    Collection Time: 06/06/18  8:11 PM   Result Value Ref Range    D-dimer 0.73 (H) 0.00 - 0.65 mg/L FEU       Radiologic Studies -   NM LUNG VENT/PERF         XR CHEST PA LAT   Final Result        CT Results  (Last 48 hours)    None        CXR Results  (Last 48 hours)               06/06/18 1747  XR CHEST PA LAT Final result    Impression:  IMPRESSION: Right lower lobe airspace disease-pneumonia versus atelectasis. Narrative:  INDICATION: shortness of breath       EXAM: CXR 2 Views. COMPARISON: 3/6/2018. FINDINGS: Frontal and lateral views of the chest show the new haziness at the   right lung base partly obscuring the right hemidiaphragm with considerations   including atelectasis and pneumonia. Left lung remains clear. Heart is large. There is no pulmonary edema. There is no evident pneumothorax,   adenopathy or sizable pleural effusion. Medical Decision Making   I am the first provider for this patient. I reviewed the vital signs, available nursing notes, past medical history, past surgical history, family history and social history. Vital Signs-Reviewed the patient's vital signs.   Patient Vitals for the past 12 hrs:   Temp Pulse Resp BP SpO2   06/06/18 2130 - - - 132/64 98 %   06/06/18 2000 - - - 134/73 93 %   06/06/18 1930 - - - 134/72 90 %   06/06/18 1900 - - - 126/69 92 %   06/06/18 1830 - - - 134/70 94 %   06/06/18 1800 - - - 117/56 94 %   06/06/18 1746 - - - - 94 %   06/06/18 1743 - - - 129/54 -   06/06/18 1654 98.6 °F (37 °C) (!) 101 19 142/72 97 %       Pulse Oximetry Analysis - 94% on RA    Cardiac Monitor:   Rate: 101 bpm  Rhythm: Sinus Tachycardia      EKG interpretation: (Preliminary)  Sinus, PVCs, rate 95, normal axis/pr/qrs, no acute ST changes, Melissa Desai DO      Records Reviewed: Nursing Notes and Old Medical Records    Provider Notes (Medical Decision Making):   DDx: PE, PNA    ED Course:   Initial assessment performed. The patients presenting problems have been discussed, and they are in agreement with the care plan formulated and outlined with them. I have encouraged them to ask questions as they arise throughout their visit. It was chosen to send a D dimer, as pt is not able to receive IV contrast. At about 7:00pm it was noted the D dimer had not resulted, Lab was called and it had not been run. They will begin running the lab. 8:45PM, D dimer elevated, will order VQ scan, discussed with charge nurse, they will have [facility] team called in. Critical Care Time: 0 minutes    Disposition:  DISCHARGE NOTE  10:57 PM  The patient has been re-evaluated and is ready for discharge. Reviewed available results with patient. Counseled pt on diagnosis and care plan. Pt has expressed understanding, and all questions have been answered. Pt agrees with plan and agrees to F/U as recommended, or return to the ED if their sxs worsen. Discharge instructions have been provided and explained to the pt, along with reasons to return to the ED. Written by Nancy Jones ED Scribe, as dictated by Fernando Joyce DO. PLAN:  1. Discharge Medication List as of 6/6/2018 11:22 PM      START taking these medications    Details   oxyCODONE IR (ROXICODONE) 5 mg immediate release tablet Take 1 Tab by mouth every six (6) hours as needed for Pain. Max Daily Amount: 20 mg., Print, Disp-8 Tab, R-0      levoFLOXacin (LEVAQUIN) 500 mg tablet Take 1 Tab by mouth daily. , Normal, Disp-6 Tab, R-0      predniSONE (DELTASONE) 20 mg tablet Take 2 Tabs by mouth daily for 5 days.  With Breakfast, Normal, Disp-10 Tab, R-0         CONTINUE these medications which have NOT CHANGED    Details   !! DULoxetine (CYMBALTA) 30 mg capsule Take 1 Cap by mouth daily. , Print, Disp-30 Cap, R-2      polyethylene glycol (MIRALAX) 17 gram/dose powder Take 17 g by mouth daily. 1 tablespoon with 8 oz of water daily, Print, Disp-510 g, R-0      bisacodyl (DULCOLAX, BISACODYL,) 10 mg suppository Insert 10 mg into rectum daily. , Print, Disp-10 Suppository, R-0      plecanatide (TRULANCE) 3 mg tab Take 3 mg by mouth., Historical Med      colchicine 0.6 mg tablet Take 1 Tab by mouth daily. , Print, Disp-1 Tab, R-0      lidocaine (LIDODERM) 5 % 1 Patch by TransDERmal route every twenty-four (24) hours. Apply patch to the affected area for 12 hours a day and remove for 12 hours a day., Historical Med      gabapentin (NEURONTIN) 400 mg capsule 400 mg p.o. 3 times daily and 1200 mg p.o. nightly, Print, Disp-180 Cap, R-2      diclofenac (VOLTAREN) 1 % gel Apply  to affected area as needed., Historical Med      amLODIPine-benazepril (LOTREL) 5-20 mg per capsule Take 1 Cap by mouth daily. , Historical Med      carvedilol (COREG) 25 mg tablet Take 25 mg by mouth two (2) times daily (with meals). , Historical Med      bumetanide (BUMEX) 2 mg tablet Take 2 mg by mouth two (2) times a day., Historical Med      !! DULoxetine (CYMBALTA) 60 mg capsule Take 60 mg by mouth daily. , Historical Med      zolpidem (AMBIEN) 10 mg tablet Take 10 mg by mouth nightly as needed for Sleep., Historical Med      cholecalciferol, vitamin D3, 2,000 unit tab Take 2,000 Units by mouth daily. , Historical Med      cyanocobalamin 1,000 mcg tablet Take 1,000 mcg by mouth daily. , Historical Med      hyoscyamine (ANASPAZ, LEVSIN) 0.125 mg tablet Take 125 mcg by mouth every four (4) hours as needed for Cramping., Historical Med      albuterol (PROVENTIL HFA, VENTOLIN HFA) 90 mcg/actuation inhaler Take 1 Puff by inhalation as needed. Historical Med, 1 Puff      spironolactone (ALDACTONE) 25 mg tablet Oral, 2 TIMES DAILY Starting 11/9/2010, Until Discontinued, Historical Med      pregabalin (LYRICA) 150 mg capsule 150 mg, Oral, 2 TIMES DAILY Starting 11/9/2010, Until Discontinued, Historical Med       !! - Potential duplicate medications found. Please discuss with provider. 2.   Follow-up Information     Follow up With Details [address] Jace Rivas MD   [address]    [address] [address]  [phone]      [facility] [place] EM[facility]  If symptoms worsen [address] [facility] [address]  [phone]        Return to ED if worse     Diagnosis     Clinical Impression:   1. Pneumonia of right lower lobe due to infectious organism ([facility] [address]    2. Pleurisy        Attestation: This note is prepared by Pepe Watson, acting as Scribe for Nick Parrish, [address], DO: The scribe's documentation has been prepared under my direction and personally reviewed by me in its entirety. I confirm that the note above accurately reflects all work, treatment, procedures, and medical decision making performed by me.

## 2018-06-07 LAB
ATRIAL RATE: 95 BPM
CALCULATED P AXIS, ECG09: 39 DEGREES
CALCULATED R AXIS, ECG10: -9 DEGREES
CALCULATED T AXIS, ECG11: 44 DEGREES
DIAGNOSIS, 93000: NORMAL
P-R INTERVAL, ECG05: 176 MS
Q-T INTERVAL, ECG07: 364 MS
QRS DURATION, ECG06: 114 MS
QTC CALCULATION (BEZET), ECG08: 457 MS
VENTRICULAR RATE, ECG03: 95 BPM

## 2018-06-07 NOTE — DISCHARGE INSTRUCTIONS
Pneumonia: Care Instructions  Your Care Instructions    Pneumonia is an infection of the lungs. Most cases are caused by infections from bacteria or viruses. Pneumonia may be mild or very severe. If it is caused by bacteria, you will be treated with antibiotics. It may take a few weeks to a few months to recover fully from pneumonia, depending on how sick you were and whether your overall health is good. Follow-up care is a key part of your treatment and safety. Be sure to make and go to all appointments, and call your doctor if you are having problems. It's also a good idea to know your test results and keep a list of the medicines you take. How can you care for yourself at home? · Take your antibiotics exactly as directed. Do not stop taking the medicine just because you are feeling better. You need to take the full course of antibiotics. · Take your medicines exactly as prescribed. Call your doctor if you think you are having a problem with your medicine. · Get plenty of rest and sleep. You may feel weak and tired for a while, but your energy level will improve with time. · To prevent dehydration, drink plenty of fluids, enough so that your urine is light yellow or clear like water. Choose water and other caffeine-free clear liquids until you feel better. If you have kidney, heart, or liver disease and have to limit fluids, talk with your doctor before you increase the amount of fluids you drink. · Take care of your cough so you can rest. A cough that brings up mucus from your lungs is common with pneumonia. It is one way your body gets rid of the infection. But if coughing keeps you from resting or causes severe fatigue and chest-wall pain, talk to your doctor. He or she may suggest that you take a medicine to reduce the cough. · Use a vaporizer or humidifier to add moisture to your bedroom. Follow the directions for cleaning the machine. · Do not smoke or allow others to smoke around you.  Smoke will make your cough last longer. If you need help quitting, talk to your doctor about stop-smoking programs and medicines. These can increase your chances of quitting for good. · Take an over-the-counter pain medicine, such as acetaminophen (Tylenol), ibuprofen (Advil, Motrin), or naproxen (Aleve). Read and follow all instructions on the label. · Do not take two or more pain medicines at the same time unless the doctor told you to. Many pain medicines have acetaminophen, which is Tylenol. Too much acetaminophen (Tylenol) can be harmful. · If you were given a spirometer to measure how well your lungs are working, use it as instructed. This can help your doctor tell how your recovery is going. · To prevent pneumonia in the future, talk to your doctor about getting a flu vaccine (once a year) and a pneumococcal vaccine (one time only for most people). When should you call for help? Call 911 anytime you think you may need emergency care. For example, call if:  ? · You have severe trouble breathing. ?Call your doctor now or seek immediate medical care if:  ? · You cough up dark brown or bloody mucus (sputum). ? · You have new or worse trouble breathing. ? · You are dizzy or lightheaded, or you feel like you may faint. ? Watch closely for changes in your health, and be sure to contact your doctor if:  ? · You have a new or higher fever. ? · You are coughing more deeply or more often. ? · You are not getting better after 2 days (48 hours). ? · You do not get better as expected. Where can you learn more? Go to http://jaspreet-thierno.info/. Enter [id number] in the search box to learn more about \"Pneumonia: Care Instructions. \"  Current as of: May 12, 2017  Content Version: 11.4  © 1910-3964 [employer]. Care instructions adapted under license by [employer] (which disclaims liability or warranty for this information).  If you have questions about a medical condition or this instruction, always ask your healthcare professional. [address] any warranty or liability for your use of this information. Pleurisy: Care Instructions  Your Care Instructions  Pleurisy is inflammation of the tissue that lines the inside of the chest and covers the lungs (pleura). Pleurisy is often caused by an infection, usually a virus. It also can be caused by other health problems, such as pneumonia or lupus. Pleurisy can cause sharp chest pain that gets worse when you cough or take a deep breath. You may need more tests to find out what is causing your pleurisy. Treatment depends on the cause. Pleurisy may come and go for a few days, or it may continue if the cause has not been treated. Home treatment can help ease symptoms. Follow-up care is a key part of your treatment and safety. Be sure to make and go to all appointments, and call your doctor if you are having problems. It's also a good idea to know your test results and keep a list of the medicines you take. How can you care for yourself at home? · Take an over-the-counter pain medicine, such as acetaminophen (Tylenol), ibuprofen (Advil, Motrin), or naproxen (Aleve). Read and follow all instructions on the label. · Do not take two or more pain medicines at the same time unless the doctor told you to. Many pain medicines have acetaminophen, which is Tylenol. Too much acetaminophen (Tylenol) can be harmful. · If your doctor prescribed antibiotics, take them as directed. Do not stop taking them just because you feel better. You need to take the full course of antibiotics. · Take cough medicine as directed if your doctor recommends it. · Avoid activities that make the pain worse. When should you call for help? Call 911 anytime you think you may need emergency care. For example, call if:  ? · You have severe trouble breathing. ? · You have severe chest pain. ? · You passed out (lost consciousness).    ?Call your doctor now or seek immediate medical care if:  ? · You have a new or higher fever. ? Watch closely for changes in your health, and be sure to contact your doctor if:  ? · You begin to cough up yellow or green mucus. ? · You cough up blood. ? · Your symptoms are not better in 3 or 4 days. Where can you learn more? Go to http://jaspreet-thierno.info/. Enter F346 in the search box to learn more about \"Pleurisy: Care Instructions. \"  Current as of: May 12, 2017  Content Version: 11.4  © 4424-6046 [employer]. Care instructions adapted under license by [employer] (which disclaims liability or warranty for this information). If you have questions about a medical condition or this instruction, always ask your healthcare professional. [address] any warranty or liability for your use of this information.

## 2018-06-28 ENCOUNTER — HOSPITAL ENCOUNTER (OUTPATIENT)
Dept: GENERAL RADIOLOGY | Age: 63
Discharge: HOME | End: 2018-06-28
Payer: MEDICARE

## 2018-06-28 DIAGNOSIS — R06.02 SOB (SHORTNESS OF BREATH): ICD-10-CM

## 2018-06-28 PROCEDURE — 71046 X-RAY EXAM CHEST 2 VIEWS: CPT

## 2018-08-09 ENCOUNTER — APPOINTMENT (OUTPATIENT)
Dept: GENERAL RADIOLOGY | Age: 63
End: 2018-08-09
Attending: EMERGENCY MEDICINE
Payer: MEDICARE

## 2018-08-09 ENCOUNTER — HOSPITAL ENCOUNTER (EMERGENCY)
Age: 63
Discharge: HOME | End: 2018-08-09
Attending: EMERGENCY MEDICINE | Admitting: EMERGENCY MEDICINE
Payer: MEDICARE

## 2018-08-09 VITALS
SYSTOLIC BLOOD PRESSURE: 128 MMHG | RESPIRATION RATE: 14 BRPM | HEIGHT: 67 IN | TEMPERATURE: 98.8 F | OXYGEN SATURATION: 94 % | DIASTOLIC BLOOD PRESSURE: 73 MMHG | WEIGHT: 248.9 LBS | HEART RATE: 91 BPM | BODY MASS INDEX: 39.07 KG/M2

## 2018-08-09 DIAGNOSIS — J20.9 ACUTE BRONCHITIS, UNSPECIFIED ORGANISM: ICD-10-CM

## 2018-08-09 DIAGNOSIS — R73.9 HYPERGLYCEMIA: Primary | ICD-10-CM

## 2018-08-09 LAB
ALBUMIN SERPL-MCNC: 3.8 G/DL (ref 3.5–5)
ALBUMIN/GLOB SERPL: 1.1 {RATIO} (ref 1.1–2.2)
ALP SERPL-CCNC: 64 U/L (ref 45–117)
ALT SERPL-CCNC: 24 U/L (ref 12–78)
ANION GAP SERPL CALC-SCNC: 11 MMOL/L (ref 5–15)
APPEARANCE UR: CLEAR
AST SERPL-CCNC: 12 U/L (ref 15–37)
BACTERIA URNS QL MICRO: ABNORMAL /HPF
BASOPHILS # BLD: 0 K/UL (ref 0–0.1)
BASOPHILS NFR BLD: 0 % (ref 0–1)
BILIRUB SERPL-MCNC: 0.3 MG/DL (ref 0.2–1)
BILIRUB UR QL: NEGATIVE
BUN SERPL-MCNC: 27 MG/DL (ref 6–20)
BUN/CREAT SERPL: 21 (ref 12–20)
CALCIUM SERPL-MCNC: 9.4 MG/DL (ref 8.5–10.1)
CHLORIDE SERPL-SCNC: 103 MMOL/L (ref 97–108)
CO2 SERPL-SCNC: 24 MMOL/L (ref 21–32)
COLOR UR: ABNORMAL
CREAT SERPL-MCNC: 1.29 MG/DL (ref 0.55–1.02)
DIFFERENTIAL METHOD BLD: NORMAL
EOSINOPHIL # BLD: 0.4 K/UL (ref 0–0.4)
EOSINOPHIL NFR BLD: 5 % (ref 0–7)
EPITH CASTS URNS QL MICRO: ABNORMAL /LPF
ERYTHROCYTE [DISTWIDTH] IN BLOOD BY AUTOMATED COUNT: 13.7 % (ref 11.5–14.5)
GLOBULIN SER CALC-MCNC: 3.6 G/DL (ref 2–4)
GLUCOSE SERPL-MCNC: 174 MG/DL (ref 65–100)
GLUCOSE UR STRIP.AUTO-MCNC: NEGATIVE MG/DL
HCT VFR BLD AUTO: 35.6 % (ref 35–47)
HGB BLD-MCNC: 11.6 G/DL (ref 11.5–16)
HGB UR QL STRIP: NEGATIVE
HYALINE CASTS URNS QL MICRO: ABNORMAL /LPF (ref 0–5)
IMM GRANULOCYTES # BLD: 0 K/UL (ref 0–0.04)
IMM GRANULOCYTES NFR BLD AUTO: 0 % (ref 0–0.5)
KETONES UR QL STRIP.AUTO: NEGATIVE MG/DL
LEUKOCYTE ESTERASE UR QL STRIP.AUTO: ABNORMAL
LYMPHOCYTES # BLD: 1.7 K/UL (ref 0.8–3.5)
LYMPHOCYTES NFR BLD: 25 % (ref 12–49)
MCH RBC QN AUTO: 30.2 PG (ref 26–34)
MCHC RBC AUTO-ENTMCNC: 32.6 G/DL (ref 30–36.5)
MCV RBC AUTO: 92.7 FL (ref 80–99)
MONOCYTES # BLD: 0.4 K/UL (ref 0–1)
MONOCYTES NFR BLD: 6 % (ref 5–13)
NEUTS SEG # BLD: 4.3 K/UL (ref 1.8–8)
NEUTS SEG NFR BLD: 63 % (ref 32–75)
NITRITE UR QL STRIP.AUTO: NEGATIVE
NRBC # BLD: 0 K/UL (ref 0–0.01)
NRBC BLD-RTO: 0 PER 100 WBC
PH UR STRIP: 5 [PH] (ref 5–8)
PLATELET # BLD AUTO: 245 K/UL (ref 150–400)
PMV BLD AUTO: 10.9 FL (ref 8.9–12.9)
POTASSIUM SERPL-SCNC: 3.7 MMOL/L (ref 3.5–5.1)
PROT SERPL-MCNC: 7.4 G/DL (ref 6.4–8.2)
PROT UR STRIP-MCNC: NEGATIVE MG/DL
RBC # BLD AUTO: 3.84 M/UL (ref 3.8–5.2)
RBC #/AREA URNS HPF: ABNORMAL /HPF (ref 0–5)
SODIUM SERPL-SCNC: 138 MMOL/L (ref 136–145)
SP GR UR REFRACTOMETRY: 1.02 (ref 1–1.03)
TROPONIN I SERPL-MCNC: <0.05 NG/ML
UA: UC IF INDICATED,UAUC: ABNORMAL
UROBILINOGEN UR QL STRIP.AUTO: 0.2 EU/DL (ref 0.2–1)
WBC # BLD AUTO: 6.8 K/UL (ref 3.6–11)
WBC URNS QL MICRO: ABNORMAL /HPF (ref 0–4)

## 2018-08-09 PROCEDURE — 85025 COMPLETE CBC W/AUTO DIFF WBC: CPT | Performed by: EMERGENCY MEDICINE

## 2018-08-09 PROCEDURE — 71046 X-RAY EXAM CHEST 2 VIEWS: CPT

## 2018-08-09 PROCEDURE — 81001 URINALYSIS AUTO W/SCOPE: CPT | Performed by: EMERGENCY MEDICINE

## 2018-08-09 PROCEDURE — 80053 COMPREHEN METABOLIC PANEL: CPT | Performed by: EMERGENCY MEDICINE

## 2018-08-09 PROCEDURE — 77030029684 HC NEB SM VOL KT MONA -A

## 2018-08-09 PROCEDURE — 74011000250 HC RX REV CODE- 250: Performed by: EMERGENCY MEDICINE

## 2018-08-09 PROCEDURE — 87086 URINE CULTURE/COLONY COUNT: CPT | Performed by: EMERGENCY MEDICINE

## 2018-08-09 PROCEDURE — 93005 ELECTROCARDIOGRAM TRACING: CPT

## 2018-08-09 PROCEDURE — 36415 COLL VENOUS BLD VENIPUNCTURE: CPT | Performed by: EMERGENCY MEDICINE

## 2018-08-09 PROCEDURE — 84484 ASSAY OF TROPONIN QUANT: CPT | Performed by: EMERGENCY MEDICINE

## 2018-08-09 PROCEDURE — 99284 EMERGENCY DEPT VISIT MOD MDM: CPT

## 2018-08-09 PROCEDURE — 94640 AIRWAY INHALATION TREATMENT: CPT

## 2018-08-09 RX ORDER — ALBUTEROL SULFATE 90 UG/1
1 AEROSOL, METERED RESPIRATORY (INHALATION) AS NEEDED
Qty: 1 INHALER | Refills: 1 | Status: ON HOLD | OUTPATIENT
Start: 2018-08-09 | End: 2020-11-19

## 2018-08-09 RX ORDER — OMEPRAZOLE 20 MG/1
20 CAPSULE, DELAYED RELEASE ORAL 2 TIMES DAILY
COMMUNITY
End: 2018-11-12

## 2018-08-09 RX ORDER — ALLOPURINOL 300 MG/1
300 TABLET ORAL DAILY
COMMUNITY

## 2018-08-09 RX ORDER — AZITHROMYCIN 250 MG/1
TABLET, FILM COATED ORAL
Qty: 6 TAB | Refills: 0 | Status: SHIPPED | OUTPATIENT
Start: 2018-08-09 | End: 2018-11-12

## 2018-08-09 RX ORDER — METHYLPREDNISOLONE 4 MG/1
TABLET ORAL
Qty: 1 DOSE PACK | Refills: 0 | Status: SHIPPED | OUTPATIENT
Start: 2018-08-09 | End: 2018-11-12

## 2018-08-09 RX ORDER — HYDROXYCHLOROQUINE SULFATE 200 MG/1
200 TABLET, FILM COATED ORAL 2 TIMES DAILY
COMMUNITY

## 2018-08-09 RX ORDER — IPRATROPIUM BROMIDE AND ALBUTEROL SULFATE 2.5; .5 MG/3ML; MG/3ML
3 SOLUTION RESPIRATORY (INHALATION) ONCE
Status: COMPLETED | OUTPATIENT
Start: 2018-08-09 | End: 2018-08-09

## 2018-08-09 RX ADMIN — IPRATROPIUM BROMIDE AND ALBUTEROL SULFATE 3 ML: .5; 3 SOLUTION RESPIRATORY (INHALATION) at 14:10

## 2018-08-09 NOTE — DISCHARGE INSTRUCTIONS
Bronchitis: Care Instructions  Your Care Instructions    Bronchitis is inflammation of the bronchial tubes, which carry air to the lungs. The tubes swell and produce mucus, or phlegm. The mucus and inflamed bronchial tubes make you cough. You may have trouble breathing. Most cases of bronchitis are caused by viruses like those that cause colds. Antibiotics usually do not help and they may be harmful. Bronchitis usually develops rapidly and lasts about 2 to 3 weeks in otherwise healthy people. Follow-up care is a key part of your treatment and safety. Be sure to make and go to all appointments, and call your doctor if you are having problems. It's also a good idea to know your test results and keep a list of the medicines you take. How can you care for yourself at home? · Take all medicines exactly as prescribed. Call your doctor if you think you are having a problem with your medicine. · Get some extra rest.  · Take an over-the-counter pain medicine, such as acetaminophen (Tylenol), ibuprofen (Advil, Motrin), or naproxen (Aleve) to reduce fever and relieve body aches. Read and follow all instructions on the label. · Do not take two or more pain medicines at the same time unless the doctor told you to. Many pain medicines have acetaminophen, which is Tylenol. Too much acetaminophen (Tylenol) can be harmful. · Take an over-the-counter cough medicine that contains dextromethorphan to help quiet a dry, hacking cough so that you can sleep. Avoid cough medicines that have more than one active ingredient. Read and follow all instructions on the label. · Breathe moist air from a humidifier, hot shower, or sink filled with hot water. The heat and moisture will thin mucus so you can cough it out. · Do not smoke. Smoking can make bronchitis worse. If you need help quitting, talk to your doctor about stop-smoking programs and medicines. These can increase your chances of quitting for good.   When should you call for help? Call 911 anytime you think you may need emergency care. For example, call if:    · You have severe trouble breathing.    Call your doctor now or seek immediate medical care if:    · You have new or worse trouble breathing.     · You cough up dark brown or bloody mucus (sputum).     · You have a new or higher fever.     · You have a new rash.    Watch closely for changes in your health, and be sure to contact your doctor if:    · You cough more deeply or more often, especially if you notice more mucus or a change in the color of your mucus.     · You are not getting better as expected. Where can you learn more? Go to http://jaspreet-tiherno.info/. Enter H333 in the search box to learn more about \"Bronchitis: Care Instructions. \"  Current as of: December 6, 2017  Content Version: 11.7  © 1848-8664 [employer]. Care instructions adapted under license by [employer] (which disclaims liability or warranty for this information). If you have questions about a medical condition or this instruction, always ask your healthcare professional. [address] any warranty or liability for your use of this information. Learning About High Blood Sugar  What is high blood sugar? Your body turns the food you eat into glucose (sugar), which it uses for energy. But if your body isn't able to use the sugar right away, it can build up in your blood and lead to high blood sugar. When the amount of sugar in your blood stays too high for too much of the time, you may have diabetes. Diabetes is a disease that can cause serious health problems. The good news is that lifestyle changes may help you get your blood sugar back to normal and avoid or delay diabetes. What causes high blood sugar? Sugar (glucose) can build up in your blood if you:  · Are overweight. · Have a family history of diabetes. · Take certain medicines, such as steroids.   What are the symptoms? Having high blood sugar may not cause any symptoms at all. Or it may make you feel very thirsty or very hungry. You may also urinate more often than usual, have blurry vision, or lose weight without trying. How is high blood sugar treated? You can take steps to lower your blood sugar level if you understand what makes it get higher. Your doctor may want you to learn how to test your blood sugar level at home. Then you can see how illness, stress, or different kinds of food or medicine raise or lower your blood sugar level. Other tests may be needed to see if you have diabetes. How can you prevent high blood sugar? · Watch your weight. If you're overweight, losing just a small amount of weight may help. Reducing fat around your waist is most important. · Limit the amount of calories, sweets, and unhealthy fat you eat. Ask your doctor if a dietitian can help you. A registered dietitian can help you create meal plans that fit your lifestyle. · Get at least 30 minutes of exercise on most days of the week. Exercise helps control your blood sugar. It also helps you maintain a healthy weight. Walking is a good choice. You also may want to do other activities, such as running, swimming, cycling, or playing tennis or team sports. · If your doctor prescribed medicines, take them exactly as prescribed. Call your doctor if you think you are having a problem with your medicine. You will get more details on the specific medicines your doctor prescribes. Follow-up care is a key part of your treatment and safety. Be sure to make and go to all appointments, and call your doctor if you are having problems. It's also a good idea to know your test results and keep a list of the medicines you take. Where can you learn more? Go to http://jaspreet-thierno.info/. Enter O108 in the search box to learn more about \"Learning About High Blood Sugar. \"  Current as of: December 7, 2017  Content Version: 11.7  © 2647-4977 [employer]. Care instructions adapted under license by [employer] (which disclaims liability or warranty for this information). If you have questions about a medical condition or this instruction, always ask your healthcare professional. [address] any warranty or liability for your use of this information.

## 2018-08-09 NOTE — ED NOTES
Patient discharged by Dr. Sonja Terry. Patient provided with discharge instructions Rx and instructions on follow up care. Patient out of ED ambulatory.

## 2018-08-09 NOTE — ED PROVIDER NOTES
EMERGENCY DEPARTMENT HISTORY AND PHYSICAL EXAM      Date: 8/9/2018  Patient Name: Derrick Ortega    History of Presenting Illness     Chief Complaint   Patient presents with    Cough     pt reports cough and chest congestion x2 weeks, no relief from OTC meds, had pneumonia in June       History Provided By: Patient    HPI: Derrick Ortega, 58 y.o. female with PMHx significant for asthma, CAD, HTN, cardiomyopathy, PNA, presents ambulatory to the ED with cc of a worsening constant productive cough with associated SOB with exertion and chest congestion for 2 weeks. She has had no relief with OTC medications. She also notes having had PNA in June and given abx. Pt denies any CP or fevers. There are no other complaints, changes, or physical findings at this time. PCP: Padmaja Reveles MD    Current Outpatient Prescriptions   Medication Sig Dispense Refill    hydroxychloroquine (PLAQUENIL) 200 mg tablet Take 200 mg by mouth two (2) times a day.  allopurinol (ZYLOPRIM) 300 mg tablet Take  by mouth daily.  omeprazole (PRILOSEC) 20 mg capsule Take 20 mg by mouth two (2) times a day.  azithromycin (ZITHROMAX Z-COMPA) 250 mg tablet Take as directed 6 Tab 0    methylPREDNISolone (MEDROL, COMPA,) 4 mg tablet Take as directed 1 Dose Pack 0    albuterol (PROVENTIL HFA, VENTOLIN HFA, PROAIR HFA) 90 mcg/actuation inhaler Take 1 Puff by inhalation as needed. 1 Inhaler 1    plecanatide (TRULANCE) 3 mg tab Take 3 mg by mouth.  colchicine 0.6 mg tablet Take 1 Tab by mouth daily. 1 Tab 0    lidocaine (LIDODERM) 5 % 1 Patch by TransDERmal route every twenty-four (24) hours. Apply patch to the affected area for 12 hours a day and remove for 12 hours a day.  gabapentin (NEURONTIN) 400 mg capsule 400 mg p.o. 3 times daily and 1200 mg p.o. nightly 180 Cap 2    diclofenac (VOLTAREN) 1 % gel Apply  to affected area as needed.       amLODIPine-benazepril (LOTREL) 5-20 mg per capsule Take 1 Cap by mouth daily.      carvedilol (COREG) 25 mg tablet Take 25 mg by mouth two (2) times daily (with meals).  bumetanide (BUMEX) 2 mg tablet Take 2 mg by mouth two (2) times a day.  DULoxetine (CYMBALTA) 60 mg capsule Take 60 mg by mouth daily.  zolpidem (AMBIEN) 10 mg tablet Take 10 mg by mouth nightly as needed for Sleep.  cholecalciferol, vitamin D3, 2,000 unit tab Take 2,000 Units by mouth daily.  cyanocobalamin 1,000 mcg tablet Take 1,000 mcg by mouth daily.  hyoscyamine (ANASPAZ, LEVSIN) 0.125 mg tablet Take 125 mcg by mouth every four (4) hours as needed for Cramping.  spironolactone (ALDACTONE) 25 mg tablet Take  by mouth two (2) times a day.  pregabalin (LYRICA) 150 mg capsule Take 150 mg by mouth two (2) times a day.  oxyCODONE IR (ROXICODONE) 5 mg immediate release tablet Take 1 Tab by mouth every six (6) hours as needed for Pain.  Max Daily Amount: 20 mg. 8 Tab 0       Past History     Past Medical History:  Past Medical History:   Diagnosis Date    Arrhythmia     per pt, treated with med    Arthritis     Asthma     CAD (coronary artery disease)     EF=33 1/3    Chronic pain     fibromyalgia    Diarrhea 3/24/2017    Epigastric pain 3/24/2017    GERD (gastroesophageal reflux disease)     ibs    Heart failure (HCC)     cardiomyopathy    Hypertension     Psychiatric disorder     h/o severe depression    Stroke ([facility] [address]     possible TIA    Unspecified sleep apnea     wears CPAP       Past Surgical History:  Past Surgical History:   Procedure Laterality Date    COLONOSCOPY N/A 8/23/2016    COLONOSCOPY performed by Sam Hodge MD at [place] [address] N/A 3/24/2017    FLEXIBLE SIGMOIDOSCOPY  performed by Fadi Cabrera MD at [facility] AM[facility] OR    HX APPENDECTOMY      HX GYN      hysterectomy    HX HEENT      left eye surgery    HX KNEE ARTHROSCOPY Right     HX KNEE REPLACEMENT Right     HX OTHER SURGICAL      hand surgery left (carpal tunnel, reconstruction of small finger)    SIGMOIDOSCOPY,BIOPSY  3/24/2017            Family History:  Family History   Problem Relation Age of Onset    Diabetes Mother        Social History:  Social History   Substance Use Topics    Smoking status: Never Smoker    Smokeless tobacco: Never Used    Alcohol use No       Allergies: Allergies   Allergen Reactions    Codeine Itching    Demerol [Meperidine] Nausea Only    Ivp [Fd And C Blue No.1] Itching    Minoxidil Itching    Penicillamine Itching and Other (comments)     Drops blood pressure    Percocet [Oxycodone-Acetaminophen] Nausea and Vomiting         Review of Systems   Review of Systems   Constitutional: Negative for activity change, chills and fever. HENT: Positive for congestion. Negative for sore throat. Eyes: Negative for pain and redness. Respiratory: Positive for cough and shortness of breath. Negative for chest tightness. Cardiovascular: Negative for chest pain and palpitations. Gastrointestinal: Negative for abdominal pain, diarrhea, nausea and vomiting. Genitourinary: Negative for dysuria, frequency and urgency. Musculoskeletal: Negative for back pain and neck pain. Skin: Negative for rash. Neurological: Negative for syncope, light-headedness and headaches. Psychiatric/Behavioral: Negative for confusion. All other systems reviewed and are negative. Physical Exam   Physical Exam   Constitutional: She is oriented to person, place, and time. She appears well-developed and well-nourished. No distress. HENT:   Head: Normocephalic. Nose: Nose normal.   Mouth/Throat: Oropharynx is clear and moist. No oropharyngeal exudate. Eyes: Conjunctivae are normal. Pupils are equal, round, and reactive to light. No scleral icterus. Neck: Normal range of motion. Neck supple. No JVD present. No tracheal deviation present. No thyromegaly present. Cardiovascular: Normal rate, regular rhythm and intact distal pulses.   Exam reveals no gallop and no friction rub. No murmur heard. Pulmonary/Chest: Effort normal. No stridor. No respiratory distress. She has wheezes (minimal bilateral expiratory). She has no rales. Abdominal: Soft. Bowel sounds are normal. She exhibits no distension. There is no tenderness. There is no rebound and no guarding. Musculoskeletal: Normal range of motion. She exhibits no edema. Lymphadenopathy:     She has no cervical adenopathy. Neurological: She is alert and oriented to person, place, and time. No cranial nerve deficit. She exhibits normal muscle tone. Coordination normal.   Skin: Skin is warm and dry. No rash noted. She is not diaphoretic. No erythema. Psychiatric: She has a normal mood and affect. Her behavior is normal.   Nursing note and vitals reviewed. Diagnostic Study Results     Labs -     Recent Results (from the past 12 hour(s))   CBC WITH AUTOMATED DIFF    Collection Time: 08/09/18  2:00 PM   Result Value Ref Range    WBC 6.8 3.6 - 11.0 K/uL    RBC 3.84 3.80 - 5.20 M/uL    HGB 11.6 11.5 - 16.0 g/dL    HCT 35.6 35.0 - 47.0 %    MCV 92.7 80.0 - 99.0 FL    MCH 30.2 26.0 - 34.0 PG    MCHC 32.6 30.0 - 36.5 g/dL    RDW 13.7 11.5 - 14.5 %    PLATELET 087 270 - 103 K/uL    MPV 10.9 8.9 - 12.9 FL    NRBC 0.0 0  WBC    ABSOLUTE NRBC 0.00 0.00 - 0.01 K/uL    NEUTROPHILS 63 32 - 75 %    LYMPHOCYTES 25 12 - 49 %    MONOCYTES 6 5 - 13 %    EOSINOPHILS 5 0 - 7 %    BASOPHILS 0 0 - 1 %    IMMATURE GRANULOCYTES 0 0.0 - 0.5 %    ABS. NEUTROPHILS 4.3 1.8 - 8.0 K/UL    ABS. LYMPHOCYTES 1.7 0.8 - 3.5 K/UL    ABS. MONOCYTES 0.4 0.0 - 1.0 K/UL    ABS. EOSINOPHILS 0.4 0.0 - 0.4 K/UL    ABS. BASOPHILS 0.0 0.0 - 0.1 K/UL    ABS. IMM.  GRANS. 0.0 0.00 - 0.04 K/UL    DF AUTOMATED     METABOLIC PANEL, COMPREHENSIVE    Collection Time: 08/09/18  2:00 PM   Result Value Ref Range    Sodium 138 136 - 145 mmol/L    Potassium 3.7 3.5 - 5.1 mmol/L    Chloride 103 97 - 108 mmol/L    CO2 24 21 - 32 mmol/L Anion gap 11 5 - 15 mmol/L    Glucose 174 (H) 65 - 100 mg/dL    BUN 27 (H) 6 - 20 MG/DL    Creatinine 1.29 (H) 0.55 - 1.02 MG/DL    BUN/Creatinine ratio 21 (H) 12 - 20      GFR est AA 51 (L) >60 ml/min/1.73m2    GFR est non-AA 42 (L) >60 ml/min/1.73m2    Calcium 9.4 8.5 - 10.1 MG/DL    Bilirubin, total 0.3 0.2 - 1.0 MG/DL    ALT (SGPT) 24 12 - 78 U/L    AST (SGOT) 12 (L) 15 - 37 U/L    Alk.  phosphatase 64 45 - 117 U/L    Protein, total 7.4 6.4 - 8.2 g/dL    Albumin 3.8 3.5 - 5.0 g/dL    Globulin 3.6 2.0 - 4.0 g/dL    A-G Ratio 1.1 1.1 - 2.2     URINALYSIS W/ REFLEX CULTURE    Collection Time: 08/09/18  2:00 PM   Result Value Ref Range    Color YELLOW/STRAW      Appearance CLEAR CLEAR      Specific gravity 1.018 1.003 - 1.030      pH (UA) 5.0 5.0 - 8.0      Protein NEGATIVE  NEG mg/dL    Glucose NEGATIVE  NEG mg/dL    Ketone NEGATIVE  NEG mg/dL    Bilirubin NEGATIVE  NEG      Blood NEGATIVE  NEG      Urobilinogen 0.2 0.2 - 1.0 EU/dL    Nitrites NEGATIVE  NEG      Leukocyte Esterase SMALL (A) NEG      WBC 0-4 0 - 4 /hpf    RBC 0-5 0 - 5 /hpf    Epithelial cells MODERATE (A) FEW /lpf    Bacteria 1+ (A) NEG /hpf    UA:UC IF INDICATED URINE CULTURE ORDERED (A) CNI      Hyaline cast 0-2 0 - 5 /lpf   TROPONIN I    Collection Time: 08/09/18  2:00 PM   Result Value Ref Range    Troponin-I, Qt. <0.05 <0.05 ng/mL   EKG, 12 LEAD, INITIAL    Collection Time: 08/09/18  2:10 PM   Result Value Ref Range    Ventricular Rate 88 BPM    Atrial Rate 88 BPM    P-R Interval 190 ms    QRS Duration 104 ms    Q-T Interval 370 ms    QTC Calculation (Bezet) 447 ms    Calculated P Axis 60 degrees    Calculated R Axis 3 degrees    Calculated T Axis 25 degrees    Diagnosis       Sinus rhythm with occasional premature ventricular complexes  Nonspecific T wave abnormality  When compared with ECG of 06-JUN-2018 16:55,  Inverted T waves have replaced nonspecific T wave abnormality in Lateral   leads         Radiologic Studies -     CXR Results  (Last 48 hours)               08/09/18 1532  XR CHEST PA LAT Final result    Impression:  IMPRESSION:       No acute pulmonary process. Narrative:  history: Persistent cough       COMPARISON: 6/28/2018       FINDINGS:       Frontal and lateral chest radiograph submitted for review. Unchanged cardiomegaly       No acute pulmonary process. No pleural effusion. No evidence for pneumothorax. Medical Decision Making   I am the first provider for this patient. I reviewed the vital signs, available nursing notes, past medical history, past surgical history, family history and social history. Vital Signs-Reviewed the patient's vital signs. Patient Vitals for the past 12 hrs:   Temp Pulse Resp BP SpO2   08/09/18 1430 - 91 14 128/73 94 %   08/09/18 1308 98.8 °F (37.1 °C) 91 16 145/78 99 %       Pulse Oximetry Analysis - 99% on room air    Cardiac Monitor:   Rate: 91 bpm  Rhythm: Normal Sinus Rhythm 145/78     ED EKG interpretation: 14:10  Rhythm: normal sinus rhythm; and regular . Rate (approx.): 88; Axis: normal; SC Interval: normal; QRS interval: normal ; ST/T wave: non-specific changes; This EKG was interpreted by Sil Liu MD,ED Provider. Records Reviewed: Nursing Notes and Old Medical Records    Provider Notes (Medical Decision Making):   DDx: PNA, asthma, bronchitis    ED Course:   Initial assessment performed. The patients presenting problems have been discussed, and they are in agreement with the care plan formulated and outlined with them. I have encouraged them to ask questions as they arise throughout their visit. Disposition:  DISCHARGE NOTE  3:59 PM  The patient has been re-evaluated and is ready for discharge. Reviewed available results with patient. Counseled patient on diagnosis and care plan. Patient has expressed understanding, and all questions have been answered.  Patient agrees with plan and agrees to follow up as recommended, or return to the ED if their symptoms worsen. Discharge instructions have been provided and explained to the patient, along with reasons to return to the ED. CXR clear; afebrile; no increased wbc; negative troponin; good room air sats; will treat as acute bronchitis; Giovanna Storey MD      PLAN:  1. Current Discharge Medication List      START taking these medications    Details   azithromycin (ZITHROMAX Z-COMPA) 250 mg tablet Take as directed  Qty: 6 Tab, Refills: 0      methylPREDNISolone (MEDROL, COMPA,) 4 mg tablet Take as directed  Qty: 1 Dose Pack, Refills: 0         CONTINUE these medications which have CHANGED    Details   albuterol (PROVENTIL HFA, VENTOLIN HFA, PROAIR HFA) 90 mcg/actuation inhaler Take 1 Puff by inhalation as needed. Qty: 1 Inhaler, Refills: 1           2. Follow-up Information     Follow up With Details Comments Contact Derrick Norwood MD Schedule an appointment as soon as possible for a visit in 1 day Your blood sugar was mildly elevated today at 177.  [address] [place] [address]  [phone]      [facility] EMERGENCY DEPT Go in 1 day If symptoms worsen [address] [place] [address]  [phone]        Return to ED if worse     Diagnosis     Clinical Impression:   1. Hyperglycemia    2. Acute bronchitis, unspecified organism        Attestations: This note is prepared by Cody Petersen, acting as Scribe for Giovanna Storey MD.    Giovanna Storey MD: The scribe's documentation has been prepared under my direction and personally reviewed by me in its entirety. I confirm that the note above accurately reflects all work, treatment, procedures, and medical decision making performed by me.

## 2018-08-10 LAB
ATRIAL RATE: 88 BPM
CALCULATED P AXIS, ECG09: 60 DEGREES
CALCULATED R AXIS, ECG10: 3 DEGREES
CALCULATED T AXIS, ECG11: 25 DEGREES
DIAGNOSIS, 93000: NORMAL
P-R INTERVAL, ECG05: 190 MS
Q-T INTERVAL, ECG07: 370 MS
QRS DURATION, ECG06: 104 MS
QTC CALCULATION (BEZET), ECG08: 447 MS
VENTRICULAR RATE, ECG03: 88 BPM

## 2018-08-11 LAB
BACTERIA SPEC CULT: NORMAL
CC UR VC: NORMAL
SERVICE CMNT-IMP: NORMAL

## 2018-09-09 ENCOUNTER — APPOINTMENT (OUTPATIENT)
Dept: GENERAL RADIOLOGY | Age: 63
End: 2018-09-09
Attending: EMERGENCY MEDICINE
Payer: MEDICARE

## 2018-09-09 ENCOUNTER — HOSPITAL ENCOUNTER (EMERGENCY)
Age: 63
Discharge: HOME | End: 2018-09-09
Attending: EMERGENCY MEDICINE
Payer: MEDICARE

## 2018-09-09 ENCOUNTER — APPOINTMENT (OUTPATIENT)
Dept: CT IMAGING | Age: 63
End: 2018-09-09
Attending: EMERGENCY MEDICINE
Payer: MEDICARE

## 2018-09-09 VITALS
SYSTOLIC BLOOD PRESSURE: 126 MMHG | HEART RATE: 76 BPM | WEIGHT: 245 LBS | OXYGEN SATURATION: 39 % | DIASTOLIC BLOOD PRESSURE: 64 MMHG | BODY MASS INDEX: 38.45 KG/M2 | RESPIRATION RATE: 13 BRPM | TEMPERATURE: 98.2 F | HEIGHT: 67 IN

## 2018-09-09 DIAGNOSIS — S09.90XA INJURY OF HEAD, INITIAL ENCOUNTER: ICD-10-CM
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
## 2018-09-09 LAB
ALBUMIN SERPL-MCNC: 4.3 G/DL (ref 3.5–5)
ALBUMIN/GLOB SERPL: 1.1 {RATIO} (ref 1.1–2.2)
ALP SERPL-CCNC: 73 U/L (ref 45–117)
ALT SERPL-CCNC: 28 U/L (ref 12–78)
ANION GAP SERPL CALC-SCNC: 7 MMOL/L (ref 5–15)
AST SERPL-CCNC: 11 U/L (ref 15–37)
BASOPHILS # BLD: 0 K/UL (ref 0–0.1)
BASOPHILS NFR BLD: 0 % (ref 0–1)
BILIRUB SERPL-MCNC: 0.3 MG/DL (ref 0.2–1)
BUN SERPL-MCNC: 19 MG/DL (ref 6–20)
BUN/CREAT SERPL: 19 (ref 12–20)
CALCIUM SERPL-MCNC: 9.9 MG/DL (ref 8.5–10.1)
CHLORIDE SERPL-SCNC: 102 MMOL/L (ref 97–108)
CO2 SERPL-SCNC: 30 MMOL/L (ref 21–32)
CREAT SERPL-MCNC: 1.02 MG/DL (ref 0.55–1.02)
DIFFERENTIAL METHOD BLD: NORMAL
EOSINOPHIL # BLD: 0.3 K/UL (ref 0–0.4)
EOSINOPHIL NFR BLD: 4 % (ref 0–7)
ERYTHROCYTE [DISTWIDTH] IN BLOOD BY AUTOMATED COUNT: 13.6 % (ref 11.5–14.5)
GLOBULIN SER CALC-MCNC: 3.9 G/DL (ref 2–4)
GLUCOSE SERPL-MCNC: 84 MG/DL (ref 65–100)
HCT VFR BLD AUTO: 39.9 % (ref 35–47)
HGB BLD-MCNC: 12.9 G/DL (ref 11.5–16)
IMM GRANULOCYTES # BLD: 0 K/UL (ref 0–0.04)
IMM GRANULOCYTES NFR BLD AUTO: 0 % (ref 0–0.5)
LYMPHOCYTES # BLD: 2.1 K/UL (ref 0.8–3.5)
LYMPHOCYTES NFR BLD: 28 % (ref 12–49)
MCH RBC QN AUTO: 30.4 PG (ref 26–34)
MCHC RBC AUTO-ENTMCNC: 32.3 G/DL (ref 30–36.5)
MCV RBC AUTO: 93.9 FL (ref 80–99)
MONOCYTES # BLD: 0.5 K/UL (ref 0–1)
MONOCYTES NFR BLD: 7 % (ref 5–13)
NEUTS SEG # BLD: 4.5 K/UL (ref 1.8–8)
NEUTS SEG NFR BLD: 61 % (ref 32–75)
NRBC # BLD: 0 K/UL (ref 0–0.01)
NRBC BLD-RTO: 0 PER 100 WBC
PLATELET # BLD AUTO: 284 K/UL (ref 150–400)
PMV BLD AUTO: 10.6 FL (ref 8.9–12.9)
POTASSIUM SERPL-SCNC: 3.8 MMOL/L (ref 3.5–5.1)
PROT SERPL-MCNC: 8.2 G/DL (ref 6.4–8.2)
RBC # BLD AUTO: 4.25 M/UL (ref 3.8–5.2)
SODIUM SERPL-SCNC: 139 MMOL/L (ref 136–145)
TROPONIN I SERPL-MCNC: <0.05 NG/ML
WBC # BLD AUTO: 7.4 K/UL (ref 3.6–11)

## 2018-09-09 PROCEDURE — 99285 EMERGENCY DEPT VISIT HI MDM: CPT

## 2018-09-09 PROCEDURE — 93005 ELECTROCARDIOGRAM TRACING: CPT

## 2018-09-09 PROCEDURE — 36415 COLL VENOUS BLD VENIPUNCTURE: CPT | Performed by: EMERGENCY MEDICINE

## 2018-09-09 PROCEDURE — 80053 COMPREHEN METABOLIC PANEL: CPT | Performed by: EMERGENCY MEDICINE

## 2018-09-09 PROCEDURE — 84484 ASSAY OF TROPONIN QUANT: CPT | Performed by: EMERGENCY MEDICINE

## 2018-09-09 PROCEDURE — 85025 COMPLETE CBC W/AUTO DIFF WBC: CPT | Performed by: EMERGENCY MEDICINE

## 2018-09-09 PROCEDURE — 70450 CT HEAD/BRAIN W/O DYE: CPT

## 2018-09-09 PROCEDURE — 71046 X-RAY EXAM CHEST 2 VIEWS: CPT

## 2018-09-09 NOTE — DISCHARGE INSTRUCTIONS
Head Injury: After Your Visit  Your Care Instructions  You have had a concussion. A concussion means you hit your head hard enough to injure your brain. You may have symptoms that last for a few days to months. You may also have changes in how well you think, concentrate, or remember; changes in your sleep patterns; or other symptoms. Although this is common after a concussion, any symptoms that are new or that are getting worse could be signs of a more serious problem. The doctor has checked you carefully, but problems can develop later. If you notice any problems or new symptoms, get medical treatment right away. Follow-up care is a key part of your treatment and safety. Be sure to make and go to all appointments, and call your doctor if you are having problems. It's also a good idea to know your test results and keep a list of the medicines you take. How can you care for yourself at home? · Have another adult watch you closely for the next 24 hours. That person should check for signs that your head injury is getting worse. · Put ice or a cold pack on the sore area for 10 to 20 minutes at a time. Put a thin cloth between the ice and your skin. · Ask your doctor if you can take an over-the-counter pain medicine, such as acetaminophen (Tylenol), ibuprofen (Advil, Motrin), or naproxen (Aleve). Read and follow all instructions on the label. Do not take two or more pain medicines at the same time unless the doctor told you to. Many pain medicines have acetaminophen, which is Tylenol. Too much acetaminophen (Tylenol) can be harmful. · You may sleep. If your doctor tells you to, have another adult check you at the suggested times to make sure you are able to wake up, recognize the other adult, and act normally. · Take it easy for the next few days or longer if you are not feeling well. · Do not drink any alcohol for 24 hours or until your doctor tells you it is okay. When should you call for help?   Call [phone] anytime you think you may need emergency care. For example, call if:  · You have a seizure. · You passed out (lost consciousness). · You feel very sleepy or confused. Call your doctor now or seek immediate medical care if:  · You have a new or worse headache. · You have new or worse nausea or vomiting. · You have a new watery (not like mucus from a cold) or bloody fluid coming from your nose or ears. · You have tingling, weakness, or numbness in any part of your body. · You have trouble walking. Watch closely for changes in your health, and be sure to contact your doctor if you do not get better as expected. Where can you learn more? Go to [employer]  Enter X549 in the search box to learn more about \"Head Injury: After Your Visit. \"   © 8694-4440 [employer]. Care instructions adapted under license by [facility] (which disclaims liability or warranty for this information). This care instruction is for use with your licensed healthcare professional. If you have questions about a medical condition or this instruction, always ask your healthcare professional. [place] [address] any warranty or liability for your use of this information.   Content Version: 8.2.451792; Last Revised: June 27, 2012

## 2018-09-09 NOTE — ED NOTES
Dr Daquan Michelle reviewed discharge instructions with the patient. The patient verbalized understanding. All questions and concerns were addressed. The patient declined a wheelchair and is discharged ambulatory in the care of family members with instructions and prescriptions in hand. Pt is alert and oriented x 4. Respirations are clear and unlabored.

## 2018-09-09 NOTE — ED PROVIDER NOTES
EMERGENCY DEPARTMENT HISTORY AND PHYSICAL EXAM 
 
 
Date: 9/9/2018 Patient Name: Drew Ceja History of Presenting Illness Chief Complaint Patient presents with  Syncope  
  pt was at [employer], \"and then I got happy and started shouting\" and then she fell backwards hitting the left back of her head on the front pew.   reports that she never really passed out, but she was confused for a few minutes after her fall History Provided By: Patient and Patient's  HPI: Drew Ceja, 58 y.o. female with PMHx significant for DM, CVA, CAD, and HTN, presents with  to the ED with cc of constant, aching HA secondary to [facility] which occurred earlier today (9/9/18). Pt reports associated confusion and nausea immediately following GLF but both symptoms have since resolved. Her  states that patient was \"struck by the holy spirit\" and became excited, falling and hitting head on the edge of the front pew. Pt states she has not eaten anything today. Her  notes that pt did not lose consciousness and was able to respond to questions and follow commands. Pt denies being on any blood thinners. Pt specifically denies syncope, LOC, dysuria, hematuria, lightheadedness, dizziness, vomiting, diarrhea, palpitation, SOB, CP, and neck pain. There are no other complaints, changes, or physical findings at this time. PCP: Joellen García MD 
 
Current Outpatient Prescriptions Medication Sig Dispense Refill  hydroxychloroquine (PLAQUENIL) 200 mg tablet Take 200 mg by mouth two (2) times a day.  allopurinol (ZYLOPRIM) 300 mg tablet Take  by mouth daily.  omeprazole (PRILOSEC) 20 mg capsule Take 20 mg by mouth two (2) times a day.     
 azithromycin (ZITHROMAX Z-COMPA) 250 mg tablet Take as directed 6 Tab 0  
 methylPREDNISolone (MEDROL, COMPA,) 4 mg tablet Take as directed 1 Dose Pack 0  
 albuterol (PROVENTIL HFA, VENTOLIN HFA, PROAIR HFA) 90 mcg/actuation inhaler Take 1 Puff by inhalation as needed. 1 Inhaler 1  
 oxyCODONE IR (ROXICODONE) 5 mg immediate release tablet Take 1 Tab by mouth every six (6) hours as needed for Pain. Max Daily Amount: 20 mg. 8 Tab 0  plecanatide (TRULANCE) 3 mg tab Take 3 mg by mouth.  colchicine 0.6 mg tablet Take 1 Tab by mouth daily. 1 Tab 0  
 lidocaine (LIDODERM) 5 % 1 Patch by TransDERmal route every twenty-four (24) hours. Apply patch to the affected area for 12 hours a day and remove for 12 hours a day.  gabapentin (NEURONTIN) 400 mg capsule 400 mg p.o. 3 times daily and 1200 mg p.o. nightly 180 Cap 2  
 diclofenac (VOLTAREN) 1 % gel Apply  to affected area as needed.  amLODIPine-benazepril (LOTREL) 5-20 mg per capsule Take 1 Cap by mouth daily.  carvedilol (COREG) 25 mg tablet Take 25 mg by mouth two (2) times daily (with meals).  bumetanide (BUMEX) 2 mg tablet Take 2 mg by mouth two (2) times a day.  DULoxetine (CYMBALTA) 60 mg capsule Take 60 mg by mouth daily.  zolpidem (AMBIEN) 10 mg tablet Take 10 mg by mouth nightly as needed for Sleep.  cholecalciferol, vitamin D3, 2,000 unit tab Take 2,000 Units by mouth daily.  cyanocobalamin 1,000 mcg tablet Take 1,000 mcg by mouth daily.  hyoscyamine (ANASPAZ, LEVSIN) 0.125 mg tablet Take 125 mcg by mouth every four (4) hours as needed for Cramping.  spironolactone (ALDACTONE) 25 mg tablet Take  by mouth two (2) times a day.  pregabalin (LYRICA) 150 mg capsule Take 150 mg by mouth two (2) times a day. Past History Past Medical History: 
Past Medical History:  
Diagnosis Date  Arrhythmia   
 per pt, treated with med  Arthritis  Asthma  CAD (coronary artery disease) EF=33 1/3  Chronic pain   
 fibromyalgia  Diarrhea 3/24/2017  Epigastric pain 3/24/2017  GERD (gastroesophageal reflux disease)   
 ibs  
 Heart failure (HCC)   
 cardiomyopathy  Hypertension  Psychiatric disorder   
 h/o severe depression  Stroke [facility])   
 possible TIA  Unspecified sleep apnea   
 wears CPAP Past Surgical History: 
Past Surgical History:  
Procedure Laterality Date  COLONOSCOPY N/A 8/23/2016 COLONOSCOPY performed by Marina Boas, MD at [facility] ENDOSCOPY  FLEXIBLE SIGMOIDOSCOPY N/A 3/24/2017 FLEXIBLE SIGMOIDOSCOPY  performed by Alma Cameron MD at [facility] AMBULATORY OR  
 HX APPENDECTOMY  HX GYN    
 hysterectomy  HX HEENT    
 left eye surgery  HX KNEE ARTHROSCOPY Right  HX KNEE REPLACEMENT Right  HX OTHER SURGICAL    
 hand surgery left (carpal tunnel, reconstruction of small finger)  SIGMOIDOSCOPY,BIOPSY  3/24/2017 Family History: 
Family History Problem Relation Age of Onset  Diabetes Mother Social History: 
Social History Substance Use Topics  Smoking status: Never Smoker  Smokeless tobacco: Never Used  Alcohol use No  
 
 
Allergies: Allergies Allergen Reactions  Codeine Itching  Demerol [Meperidine] Nausea Only  Ivp [Fd And C Blue No.1] Itching  Minoxidil Itching  Penicillamine Itching and Other (comments) Drops blood pressure  Percocet [Oxycodone-Acetaminophen] Nausea and Vomiting Review of Systems Review of Systems Constitutional: Negative for chills, fatigue and fever. HENT: Negative for congestion, rhinorrhea and sore throat. Eyes: Negative for pain, discharge and visual disturbance. Respiratory: Negative for cough, chest tightness, shortness of breath and wheezing. Cardiovascular: Negative for chest pain, palpitations and leg swelling. Gastrointestinal: Positive for nausea. Negative for abdominal pain, constipation, diarrhea and vomiting. Genitourinary: Negative for dysuria, frequency and hematuria. Musculoskeletal: Negative for arthralgias, back pain, myalgias and neck pain. Skin: Negative for rash. Neurological: Positive for headaches. Negative for dizziness, syncope, weakness and light-headedness. Psychiatric/Behavioral: Positive for confusion. Physical Exam  
Physical Exam  
Constitutional: She is oriented to person, place, and time. She appears well-developed and well-nourished. No distress. HENT:  
Head: Normocephalic and atraumatic. Eyes: EOM are normal. Right eye exhibits no discharge. Left eye exhibits no discharge. No scleral icterus. Neck: Normal range of motion. Neck supple. No tracheal deviation present. Cardiovascular: Normal rate, regular rhythm, normal heart sounds and intact distal pulses. Exam reveals no gallop and no friction rub. No murmur heard. Pulmonary/Chest: Effort normal and breath sounds normal. No respiratory distress. She has no wheezes. She has no rales. Abdominal: Soft. She exhibits no distension. There is no tenderness. Musculoskeletal: Normal range of motion. She exhibits no edema. C, T, L-spine were all non tender. Lymphadenopathy:  
  She has no cervical adenopathy. Neurological: She is alert and oriented to person, place, and time. No focal neuro deficits Skin: Skin is warm and dry. No rash noted. Psychiatric: She has a normal mood and affect. Nursing note and vitals reviewed. Diagnostic Study Results Labs - Recent Results (from the past 12 hour(s)) EKG, 12 LEAD, INITIAL Collection Time: 09/09/18  1:23 PM  
Result Value Ref Range Ventricular Rate 76 BPM  
 Atrial Rate 76 BPM  
 P-R Interval 192 ms QRS Duration 116 ms  
 Q-T Interval 410 ms QTC Calculation (Bezet) 461 ms Calculated P Axis 40 degrees Calculated R Axis 8 degrees Calculated T Axis 10 degrees Diagnosis Sinus rhythm with occasional premature ventricular complexes Incomplete left bundle branch block When compared with ECG of 09-AUG-2018 14:10, 
ST no longer depressed in Lateral leads T wave inversion no longer evident in Lateral leads CBC WITH AUTOMATED DIFF Collection Time: 09/09/18  2:45 PM  
Result Value Ref Range WBC 7.4 3.6 - 11.0 K/uL  
 RBC 4.25 3.80 - 5.20 M/uL  
 HGB 12.9 11.5 - 16.0 g/dL HCT 39.9 35.0 - 47.0 % MCV 93.9 80.0 - 99.0 FL  
 MCH 30.4 26.0 - 34.0 PG  
 MCHC 32.3 30.0 - 36.5 g/dL  
 RDW 13.6 11.5 - 14.5 % PLATELET 449 784 - 905 K/uL MPV 10.6 8.9 - 12.9 FL  
 NRBC 0.0 0  WBC ABSOLUTE NRBC 0.00 0.00 - 0.01 K/uL NEUTROPHILS 61 32 - 75 % LYMPHOCYTES 28 12 - 49 % MONOCYTES 7 5 - 13 % EOSINOPHILS 4 0 - 7 % BASOPHILS 0 0 - 1 % IMMATURE GRANULOCYTES 0 0.0 - 0.5 % ABS. NEUTROPHILS 4.5 1.8 - 8.0 K/UL  
 ABS. LYMPHOCYTES 2.1 0.8 - 3.5 K/UL  
 ABS. MONOCYTES 0.5 0.0 - 1.0 K/UL  
 ABS. EOSINOPHILS 0.3 0.0 - 0.4 K/UL  
 ABS. BASOPHILS 0.0 0.0 - 0.1 K/UL  
 ABS. IMM. GRANS. 0.0 0.00 - 0.04 K/UL  
 DF AUTOMATED METABOLIC PANEL, COMPREHENSIVE Collection Time: 09/09/18  2:45 PM  
Result Value Ref Range Sodium 139 136 - 145 mmol/L Potassium 3.8 3.5 - 5.1 mmol/L Chloride 102 97 - 108 mmol/L  
 CO2 30 21 - 32 mmol/L Anion gap 7 5 - 15 mmol/L Glucose 84 65 - 100 mg/dL BUN 19 6 - 20 MG/DL Creatinine 1.02 0.55 - 1.02 MG/DL  
 BUN/Creatinine ratio 19 12 - 20 GFR est AA >60 >60 ml/min/1.73m2 GFR est non-AA 55 (L) >60 ml/min/1.73m2 Calcium 9.9 8.5 - 10.1 MG/DL Bilirubin, total 0.3 0.2 - 1.0 MG/DL  
 ALT (SGPT) 28 12 - 78 U/L  
 AST (SGOT) 11 (L) 15 - 37 U/L Alk. phosphatase 73 45 - 117 U/L Protein, total 8.2 6.4 - 8.2 g/dL Albumin 4.3 3.5 - 5.0 g/dL Globulin 3.9 2.0 - 4.0 g/dL A-G Ratio 1.1 1.1 - 2.2    
TROPONIN I Collection Time: 09/09/18  2:45 PM  
Result Value Ref Range Troponin-I, Qt. <0.05 <0.05 ng/mL Radiologic Studies -  
CT Results  (Last 48 hours) 09/09/18 1422  CT HEAD WO CONT Final result  Impression:  IMPRESSION: Unremarkable head CT  
   
   
  
 Narrative:  EXAM:  CT HEAD WO CONT INDICATION:   fall with head injury COMPARISON: 8/27/2012. CONTRAST:  None. TECHNIQUE: Unenhanced CT of the head was performed using 5 mm images. Brain and  
bone windows were generated. CT dose reduction was achieved through use of a  
standardized protocol tailored for this examination and automatic exposure  
control for dose modulation. FINDINGS:  
The ventricles and sulci are normal in size, shape and configuration and  
midline. There is no significant white matter disease. There is no intracranial  
hemorrhage, extra-axial collection, mass, mass effect or midline shift. The  
basilar cisterns are open. No acute infarct is identified. The bone windows  
demonstrate no abnormalities. The visualized portions of the paranasal sinuses  
and mastoid air cells are clear. CXR Results  (Last 48 hours) 09/09/18 1415  XR CHEST PA LAT Final result Impression:  Impression: 1. Enlarged cardiac silhouette, otherwise no acute disease Narrative:  INDICATION:  possible syncopal event today EXAM: Chest 2 views. Comparison: 8/9/2018 Findings: Cardiac silhouette is mildly enlarged. Pulmonary vasculature is not  
engorged. There are no focal parenchymal opacities, effusions, or pneumothorax. Medical Decision Making I am the first provider for this patient. I reviewed the vital signs, available nursing notes, past medical history, past surgical history, family history and social history. Vital Signs-Reviewed the patient's vital signs. Patient Vitals for the past 12 hrs: 
 Temp Pulse Resp BP SpO2  
09/09/18 1500 - 76 13 126/64 (!) 39 % 09/09/18 1400 - 78 16 128/45 90 % 09/09/18 1335 98.2 °F (36.8 °C) 76 11 139/71 100 % EKG interpretation: (Preliminary) 13:23 Rhythm: PVC's; and regular .  Rate (approx.): 76; Axis: normal; WV interval: normal; QRS interval: normal ; ST/T wave: normal; Other findings: incomplete LBBB, similar to prior EKG. Written by Chrales Oden, ED Scribe, as dictated by Rob Perea MD. Records Reviewed: Nursing Notes and Old Medical Records Provider Notes (Medical Decision Making): DDx: head injury, ICH, vasovagal syncope, orthostatic HTN, ACS, arrhythmia Disposition Note: 
Pt is a 58 yr old female who presents to ED after fall after getting excited at [employer]. Pt denies dyspnea, CP, lightheadedness, and dizziness preceding the fall. She denies syncope, LOC. She sustained a head injury with the fall. EKG unchanged from prior EKGs. Labs unremarkable. Symptoms do no suggest arrhythmia or ACS. Will discharge pt with close f/u with PCP. ED Course:  
Initial assessment performed. The patients presenting problems have been discussed, and they are in agreement with the care plan formulated and outlined with them. I have encouraged them to ask questions as they arise throughout their visit. PROGRESS NOTE:  
3:25 PM 
Pt has been re-examined by Rob Perea MD.  She reports she is feeling fine and has not complaints. No acute events on telemetry, only PVCs. This was not a syncopal event. Will discharge with follow up as needed. Discussed results, prescriptions and follow up plan with patient. Provided customary return to ED instructions. Patient expressed understanding. Manpreet Calles MD 
 
Critical Care Time:  
0 min Discharge Note: 
3:28 PM 
The pt is ready for discharge. The pt's signs, symptoms, diagnosis, and discharge instructions have been discussed and pt has conveyed their understanding. The pt is to follow up as recommended or return to ER should their symptoms worsen. Plan has been discussed and pt is in agreement. PLAN: 
1. Current Discharge Medication List  
  
 
2. Follow-up Information Follow up With Details Comments Contact Info Sue Bruno MD In 2 days  [address] [address] 
[phone] [facility] EMERGENCY DEPT  As needed, If symptoms worsen [address] [facility] 
[phone] Return to ED if worse Diagnosis Clinical Impression: 1. Fall, initial encounter 2. Injury of head, initial encounter Attestations: This note is prepared by [employer], acting as Scribe for MD Rakesh Brown MD: The scribe's documentation has been prepared under my direction and personally reviewed by me in its entirety. I confirm that the note above accurately reflects all work, treatment, procedures, and medical decision making performed by me.

## 2018-09-10 LAB
ATRIAL RATE: 76 BPM
CALCULATED P AXIS, ECG09: 40 DEGREES
CALCULATED R AXIS, ECG10: 8 DEGREES
CALCULATED T AXIS, ECG11: 10 DEGREES
DIAGNOSIS, 93000: NORMAL
P-R INTERVAL, ECG05: 192 MS
Q-T INTERVAL, ECG07: 410 MS
QRS DURATION, ECG06: 116 MS
QTC CALCULATION (BEZET), ECG08: 461 MS
VENTRICULAR RATE, ECG03: 76 BPM

## 2018-11-12 RX ORDER — RANITIDINE 150 MG/1
150 TABLET, FILM COATED ORAL 2 TIMES DAILY
Status: ON HOLD | COMMUNITY
End: 2020-11-19

## 2018-11-12 RX ORDER — GABAPENTIN 600 MG/1
1200 TABLET ORAL ONCE
COMMUNITY
End: 2020-02-20 | Stop reason: CLARIF

## 2018-11-12 RX ORDER — BUDESONIDE AND FORMOTEROL FUMARATE DIHYDRATE 160; 4.5 UG/1; UG/1
1 AEROSOL RESPIRATORY (INHALATION) AS NEEDED
Status: ON HOLD | COMMUNITY
End: 2020-11-19

## 2018-11-12 NOTE — PERIOP NOTES
[facility] Ambulatory Surgery Unit Pre-operative Instructions Surgery/Procedure Date  11/20/18            Tentative Arrival Time 3924 1. On the day of your surgery/procedure, please report to the Ambulatory Surgery Unit Registration Desk and sign in at your designated time. The Ambulatory Surgery Unit is located in [facility] on the [facility] side of the \[facility]\"" across from the [address] [place] [address]. Please have all of your health insurance cards and a photo ID. 2. You must have someone with you to drive you home, as you should not drive a car for 24 hours following anesthesia. Please make arrangements for a responsible adult friend or family member to stay with you for at least the first 24 hours after your surgery. 3. Do not have anything to eat or drink (including water, gum, mints, coffee, juice) after 11:59 PM  11/19/18. This may not apply to medications prescribed by your physician. (Please note below the special instructions with medications to take the morning of surgery, if applicable.) 4. We recommend you do not drink any alcoholic beverages for 24 hours before and after your surgery. 5. Contact your surgeons office for instructions on the following medications: non-steroidal anti-inflammatory drugs (i.e. Advil, Aleve), vitamins, and supplements. (Some surgeons will want you to stop these medications prior to surgery and others may allow you to take them) **If you are currently taking Plavix, Coumadin, Aspirin and/or other blood-thinning agents, contact your surgeon for instructions. ** Your surgeon will partner with the physician prescribing these medications to determine if it is safe to stop or if you need to continue taking. Please do not stop taking these medications without instructions from your surgeon.  
 
6. In an effort to help prevent surgical site infection, we ask that you shower with an anti-bacterial soap (i.e. Dial/Safeguard, or the soap provided to you at your preadmission testing appointment) for 3 days prior to and on the morning of surgery, using a fresh towel after each shower. (Please begin this process with fresh bed linens.) Do not apply any lotions, powders, or deodorants after the shower on the day of your procedure. If applicable, please do not shave the operative site for 48 hours prior to surgery. 7. Wear comfortable clothes. Wear glasses instead of contacts. Do not bring any jewelry or money (other than copays or fees as instructed). Do not wear make-up, particularly mascara, the morning of your surgery. Do not wear nail polish, particularly if you are having foot /hand surgery. Wear your hair loose or down, no ponytails, buns, chaya pins or clips. All body piercings must be removed. 8. You should understand that if you do not follow these instructions your surgery may be cancelled. If your physical condition changes (i.e. fever, cold or flu) please contact your surgeon as soon as possible. 9. It is important that you be on time. If a situation occurs where you may be late, or if you have any questions or problems, please call [phone]. 
 
10. Your surgery time may be subject to change. You will receive a phone call the day prior to surgery to confirm your arrival time. 11. Pediatric patients: please bring a change of clothes, diapers, bottle/sippy cup, pacifier, etc. 
 
 
Special Instructions: Take all medications and inhalers, as prescribed, on the morning of surgery with a sip of water EXCEPT: None I understand a pre-operative phone call will be made to verify my surgery time. In the event that I am not available, I give permission for a message to be left on my answering service and/or with another person? Yes Katie-op instructions given over the phone and pt stated an understanding 
 
 ___________________      ___________________      ________________ (Signature of Patient)          (Witness)                   (Date and Time)

## 2018-11-16 NOTE — PERIOP NOTES
Spoke to patient over the phone to notify her that she will have to come in for blood work if we are unable to get a hold of her PCP for them to fax results. Pt states she has a hard time getting in contact with her PCP as well. Pt stated she would try and call them as well as her podiatrist to see if either office has the most recent lab work. Pt stated she saw her PCP on Oct 29, 2018.

## 2018-11-20 ENCOUNTER — APPOINTMENT (OUTPATIENT)
Dept: GENERAL RADIOLOGY | Age: 63
End: 2018-11-20
Attending: PODIATRIST
Payer: MEDICARE

## 2018-11-20 ENCOUNTER — ANESTHESIA (OUTPATIENT)
Dept: SURGERY | Age: 63
End: 2018-11-20
Payer: MEDICARE

## 2018-11-20 ENCOUNTER — ANESTHESIA EVENT (OUTPATIENT)
Dept: SURGERY | Age: 63
End: 2018-11-20
Payer: MEDICARE

## 2018-11-20 ENCOUNTER — HOSPITAL ENCOUNTER (OUTPATIENT)
Age: 63
Setting detail: OUTPATIENT SURGERY
Discharge: HOME | End: 2018-11-20
Attending: PODIATRIST | Admitting: PODIATRIST
Payer: MEDICARE

## 2018-11-20 VITALS
RESPIRATION RATE: 14 BRPM | OXYGEN SATURATION: 97 % | HEIGHT: 67 IN | HEART RATE: 75 BPM | WEIGHT: 246.13 LBS | SYSTOLIC BLOOD PRESSURE: 111 MMHG | TEMPERATURE: 97.7 F | BODY MASS INDEX: 38.63 KG/M2 | DIASTOLIC BLOOD PRESSURE: 57 MMHG

## 2018-11-20 DIAGNOSIS — M79.671 RIGHT FOOT PAIN: Primary | ICD-10-CM

## 2018-11-20 DIAGNOSIS — G89.18 POST-OP PAIN: ICD-10-CM

## 2018-11-20 PROCEDURE — 77030020274 HC MISC IMPL ORTHOPEDIC: Performed by: PODIATRIST

## 2018-11-20 PROCEDURE — C1713 ANCHOR/SCREW BN/BN,TIS/BN: HCPCS | Performed by: PODIATRIST

## 2018-11-20 PROCEDURE — 76030000005 HC AMB SURG OR TIME 2.5 TO 3: Performed by: PODIATRIST

## 2018-11-20 PROCEDURE — 74011250636 HC RX REV CODE- 250/636

## 2018-11-20 PROCEDURE — 77030020255 HC SOL INJ LR 1000ML BG: Performed by: PODIATRIST

## 2018-11-20 PROCEDURE — 77030021352 HC CBL LD SYS DISP COVD -B: Performed by: PODIATRIST

## 2018-11-20 PROCEDURE — 77030011640 HC PAD GRND REM COVD -A: Performed by: PODIATRIST

## 2018-11-20 PROCEDURE — C1762 CONN TISS, HUMAN(INC FASCIA): HCPCS | Performed by: PODIATRIST

## 2018-11-20 PROCEDURE — 76001 XR FLUOROSCOPY OVER 60 MINUTES: CPT

## 2018-11-20 PROCEDURE — 77030006831 HC BLD SAW SAG MCRA -B: Performed by: PODIATRIST

## 2018-11-20 PROCEDURE — 74011250636 HC RX REV CODE- 250/636: Performed by: ANESTHESIOLOGY

## 2018-11-20 PROCEDURE — 64450 NJX AA&/STRD OTHER PN/BRANCH: CPT | Performed by: ANESTHESIOLOGY

## 2018-11-20 PROCEDURE — 77030008684 HC TU ET CUF COVD -B: Performed by: NURSE ANESTHETIST, CERTIFIED REGISTERED

## 2018-11-20 PROCEDURE — 74011250636 HC RX REV CODE- 250/636: Performed by: PODIATRIST

## 2018-11-20 PROCEDURE — 73600 X-RAY EXAM OF ANKLE: CPT

## 2018-11-20 PROCEDURE — 77030031139 HC SUT VCRL2 J&J -A: Performed by: PODIATRIST

## 2018-11-20 PROCEDURE — 77030019908 HC STETH ESOPH SIMS -A: Performed by: NURSE ANESTHETIST, CERTIFIED REGISTERED

## 2018-11-20 PROCEDURE — 76210000034 HC AMBSU PH I REC 0.5 TO 1 HR: Performed by: PODIATRIST

## 2018-11-20 PROCEDURE — 76060000065 HC AMB SURG ANES 2.5 TO 3 HR: Performed by: PODIATRIST

## 2018-11-20 PROCEDURE — 77030039002 HC WRE K PG28 -B: Performed by: PODIATRIST

## 2018-11-20 PROCEDURE — 77030018836 HC SOL IRR NACL ICUM -A: Performed by: PODIATRIST

## 2018-11-20 PROCEDURE — 74011000250 HC RX REV CODE- 250: Performed by: PODIATRIST

## 2018-11-20 PROCEDURE — 77030003601 HC NDL NRV BLK BBMI -A: Performed by: ANESTHESIOLOGY

## 2018-11-20 PROCEDURE — 77030020275 HC MISC ORTHOPEDIC: Performed by: PODIATRIST

## 2018-11-20 PROCEDURE — 77030026438 HC STYL ET INTUB CARD -A: Performed by: NURSE ANESTHETIST, CERTIFIED REGISTERED

## 2018-11-20 PROCEDURE — 76210000050 HC AMBSU PH II REC 0.5 TO 1 HR: Performed by: PODIATRIST

## 2018-11-20 RX ORDER — ACETAMINOPHEN 10 MG/ML
INJECTION, SOLUTION INTRAVENOUS AS NEEDED
Status: DISCONTINUED | OUTPATIENT
Start: 2018-11-20 | End: 2018-11-20 | Stop reason: HOSPADM

## 2018-11-20 RX ORDER — PROPOFOL 10 MG/ML
INJECTION, EMULSION INTRAVENOUS AS NEEDED
Status: DISCONTINUED | OUTPATIENT
Start: 2018-11-20 | End: 2018-11-20 | Stop reason: HOSPADM

## 2018-11-20 RX ORDER — PROMETHAZINE HYDROCHLORIDE 25 MG/1
25 TABLET ORAL
Qty: 30 TAB | Refills: 0 | Status: ON HOLD | OUTPATIENT
Start: 2018-11-20 | End: 2020-11-19

## 2018-11-20 RX ORDER — ONDANSETRON 2 MG/ML
INJECTION INTRAMUSCULAR; INTRAVENOUS AS NEEDED
Status: DISCONTINUED | OUTPATIENT
Start: 2018-11-20 | End: 2018-11-20 | Stop reason: HOSPADM

## 2018-11-20 RX ORDER — SODIUM CHLORIDE 9 MG/ML
INJECTION, SOLUTION INTRAVENOUS
Status: COMPLETED | OUTPATIENT
Start: 2018-11-20 | End: 2018-11-20

## 2018-11-20 RX ORDER — HYDROCODONE BITARTRATE AND ACETAMINOPHEN 5; 325 MG/1; MG/1
1 TABLET ORAL
Qty: 40 TAB | Refills: 0 | Status: SHIPPED | OUTPATIENT
Start: 2018-11-20 | End: 2019-09-15

## 2018-11-20 RX ORDER — ROPIVACAINE HYDROCHLORIDE 5 MG/ML
INJECTION, SOLUTION EPIDURAL; INFILTRATION; PERINEURAL
Status: COMPLETED
Start: 2018-11-20 | End: 2018-11-20

## 2018-11-20 RX ORDER — SODIUM CHLORIDE, SODIUM LACTATE, POTASSIUM CHLORIDE, CALCIUM CHLORIDE 600; 310; 30; 20 MG/100ML; MG/100ML; MG/100ML; MG/100ML
25 INJECTION, SOLUTION INTRAVENOUS CONTINUOUS
Status: DISCONTINUED | OUTPATIENT
Start: 2018-11-20 | End: 2018-11-20 | Stop reason: HOSPADM

## 2018-11-20 RX ORDER — DEXAMETHASONE SODIUM PHOSPHATE 4 MG/ML
INJECTION, SOLUTION INTRA-ARTICULAR; INTRALESIONAL; INTRAMUSCULAR; INTRAVENOUS; SOFT TISSUE AS NEEDED
Status: DISCONTINUED | OUTPATIENT
Start: 2018-11-20 | End: 2018-11-20 | Stop reason: HOSPADM

## 2018-11-20 RX ORDER — LIDOCAINE HYDROCHLORIDE 20 MG/ML
INJECTION, SOLUTION EPIDURAL; INFILTRATION; INTRACAUDAL; PERINEURAL AS NEEDED
Status: DISCONTINUED | OUTPATIENT
Start: 2018-11-20 | End: 2018-11-20 | Stop reason: HOSPADM

## 2018-11-20 RX ORDER — MIDAZOLAM HYDROCHLORIDE 1 MG/ML
INJECTION, SOLUTION INTRAMUSCULAR; INTRAVENOUS AS NEEDED
Status: DISCONTINUED | OUTPATIENT
Start: 2018-11-20 | End: 2018-11-20 | Stop reason: HOSPADM

## 2018-11-20 RX ORDER — BUPIVACAINE HYDROCHLORIDE 5 MG/ML
INJECTION, SOLUTION EPIDURAL; INTRACAUDAL AS NEEDED
Status: DISCONTINUED | OUTPATIENT
Start: 2018-11-20 | End: 2018-11-20 | Stop reason: HOSPADM

## 2018-11-20 RX ORDER — MIDAZOLAM HYDROCHLORIDE 1 MG/ML
INJECTION, SOLUTION INTRAMUSCULAR; INTRAVENOUS
Status: COMPLETED
Start: 2018-11-20 | End: 2018-11-20

## 2018-11-20 RX ORDER — SUCCINYLCHOLINE CHLORIDE 20 MG/ML
INJECTION INTRAMUSCULAR; INTRAVENOUS AS NEEDED
Status: DISCONTINUED | OUTPATIENT
Start: 2018-11-20 | End: 2018-11-20 | Stop reason: HOSPADM

## 2018-11-20 RX ORDER — FENTANYL CITRATE 50 UG/ML
INJECTION, SOLUTION INTRAMUSCULAR; INTRAVENOUS AS NEEDED
Status: DISCONTINUED | OUTPATIENT
Start: 2018-11-20 | End: 2018-11-20 | Stop reason: HOSPADM

## 2018-11-20 RX ORDER — ROPIVACAINE HYDROCHLORIDE 5 MG/ML
INJECTION, SOLUTION EPIDURAL; INFILTRATION; PERINEURAL AS NEEDED
Status: DISCONTINUED | OUTPATIENT
Start: 2018-11-20 | End: 2018-11-20 | Stop reason: HOSPADM

## 2018-11-20 RX ORDER — CLINDAMYCIN HYDROCHLORIDE 300 MG/1
300 CAPSULE ORAL 2 TIMES DAILY
Qty: 14 CAP | Refills: 0 | Status: SHIPPED | OUTPATIENT
Start: 2018-11-20 | End: 2018-11-27

## 2018-11-20 RX ORDER — CEFAZOLIN SODIUM/WATER 2 G/20 ML
2 SYRINGE (ML) INTRAVENOUS ONCE
Status: COMPLETED | OUTPATIENT
Start: 2018-11-20 | End: 2018-11-20

## 2018-11-20 RX ADMIN — PROPOFOL 10 MG: 10 INJECTION, EMULSION INTRAVENOUS at 10:15

## 2018-11-20 RX ADMIN — PROPOFOL 40 MG: 10 INJECTION, EMULSION INTRAVENOUS at 10:04

## 2018-11-20 RX ADMIN — FENTANYL CITRATE 50 MCG: 50 INJECTION, SOLUTION INTRAMUSCULAR; INTRAVENOUS at 08:39

## 2018-11-20 RX ADMIN — PROPOFOL 20 MG: 10 INJECTION, EMULSION INTRAVENOUS at 10:41

## 2018-11-20 RX ADMIN — MIDAZOLAM HYDROCHLORIDE 1 MG: 1 INJECTION, SOLUTION INTRAMUSCULAR; INTRAVENOUS at 08:36

## 2018-11-20 RX ADMIN — FENTANYL CITRATE 25 MCG: 50 INJECTION, SOLUTION INTRAMUSCULAR; INTRAVENOUS at 10:29

## 2018-11-20 RX ADMIN — PROPOFOL 20 MG: 10 INJECTION, EMULSION INTRAVENOUS at 10:22

## 2018-11-20 RX ADMIN — MIDAZOLAM HYDROCHLORIDE 2 MG: 1 INJECTION, SOLUTION INTRAMUSCULAR; INTRAVENOUS at 08:17

## 2018-11-20 RX ADMIN — MIDAZOLAM HYDROCHLORIDE 2 MG: 1 INJECTION, SOLUTION INTRAMUSCULAR; INTRAVENOUS at 08:33

## 2018-11-20 RX ADMIN — PROPOFOL 20 MG: 10 INJECTION, EMULSION INTRAVENOUS at 10:57

## 2018-11-20 RX ADMIN — PROPOFOL 20 MG: 10 INJECTION, EMULSION INTRAVENOUS at 11:03

## 2018-11-20 RX ADMIN — ACETAMINOPHEN 1000 MG: 10 INJECTION, SOLUTION INTRAVENOUS at 08:51

## 2018-11-20 RX ADMIN — PROPOFOL 30 MG: 10 INJECTION, EMULSION INTRAVENOUS at 10:26

## 2018-11-20 RX ADMIN — PROPOFOL 20 MG: 10 INJECTION, EMULSION INTRAVENOUS at 10:29

## 2018-11-20 RX ADMIN — FENTANYL CITRATE 50 MCG: 50 INJECTION, SOLUTION INTRAMUSCULAR; INTRAVENOUS at 10:04

## 2018-11-20 RX ADMIN — LIDOCAINE HYDROCHLORIDE 100 MG: 20 INJECTION, SOLUTION EPIDURAL; INFILTRATION; INTRACAUDAL; PERINEURAL at 08:39

## 2018-11-20 RX ADMIN — ONDANSETRON 4 MG: 2 INJECTION INTRAMUSCULAR; INTRAVENOUS at 10:56

## 2018-11-20 RX ADMIN — PROPOFOL 20 MG: 10 INJECTION, EMULSION INTRAVENOUS at 10:39

## 2018-11-20 RX ADMIN — DEXAMETHASONE SODIUM PHOSPHATE 8 MG: 4 INJECTION, SOLUTION INTRA-ARTICULAR; INTRALESIONAL; INTRAMUSCULAR; INTRAVENOUS; SOFT TISSUE at 08:47

## 2018-11-20 RX ADMIN — PROPOFOL 20 MG: 10 INJECTION, EMULSION INTRAVENOUS at 09:53

## 2018-11-20 RX ADMIN — FENTANYL CITRATE 25 MCG: 50 INJECTION, SOLUTION INTRAMUSCULAR; INTRAVENOUS at 10:45

## 2018-11-20 RX ADMIN — PROPOFOL 20 MG: 10 INJECTION, EMULSION INTRAVENOUS at 10:05

## 2018-11-20 RX ADMIN — Medication 2 G: at 08:45

## 2018-11-20 RX ADMIN — SUCCINYLCHOLINE CHLORIDE 120 MG: 20 INJECTION INTRAMUSCULAR; INTRAVENOUS at 08:39

## 2018-11-20 RX ADMIN — PROPOFOL 120 MG: 10 INJECTION, EMULSION INTRAVENOUS at 08:39

## 2018-11-20 RX ADMIN — ROPIVACAINE HYDROCHLORIDE 40 ML: 5 INJECTION, SOLUTION EPIDURAL; INFILTRATION; PERINEURAL at 08:24

## 2018-11-20 RX ADMIN — PROPOFOL 10 MG: 10 INJECTION, EMULSION INTRAVENOUS at 08:56

## 2018-11-20 RX ADMIN — PROPOFOL 20 MG: 10 INJECTION, EMULSION INTRAVENOUS at 09:38

## 2018-11-20 RX ADMIN — FENTANYL CITRATE 25 MCG: 50 INJECTION, SOLUTION INTRAMUSCULAR; INTRAVENOUS at 10:58

## 2018-11-20 RX ADMIN — PROPOFOL 20 MG: 10 INJECTION, EMULSION INTRAVENOUS at 08:57

## 2018-11-20 RX ADMIN — PROPOFOL 30 MG: 10 INJECTION, EMULSION INTRAVENOUS at 10:36

## 2018-11-20 RX ADMIN — SODIUM CHLORIDE, SODIUM LACTATE, POTASSIUM CHLORIDE, AND CALCIUM CHLORIDE 25 ML/HR: 600; 310; 30; 20 INJECTION, SOLUTION INTRAVENOUS at 08:10

## 2018-11-20 NOTE — ANESTHESIA POSTPROCEDURE EVALUATION
Procedure(s): RIGHT SUBTALAR JOINT FUSION, RIGHT ACHILLES TENDON LENGTHENING (GEN W/ POP BLOCK). Anesthesia Post Evaluation Patient location during evaluation: PACU Note status: Adequate. Level of consciousness: responsive to verbal stimuli and sleepy but conscious Pain management: satisfactory to patient Airway patency: patent Anesthetic complications: no 
Cardiovascular status: acceptable Respiratory status: acceptable Hydration status: acceptable Comments: +Post-Anesthesia Evaluation and Assessment Patient: Shashank Kwan MRN: [id number]  SSN: [id number] YOB: 1955  Age: 61 y.o. Sex: female Cardiovascular Function/Vital Signs BP 96/53   Pulse 75   Temp 36.7 °C (98.1 °F)   Resp 12   Ht 5' 7\" (1.702 m)   Wt 111.6 kg (246 lb 2 oz)   SpO2 96%   BMI 38.55 kg/m² Patient is status post Procedure(s): RIGHT SUBTALAR JOINT FUSION, RIGHT ACHILLES TENDON LENGTHENING (GEN W/ POP BLOCK). Nausea/Vomiting: Controlled. Postoperative hydration reviewed and adequate. Pain: 
Pain Scale 1: FLACC (11/20/18 1143) Pain Intensity 1: 10 (11/20/18 0754) Managed. Neurological Status:  
Neuro (WDL): Within Defined Limits (11/20/18 1128) At baseline. Mental Status and Level of Consciousness: Arousable. Pulmonary Status:  
O2 Device: Nasal cannula (11/20/18 1130) Adequate oxygenation and airway patent. Complications related to anesthesia: None Post-anesthesia assessment completed. No concerns. Signed By: Jose Wetzel MD  
 11/20/2018 Post anesthesia nausea and vomiting:  controlled Visit Vitals BP 96/53 Pulse 75 Temp 36.7 °C (98.1 °F) Resp 12 Ht 5' 7\" (1.702 m) Wt 111.6 kg (246 lb 2 oz) SpO2 96% BMI 38.55 kg/m²

## 2018-11-20 NOTE — BRIEF OP NOTE
BRIEF OPERATIVE NOTE Date of Procedure: 11/20/2018 Preoperative Diagnosis: PES PLANUS RIGHT FOOT,  EQUINUS CONTRACTURE RIGHT ANKLE, Postoperative Diagnosis: PES PLANUS RIGHT FOOT,  EQUINUS CONTRACTURE RIGHT ANKLE, Procedure(s): RIGHT SUBTALAR JOINT FUSION, RIGHT ACHILLES TENDON LENGTHENING (GEN W/ POP BLOCK) Surgeon(s) and Role: Prabhakar Fitch DPM - Primary Surgical Assistant: Clint Ramesh, PGY 3 - Resident - Assisting Surgical Staff: 
Circ-1: Bronwyn Orellana Circ-Relief: Mayra Patino RN; Shaneka Bartlett RN Scrub Tech-1: Melvina Wright Event Time In Time Out Incision Start 8015 Incision Close 1112 Anesthesia: General  
Estimated Blood Loss: MINIMAL Specimens: * No specimens in log * Findings: arthritic STJ with equinus. Complications: none Implants:  
Implant Name Type Inv. Item Serial No.  Lot No. LRB No. Used Action Via Graft Viable  Allogenic Bone Scaffold Graft  [id number]  NA Right 1 Implanted 5.5 x 80 mm short-thread screw Screw  NA PARAGON 28 NA Right 1 Implanted 5.5 x 75 mm short-thread screw Screw  NA PARAGON 28 NA Right 1 Implanted

## 2018-11-20 NOTE — ANESTHESIA PROCEDURE NOTES
Peripheral Block Start time: 11/20/2018 8:16 AM 
End time: 11/20/2018 8:25 AM 
Performed by: Nato Salinas MD 
Authorized by: Nato Salinas MD  
 
 
Pre-procedure: Indications: at surgeon's request and post-op pain management Preanesthetic Checklist: patient identified, risks and benefits discussed, site marked, timeout performed, anesthesia consent given and patient being monitored Timeout Time: 08:16 Block Type:  
Block Type:  Popliteal 
Laterality:  Right Monitoring:  Standard ASA monitoring, frequent vital sign checks, responsive to questions, oxygen, continuous pulse ox and heart rate Injection Technique:  Single shot Procedures: nerve stimulator Patient Position: prone Prep: chlorhexidine Location:  Upper thigh Needle Type:  Stimuplex Needle Gauge:  22 G Needle Localization:  Nerve stimulator Motor Response: minimal motor response >0.4 mA Assessment: 
Number of attempts:  1 Injection Assessment:  Incremental injection every 5 mL, no paresthesia, no intravascular symptoms and negative aspiration for blood Patient tolerance:  Patient tolerated the procedure well with no immediate complications 
right Saphenous nerve block performed with 30 ml of 0.50% ropivacaine and 10ml same soln. @ the medial aspect of tibial tuberosity . Patient tolerated procedure well. No pain, paresthesia, or blood noted. VSS throughout. No complications noted.

## 2018-11-20 NOTE — PERIOP NOTES
Permission received to review discharge instructions and discuss private health information with daughter Andra Bird

## 2018-11-20 NOTE — PERIOP NOTES
Dr. Nataliya Anderseniters popliteal nerve block in preop using nerve stimulator to RLE. Pt on CM x3, 02 by NC at 2L, patient responsive when spoken to. Able to answer questions appropriately. Pt did receive 2mg Versed given by Dr. Kirara Holcomb for sedation. Pt tolerated procedure well. VSS and will continue to monitor

## 2018-11-20 NOTE — PERIOP NOTES
Assisted pt with dressing and into wheelchair. Pt discharged via wheelchair to car, accompanied by RN. Pt discharged awake and alert, respirations equal and unlabored, skin warm, dry, and intact. Pt and family members' questions and concerns addressed prior to discharge.

## 2018-11-20 NOTE — DISCHARGE INSTRUCTIONS
[facility] Mika Benites, DPM  [id number]  [place], [place]. [address]  Phone: [phone]  Fax: [phone]    Post-Op Instructions    Proper care during the postoperative period is an integral part of your surgical treatment program.  It is imperative that these instructions are followed to insure proper healing and to obtain the best results. 1.) Go directly home and keep your feet elevated on the way. 2.) At home, elevate your feet 6 inches above hip level by supporting feet & legs with pillows. 3.) Apply an ice bag covered with a towel just above but not on the operative site for 30 minutes out of each hour for the first 48 hours. 4.) Limited swelling is expected. Occasionally, the skin may take on a bruised appearance. There is no cause for alarm. 5.) Keep your bandages/cast clean and dry. Do NOT remove the bandages or inspect the wound. A small amount of blood on the bandage is normal.    6.) Have prescriptions filled and take medication as directed. If medication causes stomach upset, headache, rash, or other abnormal reactions, discontinue use and CALL THE DOCTOR.    7.) Get plenty of rest with the foot elevated. Drink plenty of fluids and eat regular well-balanced meals. 8.) If you have any problems or concerns, you can call the office anytime. There is a doctor on call 24 hours a day.   CALL THE OFFICE IMMEDIATELY if:   -The bandages become overly stained   -Your medications do not stop the discomfort    -You bump or injure the surgical site   -You develop a fever above 100 degrees   -You get your dressings wet    9.) Your activity level is:   _____Weightbearing as tolerated on _________ foot with surgical shoe   _____Partial weight bearing to __________ heel with surgical shoe & crutches   ___X__Non weight bearing on ___RIGHT________ foot with crutches    10.) Your return appointment with Dr. Lissy García is:     Monday, 11/26/18 @ [address] @ 10:30am     >>>You received an IV form of Tylenol 1000mg during your surgery, you may take tylenol (or pain medication containing Tylenol or Acetaminophen) in 6 hours at 2:45.<<<      DO NOT TAKE TYLENOL/ACETAMINOPHEN WITH PERCOCET, [facility], [address] TAKE NARCOTIC PAIN MEDICATIONS WITH FOOD! For the night of surgery, while block is still in effect, start with 1 pain pill at bedtime    Narcotics tend to be constipating and we recommend taking a stool softener such as Colace or Miralax (follow package instructions). If you were given prescriptions, please review the written information on the prescribed medications. DO NOT DRIVE WHILE TAKING NARCOTIC PAIN MEDICATIONS. CPAP PATIENTS BE SURE TO WEAR MACHINE WHENEVER NAPPING OR SLEEPING DAY/NIGHT OF SURGERY! DISCHARGE SUMMARY from Nurse    The following personal items collected during your admission are returned to you:   Dental Appliance: Dental Appliances: None  Vision: Visual Aid: Glasses  Hearing Aid:    Jewelry:    Clothing: Clothing: With patient  Other Valuables: Other Valuables: Eyeglasses(placed in PACU )  Valuables sent to safe:        PATIENT INSTRUCTIONS:    After General Anesthesia or Intravenous Sedation, for 24 hours or while taking prescription Narcotics:  · Someone should be with you for the next 24 hours. · For your own safety, a responsible adult must drive you home. · Limit your activities  · Recommended activity: Rest today, up with assistance today. Do not climb stairs or shower unattended for the next 24 hours. · Start with a soft bland diet and advance as tolerated (no nausea) to regular diet. · If you have a sore throat some things that may help are: fluids, warm salt water gargle, or throat lozenges. If this does not improve after several days please follow up with your family physician.   · Do not drive and operate hazardous machinery  · Do not make important personal or business decisions  · Do  not drink alcoholic beverages  · If you have not urinated within 8 hours after discharge, please contact your surgeon on call. Report the following to your surgeon:  · Excessive pain, swelling, redness or odor of or around the surgical area  · Temperature over 100.5  · Nausea and vomiting lasting longer than 4 hours or if unable to take medications  · Any signs of decreased circulation or nerve impairment to extremity: change in color, persistent  numbness, tingling, coldness or increase pain    · If you received an upper extremity nerve block, please wear your sling until the block has worn off, then refer to your surgeons post-operative instructions. .    · If you received a lower extremity nerve block, please use your crutches, walker, or cane until the nerve block has worn off, then refer to your surgeons post-operative instructions.    · You will receive a Post Operative Call from one of the Recovery Room Nurses on the day after your surgery to check on you. It is very important for us to know how you are recovering after your surgery. If you have an issue please call your surgeon, do not wait for the post operative call. · You may receive an e-mail or letter in the mail from [facility] regarding your experience with us in the Ambulatory Surgery Unit. Your feedback is valuable to us and we appreciate your participation in the survey. ·   · If the above instructions are not adequate or you are having problems after your surgery, call your physician at their office number. ·   · We wish youre a speedy recovery ? What to do at Home:    *  Please give a list of your current medications to your Primary Care Provider. *  Please update this list whenever your medications are discontinued, doses are      changed, or new medications (including over-the-counter products) are added. *  Please carry medication information at all times in case of emergency situations.         These are general instructions for a healthy lifestyle:    No smoking/ No tobacco products/ Avoid exposure to second hand smoke    Surgeon General's Warning:  Quitting smoking now greatly reduces serious risk to your health. Obesity, smoking, and sedentary lifestyle greatly increases your risk for illness    A healthy diet, regular physical exercise & weight monitoring are important for maintaining a healthy lifestyle    You may be retaining fluid if you have a history of heart failure or if you experience any of the following symptoms:  Weight gain of 3 pounds or more overnight or 5 pounds in a week, increased swelling in our hands or feet or shortness of breath while lying flat in bed. Please call your doctor as soon as you notice any of these symptoms; do not wait until your next office visit. Recognize signs and symptoms of STROKE:    B - Balance  E-  Eyes    F-  Face looks uneven  A-  Arms unable to move or move even  S-  Speech slurred or non-existent  T-  Time-call 911 as soon as signs and symptoms begin-DO NOT go       Back to bed or wait to see if you get better-TIME IS BRAIN. If you have not had your influenza or pneumococcal vaccines, please follow up with your primary care physician. The discharge information has been reviewed with the patient and caregiver. The patient and caregiver verbalized understanding. [facility] THROMBOSIS AND PULMONARY EMBOLUS    SURGICAL PATIENTS  Surgical patients are the #1 risk for DVT and PE. WHAT IS DVT? WHAT IS PE?  DVT is a serious condition where blood clots develop deep in the veins of the legs. PE occurs when a blood clot breaks loose from the wall of a vein and travels to the lungs blocking the pulmonary artery or one of its branches impairing blood flow from the heart, which could result in death.   RISK FACTORS   Surgery lasting longer than 45 minutes   History of inflammatory bowel disease   Oral contraceptive or hormone replacement therapy   Immobilization   Varicose veins / swollen legs   Smoking    CHF / Acute MI / Irregular heart beat   Family history of thrombosis   General anesthesia greater than 2 hours   Obesity   Infection of less than one month   Less than 1 month postpartum   COPD / Pneumonia   Arthroscopic surgery   Malignancy / cancer   Spine surgery   Blood abnormalities   Stroke / Paralysis / Coma    SIGNS AND SYMPTOMS OF DEEP VEIN TROMBOSIS  Usually occurs in one leg, above or below the knee   Swelling - one calf or thigh may be larger than the other   Feeling increased warmth in the area of the leg that is swollen or painful   Leg pain, which may increase when standing or walking   Swelling along the vein of the leg   When swollen areas is pressed with a finger, a depression may remain   Tenderness of the leg that may be confined to one area   Change in leg color (bluish or red)    SIGNS AND SYMPTOMS OF PULMONARY EMBOLUS   Chest pain that gets worse with deep breath, coughing or chest movement   Coughing up blood   Sweating   Shortness of breath or difficulty breathing   Rapid heart beat   Lightheadedness    HOW TO REDUCE THE POSSIBLE RISK OF DVT   Exercise - simple activities as rotating ankles and wrists, wiggling toes and fingers, tightening and relaxing muscles in calves and thighs promotes circulation while recovering from surgery. Please do these exercises every hour during waking hours   Take mediation as prescribed by your physician (Lovenox, Coumadin, Aspirin)   Resume your normal activities as soon as your doctor advises you to do so.  Remember, when traveling, to [place] your legs frequently. PATIENTS WHO BELIEVE THEY MAY BE EXPERIENCING SIGNS AND SYMPTOMS OF DVT OR PE SHOULD SEEK MEDICAL HELP IMMEDIATELY    TO PREVENT AN INFECTION      1. [address] YOUR HANDS     To prevent infection, good handwashing is the most important thing you or your caregiver can do.       [facility] Wash your hands with soap and water or use the hand  we gave you before you touch any wounds. 2. SHOWER     Use the antibacterial soap we gave you when you take a shower.  Shower with this soap until your wounds are healed.  To reach all areas of your body, you may need someone to help you.  Dont forget to clean your belly button with every shower. 3.  USE CLEAN SHEETS     Use freshly cleaned sheets on your bed after surgery.  To keep the surgery site clean, do not allow pets to sleep with you while your wound is still healing. 4. STOP SMOKING     Stop smoking, or at least cut back on smoking     Smoking slows your healing. 5.  CONTROL YOUR BLOOD SUGAR     High blood sugars slow wound healing.  If you are diabetic, control your blood sugar levels before and after your surgery.

## 2018-11-20 NOTE — PERIOP NOTES
2 unsuccessful IV attempts by cali, rn. #1 right hand;+brusiing, no swelling; #2 LAC;bleeding controlled;no bruising or swelling;pt denies pain at sites

## 2018-11-20 NOTE — PERIOP NOTES
David Diaz 1955 
[id number] Situation: 
Verbal [facility] given [facility] Procedure: Procedure(s): RIGHT SUBTALAR JOINT FUSION, RIGHT ACHILLES TENDON LENGTHENING (GEN W/ POP BLOCK) Background: 
 
Preoperative diagnosis: EQUINUS CONTRACTURE RIGHT ANKLE, BURSITIS RIGHT ANKLE, RIGHT ANKLE PAIN Postoperative diagnosis: QUINUS CONTRACTURE RIGHT ANKLE, BURSITIS RIGHT ANKLE, RIGHT ANKLE PAIN :  Dr. Alicia Alcantar 
 
Assistant(s): Circ-1: Qian Reed Circ-Relief: Rylie Rogers RN; Jhony Arriaza RN Scrub Tech-1: Poornima Pierson Specimens: * No specimens in log * Assessment: 
Intra-procedure medications Anesthesia gave intra-procedure sedation and medications, see anesthesia flow sheet Intravenous fluids: Pleas Kim Vital signs stable Recommendation: 
 
Permission to share finding with Dulcy Romberg : yes

## 2018-11-20 NOTE — ANESTHESIA PREPROCEDURE EVALUATION
Anesthetic History No history of anesthetic complications Review of Systems / Medical History Patient summary reviewed, nursing notes reviewed and pertinent labs reviewed Pulmonary Sleep apnea: CPAP Asthma Neuro/Psych CVA 
TIA and psychiatric history Cardiovascular Hypertension Dysrhythmias CAD Exercise tolerance: >4 METS Comments: EF 40% GI/Hepatic/Renal 
  
GERD Endo/Other Morbid obesity and arthritis Other Findings Comments: Fibromyalgia Physical Exam 
 
Airway Mallampati: II 
TM Distance: 4 - 6 cm Neck ROM: normal range of motion Mouth opening: Normal 
 
 Cardiovascular Regular rate and rhythm,  S1 and S2 normal,  no murmur, click, rub, or gallop Dental 
No notable dental hx Pulmonary Breath sounds clear to auscultation Abdominal 
GI exam deferred Other Findings Anesthetic Plan ASA: 3 Anesthesia type: general 
 
 
Post-op pain plan if not by surgeon: peripheral nerve block single Anesthetic plan and risks discussed with: Patient

## 2018-11-21 NOTE — OP NOTES
[address]  OPERATIVE REPORT    Aliya Wiggins  MR#: [id number]  : 1955  ACCOUNT #: [de-identified]   DATE OF SERVICE: 2018    PREOPERATIVE DIAGNOSES:  1. Pes planus deformity, right foot. 2.  Right gastroc/soleal equinus, right lower extremity. 3.  Right foot pain. POSTOPERATIVE DIAGNOSES:   1. Pes planus deformity, right foot. 2.  Right gastroc/soleal equinus, right lower extremity. 3.  Right foot pain. PROCEDURES PERFORMED:  1. Subtalar joint fusion, right foot. 2.  Right Achilles tendon lengthening. SURGEON:  Tyler Jimenez MD    ASSISTANT:  ___    SPECIMENS REMOVED:  Bone, soft tissue, right foot. ANESTHESIA:  General with a right lower extremity popliteal block. HEMOSTASIS:  Right pneumatic thigh tourniquet set at 300 mmHg for a total time of 114 minutes. ESTIMATED BLOOD LOSS:  Minimal.    IMPLANTS USED:  Included 2-0 Vicryl and 3-0 nylon. Two Swengel 5.5 mm cannulated screws measuring 80 and 75 mm in length also included a PRP and Swengel V92 bone graft. INJECTABLES:  Included 16 mL of 0.5% Marcaine plain. COMPLICATIONS:  None. INDICATIONS FOR PROCEDURE:  This is a 22-year-old female who presents with chronic right foot pain secondary to her pes planus deformity. She is here to have surgical intervention. DESCRIPTION OF PROCEDURE:  After the patient was properly identified by myself, my initials were placed in the right lower extremity, she received a popliteal block in the right lower extremity done by the anesthesiologist in the preop holding area. The patient was brought back to the operating room under mild sedation. Once in the operating room, she was transferred to the stretcher and placed on the operative table in supine position. Next, general anesthesia was administered. Once general anesthesia was administered, a pneumatic thigh tourniquet was placed on the right thigh and preset to 300 mmHg.   Next, the right lower extremity was then prepped and draped in normal sterile fashion. Next, intraoperative C-arm was used to lashonda out bony landmarks for the incision to access the subtalar joint. Next, a 15 blade was used to make a standard 3 incision approach for an Achilles tendon lengthening. Two small incisions were made along the medial aspect of the watershed area of the Achilles tendon on the right foot. One incision was then made along the lateral aspect of the watershed area in between these two medial incisions. incisions were carried down to the level of the Achilles tendon. Dissection was then performed using a curved hemostat. Next, a 15 blade was used to make a cut along these 3 areas of the Achilles tendon. Once this was done, the foot was maximally dorsiflexed and the lengthening was achieved. Next, the incisions were then repaired using 3-0 nylon in a horizontal mattress type fashion. The right foot was then exsanguinated and then the tourniquet was inflated. Next, a 15 blade was used to make a lateral incision from the distal tip of the lateral malleolus towards the fourth metatarsal neck base. All superficial vessels were cauterized using the Bovie. Incision was deepened using Metzenbaum scissors. Next, the large lipoma came into view. The lipoma was then removed using small tenotomy scissors. It was then passed off the surgical table. Next, dissection was carried down deeper towards the extensor digitorum brevis muscle belly. The muscle belly was then reflected from inferior to superior and the peroneal tendons were then retracted distally in order to gain access to the sinus tarsi and subtalar joint. Next, curettes were used to remove the articulating cartilage within the subtalar joint. Multiple passes were made in order to ensure all cartilage had been removed.   It is important to note there that the cartilage was absent along the distal portion of the subtalar joint along the anterior and part of the middle facet. Next, the guide pins were inserted into the talus and calcaneus and a distractor was applied to distract the joint in order to allow access to the posterior facet as well as the articulating surface of the talus. A curette was used to ensure that all cartilage had been removed from this area as well. Next, the area was thoroughly irrigated with normal sterile saline in order to remove all loose debris. Next, the bone graft mixed with PRP concoction was then placed into the fusion site and packed in there accordingly. Next, the guide pins and distractor were removed and the joint was compressed. The subtalar joint was then placed in a more suitable anatomic position with respect to the leg and then once I was satisfied with positioning, 2 guide pins were inserted across the joint in standard fashion. All positioning was checked using intraoperative C-arm. Once I was satisfied with positioning, two 5.5 mm screws measuring 80 and 75 mm in length respectively were inserted across the joint in standard fashion. All positioning was checked using intraoperative C-arm. Once I was satisfied with positioning, the guide pins were removed. At this time, the lateral incision was closed using 2-0 Vicryl, 3-0 nylon and the heel incisions were closed using 3-0 nylon. 16 mL of 0.5% Marcaine plain were injected into the surgical site to provide additional postoperative anesthesia. The incisions were dressed with Xeroform. The foot was dressed with 4 x 4 gauze, Kerlix, cast padding 5 x 30 posterior splint and a double Ace wrap. The tourniquet was let down, total time of 114 minutes. Patient tolerated procedure well and was transferred to recovery room, afebrile, all vital signs stable and cap refill intact to all toes on the right foot. The patient was given strict instructions to be nonweightbearing on the right foot using crutches.   She was given prescriptions for Norco 5/325 mg, total of 40 tablets. Clindamycin 300 mg, total of 14 tablets and Phenergan 25 mg 30 tablets. She will follow with me on Monday for her first postop visit.       DEBORA Vitale / Sury Tidwell  D: 11/20/2018 11:37     T: 11/20/2018 19:40  JOB #: [id number]

## 2019-02-02 ENCOUNTER — APPOINTMENT (OUTPATIENT)
Dept: NUCLEAR MEDICINE | Age: 64
End: 2019-02-02
Attending: NURSE PRACTITIONER
Payer: MEDICARE

## 2019-02-02 ENCOUNTER — HOSPITAL ENCOUNTER (EMERGENCY)
Age: 64
Discharge: HOME | End: 2019-02-02
Attending: EMERGENCY MEDICINE
Payer: MEDICARE

## 2019-02-02 ENCOUNTER — APPOINTMENT (OUTPATIENT)
Dept: CT IMAGING | Age: 64
End: 2019-02-02
Attending: NURSE PRACTITIONER
Payer: MEDICARE

## 2019-02-02 VITALS
HEART RATE: 87 BPM | DIASTOLIC BLOOD PRESSURE: 82 MMHG | SYSTOLIC BLOOD PRESSURE: 146 MMHG | TEMPERATURE: 98.1 F | RESPIRATION RATE: 17 BRPM | OXYGEN SATURATION: 98 %

## 2019-02-02 DIAGNOSIS — R06.02 SOB (SHORTNESS OF BREATH): Primary | ICD-10-CM

## 2019-02-02 LAB
ANION GAP SERPL CALC-SCNC: 9 MMOL/L (ref 5–15)
BASOPHILS # BLD: 0 K/UL (ref 0–0.1)
BASOPHILS NFR BLD: 0 % (ref 0–1)
BUN SERPL-MCNC: 25 MG/DL (ref 6–20)
BUN/CREAT SERPL: 23 (ref 12–20)
CALCIUM SERPL-MCNC: 9.3 MG/DL (ref 8.5–10.1)
CHLORIDE SERPL-SCNC: 107 MMOL/L (ref 97–108)
CO2 SERPL-SCNC: 26 MMOL/L (ref 21–32)
CREAT SERPL-MCNC: 1.09 MG/DL (ref 0.55–1.02)
D DIMER PPP FEU-MCNC: 0.87 MG/L FEU (ref 0–0.65)
DIFFERENTIAL METHOD BLD: NORMAL
EOSINOPHIL # BLD: 0.2 K/UL (ref 0–0.4)
EOSINOPHIL NFR BLD: 2 % (ref 0–7)
ERYTHROCYTE [DISTWIDTH] IN BLOOD BY AUTOMATED COUNT: 13.2 % (ref 11.5–14.5)
GLUCOSE SERPL-MCNC: 115 MG/DL (ref 65–100)
HCT VFR BLD AUTO: 39.4 % (ref 35–47)
HGB BLD-MCNC: 12.3 G/DL (ref 11.5–16)
IMM GRANULOCYTES # BLD AUTO: 0 K/UL (ref 0–0.04)
IMM GRANULOCYTES NFR BLD AUTO: 0 % (ref 0–0.5)
LYMPHOCYTES # BLD: 2.3 K/UL (ref 0.8–3.5)
LYMPHOCYTES NFR BLD: 28 % (ref 12–49)
MCH RBC QN AUTO: 30.8 PG (ref 26–34)
MCHC RBC AUTO-ENTMCNC: 31.2 G/DL (ref 30–36.5)
MCV RBC AUTO: 98.5 FL (ref 80–99)
MONOCYTES # BLD: 0.5 K/UL (ref 0–1)
MONOCYTES NFR BLD: 6 % (ref 5–13)
NEUTS SEG # BLD: 5.2 K/UL (ref 1.8–8)
NEUTS SEG NFR BLD: 63 % (ref 32–75)
NRBC # BLD: 0 K/UL (ref 0–0.01)
NRBC BLD-RTO: 0 PER 100 WBC
PLATELET # BLD AUTO: 246 K/UL (ref 150–400)
PMV BLD AUTO: 10.9 FL (ref 8.9–12.9)
POTASSIUM SERPL-SCNC: 3.8 MMOL/L (ref 3.5–5.1)
RBC # BLD AUTO: 4 M/UL (ref 3.8–5.2)
SODIUM SERPL-SCNC: 142 MMOL/L (ref 136–145)
WBC # BLD AUTO: 8.2 K/UL (ref 3.6–11)

## 2019-02-02 PROCEDURE — 80048 BASIC METABOLIC PNL TOTAL CA: CPT

## 2019-02-02 PROCEDURE — 85379 FIBRIN DEGRADATION QUANT: CPT

## 2019-02-02 PROCEDURE — 99283 EMERGENCY DEPT VISIT LOW MDM: CPT

## 2019-02-02 PROCEDURE — 85025 COMPLETE CBC W/AUTO DIFF WBC: CPT

## 2019-02-02 PROCEDURE — 93005 ELECTROCARDIOGRAM TRACING: CPT

## 2019-02-02 PROCEDURE — A9558 XE133 XENON 10MCI: HCPCS

## 2019-02-02 PROCEDURE — 36415 COLL VENOUS BLD VENIPUNCTURE: CPT

## 2019-02-02 RX ORDER — SODIUM CHLORIDE 0.9 % (FLUSH) 0.9 %
10 SYRINGE (ML) INJECTION
Status: DISCONTINUED | OUTPATIENT
Start: 2019-02-02 | End: 2019-02-02 | Stop reason: HOSPADM

## 2019-02-02 NOTE — ED TRIAGE NOTES
Pt to ED for c/o SOB for the past couple of months. SOB began shortly after having surgery for plantar fasciitis in right foot. Also report she did have some calf pain for a short time but it did go away.

## 2019-02-02 NOTE — ED PROVIDER NOTES
Atilio Vital is a 61 y.o. female who presents ambulatory to the ED with  c/o increased shortness of breath. Patient states she had surgery on her right foot in November and ever since two weeks after the surgery she has increased shortness of breath. Patient states she had right calf pain that went away, now only with SOB. Patient states she has been immobile and is just now getting up and placing pressure on her foot. Denies chest pain, denies dizziness. No fever or chills, no nausea or vomiting. PCP: Carmela Chappell MD 
 
PMHx significant for: Past Medical History: 
No date: Anxiety No date: Arrhythmia Comment:  per pt, treated with med No date: Arthritis No date: Asthma No date: CAD (coronary artery disease) Comment:  EF=33 1/3 No date: Chronic pain Comment:  fibromyalgia 3/24/2017: Diarrhea 3/24/2017: Epigastric pain No date: Fibromyalgia No date: GERD (gastroesophageal reflux disease) Comment:  ibs No date: Heart failure ([facility] [address] Comment:  cardiomyopathy No date: Hypertension No date: Psychiatric disorder Comment:  h/o severe depression No date: Stroke [facility]) Comment:  possible TIA No date: Unspecified sleep apnea Comment:  wears CPAP 
 
PSHx significant for: Past Surgical History: 
8/23/2016: COLONOSCOPY; N/A Comment:  COLONOSCOPY performed by Rickie Jason MD at [facility]  
             ENDOSCOPY 
3/24/2017: Dayanara Kaba; N/A Comment:  FLEXIBLE SIGMOIDOSCOPY  performed by Cheng Turner MD at [facility] AMBULATORY OR No date: HX APPENDECTOMY No date: HX ENDOSCOPY No date: HX GYN Comment:  hysterectomy No date: HX HEENT Comment:  left eye surgery No date: HX KNEE ARTHROSCOPY; Right No date: HX KNEE REPLACEMENT; Right No date: HX OTHER SURGICAL Comment:  hand surgery left (carpal tunnel, reconstruction of  
             small finger) 3/24/2017: SIGMOIDOSCOPY,BIOPSY Comment: There are no further complaints or symptoms at this time. Past Medical History:  
Diagnosis Date  Anxiety  Arrhythmia   
 per pt, treated with med  Arthritis  Asthma  CAD (coronary artery disease) EF=33 1/3  Chronic pain   
 fibromyalgia  Diarrhea 3/24/2017  Epigastric pain 3/24/2017  Fibromyalgia  GERD (gastroesophageal reflux disease)   
 ibs  
 Heart failure (HCC)   
 cardiomyopathy  Hypertension  Psychiatric disorder   
 h/o severe depression  Stroke [facility])   
 possible TIA  Unspecified sleep apnea   
 wears CPAP Past Surgical History:  
Procedure Laterality Date  COLONOSCOPY N/A 8/23/2016 COLONOSCOPY performed by Yusuf Schneider MD at [facility] ENDOSCOPY  FLEXIBLE SIGMOIDOSCOPY N/A 3/24/2017 FLEXIBLE SIGMOIDOSCOPY  performed by Carla Olmos MD at [facility] AMBULATORY OR  
 HX APPENDECTOMY  HX ENDOSCOPY    
 HX GYN    
 hysterectomy  HX HEENT    
 left eye surgery  HX KNEE ARTHROSCOPY Right  HX KNEE REPLACEMENT Right  HX OTHER SURGICAL    
 hand surgery left (carpal tunnel, reconstruction of small finger)  SIGMOIDOSCOPY,BIOPSY  3/24/2017 Family History:  
Problem Relation Age of Onset  Diabetes Mother Social History Socioeconomic History  Marital status:  Spouse name: Not on file  Number of children: Not on file  Years of education: Not on file  Highest education level: Not on file Social Needs  Financial resource strain: Not on file  Food insecurity - worry: Not on file  Food insecurity - inability: Not on file  Transportation needs - medical: Not on file  Transportation needs - non-medical: Not on file Occupational History  Not on file Tobacco Use  Smoking status: Never Smoker  Smokeless tobacco: Never Used Substance and Sexual Activity  Alcohol use: No  
 Drug use: No  
 Sexual activity: No  
Other Topics Concern  Not on file Social History Narrative  Not on file ALLERGIES: Codeine; Demerol [meperidine]; Ivp [fd and c blue no.1]; Minoxidil; Penicillamine; and Percocet [oxycodone-acetaminophen] Review of Systems Constitutional: Negative for appetite change, chills, diaphoresis, fatigue and fever. HENT: Negative for congestion, ear discharge, ear pain, sinus pressure, sinus pain, sore throat and trouble swallowing. Eyes: Negative for photophobia, pain, redness and visual disturbance. Respiratory: Positive for shortness of breath. Negative for chest tightness and wheezing. Cardiovascular: Negative for chest pain and palpitations. Gastrointestinal: Negative for abdominal distention, abdominal pain, nausea and vomiting. Endocrine: Negative. Genitourinary: Negative for difficulty urinating, flank pain, frequency and urgency. Musculoskeletal: Negative for back pain, neck pain and neck stiffness. Skin: Negative for color change, pallor, rash and wound. Allergic/Immunologic: Negative. Neurological: Negative for dizziness, speech difficulty, weakness and headaches. Hematological: Does not bruise/bleed easily. Psychiatric/Behavioral: Negative for behavioral problems. The patient is not nervous/anxious. Vitals:  
 02/02/19 1422 Pulse: (!) 101 SpO2: 98% Physical Exam  
Constitutional: She is oriented to person, place, and time. She appears well-developed and well-nourished. No distress. HENT:  
Head: Normocephalic and atraumatic. Right Ear: External ear normal.  
Left Ear: External ear normal.  
Nose: Nose normal.  
Mouth/Throat: Oropharynx is clear and moist.  
Eyes: Conjunctivae and EOM are normal. Pupils are equal, round, and reactive to light. Right eye exhibits no discharge. Left eye exhibits no discharge. Neck: Normal range of motion. Neck supple. No JVD present. No tracheal deviation present. Cardiovascular: Normal rate, regular rhythm, normal heart sounds and intact distal pulses. Exam reveals no gallop. No murmur heard. Pulmonary/Chest: Effort normal. No respiratory distress. She has no wheezes. She has no rales. She exhibits no tenderness. + tachypnea noted with exertion. Abdominal: Soft. Bowel sounds are normal. She exhibits no distension. There is no tenderness. There is no rebound and no guarding. Musculoskeletal: Normal range of motion. She exhibits no edema or tenderness. Neurological: She is alert and oriented to person, place, and time. Skin: Skin is warm and dry. No rash noted. No erythema. No pallor. Psychiatric: She has a normal mood and affect. Her behavior is normal. Judgment and thought content normal.  
Nursing note and vitals reviewed. MDM Number of Diagnoses or Management Options SOB (shortness of breath): new and requires workup Diagnosis management comments: Plan: 
Negative for PE. 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 (No murmur or chest pain in this ed visit.) Amount and/or Complexity of Data Reviewed Clinical lab tests: ordered and reviewed Tests in the radiology section of CPT®: ordered and reviewed Obtain history from someone other than the patient: yes (Discussed plan of care with Dr. Fredi Fisher) 6:12 PM 
Reviewed available labs, D Dimer elevated. Obtaining VQ scan. 
7:17 PM 
Pt has been reexamined. Pt has no new complaints, changes or physical findings. Care plan outlined and precautions discussed. All available results were reviewed with pt. All medications were reviewed with pt. All of pt's questions and concerns were addressed. Pt agrees to F/U as instructed and agrees to return to ED upon further deterioration. Pt is ready to go home. Galina Mckeon NP Procedures

## 2019-02-03 LAB
ATRIAL RATE: 85 BPM
CALCULATED P AXIS, ECG09: 53 DEGREES
CALCULATED R AXIS, ECG10: 51 DEGREES
CALCULATED T AXIS, ECG11: 43 DEGREES
DIAGNOSIS, 93000: NORMAL
P-R INTERVAL, ECG05: 184 MS
Q-T INTERVAL, ECG07: 368 MS
QRS DURATION, ECG06: 108 MS
QTC CALCULATION (BEZET), ECG08: 437 MS
VENTRICULAR RATE, ECG03: 85 BPM

## 2019-02-03 NOTE — DISCHARGE INSTRUCTIONS

## 2019-02-07 PROCEDURE — 99283 EMERGENCY DEPT VISIT LOW MDM: CPT

## 2019-02-08 ENCOUNTER — HOSPITAL ENCOUNTER (EMERGENCY)
Age: 64
Discharge: HOME | End: 2019-02-08
Attending: EMERGENCY MEDICINE
Payer: MEDICARE

## 2019-02-08 VITALS
TEMPERATURE: 98.3 F | OXYGEN SATURATION: 96 % | SYSTOLIC BLOOD PRESSURE: 141 MMHG | BODY MASS INDEX: 38.61 KG/M2 | WEIGHT: 246 LBS | HEIGHT: 67 IN | RESPIRATION RATE: 14 BRPM | DIASTOLIC BLOOD PRESSURE: 77 MMHG | HEART RATE: 99 BPM

## 2019-02-08 DIAGNOSIS — M54.31 SCIATICA OF RIGHT SIDE: Primary | ICD-10-CM

## 2019-02-08 PROCEDURE — 74011250637 HC RX REV CODE- 250/637: Performed by: EMERGENCY MEDICINE

## 2019-02-08 RX ORDER — NAPROXEN 500 MG/1
500 TABLET ORAL
Qty: 20 TAB | Refills: 0 | Status: ON HOLD | OUTPATIENT
Start: 2019-02-08 | End: 2020-11-19

## 2019-02-08 RX ORDER — HYDROCODONE BITARTRATE AND ACETAMINOPHEN 7.5; 325 MG/1; MG/1
1 TABLET ORAL
Status: COMPLETED | OUTPATIENT
Start: 2019-02-08 | End: 2019-02-08

## 2019-02-08 RX ADMIN — HYDROCODONE BITARTRATE AND ACETAMINOPHEN 1 TABLET: 7.5; 325 TABLET ORAL at 02:34

## 2019-02-08 NOTE — ED NOTES
Patient presents to ED with C/O lower back pain that radiates to the right leg that started 2 days ago, but became worse yesterday. The pt denies injury/trauma. Patient is A&Ox4, sitting in the wheelchair, call bell w/in reach, and aware of plan of care. The patient is in NAD. Male  at the bedside.

## 2019-02-08 NOTE — ED NOTES
Patient discharged and given discharge instructions by Sena Perdomo MD. Patient had an opportunity to ask questions. Patient verbalized understanding of discharge instructions. Patient d/c from ED in a wheelchair, discharge instructions and prescriptions in hand. Patient accompanied by male .

## 2019-02-08 NOTE — ED PROVIDER NOTES
EMERGENCY DEPARTMENT HISTORY AND PHYSICAL EXAM 
 
 
Date: 2/8/2019 Patient Name: Jo-Ann Rodrigues History of Presenting Illness Chief Complaint Patient presents with  Back Pain  
  sciatica, radiating to right leg; pt had recent right foot sx and is wearing boot History Provided By: Patient HPI: Jo-Ann Rodrigues, 61 y.o. female with PMHx significant for CHF, CAD, TIA, presents via wheelchair to the ED with cc of worsening R leg pain which radiates to buttock which began yesterday. Pt states that she has had difficulty walking recently due to pain. Pt states she hasn't had a sciatica flare up in a while. Pt denies any changes bowel or bladder dysfunction, numbness around the genital region or leg weakness. PPt denies hx of DM. Per chart review pt had R Subtalar foot surgery. in Nov 2018 and has been wearing a walking boot since. There are no other complaints, changes, or physical findings at this time. PCP: Jessica Sousa MD 
 
No current facility-administered medications on file prior to encounter. Current Outpatient Medications on File Prior to Encounter Medication Sig Dispense Refill  gabapentin (NEURONTIN) 600 mg tablet Take 1,200 mg by mouth once.  budesonide-formoterol (SYMBICORT) 160-4.5 mcg/actuation HFAA Take 1 Puff by inhalation as needed.  raNITIdine (ZANTAC) 150 mg tablet Take 150 mg by mouth two (2) times a day.  hydroxychloroquine (PLAQUENIL) 200 mg tablet Take 200 mg by mouth two (2) times a day.  allopurinol (ZYLOPRIM) 300 mg tablet Take  by mouth daily.  albuterol (PROVENTIL HFA, VENTOLIN HFA, PROAIR HFA) 90 mcg/actuation inhaler Take 1 Puff by inhalation as needed. 1 Inhaler 1  
 colchicine 0.6 mg tablet Take 1 Tab by mouth daily.  (Patient taking differently: Take 0.6 mg by mouth.) 1 Tab 0  
 lidocaine (LIDODERM) 5 % 1 Patch by TransDERmal route every twenty-four (24) hours. Apply patch to the affected area for 12 hours a day and remove for 12 hours a day.  gabapentin (NEURONTIN) 400 mg capsule 400 mg p.o. 3 times daily and 1200 mg p.o. nightly (Patient taking differently: 300 mg three (3) times daily. 400 mg p.o. 3 times daily and 1200 mg p.o. nightly) 180 Cap 2  
 diclofenac (VOLTAREN) 1 % gel Apply  to affected area as needed.  amLODIPine-benazepril (LOTREL) 5-20 mg per capsule Take 1 Cap by mouth daily.  carvedilol (COREG) 25 mg tablet Take 25 mg by mouth two (2) times daily (with meals).  bumetanide (BUMEX) 2 mg tablet Take 2 mg by mouth two (2) times a day.  DULoxetine (CYMBALTA) 60 mg capsule Take 60 mg by mouth daily.  cholecalciferol, vitamin D3, 2,000 unit tab Take 2,000 Units by mouth daily.  cyanocobalamin 1,000 mcg tablet Take 1,000 mcg by mouth daily.  spironolactone (ALDACTONE) 25 mg tablet Take  by mouth two (2) times a day.  pregabalin (LYRICA) 150 mg capsule Take 150 mg by mouth two (2) times a day.  HYDROcodone-acetaminophen (NORCO) 5-325 mg per tablet Take 1 Tab by mouth every four (4) hours as needed for Pain. Max Daily Amount: 6 Tabs. 40 Tab 0  promethazine (PHENERGAN) 25 mg tablet Take 1 Tab by mouth every six (6) hours as needed for Nausea. 30 Tab 0  plecanatide (TRULANCE) 3 mg tab Take 3 mg by mouth.  zolpidem (AMBIEN) 10 mg tablet Take 10 mg by mouth nightly as needed for Sleep.  hyoscyamine (ANASPAZ, LEVSIN) 0.125 mg tablet Take 125 mcg by mouth every four (4) hours as needed for Cramping. Past History Past Medical History: 
Past Medical History:  
Diagnosis Date  Anxiety  Arrhythmia   
 per pt, treated with med  Arthritis  Asthma  CAD (coronary artery disease) EF=33 1/3  Chronic pain   
 fibromyalgia  Diarrhea 3/24/2017  Epigastric pain 3/24/2017  Fibromyalgia  GERD (gastroesophageal reflux disease)   
 ibs  
  Heart failure (HCC)   
 cardiomyopathy  Hypertension  Psychiatric disorder   
 h/o severe depression  Stroke [facility])   
 possible TIA  Unspecified sleep apnea   
 wears CPAP Past Surgical History: 
Past Surgical History:  
Procedure Laterality Date  COLONOSCOPY N/A 8/23/2016 COLONOSCOPY performed by Grady Cardenas MD at [facility] ENDOSCOPY  FLEXIBLE SIGMOIDOSCOPY N/A 3/24/2017 FLEXIBLE SIGMOIDOSCOPY  performed by Barber Graham MD at [facility] AMBULATORY OR  
 HX APPENDECTOMY  HX ENDOSCOPY    
 HX GYN    
 hysterectomy  HX HEENT    
 left eye surgery  HX KNEE ARTHROSCOPY Right  HX KNEE REPLACEMENT Right  HX OTHER SURGICAL    
 hand surgery left (carpal tunnel, reconstruction of small finger)  SIGMOIDOSCOPY,BIOPSY  3/24/2017 Family History: 
Family History Problem Relation Age of Onset  Diabetes Mother Social History: 
Social History Tobacco Use  Smoking status: Never Smoker  Smokeless tobacco: Never Used Substance Use Topics  Alcohol use: No  
 Drug use: No  
 
 
Allergies: Allergies Allergen Reactions  Codeine Itching  Demerol [Meperidine] Nausea Only  Ivp [Fd And C Blue No.1] Itching  Minoxidil Itching  Penicillamine Itching and Other (comments) Drops blood pressure  Percocet [Oxycodone-Acetaminophen] Nausea and Vomiting Review of Systems Review of Systems Constitutional: Negative for activity change, appetite change, fatigue and fever. HENT: Negative. Negative for congestion, rhinorrhea and sore throat. Respiratory: Negative. Negative for cough, shortness of breath and wheezing. Cardiovascular: Negative. Negative for chest pain and leg swelling. Gastrointestinal: Negative. Negative for abdominal distention, abdominal pain, constipation, diarrhea, nausea and vomiting. Endocrine: Negative.    
Genitourinary: Negative for difficulty urinating, dysuria, menstrual problem, vaginal bleeding and vaginal discharge. Musculoskeletal: Positive for arthralgias (R Leg pain). Negative for joint swelling and myalgias. Skin: Negative. Negative for rash. Neurological: Negative. Negative for dizziness, weakness, light-headedness and headaches. Psychiatric/Behavioral: Negative. Physical Exam  
Physical Exam  
Constitutional: She is oriented to person, place, and time. She appears well-nourished. No distress. HENT:  
Head: Atraumatic. Eyes: Conjunctivae are normal. Pupils are equal, round, and reactive to light. Neck: Normal range of motion. Cardiovascular: Normal rate, regular rhythm, normal heart sounds and intact distal pulses. No murmur heard. Abdominal: Soft. Bowel sounds are normal. She exhibits no distension and no mass. There is no tenderness. There is no rebound and no guarding. Genitourinary:  
Genitourinary Comments: Tenderness over right SI joint into mid-right buttock Musculoskeletal: Normal range of motion. RLE boot (chronic). Strength equal and symmetric Neurological: She is alert and oriented to person, place, and time. No cranial nerve deficit. Skin: Skin is warm. No rash noted. No erythema. Nursing note and vitals reviewed. Diagnostic Study Results Labs - No results found for this or any previous visit (from the past 12 hour(s)). Radiologic Studies - No orders to display CT Results  (Last 48 hours) None CXR Results  (Last 48 hours) None Medical Decision Making I am the first provider for this patient. I reviewed the vital signs, available nursing notes, past medical history, past surgical history, family history and social history. Vital Signs-Reviewed the patient's vital signs. Patient Vitals for the past 12 hrs: 
 Temp Pulse Resp BP SpO2  
02/08/19 0222  99  141/77 96 % 02/07/19 2252 98.3 °F (36.8 °C) 99 14 (!) 145/93 99 % Records Reviewed: Nursing Notes and Old Medical Records Provider Notes (Medical Decision Making): DDx: Aggravation of underlying sciatica likely due to gait imbalance due to walking boot. No focal findings. No signs or sxs consistent with cauda equine. ED Course:  
Initial assessment performed. The patients presenting problems have been discussed, and they are in agreement with the care plan formulated and outlined with them. I have encouraged them to ask questions as they arise throughout their visit. Critical Care Time:  
0 Disposition: 
DISCHARGE NOTE 
8643 The patient has been re-evaluated and is ready for discharge. Reviewed available results with patient. Counseled pt on diagnosis and care plan. Pt has expressed understanding, and all questions have been answered. Pt agrees with plan and agrees to F/U as recommended, or return to the ED if their sxs worsen. Discharge instructions have been provided and explained to the pt, along with reasons to return to the ED. PLAN: 
1. Discharge Medication List as of 2/8/2019  3:04 AM  
  
START taking these medications Details  
naproxen (NAPROSYN) 500 mg tablet Take 1 Tab by mouth every twelve (12) hours as needed for Pain., Normal, Disp-20 Tab, R-0  
  
  
CONTINUE these medications which have NOT CHANGED Details  
gabapentin (NEURONTIN) 600 mg tablet Take 1,200 mg by mouth once., Historical Med  
  
budesonide-formoterol (SYMBICORT) 160-4.5 mcg/actuation HFAA Take 1 Puff by inhalation as needed., Historical Med  
  
raNITIdine (ZANTAC) 150 mg tablet Take 150 mg by mouth two (2) times a day., Historical Med  
  
hydroxychloroquine (PLAQUENIL) 200 mg tablet Take 200 mg by mouth two (2) times a day., Historical Med  
  
allopurinol (ZYLOPRIM) 300 mg tablet Take  by mouth daily. , Historical Med  
  
albuterol (PROVENTIL HFA, VENTOLIN HFA, PROAIR HFA) 90 mcg/actuation inhaler Take 1 Puff by inhalation as needed., Normal, Disp-1 Inhaler, R-1  
  
colchicine 0.6 mg tablet Take 1 Tab by mouth daily. , Print, Disp-1 Tab, R-0  
  
lidocaine (LIDODERM) 5 % 1 Patch by TransDERmal route every twenty-four (24) hours. Apply patch to the affected area for 12 hours a day and remove for 12 hours a day., Historical Med  
  
gabapentin (NEURONTIN) 400 mg capsule 400 mg p.o. 3 times daily and 1200 mg p.o. nightly, Print, Disp-180 Cap, R-2  
  
diclofenac (VOLTAREN) 1 % gel Apply  to affected area as needed., Historical Med  
  
amLODIPine-benazepril (LOTREL) 5-20 mg per capsule Take 1 Cap by mouth daily. , Historical Med  
  
carvedilol (COREG) 25 mg tablet Take 25 mg by mouth two (2) times daily (with meals). , Historical Med  
  
bumetanide (BUMEX) 2 mg tablet Take 2 mg by mouth two (2) times a day., Historical Med DULoxetine (CYMBALTA) 60 mg capsule Take 60 mg by mouth daily. , Historical Med  
  
cholecalciferol, vitamin D3, 2,000 unit tab Take 2,000 Units by mouth daily. , Historical Med  
  
cyanocobalamin 1,000 mcg tablet Take 1,000 mcg by mouth daily. , Historical Med  
  
spironolactone (ALDACTONE) 25 mg tablet Oral, 2 TIMES DAILY Starting 11/9/2010, Until Discontinued, Historical Med  
  
pregabalin (LYRICA) 150 mg capsule 150 mg, Oral, 2 TIMES DAILY Starting 11/9/2010, Until Discontinued, Historical Med HYDROcodone-acetaminophen (NORCO) 5-325 mg per tablet Take 1 Tab by mouth every four (4) hours as needed for Pain. Max Daily Amount: 6 Tabs., Print, Disp-40 Tab, R-0  
  
promethazine (PHENERGAN) 25 mg tablet Take 1 Tab by mouth every six (6) hours as needed for Nausea. , Print, Disp-30 Tab, R-0  
  
plecanatide (TRULANCE) 3 mg tab Take 3 mg by mouth., Historical Med  
  
zolpidem (AMBIEN) 10 mg tablet Take 10 mg by mouth nightly as needed for Sleep., Historical Med  
  
hyoscyamine (ANASPAZ, LEVSIN) 0.125 mg tablet Take 125 mcg by mouth every four (4) hours as needed for Cramping., Historical Med 2. Follow-up Information Follow up With Specialties Details Why Contact Info Mary Schaeffer MD Internal Medicine In 3 days As needed [address] [facility] [address] 
[phone] Antoni Pascual, DO Pain Management, Physical Medicine and Rehab In 3 days As needed, If symptoms worsen OR do not improve [facility] [address] Interventional Spine and Pain Mgmt [address] [facility] 
[phone] [facility] EMERGENCY DEPT Emergency Medicine  If symptoms worsen [address] [facility] 
[phone] Return to ED if worse Diagnosis Clinical Impression: 1. Sciatica of right side Attestations: This note is prepared by Padmini Berry, acting as Scribe for Malathi Edwards MD. 
 
The scribe's documentation has been prepared under my direction and personally reviewed by me in its entirety.  I confirm that the note above accurately reflects all work, treatment, procedures, and medical decision making performed by me, Malathi Edwards MD

## 2019-09-15 ENCOUNTER — APPOINTMENT (OUTPATIENT)
Dept: CT IMAGING | Age: 64
End: 2019-09-15
Attending: EMERGENCY MEDICINE
Payer: MEDICARE

## 2019-09-15 ENCOUNTER — HOSPITAL ENCOUNTER (EMERGENCY)
Age: 64
Discharge: HOME | End: 2019-09-15
Attending: EMERGENCY MEDICINE | Admitting: EMERGENCY MEDICINE
Payer: MEDICARE

## 2019-09-15 VITALS
HEART RATE: 72 BPM | DIASTOLIC BLOOD PRESSURE: 74 MMHG | WEIGHT: 238 LBS | OXYGEN SATURATION: 98 % | SYSTOLIC BLOOD PRESSURE: 127 MMHG | HEIGHT: 67 IN | BODY MASS INDEX: 37.35 KG/M2 | RESPIRATION RATE: 18 BRPM

## 2019-09-15 DIAGNOSIS — K57.92 DIVERTICULITIS: Primary | ICD-10-CM

## 2019-09-15 LAB
ANION GAP BLD CALC-SCNC: 14 MMOL/L (ref 10–20)
APPEARANCE UR: CLEAR
BACTERIA URNS QL MICRO: NEGATIVE /HPF
BASOPHILS # BLD: 0 K/UL (ref 0–0.1)
BASOPHILS NFR BLD: 0 % (ref 0–1)
BILIRUB UR QL: NEGATIVE
BUN BLD-MCNC: 22 MG/DL (ref 9–20)
CA-I BLD-MCNC: 1.16 MMOL/L (ref 1.12–1.32)
CHLORIDE BLD-SCNC: 102 MMOL/L (ref 98–107)
CO2 BLD-SCNC: 25 MMOL/L (ref 21–32)
COLOR UR: NORMAL
COMMENT, HOLDF: NORMAL
CREAT BLD-MCNC: 1.3 MG/DL (ref 0.6–1.3)
DIFFERENTIAL METHOD BLD: NORMAL
EOSINOPHIL # BLD: 0.2 K/UL (ref 0–0.4)
EOSINOPHIL NFR BLD: 2 % (ref 0–7)
EPITH CASTS URNS QL MICRO: NORMAL /LPF
ERYTHROCYTE [DISTWIDTH] IN BLOOD BY AUTOMATED COUNT: 14 % (ref 11.5–14.5)
GLUCOSE BLD-MCNC: 93 MG/DL (ref 65–100)
GLUCOSE UR STRIP.AUTO-MCNC: NEGATIVE MG/DL
HCT VFR BLD AUTO: 38.2 % (ref 35–47)
HCT VFR BLD CALC: 37 % (ref 35–47)
HGB BLD-MCNC: 11.8 G/DL (ref 11.5–16)
HGB UR QL STRIP: NEGATIVE
HYALINE CASTS URNS QL MICRO: NORMAL /LPF (ref 0–5)
IMM GRANULOCYTES # BLD AUTO: 0 K/UL (ref 0–0.04)
IMM GRANULOCYTES NFR BLD AUTO: 0 % (ref 0–0.5)
KETONES UR QL STRIP.AUTO: NEGATIVE MG/DL
LEUKOCYTE ESTERASE UR QL STRIP.AUTO: NEGATIVE
LYMPHOCYTES # BLD: 1.6 K/UL (ref 0.8–3.5)
LYMPHOCYTES NFR BLD: 17 % (ref 12–49)
MCH RBC QN AUTO: 30.4 PG (ref 26–34)
MCHC RBC AUTO-ENTMCNC: 30.9 G/DL (ref 30–36.5)
MCV RBC AUTO: 98.5 FL (ref 80–99)
MONOCYTES # BLD: 0.8 K/UL (ref 0–1)
MONOCYTES NFR BLD: 8 % (ref 5–13)
NEUTS SEG # BLD: 7.2 K/UL (ref 1.8–8)
NEUTS SEG NFR BLD: 73 % (ref 32–75)
NITRITE UR QL STRIP.AUTO: NEGATIVE
NRBC # BLD: 0 K/UL (ref 0–0.01)
NRBC BLD-RTO: 0 PER 100 WBC
PH UR STRIP: 6 [PH] (ref 5–8)
PLATELET # BLD AUTO: 217 K/UL (ref 150–400)
PMV BLD AUTO: 10.4 FL (ref 8.9–12.9)
POTASSIUM BLD-SCNC: 4.2 MMOL/L (ref 3.5–5.1)
PROT UR STRIP-MCNC: NEGATIVE MG/DL
RBC # BLD AUTO: 3.88 M/UL (ref 3.8–5.2)
RBC #/AREA URNS HPF: NORMAL /HPF (ref 0–5)
SAMPLES BEING HELD,HOLD: NORMAL
SERVICE CMNT-IMP: ABNORMAL
SODIUM BLD-SCNC: 136 MMOL/L (ref 136–145)
SP GR UR REFRACTOMETRY: 1.01 (ref 1–1.03)
UR CULT HOLD, URHOLD: NORMAL
UROBILINOGEN UR QL STRIP.AUTO: 0.2 EU/DL (ref 0.2–1)
WBC # BLD AUTO: 9.9 K/UL (ref 3.6–11)
WBC URNS QL MICRO: NORMAL /HPF (ref 0–4)

## 2019-09-15 PROCEDURE — 74011250636 HC RX REV CODE- 250/636: Performed by: EMERGENCY MEDICINE

## 2019-09-15 PROCEDURE — 74011250637 HC RX REV CODE- 250/637: Performed by: EMERGENCY MEDICINE

## 2019-09-15 PROCEDURE — 85025 COMPLETE CBC W/AUTO DIFF WBC: CPT

## 2019-09-15 PROCEDURE — 81001 URINALYSIS AUTO W/SCOPE: CPT

## 2019-09-15 PROCEDURE — 80047 BASIC METABLC PNL IONIZED CA: CPT

## 2019-09-15 PROCEDURE — 96374 THER/PROPH/DIAG INJ IV PUSH: CPT

## 2019-09-15 PROCEDURE — 99283 EMERGENCY DEPT VISIT LOW MDM: CPT

## 2019-09-15 PROCEDURE — 36415 COLL VENOUS BLD VENIPUNCTURE: CPT

## 2019-09-15 PROCEDURE — 74176 CT ABD & PELVIS W/O CONTRAST: CPT

## 2019-09-15 PROCEDURE — 96361 HYDRATE IV INFUSION ADD-ON: CPT

## 2019-09-15 RX ORDER — SODIUM CHLORIDE 9 MG/ML
1000 INJECTION, SOLUTION INTRAVENOUS ONCE
Status: COMPLETED | OUTPATIENT
Start: 2019-09-15 | End: 2019-09-15

## 2019-09-15 RX ORDER — HYDROCODONE BITARTRATE AND ACETAMINOPHEN 5; 325 MG/1; MG/1
1 TABLET ORAL
Status: COMPLETED | OUTPATIENT
Start: 2019-09-15 | End: 2019-09-15

## 2019-09-15 RX ORDER — DIPHENHYDRAMINE HYDROCHLORIDE 50 MG/ML
25 INJECTION, SOLUTION INTRAMUSCULAR; INTRAVENOUS
Status: COMPLETED | OUTPATIENT
Start: 2019-09-15 | End: 2019-09-15

## 2019-09-15 RX ORDER — CIPROFLOXACIN 500 MG/1
500 TABLET ORAL
Status: COMPLETED | OUTPATIENT
Start: 2019-09-15 | End: 2019-09-15

## 2019-09-15 RX ORDER — METRONIDAZOLE 250 MG/1
500 TABLET ORAL
Status: COMPLETED | OUTPATIENT
Start: 2019-09-15 | End: 2019-09-15

## 2019-09-15 RX ORDER — CIPROFLOXACIN 500 MG/1
500 TABLET ORAL 2 TIMES DAILY
Qty: 20 TAB | Refills: 0 | Status: SHIPPED | OUTPATIENT
Start: 2019-09-15 | End: 2019-09-25

## 2019-09-15 RX ORDER — SODIUM CHLORIDE 0.9 % (FLUSH) 0.9 %
10 SYRINGE (ML) INJECTION
Status: DISCONTINUED | OUTPATIENT
Start: 2019-09-15 | End: 2019-09-16 | Stop reason: HOSPADM

## 2019-09-15 RX ORDER — HYDROCODONE BITARTRATE AND ACETAMINOPHEN 5; 325 MG/1; MG/1
1 TABLET ORAL
Qty: 10 TAB | Refills: 0 | Status: SHIPPED | OUTPATIENT
Start: 2019-09-15 | End: 2019-09-18

## 2019-09-15 RX ORDER — METRONIDAZOLE 500 MG/1
500 TABLET ORAL 3 TIMES DAILY
Qty: 30 TAB | Refills: 0 | Status: SHIPPED | OUTPATIENT
Start: 2019-09-15 | End: 2019-09-25

## 2019-09-15 RX ADMIN — HYDROCODONE BITARTRATE AND ACETAMINOPHEN 1 TABLET: 5; 325 TABLET ORAL at 20:18

## 2019-09-15 RX ADMIN — CIPROFLOXACIN 500 MG: 500 TABLET, FILM COATED ORAL at 22:51

## 2019-09-15 RX ADMIN — DIPHENHYDRAMINE HYDROCHLORIDE 25 MG: 50 INJECTION, SOLUTION INTRAMUSCULAR; INTRAVENOUS at 20:18

## 2019-09-15 RX ADMIN — METRONIDAZOLE 500 MG: 250 TABLET, FILM COATED ORAL at 22:52

## 2019-09-15 RX ADMIN — SODIUM CHLORIDE 1000 ML: 900 INJECTION, SOLUTION INTRAVENOUS at 20:18

## 2019-09-15 NOTE — ED PROVIDER NOTES
20-year-old female presents to the emergency room for evaluation of left lower quadrant pain. Pain is been present for 3 days. Pain is described as sharp. It does not radiate. No associated nausea or vomiting. No diarrhea. No fevers or chills. No dark stool black stool or bloody stool. No blood in the urine. No dysuria frequency urgency. No chest pain or shortness of breath. Patient has taken over-the-counter medicines and Bentyl with no relief. No known precipitating events. No aggravating factors. Pain currently 10/10. Social hx  Nonsmoker  No alcohol    The history is provided by the patient. No  was used.         Past Medical History:   Diagnosis Date    Anxiety     Arrhythmia     per pt, treated with med    Arthritis     Asthma     CAD (coronary artery disease)     EF=33 1/3    Chronic pain     fibromyalgia    Diarrhea 3/24/2017    Epigastric pain 3/24/2017    Fibromyalgia     GERD (gastroesophageal reflux disease)     ibs    Heart failure (HCC)     cardiomyopathy    Hypertension     Psychiatric disorder     h/o severe depression    Stroke ([facility] [address]     possible TIA    Unspecified sleep apnea     wears CPAP       Past Surgical History:   Procedure Laterality Date    COLONOSCOPY N/A 8/23/2016    COLONOSCOPY performed by Fawn Vilchis MD at [place] [address] N/A 3/24/2017    FLEXIBLE SIGMOIDOSCOPY  performed by Danna Barton MD at [facility] AMBULATORY OR    HX APPENDECTOMY      HX ENDOSCOPY      HX GYN      hysterectomy    HX HEENT      left eye surgery    HX KNEE ARTHROSCOPY Right     HX KNEE REPLACEMENT Right     HX OTHER SURGICAL      hand surgery left (carpal tunnel, reconstruction of small finger)    SIGMOIDOSCOPY,BIOPSY  3/24/2017              Family History:   Problem Relation Age of Onset    Diabetes Mother        Social History     Socioeconomic History    Marital status:      Spouse name: Not on file    Number of children: Not on file    Years of education: Not on file    Highest education level: Not on file   Occupational History    Not on file   Social Needs    Financial resource strain: Not on file    Food insecurity:     Worry: Not on file     Inability: Not on file    Transportation needs:     Medical: Not on file     Non-medical: Not on file   Tobacco Use    Smoking status: Never Smoker    Smokeless tobacco: Never Used   Substance and Sexual Activity    Alcohol use: No    Drug use: No     Types: OTC, Prescription    Sexual activity: Never   Lifestyle    Physical activity:     Days per week: Not on file     Minutes per session: Not on file    Stress: Not on file   Relationships    Social connections:     Talks on phone: Not on file     Gets together: Not on file     Attends [employer] service: Not on file     Active member of club or organization: Not on file     Attends meetings of clubs or organizations: Not on file     Relationship status: Not on file    Intimate partner violence:     Fear of current or ex partner: Not on file     Emotionally abused: Not on file     Physically abused: Not on file     Forced sexual activity: Not on file   Other Topics Concern    Not on file   Social History Narrative    Not on file         ALLERGIES: Codeine; Demerol [meperidine]; Ivp [fd and c blue no.1]; Minoxidil; Penicillamine; and Percocet [oxycodone-acetaminophen]    Review of Systems   Constitutional: Negative for chills and fever. Respiratory: Negative for cough and shortness of breath. Cardiovascular: Negative for chest pain and palpitations. Gastrointestinal: Positive for abdominal pain. Negative for blood in stool, diarrhea, nausea and vomiting. Genitourinary: Negative for dysuria, flank pain, frequency and urgency. Musculoskeletal: Negative for arthralgias, myalgias, neck pain and neck stiffness. Skin: Negative for rash and wound. Neurological: Negative for dizziness, numbness and headaches. All other systems reviewed and are negative. Vitals:    09/15/19 1831   Pulse: 87   SpO2: 97%            Physical Exam   Constitutional: She is oriented to person, place, and time. She appears well-developed and well-nourished. No distress. HENT:   Head: Normocephalic and atraumatic. Right Ear: External ear normal.   Left Ear: External ear normal.   Eyes: Pupils are equal, round, and reactive to light. Conjunctivae and EOM are normal.   Neck: Normal range of motion. Neck supple. Cardiovascular: Normal rate, regular rhythm and normal heart sounds. Pulmonary/Chest: Effort normal and breath sounds normal. No respiratory distress. She has no wheezes. Abdominal: Soft. Normal appearance and bowel sounds are normal. She exhibits no shifting dullness, no distension, no pulsatile liver, no abdominal bruit, no pulsatile midline mass and no mass. There is no hepatosplenomegaly, splenomegaly or hepatomegaly. There is tenderness. There is no rigidity, no rebound, no guarding, no CVA tenderness, no tenderness at McBurney's point and negative Worley's sign. Abdomen exposed for exam.  Soft, tender left lower quadrant. No peritoneal signs   Musculoskeletal: Normal range of motion. She exhibits no edema or tenderness. Neurological: She is alert and oriented to person, place, and time. She has normal reflexes. She displays normal reflexes. No cranial nerve deficit. Coordination normal.   Skin: Skin is warm and dry. No rash noted. No erythema. Psychiatric: She has a normal mood and affect. Her behavior is normal. Judgment and thought content normal.   Nursing note and vitals reviewed. MDM  Number of Diagnoses or Management Options  Diverticulitis:   Diagnosis management comments: The patient is resting comfortably and feels better, is alert and in no distress. The repeat examination is unremarkable and benign; in particular, there is no discomfort at McBurney's point.  The history, exam, diagnostic testing, and current condition do not suggest acute appendicitis, bowel obstruction, incarcerated hernia, acute cholecystitis, bowel perforation, major gastrointestinal bleeding, severe diverticulitis, sepsis, or other significant pathology to warrant further testing, continued ED treatment, admission, or surgical evaluation at this point. The vital signs have been stable and are within normal limits at this time. The patient does not have uncontrollable pain, intractable vomiting, or other significant symptoms. The patient's condition is stable and appropriate for discharge. The patient will pursue further outpatient evaluation with the primary care physician or other designated or consulting physician as indicated in the discharge instructions. Standard narcotic and sedating medication warnings given  Patient's results have been reviewed with them. Patient and/or family have verbally conveyed their understanding and agreement of the patient's signs, symptoms, diagnosis, treatment and prognosis and additionally agree to follow up as recommended or return to the Emergency Room should their condition change prior to follow-up. Discharge instructions have also been provided to the patient with some educational information regarding their diagnosis as well a list of reasons why they would want to return to the ER prior to their follow-up appointment should their condition change. Amount and/or Complexity of Data Reviewed  Discuss the patient with other providers: yes (ER attending-William)    Patient Progress  Patient progress: stable         Procedures        Pt case including HPI, PE, and all available lab and radiology results has been discussed with attending physician. Opportunity to evaluate patient has been provided to ER attending. Discharge and prescription plan has been agreed upon.

## 2019-09-16 NOTE — ED NOTES
Patient has received discharge instructions from ER PA, verbalizes understanding.   Discharged via wheelchair with male

## 2019-09-16 NOTE — DISCHARGE INSTRUCTIONS
Cipro:1 pill every 12 hours for 10 days  Flagyl:1 pill every 8 hours for 10 days. No alcohol with this medicine. Norco:1 pill every 6 hours as needed for pain. No alcohol or driving. Be aware of sedating effects. Return to ER for any fever, chills, vomiting, worsening of pain. Diverticulitis: Care Instructions  Your Care Instructions    Diverticulitis occurs when pouches form in the wall of the colon and become inflamed or infected. It can be very painful. Doctors aren't sure what causes diverticulitis. There is no proof that foods such as nuts, seeds, or berries cause it or make it worse. A low-fiber diet may cause the colon to work harder to push stool forward. Pouches may form because of this extra work. It may be hard to think about healthy eating while you're in pain. But as you recover, you might think about how you can use healthy eating for overall better health. Healthy eating may help you avoid future attacks. Follow-up care is a key part of your treatment and safety. Be sure to make and go to all appointments, and call your doctor if you are having problems. It's also a good idea to know your test results and keep a list of the medicines you take. How can you care for yourself at home? · Drink plenty of fluids, enough so that your urine is light yellow or clear like water. If you have kidney, heart, or liver disease and have to limit fluids, talk with your doctor before you increase the amount of fluids you drink. · Stick to liquids or a bland diet (plain rice, bananas, dry toast or crackers, applesauce) until you are feeling better. Then you can return to regular foods and gradually increase the amount of fiber in your diet. · Use a heating pad set on low on your belly to relieve mild cramps and pain. · Get extra rest until you are feeling better. · Be safe with medicines. Read and follow all instructions on the label.   ? If the doctor gave you a prescription medicine for pain, take it as prescribed. ? If you are not taking a prescription pain medicine, ask your doctor if you can take an over-the-counter medicine. · If your doctor prescribed antibiotics, take them as directed. Do not stop taking them just because you feel better. You need to take the full course of antibiotics. To prevent future attacks of diverticulitis  · Avoid constipation:  ? Include fruits, vegetables, beans, and whole grains in your diet each day. These foods are high in fiber. ? Drink plenty of fluids, enough so that your urine is light yellow or clear like water. If you have kidney, heart, or liver disease and have to limit fluids, talk with your doctor before you increase the amount of fluids you drink. ? Get some exercise every day. Build up slowly to 30 to 60 minutes a day on 5 or more days of the week. ? Take a fiber supplement, such as Citrucel or Metamucil, every day if needed. Read and follow all instructions on the label. ? Schedule time each day for a bowel movement. Having a daily routine may help. Take your time and do not strain when having a bowel movement. When should you call for help? Call your doctor now or seek immediate medical care if:    · You have a fever.     · You are vomiting.     · You have new or worse belly pain.     · You cannot pass stools or gas.    Watch closely for changes in your health, and be sure to contact your doctor if you have any problems. Where can you learn more? Go to http://jaspreet-thierno.info/. Enter H901 in the search box to learn more about \"Diverticulitis: Care Instructions. \"  Current as of: November 7, 2018  Content Version: 12.1  © 1113-8300 [employer]. Care instructions adapted under license by [employer] (which disclaims liability or warranty for this information).  If you have questions about a medical condition or this instruction, always ask your healthcare professional. [address]  any warranty or liability for your use of this information.

## 2019-10-23 ENCOUNTER — HOSPITAL ENCOUNTER (OUTPATIENT)
Dept: CT IMAGING | Age: 64
Discharge: HOME | End: 2019-10-23
Attending: PHYSICIAN ASSISTANT
Payer: MEDICARE

## 2019-10-23 DIAGNOSIS — K58.1 IRRITABLE BOWEL SYNDROME WITH CONSTIPATION: ICD-10-CM

## 2019-10-23 DIAGNOSIS — K21.9 GASTRO-ESOPHAGEAL REFLUX DISEASE WITHOUT ESOPHAGITIS: ICD-10-CM

## 2019-10-23 DIAGNOSIS — R10.32 LEFT LOWER QUADRANT PAIN: ICD-10-CM

## 2019-10-23 DIAGNOSIS — K57.32 DIVERTICULITIS OF LARGE INTESTINE WITHOUT PERFORATION OR ABSCESS WITHOUT BLEEDING: ICD-10-CM

## 2019-10-23 PROCEDURE — 74176 CT ABD & PELVIS W/O CONTRAST: CPT

## 2020-02-20 RX ORDER — CLOPIDOGREL BISULFATE 75 MG/1
75 TABLET ORAL DAILY
COMMUNITY

## 2020-02-20 RX ORDER — ROSUVASTATIN CALCIUM 20 MG/1
20 TABLET, COATED ORAL
COMMUNITY

## 2020-02-20 RX ORDER — ASPIRIN 81 MG/1
81 TABLET ORAL DAILY
COMMUNITY

## 2020-02-21 ENCOUNTER — ANESTHESIA EVENT (OUTPATIENT)
Dept: ENDOSCOPY | Age: 65
End: 2020-02-21
Payer: MEDICARE

## 2020-02-21 ENCOUNTER — HOSPITAL ENCOUNTER (OUTPATIENT)
Age: 65
Setting detail: OUTPATIENT SURGERY
Discharge: HOME | End: 2020-02-21
Attending: SPECIALIST | Admitting: SPECIALIST
Payer: MEDICARE

## 2020-02-21 ENCOUNTER — ANESTHESIA (OUTPATIENT)
Dept: ENDOSCOPY | Age: 65
End: 2020-02-21
Payer: MEDICARE

## 2020-02-21 VITALS
SYSTOLIC BLOOD PRESSURE: 120 MMHG | OXYGEN SATURATION: 100 % | BODY MASS INDEX: 40.04 KG/M2 | RESPIRATION RATE: 11 BRPM | HEART RATE: 74 BPM | WEIGHT: 255.1 LBS | TEMPERATURE: 97.4 F | DIASTOLIC BLOOD PRESSURE: 73 MMHG | HEIGHT: 67 IN

## 2020-02-21 PROCEDURE — 88305 TISSUE EXAM BY PATHOLOGIST: CPT

## 2020-02-21 PROCEDURE — 77030013992 HC SNR POLYP ENDOSC BSC -B: Performed by: SPECIALIST

## 2020-02-21 PROCEDURE — 74011250636 HC RX REV CODE- 250/636: Performed by: SPECIALIST

## 2020-02-21 PROCEDURE — 76040000007: Performed by: SPECIALIST

## 2020-02-21 PROCEDURE — 77030039825 HC MSK NSL PAP SUPERNO2VA VYRM -B: Performed by: NURSE ANESTHETIST, CERTIFIED REGISTERED

## 2020-02-21 PROCEDURE — 74011250636 HC RX REV CODE- 250/636: Performed by: NURSE ANESTHETIST, CERTIFIED REGISTERED

## 2020-02-21 PROCEDURE — 76060000032 HC ANESTHESIA 0.5 TO 1 HR: Performed by: SPECIALIST

## 2020-02-21 PROCEDURE — 74011000250 HC RX REV CODE- 250: Performed by: NURSE ANESTHETIST, CERTIFIED REGISTERED

## 2020-02-21 RX ORDER — DEXTROMETHORPHAN/PSEUDOEPHED 2.5-7.5/.8
1.2 DROPS ORAL
Status: DISCONTINUED | OUTPATIENT
Start: 2020-02-21 | End: 2020-02-21 | Stop reason: HOSPADM

## 2020-02-21 RX ORDER — PROPOFOL 10 MG/ML
INJECTION, EMULSION INTRAVENOUS AS NEEDED
Status: DISCONTINUED | OUTPATIENT
Start: 2020-02-21 | End: 2020-02-21 | Stop reason: HOSPADM

## 2020-02-21 RX ORDER — SODIUM CHLORIDE 9 MG/ML
50 INJECTION, SOLUTION INTRAVENOUS CONTINUOUS
Status: DISCONTINUED | OUTPATIENT
Start: 2020-02-21 | End: 2020-02-21 | Stop reason: HOSPADM

## 2020-02-21 RX ORDER — LIDOCAINE HYDROCHLORIDE 20 MG/ML
INJECTION, SOLUTION EPIDURAL; INFILTRATION; INTRACAUDAL; PERINEURAL AS NEEDED
Status: DISCONTINUED | OUTPATIENT
Start: 2020-02-21 | End: 2020-02-21 | Stop reason: HOSPADM

## 2020-02-21 RX ORDER — SODIUM CHLORIDE 0.9 % (FLUSH) 0.9 %
5-40 SYRINGE (ML) INJECTION AS NEEDED
Status: DISCONTINUED | OUTPATIENT
Start: 2020-02-21 | End: 2020-02-21 | Stop reason: HOSPADM

## 2020-02-21 RX ORDER — SODIUM CHLORIDE 0.9 % (FLUSH) 0.9 %
5-40 SYRINGE (ML) INJECTION EVERY 8 HOURS
Status: DISCONTINUED | OUTPATIENT
Start: 2020-02-21 | End: 2020-02-21 | Stop reason: HOSPADM

## 2020-02-21 RX ADMIN — PROPOFOL 50 MG: 10 INJECTION, EMULSION INTRAVENOUS at 08:23

## 2020-02-21 RX ADMIN — PROPOFOL 50 MG: 10 INJECTION, EMULSION INTRAVENOUS at 08:56

## 2020-02-21 RX ADMIN — PROPOFOL 50 MG: 10 INJECTION, EMULSION INTRAVENOUS at 08:32

## 2020-02-21 RX ADMIN — PROPOFOL 50 MG: 10 INJECTION, EMULSION INTRAVENOUS at 08:39

## 2020-02-21 RX ADMIN — PROPOFOL 50 MG: 10 INJECTION, EMULSION INTRAVENOUS at 08:49

## 2020-02-21 RX ADMIN — LIDOCAINE HYDROCHLORIDE 60 MG: 20 INJECTION, SOLUTION EPIDURAL; INFILTRATION; INTRACAUDAL; PERINEURAL at 08:23

## 2020-02-21 RX ADMIN — SODIUM CHLORIDE: 900 INJECTION, SOLUTION INTRAVENOUS at 07:47

## 2020-02-21 RX ADMIN — PROPOFOL 50 MG: 10 INJECTION, EMULSION INTRAVENOUS at 09:02

## 2020-02-21 RX ADMIN — SODIUM CHLORIDE 50 ML/HR: 900 INJECTION, SOLUTION INTRAVENOUS at 07:46

## 2020-02-21 NOTE — PERIOP NOTES
Anesthesia reports 300mg Propofol, 60mg Lidocaine and 450mL NS given during procedure. Received report from anesthesia staff on vital signs and status of patient. Endoscope was pre-cleaned at the bedside immediately following procedure by LITZY DIALLO

## 2020-02-21 NOTE — DISCHARGE INSTRUCTIONS
Mackenzie Hernandez  [id number]  1955    COLON DISCHARGE INSTRUCTIONS  Discomfort:  Redness at IV site- apply warm compress to area; if redness or soreness persist- contact your physician  There may be a slight amount of blood passed from the rectum  Gaseous discomfort- walking, belching will help relieve any discomfort  You may not operate a vehicle for 12 hours  You may not engage in an occupation involving machinery or appliances for rest of today  You may not drink alcoholic beverages for at least 12 hours  Avoid making any critical decisions for at least 24 hour  DIET:   Regular diet. - however -  remember your colon is empty and a heavy meal will produce gas. Avoid these foods:  vegetables, fried / greasy foods, carbonated drinks for today. MEDICATIONS:        Regarding Aspirin or Nonsteroidal medications, please see below. ACTIVITY:  You may resume your normal daily activities it is recommended that you spend the remainder of the day resting -  avoid any strenuous activity. CALL M.D. ANY SIGN OF:  Increasing pain, nausea, vomiting  Abdominal distension (swelling)  New increased bleeding (oral or rectal)  Fever (chills)  Pain in chest area  Bloody discharge from nose or mouth  Shortness of breath    ONLY  Tylenol as needed for pain.       Follow-up Instructions:   Call Dr. Kathy Rai for results of procedure / biopsy in 4-5 days at telephone #  [phone]              Repeat Colonoscopy in 3 years              Resume Plavix in 3 days

## 2020-02-21 NOTE — ANESTHESIA POSTPROCEDURE EVALUATION
Procedure(s):  COLONOSCOPY  ENDOSCOPIC POLYPECTOMY. MAC    Anesthesia Post Evaluation        Patient location during evaluation: PACU  Note status: Adequate. Level of consciousness: responsive to verbal stimuli and sleepy but conscious  Pain management: satisfactory to patient  Airway patency: patent  Anesthetic complications: no  Cardiovascular status: acceptable  Respiratory status: acceptable  Hydration status: acceptable  Comments: +Post-Anesthesia Evaluation and Assessment    Patient: Daysi Crews MRN: [id number]  SSN: [id number]   YOB: 1955  Age: 59 y.o. Sex: female      Cardiovascular Function/Vital Signs    /73   Pulse 74   Temp 36.3 °C (97.4 °F)   Resp 11   Ht 5' 7\" (1.702 m)   Wt 115.7 kg (255 lb 1.6 oz)   SpO2 100%   Breastfeeding No   BMI 39.95 kg/m²     Patient is status post Procedure(s):  COLONOSCOPY  ENDOSCOPIC POLYPECTOMY. Nausea/Vomiting: Controlled. Postoperative hydration reviewed and adequate. Pain:  Pain Scale 1: Numeric (0 - 10) (02/21/20 0930)  Pain Intensity 1: 0 (02/21/20 1090)   Managed. Neurological Status: At baseline. Mental Status and Level of Consciousness: Arousable. Pulmonary Status:   O2 Device: Room air (02/21/20 0930)   Adequate oxygenation and airway patent. Complications related to anesthesia: None    Post-anesthesia assessment completed. No concerns. Signed By: Sherie Potter MD    2/21/2020  Post anesthesia nausea and vomiting:  controlled      Vitals Value Taken Time   BP 0/0 2/21/2020  9:52 AM   Temp 36.3 °C (97.4 °F) 2/21/2020  9:29 AM   Pulse 0 2/21/2020  9:53 AM   Resp 0 2/21/2020  9:53 AM   SpO2 100 % 2/21/2020  9:40 AM   Vitals shown include unvalidated device data.

## 2020-02-21 NOTE — PROCEDURES
Colonoscopy Procedure Note    Indications:   LLQ pain, h/o diverticuitis, h/o polyps    Referring Physician: Abby Salomon MD  Anesthesia/Sedation: MAC anesthesia Propofol  Endoscopist:  Dr. Lenna Canavan    Procedure in Detail:  Informed consent was obtained for the procedure, including sedation. Risks of perforation, hemorrhage, adverse drug reaction, and aspiration were discussed. The patient was placed in the left lateral decubitus position. Based on the pre-procedure assessment, including review of the patient's medical history, medications, allergies, and review of systems, she had been deemed to be an appropriate candidate for moderate sedation; she was therefore sedated with the medications listed above. The patient was monitored continuously with ECG tracing, pulse oximetry, blood pressure monitoring, and direct observations. A rectal examination was performed. The [id number] was inserted into the rectum and advanced under direct vision to the cecum, which was identified by the ileocecal valve and appendiceal orifice. The quality of the colonic preparation was adequate. A careful inspection was made as the colonoscope was withdrawn, including a retroflexed view of the rectum; findings and interventions are described below. Appropriate photodocumentation was obtained. Findings:     1. Scope advanced to the cecum. 2.  Preparation was adequate. 3.  Severe clustering of diverticulosis with some focal pus in the sigmoid c/w possible diverticulitis. 4.  Semi pedunculated polyp 1 cm in the mid ascending colon s/p hot snare removal.  5.  Small internal hemorrhoids. Therapies:  As above    Specimen:  Specimens were collected as described above and sent to pathology. Complications: None were encountered during the procedure. EBL: < 10 ml.     Recommendations:   -repeat colonoscopy in 3 years  -follow up post procedure  Signed By: Jaren Ghosh MD February 21, 2020

## 2020-02-21 NOTE — H&P
Gastroenterology Outpatient History and Physical    Patient: Marsha Single    Physician: Reed Aguilar MD    Vital Signs: Blood pressure 118/64, pulse 83, temperature 98.1 °F (36.7 °C), resp. rate 15, height 5' 7\" (1.702 m), weight 115.7 kg (255 lb 1.6 oz), SpO2 98 %, not currently breastfeeding. Allergies: Allergies   Allergen Reactions    Codeine Itching    Demerol [Meperidine] Nausea Only    Ivp [Fd And C Blue No.1] Itching    Minoxidil Itching    Penicillamine Itching and Other (comments)     Drops blood pressure    Percocet [Oxycodone-Acetaminophen] Nausea and Vomiting       Chief Complaint: H/O Polyp    History of Present Illness: 60 yo BF for colonoscopy for h/o constipation, h/o polyps.     Justification for Procedure: above    History:  Past Medical History:   Diagnosis Date    Anxiety     Arrhythmia     per pt, treated with med    Arthritis     Asthma     CAD (coronary artery disease)     EF=33 1/3    Chronic pain     fibromyalgia    Diarrhea 3/24/2017    Epigastric pain 3/24/2017    Fibromyalgia     GERD (gastroesophageal reflux disease)     ibs    Gout     Heart failure (HCC)     cardiomyopathy    Hypertension     Psychiatric disorder     h/o severe depression    Stroke ([facility] [address]     possible TIA    Unspecified sleep apnea     wears CPAP      Past Surgical History:   Procedure Laterality Date    COLONOSCOPY N/A 8/23/2016    COLONOSCOPY performed by Reed Aguilar MD at [address] [place] [address] N/A 3/24/2017    FLEXIBLE SIGMOIDOSCOPY  performed by Raman Pleitez MD at [facility] AMBULATORY OR    HX APPENDECTOMY      HX ENDOSCOPY      HX GYN      hysterectomy    HX HEENT      left eye surgery    HX KNEE ARTHROSCOPY Right     HX KNEE REPLACEMENT Right     HX ORTHOPAEDIC Right     plantar fascitis repair with implant    HX OTHER SURGICAL      hand surgery left (carpal tunnel, reconstruction of small finger)    SIGMOIDOSCOPY,BIOPSY  3/24/2017         VASCULAR SURGERY PROCEDURE UNLIST      stent in bilateral legs      Social History     Socioeconomic History    Marital status:      Spouse name: Not on file    Number of children: Not on file    Years of education: Not on file    Highest education level: Not on file   Tobacco Use    Smoking status: Never Smoker    Smokeless tobacco: Never Used   Substance and Sexual Activity    Alcohol use: No    Drug use: No     Types: OTC, Prescription    Sexual activity: Never      Family History   Problem Relation Age of Onset    Diabetes Mother        Medications:   Prior to Admission medications    Medication Sig Start Date End Date Taking? Authorizing Provider   rosuvastatin (CRESTOR) 20 mg tablet Take 20 mg by mouth nightly. Yes Provider, Historical   dulaglutide (TRULICITY) 1.5 FH/7.2 mL sub-q pen 1.5 mg by SubCUTAneous route every seven (7) days. Yes Provider, Historical   clopidogreL (PLAVIX) 75 mg tab Take 75 mg by mouth daily. Yes Provider, Historical   aspirin delayed-release 81 mg tablet Take 81 mg by mouth daily. Yes Provider, Historical   raNITIdine (ZANTAC) 150 mg tablet Take 150 mg by mouth two (2) times a day. Yes Provider, Historical   hydroxychloroquine (PLAQUENIL) 200 mg tablet Take 200 mg by mouth two (2) times a day. Yes Other, MD Fabio   allopurinol (ZYLOPRIM) 300 mg tablet Take  by mouth daily. Yes Other, MD Fabio   colchicine 0.6 mg tablet Take 1 Tab by mouth daily. Patient taking differently: Take 0.6 mg by mouth. 4/15/17  Yes Marc Street MD   amLODIPine-benazepril (LOTREL) 5-20 mg per capsule Take 1 Cap by mouth daily. Yes Provider, Historical   carvedilol (COREG) 25 mg tablet Take 25 mg by mouth two (2) times daily (with meals). Yes Other, MD Fabio   bumetanide (BUMEX) 2 mg tablet Take 2 mg by mouth two (2) times a day. Yes Other, MD Fabio   zolpidem (AMBIEN) 10 mg tablet Take 10 mg by mouth nightly as needed for Sleep.    Yes Lina, MD Fabio   cholecalciferol, vitamin D3, 2,000 unit tab Take 2,000 Units by mouth daily. Yes Other, MD Fabio   cyanocobalamin 1,000 mcg tablet Take 1,000 mcg by mouth daily. Yes Lina, MD Fabio   spironolactone (ALDACTONE) 25 mg tablet Take  by mouth two (2) times a day. 11/9/10  Yes Provider, Historical   naproxen (NAPROSYN) 500 mg tablet Take 1 Tab by mouth every twelve (12) hours as needed for Pain. 2/8/19   Lisa Rodriguez MD   promethazine (PHENERGAN) 25 mg tablet Take 1 Tab by mouth every six (6) hours as needed for Nausea. 11/20/18   Nora Hernandez DPM   budesonide-formoterol (SYMBICORT) 160-4.5 mcg/actuation HFAA Take 1 Puff by inhalation as needed. Provider, Historical   albuterol (PROVENTIL HFA, VENTOLIN HFA, PROAIR HFA) 90 mcg/actuation inhaler Take 1 Puff by inhalation as needed. 8/9/18   Britton Amato MD   plecanatide (TRULANCE) 3 mg tab Take 3 mg by mouth daily. Provider, Historical   lidocaine (LIDODERM) 5 % 1 Patch by TransDERmal route every twenty-four (24) hours. Apply patch to the affected area for 12 hours a day and remove for 12 hours a day. Provider, Historical   gabapentin (NEURONTIN) 400 mg capsule 400 mg p.o. 3 times daily and 1200 mg p.o. nightly  Patient taking differently: 300 mg three (3) times daily. 400 mg p.o. 3 times daily and 1200 mg p.o. nightly 2/14/17   Lala Ware MD   diclofenac (VOLTAREN) 1 % gel Apply  to affected area as needed. Provider, Historical   DULoxetine (CYMBALTA) 60 mg capsule Take 60 mg by mouth daily. Other, MD Fabio   hyoscyamine (ANASPAZ, LEVSIN) 0.125 mg tablet Take 125 mcg by mouth every four (4) hours as needed for Cramping. Provider, Historical   pregabalin (LYRICA) 150 mg capsule Take 150 mg by mouth two (2) times a day. 11/9/10   Provider, Historical       Physical Exam:   General: alert, no distress   HEENT: Head: Normocephalic, no lesions, without obvious abnormality.    Heart: regular rate and rhythm, S1, S2 normal, no murmur, click, rub or gallop   Lungs: chest clear, no wheezing, rales, normal symmetric air entry   Abdominal: soft, NT/ND + BS   Neurological: Grossly normal   Extremities: extremities normal, atraumatic, no cyanosis or edema     Findings/Diagnosis: H/O polyps    Plan of Care/Planned Procedure: Colonoscopy

## 2020-02-21 NOTE — ANESTHESIA PREPROCEDURE EVALUATION
Anesthetic History   No history of anesthetic complications            Review of Systems / Medical History  Patient summary reviewed, nursing notes reviewed and pertinent labs reviewed    Pulmonary        Sleep apnea: CPAP    Asthma        Neuro/Psych       CVA  TIA and psychiatric history     Cardiovascular    Hypertension        Dysrhythmias   CAD    Exercise tolerance: >4 METS  Comments: EF 40%   GI/Hepatic/Renal     GERD           Endo/Other        Morbid obesity and arthritis     Other Findings   Comments: Fibromyalgia           Physical Exam    Airway  Mallampati: II  TM Distance: 4 - 6 cm  Neck ROM: normal range of motion   Mouth opening: Normal     Cardiovascular  Regular rate and rhythm,  S1 and S2 normal,  no murmur, click, rub, or gallop             Dental  No notable dental hx       Pulmonary  Breath sounds clear to auscultation               Abdominal  GI exam deferred       Other Findings            Anesthetic Plan    ASA: 3  Anesthesia type: MAC          Induction: Intravenous  Anesthetic plan and risks discussed with: Patient

## 2020-02-21 NOTE — ROUTINE PROCESS
Abril Wildwood 1955 
[id number] Situation: 
Verbal report received from: Matias olivera Procedure: Procedure(s): 
COLONOSCOPY 
ENDOSCOPIC POLYPECTOMY Background: 
 
Preoperative diagnosis: DIVERTICULITIS OF LARGE INTESTINE WITHOUT PERFORATION OR ABSCESS WITHOUT BLEEDING- IMPROVING 
EPIGASTRIC PAIN-IMPROVED 
LEFT LOWER QUADRANT PAIN-IMPROVED Postoperative diagnosis: Diverticulosis, Polyp :  Dr. Alia Huang Assistant(s): Endoscopy Technician-1: Dorie Lyon Endoscopy RN-1: Connie Bird Specimens:  
ID Type Source Tests Collected by Time Destination 1 : Ascending Colon Polyp Preservative Colon, Ascending  Yumiko Staley MD 2/21/2020 2500 Pathology H. Pylori  no Assessment: 
Intra-procedure medications Anesthesia gave intra-procedure sedation and medications, see anesthesia flow sheet yes Intravenous fluids: NS@ [facility] [place] Vital signs stable Abdominal assessment: round and soft Recommendation: 
Discharge patient per MD order. Return to floor Family or Friend Permission to share finding with family or friend yes

## 2020-03-03 ENCOUNTER — HOSPITAL ENCOUNTER (EMERGENCY)
Age: 65
Discharge: HOME | End: 2020-03-04
Attending: EMERGENCY MEDICINE
Payer: MEDICARE

## 2020-03-03 DIAGNOSIS — M54.31 SCIATICA OF RIGHT SIDE: Primary | ICD-10-CM

## 2020-03-03 PROCEDURE — 99284 EMERGENCY DEPT VISIT MOD MDM: CPT

## 2020-03-03 PROCEDURE — 96372 THER/PROPH/DIAG INJ SC/IM: CPT

## 2020-03-04 VITALS
WEIGHT: 252 LBS | TEMPERATURE: 98.3 F | HEART RATE: 86 BPM | HEIGHT: 67 IN | DIASTOLIC BLOOD PRESSURE: 51 MMHG | BODY MASS INDEX: 39.55 KG/M2 | OXYGEN SATURATION: 92 % | SYSTOLIC BLOOD PRESSURE: 114 MMHG | RESPIRATION RATE: 18 BRPM

## 2020-03-04 PROCEDURE — 96372 THER/PROPH/DIAG INJ SC/IM: CPT

## 2020-03-04 PROCEDURE — 74011250636 HC RX REV CODE- 250/636: Performed by: EMERGENCY MEDICINE

## 2020-03-04 RX ORDER — HYDROCODONE BITARTRATE AND ACETAMINOPHEN 5; 325 MG/1; MG/1
1 TABLET ORAL
Qty: 12 TAB | Refills: 0 | Status: SHIPPED | OUTPATIENT
Start: 2020-03-04 | End: 2020-03-07

## 2020-03-04 RX ORDER — METHYLPREDNISOLONE 4 MG/1
TABLET ORAL
Qty: 1 DOSE PACK | Refills: 0 | Status: ON HOLD | OUTPATIENT
Start: 2020-03-04 | End: 2020-11-19

## 2020-03-04 RX ORDER — FENTANYL CITRATE 50 UG/ML
50 INJECTION, SOLUTION INTRAMUSCULAR; INTRAVENOUS
Status: COMPLETED | OUTPATIENT
Start: 2020-03-04 | End: 2020-03-04

## 2020-03-04 RX ADMIN — FENTANYL CITRATE 50 MCG: 50 INJECTION, SOLUTION INTRAMUSCULAR; INTRAVENOUS at 00:40

## 2020-03-04 RX ADMIN — METHYLPREDNISOLONE SODIUM SUCCINATE 125 MG: 125 INJECTION, POWDER, FOR SOLUTION INTRAMUSCULAR; INTRAVENOUS at 00:44

## 2020-03-04 NOTE — ED NOTES
Discharge summary and prescriptions reviewed with patient. Verbalized understanding. All questions welcomed and answered. Assisted off unit in wheelchair by tech.

## 2020-03-04 NOTE — DISCHARGE INSTRUCTIONS
Patient Education        Back Pain: Care Instructions  Your Care Instructions    Back pain has many possible causes. It is often related to problems with muscles and ligaments of the back. It may also be related to problems with the nerves, discs, or bones of the back. Moving, lifting, standing, sitting, or sleeping in an awkward way can strain the back. Sometimes you don't notice the injury until later. Arthritis is another common cause of back pain. Although it may hurt a lot, back pain usually improves on its own within several weeks. Most people recover in 12 weeks or less. Using good home treatment and being careful not to stress your back can help you feel better sooner. Follow-up care is a key part of your treatment and safety. Be sure to make and go to all appointments, and call your doctor if you are having problems. It's also a good idea to know your test results and keep a list of the medicines you take. How can you care for yourself at home? · Sit or lie in positions that are most comfortable and reduce your pain. Try one of these positions when you lie down:  ? Lie on your back with your knees bent and supported by large pillows. ? Lie on the floor with your legs on the seat of a sofa or chair. ? Lie on your side with your knees and hips bent and a pillow between your legs. ? Lie on your stomach if it does not make pain worse. · Do not sit up in bed, and avoid soft couches and twisted positions. Bed rest can help relieve pain at first, but it delays healing. Avoid bed rest after the first day of back pain. · Change positions every 30 minutes. If you must sit for long periods of time, take breaks from sitting. Get up and walk around, or lie in a comfortable position. · Try using a heating pad on a low or medium setting for 15 to 20 minutes every 2 or 3 hours. Try a warm shower in place of one session with the heating pad. · You can also try an ice pack for 10 to 15 minutes every 2 to 3 hours. Put a thin cloth between the ice pack and your skin. · Take pain medicines exactly as directed. ? If the doctor gave you a prescription medicine for pain, take it as prescribed. ? If you are not taking a prescription pain medicine, ask your doctor if you can take an over-the-counter medicine. · Take short walks several times a day. You can start with 5 to 10 minutes, 3 or 4 times a day, and work up to longer walks. Walk on level surfaces and avoid hills and stairs until your back is better. · Return to work and other activities as soon as you can. Continued rest without activity is usually not good for your back. · To prevent future back pain, do exercises to stretch and strengthen your back and stomach. Learn how to use good posture, safe lifting techniques, and proper body mechanics. When should you call for help? Call your doctor now or seek immediate medical care if:    · You have new or worsening numbness in your legs.     · You have new or worsening weakness in your legs. (This could make it hard to stand up.)     · You lose control of your bladder or bowels.    Watch closely for changes in your health, and be sure to contact your doctor if:    · You have a fever, lose weight, or don't feel well.     · You do not get better as expected. Where can you learn more? Go to http://jaspreet-thierno.info/. Enter J572 in the search box to learn more about \"Back Pain: Care Instructions. \"  Current as of: June 26, 2019  Content Version: 12.2  © 3549-4121 [employer]. Care instructions adapted under license by [employer] (which disclaims liability or warranty for this information). If you have questions about a medical condition or this instruction, always ask your healthcare professional. [place] [address] any warranty or liability for your use of this information.        Patient Education        Sciatica: Care Instructions  Your Care Instructions    Sciatica (say \"dza-YH-cm-kuh\") is an irritation of one of the sciatic nerves, which come from the spinal cord in the lower back. The sciatic nerves and their branches extend down through the buttock to the foot. Sciatica can develop when an injured disc in the back presses against a spinal nerve root. Its main symptom is pain, numbness, or weakness that is often worse in the leg or foot than in the back. Sciatica often will improve and go away with time. Early treatment usually includes medicines and exercises to relieve pain. Follow-up care is a key part of your treatment and safety. Be sure to make and go to all appointments, and call your doctor if you are having problems. It's also a good idea to know your test results and keep a list of the medicines you take. How can you care for yourself at home? · Take pain medicines exactly as directed. ? If the doctor gave you a prescription medicine for pain, take it as prescribed. ? If you are not taking a prescription pain medicine, ask your doctor if you can take an over-the-counter medicine. · Use heat or ice to relieve pain. ? To apply heat, put a warm water bottle, heating pad set on low, or warm cloth on your back. Do not go to sleep with a heating pad on your skin. ? To use ice, put ice or a cold pack on the area for 10 to 20 minutes at a time. Put a thin cloth between the ice and your skin. · Avoid sitting if possible, unless it feels better than standing. · Alternate lying down with short walks. Increase your walking distance as you are able to without making your symptoms worse. · Do not do anything that makes your symptoms worse. When should you call for help? Call 911 anytime you think you may need emergency care. For example, call if:    · You are unable to move a leg at all.   [facility] your doctor now or seek immediate medical care if:    · You have new or worse symptoms in your legs or buttocks.  Symptoms may include:  ? Numbness or tingling. ? Weakness. ? Pain.     · You lose bladder or bowel control.    Watch closely for changes in your health, and be sure to contact your doctor if:    · You are not getting better as expected. Where can you learn more? Go to http://jaspreet-thierno.info/. Enter [phone] in the search box to learn more about \"Sciatica: Care Instructions. \"  Current as of: June 26, 2019  Content Version: 12.2  © 8597-1406 [employer]. Care instructions adapted under license by [employer] (which disclaims liability or warranty for this information). If you have questions about a medical condition or this instruction, always ask your healthcare professional. [address] any warranty or liability for your use of this information.

## 2020-03-04 NOTE — ED PROVIDER NOTES
EMERGENCY DEPARTMENT HISTORY AND PHYSICAL EXAM      Date: 3/3/2020  Patient Name: Chapo Chappell    History of Presenting Illness     Chief Complaint   Patient presents with    Back Pain     onset of sxs 6 hours ago - hx of sciatica - no relief from OTC medicatiosn / ice applied - denies trauma / fall / back surgeries / fevers / chills / N / V / D / neck stiffness        History Provided By: Patient    HPI: Chapo Chappell, 59 y.o. female with PMHx significant for fibromyalgia, hypertension, and history of right-sided sciatica presents to the emergency room with pain in her right hip. Symptoms started this morning but got worse this evening. She took 310 mg prednisone's and a Flexeril without relief. She also took a Tylenol. She denies any urinary retention or urinary incontinence, dysuria, urinary frequency, leg weakness, bowel incontinence. Her pain is worse when she tries to lay on her right side. She denies any fevers or chills, nausea, vomiting, diarrhea. She has had similar episodes before and come to the emergency room for evaluation. She states that she \"got a shot\" and felt much better. She has a history of chronic arthritis and has been told that she should not take NSAIDs because her renal function is borderline. PCP: Mattie Aguilar MD    No current facility-administered medications on file prior to encounter. Current Outpatient Medications on File Prior to Encounter   Medication Sig Dispense Refill    rosuvastatin (CRESTOR) 20 mg tablet Take 20 mg by mouth nightly.  dulaglutide (TRULICITY) 1.5 MM/8.0 mL sub-q pen 1.5 mg by SubCUTAneous route every seven (7) days.  clopidogreL (PLAVIX) 75 mg tab Take 75 mg by mouth daily.  aspirin delayed-release 81 mg tablet Take 81 mg by mouth daily.  naproxen (NAPROSYN) 500 mg tablet Take 1 Tab by mouth every twelve (12) hours as needed for Pain.  20 Tab 0    promethazine (PHENERGAN) 25 mg tablet Take 1 Tab by mouth every six (6) hours as needed for Nausea. 30 Tab 0    budesonide-formoterol (SYMBICORT) 160-4.5 mcg/actuation HFAA Take 1 Puff by inhalation as needed.  raNITIdine (ZANTAC) 150 mg tablet Take 150 mg by mouth two (2) times a day.  hydroxychloroquine (PLAQUENIL) 200 mg tablet Take 200 mg by mouth two (2) times a day.  allopurinol (ZYLOPRIM) 300 mg tablet Take  by mouth daily.  albuterol (PROVENTIL HFA, VENTOLIN HFA, PROAIR HFA) 90 mcg/actuation inhaler Take 1 Puff by inhalation as needed. 1 Inhaler 1    plecanatide (TRULANCE) 3 mg tab Take 3 mg by mouth daily.  colchicine 0.6 mg tablet Take 1 Tab by mouth daily. (Patient taking differently: Take 0.6 mg by mouth.) 1 Tab 0    lidocaine (LIDODERM) 5 % 1 Patch by TransDERmal route every twenty-four (24) hours. Apply patch to the affected area for 12 hours a day and remove for 12 hours a day.  gabapentin (NEURONTIN) 400 mg capsule 400 mg p.o. 3 times daily and 1200 mg p.o. nightly (Patient taking differently: 300 mg three (3) times daily. 400 mg p.o. 3 times daily and 1200 mg p.o. nightly) 180 Cap 2    diclofenac (VOLTAREN) 1 % gel Apply  to affected area as needed.  amLODIPine-benazepril (LOTREL) 5-20 mg per capsule Take 1 Cap by mouth daily.  carvedilol (COREG) 25 mg tablet Take 25 mg by mouth two (2) times daily (with meals).  bumetanide (BUMEX) 2 mg tablet Take 2 mg by mouth two (2) times a day.  DULoxetine (CYMBALTA) 60 mg capsule Take 60 mg by mouth daily.  zolpidem (AMBIEN) 10 mg tablet Take 10 mg by mouth nightly as needed for Sleep.  cholecalciferol, vitamin D3, 2,000 unit tab Take 2,000 Units by mouth daily.  cyanocobalamin 1,000 mcg tablet Take 1,000 mcg by mouth daily.  hyoscyamine (ANASPAZ, LEVSIN) 0.125 mg tablet Take 125 mcg by mouth every four (4) hours as needed for Cramping.  spironolactone (ALDACTONE) 25 mg tablet Take  by mouth two (2) times a day.       pregabalin (LYRICA) 150 mg capsule Take 150 mg by mouth two (2) times a day. Past History     Past Medical History:  Past Medical History:   Diagnosis Date    Anxiety     Arrhythmia     per pt, treated with med    Arthritis     Asthma     CAD (coronary artery disease)     EF=33 1/3    Chronic pain     fibromyalgia    Diarrhea 3/24/2017    Epigastric pain 3/24/2017    Fibromyalgia     GERD (gastroesophageal reflux disease)     ibs    Gout     Heart failure (HCC)     cardiomyopathy    Hypertension     Psychiatric disorder     h/o severe depression    Stroke ([address]     possible TIA    Unspecified sleep apnea     wears CPAP       Past Surgical History:  Past Surgical History:   Procedure Laterality Date    COLONOSCOPY N/A 8/23/2016    COLONOSCOPY performed by Joss Milligan MD at [facility] ENDOSCOPY    COLONOSCOPY N/A 2/21/2020    COLONOSCOPY performed by Trina Skaggs MD at [place] [id number] N/A 3/24/2017    FLEXIBLE SIGMOIDOSCOPY  performed by Roxanna Cruz MD at [facility] AMBULATORY OR    HX APPENDECTOMY      HX ENDOSCOPY      HX GYN      hysterectomy    HX HEENT      left eye surgery    HX KNEE ARTHROSCOPY Right     HX KNEE REPLACEMENT Right     HX ORTHOPAEDIC Right     plantar fascitis repair with implant    HX OTHER SURGICAL      hand surgery left (carpal tunnel, reconstruction of small finger)    SIGMOIDOSCOPY,BIOPSY  3/24/2017         VASCULAR SURGERY PROCEDURE UNLIST      stent in bilateral legs       Family History:  Family History   Problem Relation Age of Onset    Diabetes Mother        Social History:  Social History     Tobacco Use    Smoking status: Never Smoker    Smokeless tobacco: Never Used   Substance Use Topics    Alcohol use: No    Drug use: No     Types: OTC, Prescription       Allergies:   Allergies   Allergen Reactions    Codeine Itching    Demerol [Meperidine] Nausea Only    Ivp [Fd And C Blue No.1] Itching    Minoxidil Itching    Penicillamine Itching and Other (comments)     Drops blood pressure    Percocet [Oxycodone-Acetaminophen] Nausea and Vomiting         Review of Systems   Review of Systems   Constitutional: Negative for chills and fever. HENT: Negative for congestion, ear pain, rhinorrhea and sore throat. Eyes: Negative. Respiratory: Negative for cough, chest tightness, shortness of breath and wheezing. Cardiovascular: Negative for chest pain, palpitations and leg swelling. Gastrointestinal: Negative for abdominal pain, diarrhea, nausea and vomiting. Genitourinary: Negative for dysuria, flank pain and hematuria. Musculoskeletal: Positive for arthralgias, back pain and gait problem. Negative for myalgias. Skin: Negative for rash and wound. Neurological: Negative for dizziness, syncope, light-headedness and headaches. Psychiatric/Behavioral: Negative for confusion. The patient is nervous/anxious. Physical Exam    General appearance -overweight, well appearing, and in no distress  Eyes - pupils equal and reactive, extraocular eye movements intact  ENT - mucous membranes moist, pharynx normal without lesions  Neck - supple, no significant adenopathy; non-tender to palpation  Chest - clear to auscultation, no wheezes, rales or rhonchi; non-tender to palpation  Heart - normal rate and regular rhythm, S1 and S2 normal, no murmurs noted  Abdomen - soft, nontender, nondistended, no masses or organomegaly  Musculoskeletal -tender to palpation right hip and SI joint, tender to palpation right paralumbar musculature, deformity or swelling; normal ROM  Extremities - peripheral pulses normal, no pedal edema  Skin - normal coloration and turgor, no rashes  Neurological - alert, oriented x3, normal speech, no focal findings or movement disorder noted    Diagnostic Study Results     Labs -   No results found for this or any previous visit (from the past 12 hour(s)).     Radiologic Studies -   No orders to display     CT Results  (Last 48 hours)    None        CXR Results  (Last 48 hours)    None            Medical Decision Making   I am the first provider for this patient. I reviewed the vital signs, available nursing notes, past medical history, past surgical history, family history and social history. Vital Signs-Reviewed the patient's vital signs. Patient Vitals for the past 12 hrs:   Temp Pulse Resp BP SpO2   03/03/20 2224 98.3 °F (36.8 °C) 86 18 134/62 99 %           Records Reviewed: Nursing Notes and Old Medical Records    Provider Notes (Medical Decision Making):   Differential diagnosis: Sciatica, lumbar radiculopathy, muscle strain    ED Course:   Initial assessment performed. The patients presenting problems have been discussed, and they are in agreement with the care plan formulated and outlined with them. I have encouraged them to ask questions as they arise throughout their visit. Progress Notes:     Feeling better after meds in the ED. Ready for discharge  Disposition:  Discharge home    PLAN:  1. Discharge Medication List as of 3/4/2020 12:59 AM      START taking these medications    Details   HYDROcodone-acetaminophen (NORCO) 5-325 mg per tablet Take 1 Tab by mouth every six (6) hours as needed for Pain for up to 3 days. Max Daily Amount: 4 Tabs., Print, Disp-12 Tab, R-0      methylPREDNISolone (MEDROL, COMPA,) 4 mg tablet As directed, Print, Disp-1 Dose Pack, R-0         CONTINUE these medications which have NOT CHANGED    Details   rosuvastatin (CRESTOR) 20 mg tablet Take 20 mg by mouth nightly., Historical Med      dulaglutide (TRULICITY) 1.5 NAGEL/7.6 mL sub-q pen 1.5 mg by SubCUTAneous route every seven (7) days. , Historical Med      clopidogreL (PLAVIX) 75 mg tab Take 75 mg by mouth daily. , Historical Med      aspirin delayed-release 81 mg tablet Take 81 mg by mouth daily. , Historical Med      naproxen (NAPROSYN) 500 mg tablet Take 1 Tab by mouth every twelve (12) hours as needed for Pain., Normal, Disp-20 Tab, R-0 promethazine (PHENERGAN) 25 mg tablet Take 1 Tab by mouth every six (6) hours as needed for Nausea. , Print, Disp-30 Tab, R-0      budesonide-formoterol (SYMBICORT) 160-4.5 mcg/actuation HFAA Take 1 Puff by inhalation as needed., Historical Med      raNITIdine (ZANTAC) 150 mg tablet Take 150 mg by mouth two (2) times a day., Historical Med      hydroxychloroquine (PLAQUENIL) 200 mg tablet Take 200 mg by mouth two (2) times a day., Historical Med      allopurinol (ZYLOPRIM) 300 mg tablet Take  by mouth daily. , Historical Med      albuterol (PROVENTIL HFA, VENTOLIN HFA, PROAIR HFA) 90 mcg/actuation inhaler Take 1 Puff by inhalation as needed., Normal, Disp-1 Inhaler, R-1      plecanatide (TRULANCE) 3 mg tab Take 3 mg by mouth daily. , Historical Med      colchicine 0.6 mg tablet Take 1 Tab by mouth daily. , Print, Disp-1 Tab, R-0      lidocaine (LIDODERM) 5 % 1 Patch by TransDERmal route every twenty-four (24) hours. Apply patch to the affected area for 12 hours a day and remove for 12 hours a day., Historical Med      gabapentin (NEURONTIN) 400 mg capsule 400 mg p.o. 3 times daily and 1200 mg p.o. nightly, Print, Disp-180 Cap, R-2      diclofenac (VOLTAREN) 1 % gel Apply  to affected area as needed., Historical Med      amLODIPine-benazepril (LOTREL) 5-20 mg per capsule Take 1 Cap by mouth daily. , Historical Med      carvedilol (COREG) 25 mg tablet Take 25 mg by mouth two (2) times daily (with meals). , Historical Med      bumetanide (BUMEX) 2 mg tablet Take 2 mg by mouth two (2) times a day., Historical Med      DULoxetine (CYMBALTA) 60 mg capsule Take 60 mg by mouth daily. , Historical Med      zolpidem (AMBIEN) 10 mg tablet Take 10 mg by mouth nightly as needed for Sleep., Historical Med      cholecalciferol, vitamin D3, 2,000 unit tab Take 2,000 Units by mouth daily. , Historical Med      cyanocobalamin 1,000 mcg tablet Take 1,000 mcg by mouth daily. , Historical Med      hyoscyamine (ANASPAZ, LEVSIN) 0.125 mg tablet Take 125 mcg by mouth every four (4) hours as needed for Cramping., Historical Med      spironolactone (ALDACTONE) 25 mg tablet Oral, 2 TIMES DAILY Starting 11/9/2010, Until Discontinued, Historical Med      pregabalin (LYRICA) 150 mg capsule 150 mg, Oral, 2 TIMES DAILY Starting 11/9/2010, Until Discontinued, Historical Med           2. Follow-up Information     Follow up With Specialties Details Why Contact Info    [facility] EMERGENCY DEPT Emergency Medicine  If symptoms worsen [address]    Yadi Zelaya MD Internal Medicine In 2 days  [address] [place] [address],[address]  [phone]          Return to ED if worse     Diagnosis     Clinical Impression:   1.  Sciatica of right side

## 2020-04-06 ENCOUNTER — HOSPITAL ENCOUNTER (OUTPATIENT)
Dept: CT IMAGING | Age: 65
Discharge: HOME | End: 2020-04-06
Attending: PHYSICIAN ASSISTANT
Payer: MEDICARE

## 2020-04-06 DIAGNOSIS — R10.32 LEFT LOWER QUADRANT PAIN: ICD-10-CM

## 2020-04-06 PROCEDURE — 74176 CT ABD & PELVIS W/O CONTRAST: CPT

## 2020-04-06 PROCEDURE — 74011636320 HC RX REV CODE- 636/320: Performed by: PHYSICIAN ASSISTANT

## 2020-04-06 RX ADMIN — IOHEXOL 50 ML: 240 INJECTION, SOLUTION INTRATHECAL; INTRAVASCULAR; INTRAVENOUS; ORAL at 17:07

## 2020-11-18 ENCOUNTER — HOSPITAL ENCOUNTER (INPATIENT)
Age: 65
LOS: 12 days | Discharge: HOME HEALTH | DRG: 177 | End: 2020-11-30
Attending: EMERGENCY MEDICINE | Admitting: HOSPITALIST
Payer: MEDICARE

## 2020-11-18 ENCOUNTER — APPOINTMENT (OUTPATIENT)
Dept: GENERAL RADIOLOGY | Age: 65
DRG: 177 | End: 2020-11-18
Attending: EMERGENCY MEDICINE
Payer: MEDICARE

## 2020-11-18 DIAGNOSIS — R06.02 SOB (SHORTNESS OF BREATH): Primary | ICD-10-CM

## 2020-11-18 LAB
25(OH)D3 SERPL-MCNC: 10.5 NG/ML (ref 30–100)
ALBUMIN SERPL-MCNC: 3.6 G/DL (ref 3.5–5)
ALBUMIN/GLOB SERPL: 0.7 {RATIO} (ref 1.1–2.2)
ALP SERPL-CCNC: 63 U/L (ref 45–117)
ALT SERPL-CCNC: 47 U/L (ref 12–78)
ANION GAP SERPL CALC-SCNC: 7 MMOL/L (ref 5–15)
AST SERPL-CCNC: 39 U/L (ref 15–37)
BASOPHILS # BLD: 0 K/UL (ref 0–0.1)
BASOPHILS NFR BLD: 0 % (ref 0–1)
BILIRUB SERPL-MCNC: 0.4 MG/DL (ref 0.2–1)
BUN SERPL-MCNC: 35 MG/DL (ref 6–20)
BUN/CREAT SERPL: 22 (ref 12–20)
CALCIUM SERPL-MCNC: 10.1 MG/DL (ref 8.5–10.1)
CHLORIDE SERPL-SCNC: 99 MMOL/L (ref 97–108)
CO2 SERPL-SCNC: 28 MMOL/L (ref 21–32)
COMMENT, HOLDF: NORMAL
CREAT SERPL-MCNC: 1.6 MG/DL (ref 0.55–1.02)
CRP SERPL-MCNC: 8.87 MG/DL (ref 0–0.6)
D DIMER PPP FEU-MCNC: 2.09 MG/L FEU (ref 0–0.65)
DIFFERENTIAL METHOD BLD: ABNORMAL
EOSINOPHIL # BLD: 0 K/UL (ref 0–0.4)
EOSINOPHIL NFR BLD: 0 % (ref 0–7)
ERYTHROCYTE [DISTWIDTH] IN BLOOD BY AUTOMATED COUNT: 13.9 % (ref 11.5–14.5)
FERRITIN SERPL-MCNC: 293 NG/ML (ref 8–252)
FIBRINOGEN PPP-MCNC: >800 MG/DL (ref 200–475)
GLOBULIN SER CALC-MCNC: 5 G/DL (ref 2–4)
GLUCOSE SERPL-MCNC: 180 MG/DL (ref 65–100)
HCT VFR BLD AUTO: 38.7 % (ref 35–47)
HGB BLD-MCNC: 12.6 G/DL (ref 11.5–16)
IMM GRANULOCYTES # BLD AUTO: 0.1 K/UL (ref 0–0.04)
IMM GRANULOCYTES NFR BLD AUTO: 1 % (ref 0–0.5)
INR PPP: 1.1 (ref 0.9–1.1)
LACTATE SERPL-SCNC: 0.8 MMOL/L (ref 0.4–2)
LDH SERPL L TO P-CCNC: 359 U/L (ref 81–246)
LYMPHOCYTES # BLD: 0.6 K/UL (ref 0.8–3.5)
LYMPHOCYTES NFR BLD: 8 % (ref 12–49)
MCH RBC QN AUTO: 30.2 PG (ref 26–34)
MCHC RBC AUTO-ENTMCNC: 32.6 G/DL (ref 30–36.5)
MCV RBC AUTO: 92.8 FL (ref 80–99)
MONOCYTES # BLD: 0.2 K/UL (ref 0–1)
MONOCYTES NFR BLD: 3 % (ref 5–13)
NEUTS SEG # BLD: 6.9 K/UL (ref 1.8–8)
NEUTS SEG NFR BLD: 88 % (ref 32–75)
NRBC # BLD: 0 K/UL (ref 0–0.01)
NRBC BLD-RTO: 0 PER 100 WBC
PLATELET # BLD AUTO: 199 K/UL (ref 150–400)
PMV BLD AUTO: 10.9 FL (ref 8.9–12.9)
POTASSIUM SERPL-SCNC: 3.6 MMOL/L (ref 3.5–5.1)
PROCALCITONIN SERPL-MCNC: 0.25 NG/ML
PROT SERPL-MCNC: 8.6 G/DL (ref 6.4–8.2)
PROTHROMBIN TIME: 11 SEC (ref 9–11.1)
RBC # BLD AUTO: 4.17 M/UL (ref 3.8–5.2)
RBC MORPH BLD: ABNORMAL
SAMPLES BEING HELD,HOLD: NORMAL
SODIUM SERPL-SCNC: 134 MMOL/L (ref 136–145)
WBC # BLD AUTO: 7.8 K/UL (ref 3.6–11)

## 2020-11-18 PROCEDURE — 80053 COMPREHEN METABOLIC PANEL: CPT

## 2020-11-18 PROCEDURE — 71045 X-RAY EXAM CHEST 1 VIEW: CPT

## 2020-11-18 PROCEDURE — 65660000000 HC RM CCU STEPDOWN

## 2020-11-18 PROCEDURE — 82306 VITAMIN D 25 HYDROXY: CPT

## 2020-11-18 PROCEDURE — 85610 PROTHROMBIN TIME: CPT

## 2020-11-18 PROCEDURE — 74011250637 HC RX REV CODE- 250/637: Performed by: HOSPITALIST

## 2020-11-18 PROCEDURE — 85379 FIBRIN DEGRADATION QUANT: CPT

## 2020-11-18 PROCEDURE — 94640 AIRWAY INHALATION TREATMENT: CPT

## 2020-11-18 PROCEDURE — 86140 C-REACTIVE PROTEIN: CPT

## 2020-11-18 PROCEDURE — 99284 EMERGENCY DEPT VISIT MOD MDM: CPT

## 2020-11-18 PROCEDURE — 83605 ASSAY OF LACTIC ACID: CPT

## 2020-11-18 PROCEDURE — 93005 ELECTROCARDIOGRAM TRACING: CPT

## 2020-11-18 PROCEDURE — 36415 COLL VENOUS BLD VENIPUNCTURE: CPT

## 2020-11-18 PROCEDURE — 84145 PROCALCITONIN (PCT): CPT

## 2020-11-18 PROCEDURE — 85025 COMPLETE CBC W/AUTO DIFF WBC: CPT

## 2020-11-18 PROCEDURE — 83615 LACTATE (LD) (LDH) ENZYME: CPT

## 2020-11-18 PROCEDURE — 77030012341 HC CHMB SPCR OPTC MDI VYRM -A

## 2020-11-18 PROCEDURE — 85384 FIBRINOGEN ACTIVITY: CPT

## 2020-11-18 PROCEDURE — 74011250637 HC RX REV CODE- 250/637: Performed by: EMERGENCY MEDICINE

## 2020-11-18 PROCEDURE — 74011250636 HC RX REV CODE- 250/636: Performed by: HOSPITALIST

## 2020-11-18 PROCEDURE — 87635 SARS-COV-2 COVID-19 AMP PRB: CPT

## 2020-11-18 PROCEDURE — 82728 ASSAY OF FERRITIN: CPT

## 2020-11-18 RX ORDER — DULOXETIN HYDROCHLORIDE 60 MG/1
60 CAPSULE, DELAYED RELEASE ORAL DAILY
Status: DISCONTINUED | OUTPATIENT
Start: 2020-11-19 | End: 2020-11-30 | Stop reason: HOSPADM

## 2020-11-18 RX ORDER — ACETAMINOPHEN 325 MG/1
650 TABLET ORAL
Status: DISCONTINUED | OUTPATIENT
Start: 2020-11-18 | End: 2020-11-18 | Stop reason: SDUPTHER

## 2020-11-18 RX ORDER — ASPIRIN 81 MG/1
81 TABLET ORAL DAILY
Status: DISCONTINUED | OUTPATIENT
Start: 2020-11-19 | End: 2020-11-30 | Stop reason: HOSPADM

## 2020-11-18 RX ORDER — ALLOPURINOL 300 MG/1
300 TABLET ORAL DAILY
Status: DISCONTINUED | OUTPATIENT
Start: 2020-11-19 | End: 2020-11-30 | Stop reason: HOSPADM

## 2020-11-18 RX ORDER — ACETAMINOPHEN 650 MG/1
650 SUPPOSITORY RECTAL
Status: DISCONTINUED | OUTPATIENT
Start: 2020-11-18 | End: 2020-11-18 | Stop reason: SDUPTHER

## 2020-11-18 RX ORDER — CARVEDILOL 12.5 MG/1
25 TABLET ORAL 2 TIMES DAILY WITH MEALS
Status: DISCONTINUED | OUTPATIENT
Start: 2020-11-18 | End: 2020-11-30 | Stop reason: HOSPADM

## 2020-11-18 RX ORDER — SODIUM CHLORIDE 0.9 % (FLUSH) 0.9 %
5-40 SYRINGE (ML) INJECTION AS NEEDED
Status: DISCONTINUED | OUTPATIENT
Start: 2020-11-18 | End: 2020-11-30 | Stop reason: HOSPADM

## 2020-11-18 RX ORDER — ACETAMINOPHEN 325 MG/1
650 TABLET ORAL
Status: DISCONTINUED | OUTPATIENT
Start: 2020-11-18 | End: 2020-11-30 | Stop reason: HOSPADM

## 2020-11-18 RX ORDER — ALBUTEROL SULFATE 90 UG/1
1 AEROSOL, METERED RESPIRATORY (INHALATION)
Status: DISCONTINUED | OUTPATIENT
Start: 2020-11-18 | End: 2020-11-19

## 2020-11-18 RX ORDER — ONDANSETRON 2 MG/ML
4 INJECTION INTRAMUSCULAR; INTRAVENOUS
Status: DISCONTINUED | OUTPATIENT
Start: 2020-11-18 | End: 2020-11-30 | Stop reason: HOSPADM

## 2020-11-18 RX ORDER — ROSUVASTATIN CALCIUM 10 MG/1
20 TABLET, COATED ORAL
Status: DISCONTINUED | OUTPATIENT
Start: 2020-11-18 | End: 2020-11-30 | Stop reason: HOSPADM

## 2020-11-18 RX ORDER — MONTELUKAST SODIUM 10 MG/1
10 TABLET ORAL DAILY
COMMUNITY

## 2020-11-18 RX ORDER — ENOXAPARIN SODIUM 100 MG/ML
40 INJECTION SUBCUTANEOUS DAILY
Status: DISCONTINUED | OUTPATIENT
Start: 2020-11-19 | End: 2020-11-18

## 2020-11-18 RX ORDER — POLYETHYLENE GLYCOL 3350 17 G/17G
17 POWDER, FOR SOLUTION ORAL DAILY PRN
Status: DISCONTINUED | OUTPATIENT
Start: 2020-11-18 | End: 2020-11-30 | Stop reason: HOSPADM

## 2020-11-18 RX ORDER — LEVOFLOXACIN 5 MG/ML
500 INJECTION, SOLUTION INTRAVENOUS
Status: COMPLETED | OUTPATIENT
Start: 2020-11-18 | End: 2020-11-18

## 2020-11-18 RX ORDER — CLOPIDOGREL BISULFATE 75 MG/1
75 TABLET ORAL DAILY
Status: DISCONTINUED | OUTPATIENT
Start: 2020-11-19 | End: 2020-11-30 | Stop reason: HOSPADM

## 2020-11-18 RX ORDER — PROMETHAZINE HYDROCHLORIDE 25 MG/1
12.5 TABLET ORAL
Status: DISCONTINUED | OUTPATIENT
Start: 2020-11-18 | End: 2020-11-30 | Stop reason: HOSPADM

## 2020-11-18 RX ORDER — SODIUM CHLORIDE 0.9 % (FLUSH) 0.9 %
5-40 SYRINGE (ML) INJECTION EVERY 8 HOURS
Status: DISCONTINUED | OUTPATIENT
Start: 2020-11-18 | End: 2020-11-30 | Stop reason: HOSPADM

## 2020-11-18 RX ORDER — HYDROXYCHLOROQUINE SULFATE 200 MG/1
200 TABLET, FILM COATED ORAL 2 TIMES DAILY
Status: DISCONTINUED | OUTPATIENT
Start: 2020-11-18 | End: 2020-11-30 | Stop reason: HOSPADM

## 2020-11-18 RX ORDER — FAMOTIDINE 40 MG/1
40 TABLET, FILM COATED ORAL DAILY
COMMUNITY

## 2020-11-18 RX ORDER — SULFASALAZINE 500 MG/1
500 TABLET ORAL 2 TIMES DAILY
COMMUNITY

## 2020-11-18 RX ORDER — PREGABALIN 75 MG/1
150 CAPSULE ORAL 2 TIMES DAILY
Status: DISCONTINUED | OUTPATIENT
Start: 2020-11-18 | End: 2020-11-18

## 2020-11-18 RX ORDER — GUAIFENESIN/DEXTROMETHORPHAN 100-10MG/5
5 SYRUP ORAL
Status: DISCONTINUED | OUTPATIENT
Start: 2020-11-18 | End: 2020-11-30 | Stop reason: HOSPADM

## 2020-11-18 RX ORDER — FLUTICASONE PROPIONATE 50 MCG
2 SPRAY, SUSPENSION (ML) NASAL
COMMUNITY
End: 2021-06-10

## 2020-11-18 RX ORDER — ENOXAPARIN SODIUM 100 MG/ML
30 INJECTION SUBCUTANEOUS EVERY 12 HOURS
Status: DISCONTINUED | OUTPATIENT
Start: 2020-11-18 | End: 2020-11-19

## 2020-11-18 RX ORDER — ALBUTEROL SULFATE 90 UG/1
2 AEROSOL, METERED RESPIRATORY (INHALATION)
Status: COMPLETED | OUTPATIENT
Start: 2020-11-18 | End: 2020-11-18

## 2020-11-18 RX ORDER — SODIUM CHLORIDE 9 MG/ML
75 INJECTION, SOLUTION INTRAVENOUS CONTINUOUS
Status: DISCONTINUED | OUTPATIENT
Start: 2020-11-18 | End: 2020-11-19

## 2020-11-18 RX ORDER — ACETAMINOPHEN 650 MG/1
650 SUPPOSITORY RECTAL
Status: DISCONTINUED | OUTPATIENT
Start: 2020-11-18 | End: 2020-11-30 | Stop reason: HOSPADM

## 2020-11-18 RX ADMIN — GUAIFENESIN AND DEXTROMETHORPHAN 5 ML: 100; 10 SYRUP ORAL at 22:57

## 2020-11-18 RX ADMIN — ALBUTEROL SULFATE 2 PUFF: 90 AEROSOL, METERED RESPIRATORY (INHALATION) at 14:41

## 2020-11-18 RX ADMIN — HYDROXYCHLOROQUINE SULFATE 200 MG: 200 TABLET, FILM COATED ORAL at 22:56

## 2020-11-18 RX ADMIN — LEVOFLOXACIN 500 MG: 5 INJECTION, SOLUTION INTRAVENOUS at 15:40

## 2020-11-18 RX ADMIN — ACETAMINOPHEN 650 MG: 325 TABLET ORAL at 22:56

## 2020-11-18 RX ADMIN — SODIUM CHLORIDE 75 ML/HR: 9 INJECTION, SOLUTION INTRAVENOUS at 20:10

## 2020-11-18 RX ADMIN — Medication 10 ML: at 22:57

## 2020-11-18 RX ADMIN — ROSUVASTATIN 20 MG: 10 TABLET, FILM COATED ORAL at 22:56

## 2020-11-18 NOTE — ED PROVIDER NOTES
HPI     Pt is a 72 y.o. F with PMH of cardiomyopathy, COVID (diagnosed on 11/8), here with c/o cough, shortness of breath, and fever. She is taking azithromycin, prednisone, tylenol, and tussin, albuterol inhaler and nasal spray. She has also used her husbands nebulizer. She has pleuritic pain with coughing but otherwise no chest pain. No other complaints at this time.       Past Medical History:   Diagnosis Date    Anxiety     Arrhythmia     per pt, treated with med    Arthritis     Asthma     CAD (coronary artery disease)     EF=33 1/3    Chronic pain     fibromyalgia    COVID-19     Diarrhea 3/24/2017    Epigastric pain 3/24/2017    Fibromyalgia     GERD (gastroesophageal reflux disease)     ibs    Gout     Heart failure (HCC)     cardiomyopathy    Hypertension     Psychiatric disorder     h/o severe depression    Stroke ([facility] [address]     possible TIA    Unspecified sleep apnea     wears CPAP       Past Surgical History:   Procedure Laterality Date    COLONOSCOPY N/A 8/23/2016    COLONOSCOPY performed by Damion Alvarez MD at [facility] ENDOSCOPY    COLONOSCOPY N/A 2/21/2020    COLONOSCOPY performed by Davis Stoll MD at [address] N/A 3/24/2017    FLEXIBLE SIGMOIDOSCOPY  performed by Myron Arellano MD at [facility] AMBULATORY OR    HX APPENDECTOMY      HX ENDOSCOPY      HX GYN      hysterectomy    HX HEENT      left eye surgery    HX KNEE ARTHROSCOPY Right     HX KNEE REPLACEMENT Right     HX ORTHOPAEDIC Right     plantar fascitis repair with implant    HX OTHER SURGICAL      hand surgery left (carpal tunnel, reconstruction of small finger)    SIGMOIDOSCOPY,BIOPSY  3/24/2017         VASCULAR SURGERY PROCEDURE UNLIST      stent in bilateral legs         Family History:   Problem Relation Age of Onset    Diabetes Mother        Social History     Socioeconomic History    Marital status:      Spouse name: Not on file    Number of children: Not on file    Years of education: Not on file    Highest education level: Not on file   Occupational History    Not on file   Social Needs    Financial resource strain: Not on file    Food insecurity     Worry: Not on file     Inability: Not on file    Transportation needs     Medical: Not on file     Non-medical: Not on file   Tobacco Use    Smoking status: Never Smoker    Smokeless tobacco: Never Used   Substance and Sexual Activity    Alcohol use: No    Drug use: No     Types: OTC, Prescription    Sexual activity: Never   Lifestyle    Physical activity     Days per week: Not on file     Minutes per session: Not on file    Stress: Not on file   Relationships    Social connections     Talks on phone: Not on file     Gets together: Not on file     Attends Confucianism service: Not on file     Active member of club or organization: Not on file     Attends meetings of clubs or organizations: Not on file     Relationship status: Not on file    Intimate partner violence     Fear of current or ex partner: Not on file     Emotionally abused: Not on file     Physically abused: Not on file     Forced sexual activity: Not on file   Other Topics Concern    Not on file   Social History Narrative    Not on file         ALLERGIES: Codeine; Demerol [meperidine]; Ivp [fd and c blue no.1]; Minoxidil; Penicillamine; and Percocet [oxycodone-acetaminophen]    Review of Systems   Constitutional: Positive for fever. Negative for chills and diaphoresis. HENT: Negative for congestion and trouble swallowing. Eyes: Negative for photophobia and visual disturbance. Respiratory: Positive for cough and shortness of breath. Negative for chest tightness. Cardiovascular: Positive for chest pain. Negative for palpitations and leg swelling. Gastrointestinal: Negative for abdominal pain, diarrhea, nausea and vomiting. Genitourinary: Negative for difficulty urinating, dysuria, flank pain and frequency.    Musculoskeletal: Negative for back pain and myalgias. Skin: Negative for rash and wound. Neurological: Negative for dizziness, weakness, light-headedness and headaches. Hematological: Negative for adenopathy. Does not bruise/bleed easily. Psychiatric/Behavioral: Negative for agitation and confusion. All other systems reviewed and are negative. Vitals:    11/18/20 1309   BP: 118/69   Pulse: 97   Resp: 20   Temp: 97.8 °F (36.6 °C)   SpO2: 92%            Physical Exam  Vitals signs and nursing note reviewed. Constitutional:       General: She is not in acute distress. Appearance: She is well-developed. She is not diaphoretic. HENT:      Head: Normocephalic. Eyes:      Conjunctiva/sclera: Conjunctivae normal.      Pupils: Pupils are equal, round, and reactive to light. Neck:      Musculoskeletal: Normal range of motion and neck supple. Vascular: No JVD. Cardiovascular:      Rate and Rhythm: Normal rate and regular rhythm. Heart sounds: Normal heart sounds. Pulmonary:      Effort: Pulmonary effort is normal.      Breath sounds: Wheezing present. Abdominal:      General: Bowel sounds are normal. There is no distension. Palpations: Abdomen is soft. Tenderness: There is no abdominal tenderness. Musculoskeletal: Normal range of motion. General: No tenderness or deformity. Lymphadenopathy:      Cervical: No cervical adenopathy. Skin:     General: Skin is warm and dry. Capillary Refill: Capillary refill takes less than 2 seconds. Findings: No erythema or rash. Neurological:      Mental Status: She is alert and oriented to person, place, and time. Cranial Nerves: No cranial nerve deficit. Sensory: No sensory deficit. MDM       Procedures    ED EKG interpretation:  Normal sinus rhythm, rate 92, regular, nonspecific T abnormality. No STEMI. Normal P and QRS slightly prolonged.   EKG documented by Shayy Stover MD, as interpreted by Maxine Hale MD, ED MD.    Jim Hunter Serve Consult for Admission  3:02 PM to Dr. Tati Wells    ED Room Number: BK17/28  Patient Name and age:  Robert Brandon 72 y.o.  female  Working Diagnosis:   1. SOB (shortness of breath)    2. VICKI    COVID-19 Suspicion:  yes  Sepsis present:  no  Reassessment needed: no  Code Status:  Full Code  Readmission: no  Isolation Requirements:  yes  Recommended Level of Care:  med/surg  Department:[facility] ED - 21   Other:      Admit accepted.     Bassem Arreola MD

## 2020-11-18 NOTE — ROUTINE PROCESS
TRANSFER - OUT REPORT: 
 
Verbal report given to Miley on Miri Juna  being transferred to [facility] for routine progression of care Report consisted of patients Situation, Background, Assessment and  
Recommendations(SBAR). Information from the following report(s) SBAR, ED Summary, [facility] and Recent Results was reviewed with the receiving nurse. Lines:  
Peripheral IV 11/18/20 Right Antecubital (Active) Site Assessment Clean, dry, & intact 11/18/20 1355 Phlebitis Assessment 0 11/18/20 1355 Infiltration Assessment 0 11/18/20 1355 Dressing Status Clean, dry, & intact 11/18/20 1355 Opportunity for questions and clarification was provided. Patient transported with: 
 [employer]

## 2020-11-18 NOTE — H&P
History & Physical    Primary Care Provider: Krysten Eaton MD  Source of Information: Patient     History of Presenting Illness:   Anson Faria is a 72 y.o. female who presents with cough and shortness of breath since 11/8 patient was tested positive Covid at an outside testing center and was taking antibiotics nebulizer and Medrol pack did not improve patient said that cough is increasing in frequency and thickening whitish in color no blood in it patient  also have cold-like symptoms and admitted at [facility] patient has chronic systolic heart failure but she takes all her medications regularly does not seem to be in exacerbation. Currently patient was saturating well in room air in the ED patient was admitted for further management    The patient denies any fever, chills, chest pain, cough, congestion, recent illness, palpitations, or dysuria. Review of Systems:  Pertinent items are noted in the History of Present Illness.      Past Medical History:   Diagnosis Date    Anxiety     Arrhythmia     per pt, treated with med    Arthritis     Asthma     CAD (coronary artery disease)     EF=33 1/3    Chronic pain     fibromyalgia    COVID-19     Diarrhea 3/24/2017    Epigastric pain 3/24/2017    Fibromyalgia     GERD (gastroesophageal reflux disease)     ibs    Gout     Heart failure ([facility] [address]     cardiomyopathy    Hypertension     Psychiatric disorder     h/o severe depression    Stroke ([facility] [address]     possible TIA    Unspecified sleep apnea     wears CPAP      Past Surgical History:   Procedure Laterality Date    COLONOSCOPY N/A 8/23/2016    COLONOSCOPY performed by Nahed Zapien MD at [facility] ENDOSCOPY    COLONOSCOPY N/A 2/21/2020    COLONOSCOPY performed by Lila Dietz MD at [place] [id number] N/A 3/24/2017    FLEXIBLE SIGMOIDOSCOPY  performed by Macy Altamirano MD at [facility] AMBULATORY OR    HX APPENDECTOMY      HX ENDOSCOPY      HX GYN      hysterectomy    HX HEENT      left eye surgery    HX KNEE ARTHROSCOPY Right     HX KNEE REPLACEMENT Right     HX ORTHOPAEDIC Right     plantar fascitis repair with implant    HX OTHER SURGICAL      hand surgery left (carpal tunnel, reconstruction of small finger)    SIGMOIDOSCOPY,BIOPSY  3/24/2017         VASCULAR SURGERY PROCEDURE UNLIST      stent in bilateral legs     Prior to Admission medications    Medication Sig Start Date End Date Taking? Authorizing Provider   methylPREDNISolone (MEDROL, COMPA,) 4 mg tablet As directed 3/4/20   Maru Pires MD   rosuvastatin (CRESTOR) 20 mg tablet Take 20 mg by mouth nightly. Provider, Historical   dulaglutide (TRULICITY) 1.5 MQ/5.6 mL sub-q pen 1.5 mg by SubCUTAneous route every seven (7) days. Provider, Historical   clopidogreL (PLAVIX) 75 mg tab Take 75 mg by mouth daily. Provider, Historical   aspirin delayed-release 81 mg tablet Take 81 mg by mouth daily. Provider, Historical   naproxen (NAPROSYN) 500 mg tablet Take 1 Tab by mouth every twelve (12) hours as needed for Pain. 2/8/19   Jeri Benitez MD   promethazine (PHENERGAN) 25 mg tablet Take 1 Tab by mouth every six (6) hours as needed for Nausea. 11/20/18   Elveria Halo, DPM   budesonide-formoterol (SYMBICORT) 160-4.5 mcg/actuation HFAA Take 1 Puff by inhalation as needed. Provider, Historical   raNITIdine (ZANTAC) 150 mg tablet Take 150 mg by mouth two (2) times a day. Provider, Historical   hydroxychloroquine (PLAQUENIL) 200 mg tablet Take 200 mg by mouth two (2) times a day. OtherFabio MD   allopurinol (ZYLOPRIM) 300 mg tablet Take  by mouth daily. Fabio Mills MD   albuterol (PROVENTIL HFA, VENTOLIN HFA, PROAIR HFA) 90 mcg/actuation inhaler Take 1 Puff by inhalation as needed. 8/9/18   Katia Chowdhury MD   plecanatide (TRULANCE) 3 mg tab Take 3 mg by mouth daily.     Provider, Historical   colchicine 0.6 mg tablet Take 1 Tab by mouth daily. Patient taking differently: Take 0.6 mg by mouth. 4/15/17   Dwight Gilman MD   lidocaine (LIDODERM) 5 % 1 Patch by TransDERmal route every twenty-four (24) hours. Apply patch to the affected area for 12 hours a day and remove for 12 hours a day. Provider, Historical   gabapentin (NEURONTIN) 400 mg capsule 400 mg p.o. 3 times daily and 1200 mg p.o. nightly  Patient taking differently: 300 mg three (3) times daily. 400 mg p.o. 3 times daily and 1200 mg p.o. nightly 2/14/17   Tahmina Desai MD   diclofenac (VOLTAREN) 1 % gel Apply  to affected area as needed. Provider, Historical   amLODIPine-benazepril (LOTREL) 5-20 mg per capsule Take 1 Cap by mouth daily. Provider, Historical   carvedilol (COREG) 25 mg tablet Take 25 mg by mouth two (2) times daily (with meals). OtherFabio MD   bumetanide (BUMEX) 2 mg tablet Take 2 mg by mouth two (2) times a day. OtherFabio MD   DULoxetine (CYMBALTA) 60 mg capsule Take 60 mg by mouth daily. Fabio Mills MD   zolpidem (AMBIEN) 10 mg tablet Take 10 mg by mouth nightly as needed for Sleep. Fabio Mills MD   cholecalciferol, vitamin D3, 2,000 unit tab Take 2,000 Units by mouth daily. Fabio Mills MD   cyanocobalamin 1,000 mcg tablet Take 1,000 mcg by mouth daily. OtherFabio MD   hyoscyamine (ANASPAZ, LEVSIN) 0.125 mg tablet Take 125 mcg by mouth every four (4) hours as needed for Cramping. Provider, Historical   spironolactone (ALDACTONE) 25 mg tablet Take  by mouth two (2) times a day. 11/9/10   Provider, Historical   pregabalin (LYRICA) 150 mg capsule Take 150 mg by mouth two (2) times a day.  11/9/10   Provider, Historical     Allergies   Allergen Reactions    Codeine Itching    Demerol [Meperidine] Nausea Only    Ivp [Fd And C Blue No.1] Itching    Minoxidil Itching    Penicillamine Itching and Other (comments)     Drops blood pressure    Percocet [Oxycodone-Acetaminophen] Nausea and Vomiting      Family History   Problem Relation Age of Onset    Diabetes Mother         SOCIAL HISTORY:  Patient resides:  Independently x   Assisted Living    SNF    With family care       Smoking history:   None x   Former    Chronic      Alcohol history:   None x   Social    Chronic      Ambulates:   Independently x   w/cane    w/walker    w/wc    CODE STATUS:  DNR    Full x   Other      Objective:     Physical Exam:     Visit Vitals  /69   Pulse 97   Temp 97.8 °F (36.6 °C)   Resp 20   SpO2 93%      O2 Device: Room air    General:  Alert, cooperative, no distress, appears stated age. Head:  Normocephalic, without obvious abnormality, atraumatic. Eyes:  Conjunctivae/corneas clear. PERRL, EOMs intact. Nose: Nares normal. Septum midline. Mucosa normal. No drainage or sinus tenderness. Throat: Lips, mucosa, and tongue normal. Teeth and gums normal.   Neck: Supple, symmetrical, trachea midline,, no carotid bruit and no JVD. Lungs:   Clear to auscultation bilaterally. Heart:  Regular rate and rhythm, S1, S2 normal, no murmur, click, rub or gallop. Abdomen:   Soft, non-tender. Bowel sounds normal. No masses,  No organomegaly. Extremities: Extremities normal, atraumatic, no cyanosis or edema. Pulses: 2+ and symmetric all extremities. Skin: Skin color, texture, turgor normal. No rashes or lesions   Neurologic: CNII-XII intact. EKG:  normal sinus rhythm. Data Review:     Recent Days:  Recent Labs     11/18/20  1343   WBC 7.8   HGB 12.6   HCT 38.7        Recent Labs     11/18/20  1343   *   K 3.6   CL 99   CO2 28   *   BUN 35*   CREA 1.60*   CA 10.1   ALB 3.6   TBILI 0.4   ALT 47     No results for input(s): PH, PCO2, PO2, HCO3, FIO2 in the last 72 hours.     24 Hour Results:  Recent Results (from the past 24 hour(s))   CBC WITH AUTOMATED DIFF    Collection Time: 11/18/20  1:43 PM   Result Value Ref Range    WBC 7.8 3.6 - 11.0 K/uL    RBC 4.17 3.80 - 5.20 M/uL    HGB 12.6 11.5 - 16.0 g/dL    HCT 38.7 35.0 - 47.0 %    MCV 92.8 80.0 - 99.0 FL    MCH 30.2 26.0 - 34.0 PG    MCHC 32.6 30.0 - 36.5 g/dL    RDW 13.9 11.5 - 14.5 %    PLATELET 816 468 - 194 K/uL    MPV 10.9 8.9 - 12.9 FL    NRBC 0.0 0  WBC    ABSOLUTE NRBC 0.00 0.00 - 0.01 K/uL    NEUTROPHILS 88 (H) 32 - 75 %    LYMPHOCYTES 8 (L) 12 - 49 %    MONOCYTES 3 (L) 5 - 13 %    EOSINOPHILS 0 0 - 7 %    BASOPHILS 0 0 - 1 %    IMMATURE GRANULOCYTES 1 (H) 0.0 - 0.5 %    ABS. NEUTROPHILS 6.9 1.8 - 8.0 K/UL    ABS. LYMPHOCYTES 0.6 (L) 0.8 - 3.5 K/UL    ABS. MONOCYTES 0.2 0.0 - 1.0 K/UL    ABS. EOSINOPHILS 0.0 0.0 - 0.4 K/UL    ABS. BASOPHILS 0.0 0.0 - 0.1 K/UL    ABS. IMM. GRANS. 0.1 (H) 0.00 - 0.04 K/UL    DF SMEAR SCANNED      RBC COMMENTS NORMOCYTIC, NORMOCHROMIC     METABOLIC PANEL, COMPREHENSIVE    Collection Time: 11/18/20  1:43 PM   Result Value Ref Range    Sodium 134 (L) 136 - 145 mmol/L    Potassium 3.6 3.5 - 5.1 mmol/L    Chloride 99 97 - 108 mmol/L    CO2 28 21 - 32 mmol/L    Anion gap 7 5 - 15 mmol/L    Glucose 180 (H) 65 - 100 mg/dL    BUN 35 (H) 6 - 20 MG/DL    Creatinine 1.60 (H) 0.55 - 1.02 MG/DL    BUN/Creatinine ratio 22 (H) 12 - 20      GFR est AA 39 (L) >60 ml/min/1.73m2    GFR est non-AA 32 (L) >60 ml/min/1.73m2    Calcium 10.1 8.5 - 10.1 MG/DL    Bilirubin, total 0.4 0.2 - 1.0 MG/DL    ALT (SGPT) 47 12 - 78 U/L    AST (SGOT) 39 (H) 15 - 37 U/L    Alk. phosphatase 63 45 - 117 U/L    Protein, total 8.6 (H) 6.4 - 8.2 g/dL    Albumin 3.6 3.5 - 5.0 g/dL    Globulin 5.0 (H) 2.0 - 4.0 g/dL    A-G Ratio 0.7 (L) 1.1 - 2.2     SAMPLES BEING HELD    Collection Time: 11/18/20  1:43 PM   Result Value Ref Range    SAMPLES BEING HELD 1RED,1BLU     COMMENT        Add-on orders for these samples will be processed based on acceptable specimen integrity and analyte stability, which may vary by analyte.    EKG, 12 LEAD, INITIAL    Collection Time: 11/18/20  1:47 PM   Result Value Ref Range    Ventricular Rate 92 BPM    Atrial Rate 92 BPM    P-R Interval 168 ms    QRS Duration 112 ms    Q-T Interval 344 ms    QTC Calculation (Bezet) 425 ms    Calculated P Axis 34 degrees    Calculated R Axis -4 degrees    Calculated T Axis 21 degrees    Diagnosis       Normal sinus rhythm  Nonspecific T wave abnormality  When compared with ECG of 02-FEB-2019 14:49,  No significant change was found           Imaging:       Xr Chest Port    Result Date: 11/18/2020  IMPRESSION: There is suspicion of vague patchy opacities in the lower lung zones but study is limited by patient's body habitus. Assessment:     Principal Problem:    Chronic systolic heart failure ([address] (7/2/2014)    Active Problems:    SOB (shortness of breath) (11/18/2020)           Plan:     1. Shortness of breath present on admission patient is on room air  -Suspected Covid droplet plus isolation nebulizer as needed  -If positive will call ID and pulmonary for remdesivir and inhaled nitric oxide therapy currently not indicated as patient is on room air continue to monitor  -Supportive treatment and Covid focused labs ordered  -Continue antibiotics check pro calcitonin  -Will not start any steroid steroids at this time    2. Chronic systolic heart failure NYHA II  -Looks euvolemic and on the drier side  -Hold diuretics continue beta-blocker hold ACE inhibitor    3. VICKI on CKD 2  Possibly in the setting of diuresis and ACE inhibitor-hold both gentle hydration for 24 hours    4. Gout  -Hold colchicine continue allopurinol     5. Diabetes type 2  -Continue sliding scale insulin get A1c    6. Hypertension continue beta-blocker hold ACE inhibitor due to VICKI    7. Obesity BMI 39.47    8. Ambulate at baseline lives with family    5. Full code    10.   DVT prophylaxis subcu heparin       Signed By: Isiah Ken MD     November 18, 2020

## 2020-11-19 LAB
ABO + RH BLD: NORMAL
ANION GAP SERPL CALC-SCNC: 7 MMOL/L (ref 5–15)
ATRIAL RATE: 92 BPM
BLOOD GROUP ANTIBODIES SERPL: NORMAL
BUN SERPL-MCNC: 34 MG/DL (ref 6–20)
BUN/CREAT SERPL: 24 (ref 12–20)
CALCIUM SERPL-MCNC: 9.5 MG/DL (ref 8.5–10.1)
CALCULATED P AXIS, ECG09: 34 DEGREES
CALCULATED R AXIS, ECG10: -4 DEGREES
CALCULATED T AXIS, ECG11: 21 DEGREES
CHLORIDE SERPL-SCNC: 100 MMOL/L (ref 97–108)
CO2 SERPL-SCNC: 28 MMOL/L (ref 21–32)
CREAT SERPL-MCNC: 1.43 MG/DL (ref 0.55–1.02)
D DIMER PPP FEU-MCNC: 3.15 MG/L FEU (ref 0–0.65)
DIAGNOSIS, 93000: NORMAL
ERYTHROCYTE [DISTWIDTH] IN BLOOD BY AUTOMATED COUNT: 13.7 % (ref 11.5–14.5)
FIBRINOGEN PPP-MCNC: >800 MG/DL (ref 200–475)
GLUCOSE BLD STRIP.AUTO-MCNC: 147 MG/DL (ref 65–100)
GLUCOSE BLD STRIP.AUTO-MCNC: 184 MG/DL (ref 65–100)
GLUCOSE SERPL-MCNC: 137 MG/DL (ref 65–100)
HCT VFR BLD AUTO: 34 % (ref 35–47)
HEALTH STATUS, XMCV2T: ABNORMAL
HGB BLD-MCNC: 11.3 G/DL (ref 11.5–16)
INR PPP: 1.1 (ref 0.9–1.1)
MCH RBC QN AUTO: 30.1 PG (ref 26–34)
MCHC RBC AUTO-ENTMCNC: 33.2 G/DL (ref 30–36.5)
MCV RBC AUTO: 90.7 FL (ref 80–99)
NRBC # BLD: 0 K/UL (ref 0–0.01)
NRBC BLD-RTO: 0 PER 100 WBC
P-R INTERVAL, ECG05: 168 MS
PLATELET # BLD AUTO: 188 K/UL (ref 150–400)
PMV BLD AUTO: 11.1 FL (ref 8.9–12.9)
POTASSIUM SERPL-SCNC: 3.5 MMOL/L (ref 3.5–5.1)
PROTHROMBIN TIME: 11.8 SEC (ref 9–11.1)
Q-T INTERVAL, ECG07: 344 MS
QRS DURATION, ECG06: 112 MS
QTC CALCULATION (BEZET), ECG08: 425 MS
RBC # BLD AUTO: 3.75 M/UL (ref 3.8–5.2)
SARS-COV-2, COV2: DETECTED
SERVICE CMNT-IMP: ABNORMAL
SERVICE CMNT-IMP: ABNORMAL
SODIUM SERPL-SCNC: 135 MMOL/L (ref 136–145)
SOURCE, COVRS: ABNORMAL
SPECIMEN EXP DATE BLD: NORMAL
SPECIMEN SOURCE, FCOV2M: ABNORMAL
SPECIMEN TYPE, XMCV1T: ABNORMAL
VENTRICULAR RATE, ECG03: 92 BPM
WBC # BLD AUTO: 9.2 K/UL (ref 3.6–11)

## 2020-11-19 PROCEDURE — 85610 PROTHROMBIN TIME: CPT

## 2020-11-19 PROCEDURE — 86900 BLOOD TYPING SEROLOGIC ABO: CPT

## 2020-11-19 PROCEDURE — 85379 FIBRIN DEGRADATION QUANT: CPT

## 2020-11-19 PROCEDURE — 74011636637 HC RX REV CODE- 636/637: Performed by: INTERNAL MEDICINE

## 2020-11-19 PROCEDURE — 85027 COMPLETE CBC AUTOMATED: CPT

## 2020-11-19 PROCEDURE — 36415 COLL VENOUS BLD VENIPUNCTURE: CPT

## 2020-11-19 PROCEDURE — 87449 NOS EACH ORGANISM AG IA: CPT

## 2020-11-19 PROCEDURE — 74011250636 HC RX REV CODE- 250/636: Performed by: INTERNAL MEDICINE

## 2020-11-19 PROCEDURE — 65660000000 HC RM CCU STEPDOWN

## 2020-11-19 PROCEDURE — 74011250637 HC RX REV CODE- 250/637: Performed by: INTERNAL MEDICINE

## 2020-11-19 PROCEDURE — 74011250637 HC RX REV CODE- 250/637: Performed by: HOSPITALIST

## 2020-11-19 PROCEDURE — 82962 GLUCOSE BLOOD TEST: CPT

## 2020-11-19 PROCEDURE — 80048 BASIC METABOLIC PNL TOTAL CA: CPT

## 2020-11-19 PROCEDURE — 85384 FIBRINOGEN ACTIVITY: CPT

## 2020-11-19 PROCEDURE — 74011250636 HC RX REV CODE- 250/636: Performed by: HOSPITALIST

## 2020-11-19 PROCEDURE — 87899 AGENT NOS ASSAY W/OPTIC: CPT

## 2020-11-19 RX ORDER — MAGNESIUM SULFATE 100 %
4 CRYSTALS MISCELLANEOUS AS NEEDED
Status: DISCONTINUED | OUTPATIENT
Start: 2020-11-19 | End: 2020-11-30 | Stop reason: HOSPADM

## 2020-11-19 RX ORDER — ALBUTEROL SULFATE 90 UG/1
1 AEROSOL, METERED RESPIRATORY (INHALATION)
COMMUNITY

## 2020-11-19 RX ORDER — GABAPENTIN 300 MG/1
300 CAPSULE ORAL
COMMUNITY

## 2020-11-19 RX ORDER — DEXTROSE MONOHYDRATE 100 MG/ML
0-250 INJECTION, SOLUTION INTRAVENOUS AS NEEDED
Status: DISCONTINUED | OUTPATIENT
Start: 2020-11-19 | End: 2020-11-30 | Stop reason: HOSPADM

## 2020-11-19 RX ORDER — ASCORBIC ACID 500 MG
500 TABLET ORAL 2 TIMES DAILY
Status: DISCONTINUED | OUTPATIENT
Start: 2020-11-19 | End: 2020-11-30 | Stop reason: HOSPADM

## 2020-11-19 RX ORDER — SULFASALAZINE 500 MG/1
500 TABLET ORAL 2 TIMES DAILY WITH MEALS
Status: DISCONTINUED | OUTPATIENT
Start: 2020-11-19 | End: 2020-11-30 | Stop reason: HOSPADM

## 2020-11-19 RX ORDER — MONTELUKAST SODIUM 10 MG/1
10 TABLET ORAL
Status: DISCONTINUED | OUTPATIENT
Start: 2020-11-19 | End: 2020-11-30 | Stop reason: HOSPADM

## 2020-11-19 RX ORDER — DEXAMETHASONE 4 MG/1
6 TABLET ORAL EVERY 24 HOURS
Status: COMPLETED | OUTPATIENT
Start: 2020-11-19 | End: 2020-11-28

## 2020-11-19 RX ORDER — ZINC SULFATE 50(220)MG
1 CAPSULE ORAL DAILY
Status: COMPLETED | OUTPATIENT
Start: 2020-11-19 | End: 2020-11-28

## 2020-11-19 RX ORDER — BUMETANIDE 1 MG/1
0.5 TABLET ORAL DAILY
Status: DISCONTINUED | OUTPATIENT
Start: 2020-11-20 | End: 2020-11-20

## 2020-11-19 RX ORDER — BUDESONIDE AND FORMOTEROL FUMARATE DIHYDRATE 160; 4.5 UG/1; UG/1
2 AEROSOL RESPIRATORY (INHALATION)
Status: DISCONTINUED | OUTPATIENT
Start: 2020-11-19 | End: 2020-11-19

## 2020-11-19 RX ORDER — ENOXAPARIN SODIUM 100 MG/ML
40 INJECTION SUBCUTANEOUS EVERY 12 HOURS
Status: DISCONTINUED | OUTPATIENT
Start: 2020-11-19 | End: 2020-11-30 | Stop reason: HOSPADM

## 2020-11-19 RX ORDER — ALBUTEROL SULFATE 90 UG/1
1 AEROSOL, METERED RESPIRATORY (INHALATION)
Status: DISCONTINUED | OUTPATIENT
Start: 2020-11-19 | End: 2020-11-24

## 2020-11-19 RX ORDER — INSULIN LISPRO 100 [IU]/ML
INJECTION, SOLUTION INTRAVENOUS; SUBCUTANEOUS
Status: DISCONTINUED | OUTPATIENT
Start: 2020-11-19 | End: 2020-11-30 | Stop reason: HOSPADM

## 2020-11-19 RX ORDER — AMLODIPINE BESYLATE 5 MG/1
5 TABLET ORAL DAILY
Status: DISCONTINUED | OUTPATIENT
Start: 2020-11-19 | End: 2020-11-30 | Stop reason: HOSPADM

## 2020-11-19 RX ORDER — COLCHICINE 0.6 MG/1
0.6 TABLET ORAL
COMMUNITY

## 2020-11-19 RX ADMIN — Medication 10 ML: at 05:53

## 2020-11-19 RX ADMIN — MONTELUKAST 10 MG: 10 TABLET, FILM COATED ORAL at 22:14

## 2020-11-19 RX ADMIN — ONDANSETRON 4 MG: 2 INJECTION INTRAMUSCULAR; INTRAVENOUS at 17:41

## 2020-11-19 RX ADMIN — DULOXETINE HYDROCHLORIDE 60 MG: 60 CAPSULE, DELAYED RELEASE ORAL at 10:05

## 2020-11-19 RX ADMIN — ROSUVASTATIN 20 MG: 10 TABLET, FILM COATED ORAL at 22:14

## 2020-11-19 RX ADMIN — AMLODIPINE BESYLATE 5 MG: 5 TABLET ORAL at 15:26

## 2020-11-19 RX ADMIN — Medication 81 MG: at 10:05

## 2020-11-19 RX ADMIN — CARVEDILOL 25 MG: 12.5 TABLET, FILM COATED ORAL at 10:05

## 2020-11-19 RX ADMIN — INSULIN LISPRO 2 UNITS: 100 INJECTION, SOLUTION INTRAVENOUS; SUBCUTANEOUS at 17:32

## 2020-11-19 RX ADMIN — CARVEDILOL 25 MG: 12.5 TABLET, FILM COATED ORAL at 17:33

## 2020-11-19 RX ADMIN — ENOXAPARIN SODIUM 40 MG: 40 INJECTION SUBCUTANEOUS at 17:32

## 2020-11-19 RX ADMIN — SULFASALAZINE 500 MG: 500 TABLET ORAL at 17:41

## 2020-11-19 RX ADMIN — ALLOPURINOL 300 MG: 300 TABLET ORAL at 10:05

## 2020-11-19 RX ADMIN — HYDROXYCHLOROQUINE SULFATE 200 MG: 200 TABLET, FILM COATED ORAL at 10:05

## 2020-11-19 RX ADMIN — ZINC SULFATE 220 MG (50 MG) CAPSULE 1 CAPSULE: CAPSULE at 15:26

## 2020-11-19 RX ADMIN — Medication 10 ML: at 22:15

## 2020-11-19 RX ADMIN — DEXAMETHASONE 6 MG: 4 TABLET ORAL at 15:26

## 2020-11-19 RX ADMIN — OXYCODONE HYDROCHLORIDE AND ACETAMINOPHEN 500 MG: 500 TABLET ORAL at 17:32

## 2020-11-19 RX ADMIN — ONDANSETRON 4 MG: 2 INJECTION INTRAMUSCULAR; INTRAVENOUS at 12:07

## 2020-11-19 RX ADMIN — HYDROXYCHLOROQUINE SULFATE 200 MG: 200 TABLET, FILM COATED ORAL at 17:33

## 2020-11-19 RX ADMIN — ACETAMINOPHEN 650 MG: 325 TABLET ORAL at 12:07

## 2020-11-19 RX ADMIN — CLOPIDOGREL BISULFATE 75 MG: 75 TABLET ORAL at 10:05

## 2020-11-19 RX ADMIN — ACETAMINOPHEN 650 MG: 325 TABLET ORAL at 05:51

## 2020-11-19 NOTE — PROGRESS NOTES
Pulmonary    Due to COVID-19 pandemic and in efforts to decrease risk of spread and potential spread of virus as well as to conserve personal protective equipment, direct patient contact is being limited where clinically appropriate. Chart reviewed. All pertinent images, lab studies, and medical testing have been reviewed (images independently viewed). Pulmonary recommendations are being made to the primary medical team.    Pulmonary Impression:        COVID 19 pneumonia - febrile and on RA with elevated inflammatory markers and risk for deterioration    Systolic CHF    H/o asthma    H/o CAD    Mild renal insufficiency above baseline      Pulmonary Recommendations:   Supportive care  O2 if needed  ID to see for remdesivir  On decadron and lovenox            History:         73 yo female  has a past medical history of Anxiety, Arrhythmia, Arthritis, Asthma, CAD (coronary artery disease), Chronic pain, COVID-19, Diarrhea (3/24/2017), Epigastric pain (3/24/2017), Fibromyalgia, GERD (gastroesophageal reflux disease), Gout, Heart failure ([facility] [address] Hypertension, Psychiatric disorder, Stroke ([facility] [address] and Unspecified sleep apnea. Presented with cough and shortness of breath. Reportedly tested positive for COVID 19 on  and has not improved. Admitted with CXR suggestive of patchy lower lobe infiltrates. She is on RA but inflammatory markers are elevated.     She has a reported h/o asthma          Vital Signs:       Patient Vitals for the past 4 hrs:   BP Temp Pulse Resp SpO2   20 1524 (!) 110/57 98.4 °F (36.9 °C) 85 21 97 %   20 1201 (!) 151/72 100.4 °F (38 °C) (!) 103 20 92 %     Temp (24hrs), Av.7 °F (38.2 °C), Min:98.4 °F (36.9 °C), Max:102.7 °F (39.3 °C)    CVP:          Intake/Output Summary (Last 24 hours) at 2020 1528  Last data filed at 2020 1158  Gross per 24 hour   Intake 1506.25 ml   Output    Net 1506.25 ml     Blood Sugar:  Lab Results   Component Value Date/Time    Glucose (POC) 93 09/15/2019 08:03 PM       Physical Exam:      Deferred             Data Review:      Radiology images independently viewed  CXR 11/18/20 - CM, with patchy lower lobe infiltrates    Labs:  Lab:  Recent Labs     11/19/20  0045 11/18/20  1541 11/18/20  1343   WBC 9.2  --  7.8   HGB 11.3*  --  12.6     --  199   *  --  134*   K 3.5  --  3.6     --  99   CO2 28  --  28   BUN 34*  --  35*   CREA 1.43*  --  1.60*   *  --  180*   CA 9.5  --  10.1   INR 1.1  --  1.1   TBILI  --   --  0.4   LAC  --  0.8  --    d dimer 3.15  Fibrinogen > 800  procalcitonin 0.25  Ferritin 293  CRP - 8.87  SARS COV2 Positive         ABG:  No results for input(s): PHI, PCO2I, PO2I, HCO3I, SO2I, FIO2I in the last 72 hours.   Microbiology:       Senthil Ivy MD

## 2020-11-19 NOTE — PROGRESS NOTES
Problem: Airway Clearance - Ineffective  Goal: Achieve or maintain patent airway  Outcome: Progressing Towards Goal     Problem: Gas Exchange - Impaired  Goal: Absence of hypoxia  Outcome: Progressing Towards Goal  Goal: Promote optimal lung function  Outcome: Progressing Towards Goal     Problem: Breathing Pattern - Ineffective  Goal: Ability to achieve and maintain a regular respiratory rate  Outcome: Progressing Towards Goal     Problem: Body Temperature -  Risk of, Imbalanced  Goal: Ability to maintain a body temperature within defined limits  Outcome: Progressing Towards Goal  Goal: Will regain or maintain usual level of consciousness  Outcome: Progressing Towards Goal  Goal: Complications related to the disease process, condition or treatment will be avoided or minimized  Outcome: Progressing Towards Goal     Problem: Isolation Precautions - Risk of Spread of Infection  Goal: Prevent transmission of infectious organism to others  Outcome: Progressing Towards Goal     Problem: Nutrition Deficits  Goal: Optimize nutrtional status  Outcome: Progressing Towards Goal     Problem: Risk for Fluid Volume Deficit  Goal: Maintain normal heart rhythm  Outcome: Progressing Towards Goal  Goal: Maintain absence of muscle cramping  Outcome: Progressing Towards Goal  Goal: Maintain normal serum potassium, sodium, calcium, phosphorus, and pH  Outcome: Progressing Towards Goal     Problem: Loneliness or Risk for Loneliness  Goal: Demonstrate positive use of time alone when socialization is not possible  Outcome: Progressing Towards Goal     Problem: Fatigue  Goal: Verbalize increase energy and improved vitality  Outcome: Progressing Towards Goal     Problem: Patient Education: Go to Patient Education Activity  Goal: Patient/Family Education  Outcome: Progressing Towards Goal     Problem: Falls - Risk of  Goal: *Absence of Falls  Description: Document Christiano Fall Risk and appropriate interventions in the flowsheet.   Outcome: Progressing Towards Goal  Note: Fall Risk Interventions:    Medication Interventions: Patient to call before getting OOB, Teach patient to arise slowly      Problem: Patient Education: Go to Patient Education Activity  Goal: Patient/Family Education  Outcome: Progressing Towards Goal

## 2020-11-19 NOTE — PROGRESS NOTES
Bedside shift change report given to Den Pedraza RN (oncoming nurse) by DEVAUGHN Mahoney (offgoing nurse).  Report included the following information SBAR, Kardex, Intake/Output, MAR, Accordion and Cardiac Rhythm NSR-ST.

## 2020-11-19 NOTE — PROGRESS NOTES
Reason for Admission:  Admitted from home with complaints cough and shortness of breath. Reports that she tested positive for Covid on 11/08 outpatient. RUR Score:  13%-Low                   Plan for utilizing home health: Independent with all ADLs. No needs identified at this time. Will need to evaluate level of care needed at discharge. PCP: First and Last name: Sugar Dutta    Name of Practice:    Are you a current patient: Yes/No: YES  Approximate date of last visit: 11/2020  Can you participate in a virtual visit with your PCP: YES                    Current Advanced Directive/Advance Care Plan:  Vera Baum is NOK                         Transition of Care Plan:    Covid positive  Per attending Covid therapeutics not indicated at this time secondary to patient remaining on RA. Once medically stable will likely discharge home with family. Medicare pt has received, reviewed, and signed  IM letter informing them of their right to appeal the discharge. Signed copy has been placed on pt bedside chart.   Care Management Interventions  PCP Verified by CM: Yes(11/20)  Palliative Care Criteria Met (RRAT>21 & CHF Dx)?: No  Mode of Transport at Discharge: ( car)  Current Support Network: Lives with Spouse, Own Home  The Patient and/or Patient Representative was Provided with a Choice of Provider and Agrees with the Discharge Plan?: ( Lisa Jones)  [facility] Provided?: No  Masha Dyson, [facility]  Ext [phone]

## 2020-11-19 NOTE — ACP (ADVANCE CARE PLANNING)
Advance Care Planning     Advance Care Planning Activator (Inpatient)  Conversation Note      Date of ACP Conversation: 11/19/20     Conversation Conducted with:   Patient with Decision Making Capacity    ACP Activator: Juanita Oglesby RN    *When Decision Maker makes decisions on behalf of the incapacitated patient: Decision Maker is asked to consider and make decisions based on patient values, known preferences, or best interests. Health Care Decision Maker:    Current Designated Health Care Decision Maker:   Primary Decision Maker: Wei Osborn - [phone]    Secondary Decision Maker: Shanice Camp - Daughter - [phone]  (If there is a [address] named in the [address]" box in the ACP activity, but it is not visible above, be sure to open that field and then select the health care decision maker relationship (ie \"primary\") in the blank space to the right of the name.) Validate  this information as still accurate & up-to-date; edit [place] field as needed.)    Note: Assess and validate information in current ACP documents, as indicated. If no Decision Maker listed above or available through scanned documents, then:    If no Authorized Decision Maker has previously been identified, then patient chooses [place]:  \"Who would you like to name as your primary health care decision-maker? \"    Name: Marita Malik   Relationship:  Phone number: [phone]  Joyce Danielle this person be reached easily? \" YES  \"Who would you like to name as your back-up decision maker? \"   Name: Mariya Maldonado  Relationship: child Phone number: [phone]  Joyce Danielle this person be reached easily? \" YES    Note: If the relationship of these Decision-Makers to the patient does NOT follow your state's Next of Kin hierarchy, recommend that patient complete ACP document that meets state-specific requirements to allow them to act on the patient's behalf when appropriate. Care Preferences    Ventilation: \"If you were in your present state of health and suddenly became very ill and were unable to breathe on your own, what would your preference be about the use of a ventilator (breathing machine) if it were available to you? \"      If patient would desire the use of a ventilator (breathing machine), answer \"yes\", if not \"no\":yes    \"If your health worsens and it becomes clear that your chance of recovery is unlikely, what would your preference be about the use of a ventilator (breathing machine) if it were available to you? \"     Would the patient desire the use of a ventilator (breathing machine)? YES      Resuscitation  \"CPR works best to restart the heart when there is a sudden event, like a heart attack, in someone who is otherwise healthy. Unfortunately, CPR does not typically restart the heart for people who have serious health conditions or who are very sick. \"    \"In the event your heart stopped as a result of an underlying serious health condition, would you want attempts to be made to restart your heart (answer \"yes\" for attempt to resuscitate) or would you prefer a natural death (answer \"no\" for do not attempt to resuscitate)? \" yes  [x] Yes  [] No   Educated Patient / Pender Hurst regarding differences between Advance Directives and portable DNR orders.     Length of ACP Conversation in minutes:  10    Conversation Outcomes:  [x] ACP discussion completed  [] Existing advance directive reviewed with patient; no changes to patient's previously recorded wishes     [] New Advance Directive completed   [] Portable Do Not Resuscitate prepared for Provider review and signature  [] POLST/POST/MOLST/MOST prepared for Provider review and signature      Follow-up plan:    [] Schedule follow-up conversation to continue planning  [] Referred individual to Provider for additional questions/concerns   [] Advised patient/agent/surrogate to review completed ACP document and update if needed with changes in condition, patient preferences or care setting     [] This note routed to one or more involved healthcare providers

## 2020-11-19 NOTE — PROGRESS NOTES
[facility] Adult  Hospitalist Group                                                                                          Hospitalist Progress Note  Jenny Monge DO  Answering service: [phone] from in house phone        Date of Service:  2020  NAME:  Tasia Florence  :  1955  MRN:  [id number]      Admission Summary:   72 y.o. female who presents with cough and shortness of breath since  patient was tested positive Covid at an outside testing center and was taking antibiotics nebulizer and Medrol pack did not improve patient said that cough is increasing in frequency and thickening whitish in color no blood in it patient  also have cold-like symptoms and admitted at [facility] hospital patient has chronic systolic heart failure but she takes all her medications regularly does not seem to be in exacerbation. Currently patient was saturating well in room air in the ED patient was admitted for further management    Interval history / Subjective: Follow up COVID 19. Patient seen and examined. Febrile overnight. Reports shortness of breath when talking. During exam, attempted to wean oxygen and immediately fell to 89%. Placed back on supplemental oxygen. Her  is hospitalized at the [facility].      Assessment & Plan:     COVID-19 pneumonia:  Acute hypoxic respiratory failure (89% on RA + respiratory distress, tachypnea)  -ID consult for remdesivir  -Dexamethasone 6 mg once daily  -Vitamin C, zinc  -Pulmonary consult for JODY pulse  -tylenol for fevers  -check type and screen for convalescent plasma  -Hold antibiotics, pro calcitonin 0.25  -Trend inflammatory markers    Chronic systolic heart failure NYHA II  -Initiate low-dose diuretic   -Continue home carvedilol  -Continue home Plavix, aspirin, rosuvastatin     VICKI on CKD 2: Improved  -Monitor closely, reinitiate diuretic      History of rheumatoid arthritis:  -Continue sulfasalazine, Plaquenil    History of depression:  -Continue home Cymbalta,    Gout  -Hold colchicine, continue allopurinol      Diabetes type 2 with hyperglycemia  -continue SSI, monitor closely with steroids, consider long acting insulin  -obtain Ha1c     Hypertension:  -continue carvedilol, add amlodipine     Obesity BMI 39.47    Code status: Full  DVT prophylaxis: Lovenox per Covid protocol    Care Plan discussed with: Patient/Family  Anticipated Disposition: Home w/Family  Anticipated Discharge: Greater than 48 hours     Hospital Problems  Date Reviewed: 11/18/2020          Codes Class Noted POA    SOB (shortness of breath) ICD-10-CM: R06.02  ICD-9-CM: 786.05  11/18/2020 Unknown        * (Principal) Chronic systolic heart failure ([facility] [address] ICD-10-CM: I50.22  ICD-9-CM: 428.22  7/2/2014 Yes                Review of Systems:   Negative unless stated above      Vital Signs:    Last 24hrs VS reviewed since prior progress note. Most recent are:  Visit Vitals  BP (!) 110/57   Pulse 85   Temp 98.4 °F (36.9 °C)   Resp 21   Ht 5' 7\" (1.702 m)   Wt 113.4 kg (249 lb 14.4 oz)   SpO2 97%   BMI 39.14 kg/m²         Intake/Output Summary (Last 24 hours) at 11/19/2020 1701  Last data filed at 11/19/2020 1558  Gross per 24 hour   Intake 1506.25 ml   Output    Net 1506.25 ml        Physical Examination:     I had a face to face encounter with this patient and independently examined them on 11/19/2020 as outlined below:          Constitutional:  No acute distress, cooperative, pleasant    ENT:  Oral mucosa moist, oropharynx benign. Resp:  Bilateral coarse crackles. Collette Ruts + accessory muscle use when talking   CV:  Regular rhythm, normal rate, no murmurs, gallops, rubs    GI:  Soft, non distended, non tender. normoactive bowel sounds, no hepatosplenomegaly     Musculoskeletal:  No edema, warm, 2+ pulses throughout    Neurologic:  Moves all extremities.              Data Review:    Review and/or order of clinical lab test  Review and/or order of tests in the radiology section of CPT  Review and/or order of tests in the medicine section of CPT      Labs:     Recent Labs     11/19/20  0045 11/18/20  1343   WBC 9.2 7.8   HGB 11.3* 12.6   HCT 34.0* 38.7    199     Recent Labs     11/19/20  0045 11/18/20  1343   * 134*   K 3.5 3.6    99   CO2 28 28   BUN 34* 35*   CREA 1.43* 1.60*   * 180*   CA 9.5 10.1     Recent Labs     11/18/20  1343   ALT 47   AP 63   TBILI 0.4   TP 8.6*   ALB 3.6   GLOB 5.0*     Recent Labs     11/19/20  0045 11/18/20  1343   INR 1.1 1.1   PTP 11.8* 11.0      Recent Labs     11/18/20  1343   FERR 293*      No results found for: FOL, RBCF   No results for input(s): PH, PCO2, PO2 in the last 72 hours. No results for input(s): CPK, CKNDX, TROIQ in the last 72 hours.     No lab exists for component: CPKMB  No results found for: CHOL, CHOLX, CHLST, CHOLV, HDL, HDLP, LDL, LDLC, DLDLP, TGLX, TRIGL, TRIGP, CHHD, CHHDX  Lab Results   Component Value Date/Time    Glucose (POC) 93 09/15/2019 08:03 PM     Lab Results   Component Value Date/Time    Color YELLOW/STRAW 09/15/2019 09:47 PM    Appearance CLEAR 09/15/2019 09:47 PM    Specific gravity 1.013 09/15/2019 09:47 PM    pH (UA) 6.0 09/15/2019 09:47 PM    Protein NEGATIVE  09/15/2019 09:47 PM    Glucose NEGATIVE  09/15/2019 09:47 PM    Ketone NEGATIVE  09/15/2019 09:47 PM    Bilirubin NEGATIVE  09/15/2019 09:47 PM    Urobilinogen 0.2 09/15/2019 09:47 PM    Nitrites NEGATIVE  09/15/2019 09:47 PM    Leukocyte Esterase NEGATIVE  09/15/2019 09:47 PM    Epithelial cells FEW 09/15/2019 09:47 PM    Bacteria NEGATIVE  09/15/2019 09:47 PM    WBC 0-4 09/15/2019 09:47 PM    RBC 0-5 09/15/2019 09:47 PM         Medications Reviewed:     Current Facility-Administered Medications   Medication Dose Route Frequency    glucose chewable tablet 16 g  4 Tab Oral PRN    glucagon (GLUCAGEN) injection 1 mg  1 mg IntraMUSCular PRN    dextrose 10% infusion 0-250 mL  0-250 mL IntraVENous PRN    insulin lispro (HUMALOG) injection   SubCUTAneous AC&HS    amLODIPine (NORVASC) tablet 5 mg  5 mg Oral DAILY    enoxaparin (LOVENOX) injection 40 mg  40 mg SubCUTAneous Q12H    ascorbic acid (vitamin C) (VITAMIN C) tablet 500 mg  500 mg Oral BID    zinc sulfate (ZINCATE) 220 (50) mg capsule 1 Cap  1 Cap Oral DAILY    sulfaSALAzine (AZULFIDINE) tablet 500 mg  500 mg Oral BID WITH MEALS    montelukast (SINGULAIR) tablet 10 mg  10 mg Oral QHS    dexAMETHasone (DECADRON) tablet 6 mg  6 mg Oral Q24H    albuterol (PROVENTIL HFA, VENTOLIN HFA, PROAIR HFA) inhaler 1 Puff  1 Puff Inhalation Q6H PRN    allopurinoL (ZYLOPRIM) tablet 300 mg  300 mg Oral DAILY    aspirin delayed-release tablet 81 mg  81 mg Oral DAILY    carvediloL (COREG) tablet 25 mg  25 mg Oral BID WITH MEALS    clopidogreL (PLAVIX) tablet 75 mg  75 mg Oral DAILY    DULoxetine (CYMBALTA) capsule 60 mg  60 mg Oral DAILY    hydrOXYchloroQUINE (PLAQUENIL) tablet 200 mg  200 mg Oral BID    rosuvastatin (CRESTOR) tablet 20 mg  20 mg Oral QHS    acetaminophen (TYLENOL) tablet 650 mg  650 mg Oral Q6H PRN    Or    acetaminophen (TYLENOL) suppository 650 mg  650 mg Rectal Q6H PRN    guaiFENesin-dextromethorphan (ROBITUSSIN DM) 100-10 mg/5 mL syrup 5 mL  5 mL Oral Q4H PRN    sodium chloride (NS) flush 5-40 mL  5-40 mL IntraVENous Q8H    sodium chloride (NS) flush 5-40 mL  5-40 mL IntraVENous PRN    polyethylene glycol (MIRALAX) packet 17 g  17 g Oral DAILY PRN    promethazine (PHENERGAN) tablet 12.5 mg  12.5 mg Oral Q6H PRN    Or    ondansetron (ZOFRAN) injection 4 mg  4 mg IntraVENous Q6H PRN     ______________________________________________________________________  EXPECTED LENGTH OF STAY: 4d 4h  ACTUAL LENGTH OF STAY:          [facility]

## 2020-11-19 NOTE — PROGRESS NOTES
Admission Medication Reconciliation:    Information obtained from:  Patient over the phone  RxQuery data available¹:  NO    Comments/Recommendations: Updated PTA meds/reviewed patient's allergies. 1)  Information obtained from patient over the phone (seems to be a reliable historian)    2)  Medication changes (since last review): Adjusted  - Trulance to PRN  -bumex to every other day  -colchicine to PRN  -flonase to PRN  -lidocaine patch t oPRN  -gabapentin to PRN (only when she has arthritis flares)    Removed  - symbicort  -levsin  -naproxen  -lyrica  -phenergan  -zantac  -ambien  -solumedrol         ¹RxQuery pharmacy benefit data reflects medications filled and processed through the patient's insurance, however   this data does NOT capture whether the medication was picked up or is currently being taken by the patient. Allergies:  Codeine; Demerol [meperidine]; Ivp [fd and c blue no.1]; Minoxidil; Penicillamine; and Percocet [oxycodone-acetaminophen]    Significant PMH/Disease States:   Past Medical History:   Diagnosis Date    Anxiety     Arrhythmia     per pt, treated with med    Arthritis     Asthma     CAD (coronary artery disease)     EF=33 1/3    Chronic pain     fibromyalgia    COVID-19     Diarrhea 3/24/2017    Epigastric pain 3/24/2017    Fibromyalgia     GERD (gastroesophageal reflux disease)     ibs    Gout     Heart failure ([facility] [address]     cardiomyopathy    Hypertension     Psychiatric disorder     h/o severe depression    Stroke ([facility] [address]     possible TIA    Unspecified sleep apnea     wears CPAP     Chief Complaint for this Admission:    Chief Complaint   Patient presents with    Shortness of Breath     Prior to Admission Medications:   Prior to Admission Medications   Prescriptions Last Dose Informant Taking? DULoxetine (CYMBALTA) 60 mg capsule   Yes   Sig: Take 60 mg by mouth daily.    albuterol (PROVENTIL HFA, VENTOLIN HFA, PROAIR HFA) 90 mcg/actuation inhaler   Yes   Sig: Take 1 Puff by inhalation every four (4) hours as needed for Wheezing. allopurinol (ZYLOPRIM) 300 mg tablet   Yes   Sig: Take 300 mg by mouth daily. amLODIPine-benazepril (LOTREL) 5-20 mg per capsule   Yes   Sig: Take 1 Cap by mouth daily. aspirin delayed-release 81 mg tablet   Yes   Sig: Take 81 mg by mouth daily. bumetanide (BUMEX) 1 mg tablet   Yes   Sig: Take 1 mg by mouth every other day. carvedilol (COREG) 25 mg tablet   Yes   Sig: Take 25 mg by mouth two (2) times daily (with meals). cholecalciferol, vitamin D3, 2,000 unit tab   Yes   Sig: Take 2,000 Units by mouth daily. clopidogreL (PLAVIX) 75 mg tab   Yes   Sig: Take 75 mg by mouth daily. colchicine 0.6 mg tablet   Yes   Sig: Take 0.6 mg by mouth daily as needed for Gout or Pain. cyanocobalamin 1,000 mcg tablet   Yes   Sig: Take 1,000 mcg by mouth daily. diclofenac (VOLTAREN) 1 % gel   Yes   Sig: Apply  to affected area as needed. dulaglutide (TRULICITY) 1.5 GY/2.7 mL sub-q pen   Yes   Si.5 mg by SubCUTAneous route every . famotidine (PEPCID) 40 mg tablet   Yes   Sig: Take 40 mg by mouth daily. fluticasone propionate (FLONASE) 50 mcg/actuation nasal spray   Yes   Si Sprays by Both Nostrils route daily as needed. gabapentin (NEURONTIN) 300 mg capsule   Yes   Sig: Take 300 mg by mouth three (3) times daily as needed for Pain (arthritis flare ups). hydroxychloroquine (PLAQUENIL) 200 mg tablet   Yes   Sig: Take 200 mg by mouth two (2) times a day. lidocaine (LIDODERM) 5 %   Yes   Si Patch by TransDERmal route daily as needed. Apply patch to the affected area for 12 hours a day and remove for 12 hours a day. montelukast (SINGULAIR) 10 mg tablet   Yes   Sig: Take 10 mg by mouth daily. plecanatide (TRULANCE) 3 mg tab   Yes   Sig: Take 3 mg by mouth daily as needed. rosuvastatin (CRESTOR) 20 mg tablet   Yes   Sig: Take 20 mg by mouth nightly.    spironolactone (ALDACTONE) 25 mg tablet   Yes   Sig: Take 25 mg by mouth two (2) times a day. sulfaSALAzine (AZULFIDINE) 500 mg tablet   Yes   Sig: Take 500 mg by mouth two (2) times a day. Facility-Administered Medications: None       Please contact the main inpatient pharmacy with any questions or concerns at [phone] and we will direct you to the clinical pharmacist covering this patient's care while in-house.    MEAGAN SkinnerD

## 2020-11-19 NOTE — PROGRESS NOTES
1941: Patient has not arrived on unit     Bedside shift change report given to Melinda (oncoming nurse) by Aidan Madera (offgoing nurse).  Report included the following information SBAR and Kardex., JEAN REESE

## 2020-11-20 ENCOUNTER — APPOINTMENT (OUTPATIENT)
Dept: GENERAL RADIOLOGY | Age: 65
DRG: 177 | End: 2020-11-20
Attending: INTERNAL MEDICINE
Payer: MEDICARE

## 2020-11-20 LAB
ANION GAP SERPL CALC-SCNC: 8 MMOL/L (ref 5–15)
ATRIAL RATE: 102 BPM
BNP SERPL-MCNC: 152 PG/ML
BUN SERPL-MCNC: 36 MG/DL (ref 6–20)
BUN/CREAT SERPL: 27 (ref 12–20)
CALCIUM SERPL-MCNC: 9.6 MG/DL (ref 8.5–10.1)
CALCULATED P AXIS, ECG09: 48 DEGREES
CALCULATED R AXIS, ECG10: 46 DEGREES
CALCULATED T AXIS, ECG11: 38 DEGREES
CHLORIDE SERPL-SCNC: 100 MMOL/L (ref 97–108)
CO2 SERPL-SCNC: 23 MMOL/L (ref 21–32)
CREAT SERPL-MCNC: 1.32 MG/DL (ref 0.55–1.02)
D DIMER PPP FEU-MCNC: 1.9 MG/L FEU (ref 0–0.65)
DIAGNOSIS, 93000: NORMAL
ERYTHROCYTE [DISTWIDTH] IN BLOOD BY AUTOMATED COUNT: 13.8 % (ref 11.5–14.5)
EST. AVERAGE GLUCOSE BLD GHB EST-MCNC: 140 MG/DL
FIBRINOGEN PPP-MCNC: >800 MG/DL (ref 200–475)
GLUCOSE BLD STRIP.AUTO-MCNC: 109 MG/DL (ref 65–100)
GLUCOSE BLD STRIP.AUTO-MCNC: 123 MG/DL (ref 65–100)
GLUCOSE BLD STRIP.AUTO-MCNC: 132 MG/DL (ref 65–100)
GLUCOSE BLD STRIP.AUTO-MCNC: 158 MG/DL (ref 65–100)
GLUCOSE SERPL-MCNC: 153 MG/DL (ref 65–100)
HBA1C MFR BLD: 6.5 % (ref 4–5.6)
HCT VFR BLD AUTO: 33 % (ref 35–47)
HGB BLD-MCNC: 10.6 G/DL (ref 11.5–16)
INR PPP: 1.2 (ref 0.9–1.1)
L PNEUMO1 AG UR QL IA: NEGATIVE
MAGNESIUM SERPL-MCNC: 2 MG/DL (ref 1.6–2.4)
MCH RBC QN AUTO: 30.3 PG (ref 26–34)
MCHC RBC AUTO-ENTMCNC: 32.1 G/DL (ref 30–36.5)
MCV RBC AUTO: 94.3 FL (ref 80–99)
NRBC # BLD: 0 K/UL (ref 0–0.01)
NRBC BLD-RTO: 0 PER 100 WBC
P-R INTERVAL, ECG05: 172 MS
PHOSPHATE SERPL-MCNC: 3.6 MG/DL (ref 2.6–4.7)
PLATELET # BLD AUTO: 208 K/UL (ref 150–400)
POTASSIUM SERPL-SCNC: 4 MMOL/L (ref 3.5–5.1)
PROTHROMBIN TIME: 12.1 SEC (ref 9–11.1)
Q-T INTERVAL, ECG07: 320 MS
QRS DURATION, ECG06: 118 MS
QTC CALCULATION (BEZET), ECG08: 417 MS
RBC # BLD AUTO: 3.5 M/UL (ref 3.8–5.2)
SERVICE CMNT-IMP: ABNORMAL
SODIUM SERPL-SCNC: 131 MMOL/L (ref 136–145)
SPECIMEN SOURCE: NORMAL
VENTRICULAR RATE, ECG03: 102 BPM
WBC # BLD AUTO: 9.7 K/UL (ref 3.6–11)

## 2020-11-20 PROCEDURE — 83880 ASSAY OF NATRIURETIC PEPTIDE: CPT

## 2020-11-20 PROCEDURE — 65660000000 HC RM CCU STEPDOWN

## 2020-11-20 PROCEDURE — 74011000258 HC RX REV CODE- 258: Performed by: NURSE PRACTITIONER

## 2020-11-20 PROCEDURE — 80048 BASIC METABOLIC PNL TOTAL CA: CPT

## 2020-11-20 PROCEDURE — 93005 ELECTROCARDIOGRAM TRACING: CPT

## 2020-11-20 PROCEDURE — 74011250636 HC RX REV CODE- 250/636: Performed by: INTERNAL MEDICINE

## 2020-11-20 PROCEDURE — 83036 HEMOGLOBIN GLYCOSYLATED A1C: CPT

## 2020-11-20 PROCEDURE — 94640 AIRWAY INHALATION TREATMENT: CPT

## 2020-11-20 PROCEDURE — 85384 FIBRINOGEN ACTIVITY: CPT

## 2020-11-20 PROCEDURE — 85610 PROTHROMBIN TIME: CPT

## 2020-11-20 PROCEDURE — 74011250637 HC RX REV CODE- 250/637: Performed by: INTERNAL MEDICINE

## 2020-11-20 PROCEDURE — 74011250637 HC RX REV CODE- 250/637: Performed by: HOSPITALIST

## 2020-11-20 PROCEDURE — 83735 ASSAY OF MAGNESIUM: CPT

## 2020-11-20 PROCEDURE — 71045 X-RAY EXAM CHEST 1 VIEW: CPT

## 2020-11-20 PROCEDURE — 84100 ASSAY OF PHOSPHORUS: CPT

## 2020-11-20 PROCEDURE — 85379 FIBRIN DEGRADATION QUANT: CPT

## 2020-11-20 PROCEDURE — 36415 COLL VENOUS BLD VENIPUNCTURE: CPT

## 2020-11-20 PROCEDURE — 36430 TRANSFUSION BLD/BLD COMPNT: CPT

## 2020-11-20 PROCEDURE — 82962 GLUCOSE BLOOD TEST: CPT

## 2020-11-20 PROCEDURE — 85027 COMPLETE CBC AUTOMATED: CPT

## 2020-11-20 PROCEDURE — 77010033678 HC OXYGEN DAILY

## 2020-11-20 PROCEDURE — XW13325 TRANSFUSION OF CONVALESCENT PLASMA (NONAUTOLOGOUS) INTO PERIPHERAL VEIN, PERCUTANEOUS APPROACH, NEW TECHNOLOGY GROUP 5: ICD-10-PCS | Performed by: INTERNAL MEDICINE

## 2020-11-20 PROCEDURE — 74011000250 HC RX REV CODE- 250: Performed by: NURSE PRACTITIONER

## 2020-11-20 RX ORDER — LOPERAMIDE HYDROCHLORIDE 2 MG/1
4 CAPSULE ORAL
Status: DISCONTINUED | OUTPATIENT
Start: 2020-11-20 | End: 2020-11-30 | Stop reason: HOSPADM

## 2020-11-20 RX ORDER — BUMETANIDE 1 MG/1
1 TABLET ORAL DAILY
Status: DISCONTINUED | OUTPATIENT
Start: 2020-11-21 | End: 2020-11-21

## 2020-11-20 RX ORDER — GUAIFENESIN 600 MG/1
600 TABLET, EXTENDED RELEASE ORAL EVERY 12 HOURS
Status: DISCONTINUED | OUTPATIENT
Start: 2020-11-20 | End: 2020-11-30 | Stop reason: HOSPADM

## 2020-11-20 RX ORDER — SODIUM CHLORIDE 9 MG/ML
250 INJECTION, SOLUTION INTRAVENOUS AS NEEDED
Status: DISCONTINUED | OUTPATIENT
Start: 2020-11-20 | End: 2020-11-30 | Stop reason: HOSPADM

## 2020-11-20 RX ADMIN — DEXAMETHASONE 6 MG: 4 TABLET ORAL at 15:22

## 2020-11-20 RX ADMIN — ENOXAPARIN SODIUM 40 MG: 40 INJECTION SUBCUTANEOUS at 06:00

## 2020-11-20 RX ADMIN — ACETAMINOPHEN 650 MG: 325 TABLET ORAL at 19:39

## 2020-11-20 RX ADMIN — REMDESIVIR 200 MG: 100 INJECTION, POWDER, LYOPHILIZED, FOR SOLUTION INTRAVENOUS at 17:18

## 2020-11-20 RX ADMIN — ENOXAPARIN SODIUM 40 MG: 40 INJECTION SUBCUTANEOUS at 17:18

## 2020-11-20 RX ADMIN — ALBUTEROL SULFATE 1 PUFF: 90 AEROSOL, METERED RESPIRATORY (INHALATION) at 21:21

## 2020-11-20 RX ADMIN — SULFASALAZINE 500 MG: 500 TABLET ORAL at 09:13

## 2020-11-20 RX ADMIN — ACETAMINOPHEN 650 MG: 325 TABLET ORAL at 05:54

## 2020-11-20 RX ADMIN — GUAIFENESIN 600 MG: 600 TABLET, EXTENDED RELEASE ORAL at 13:21

## 2020-11-20 RX ADMIN — OXYCODONE HYDROCHLORIDE AND ACETAMINOPHEN 500 MG: 500 TABLET ORAL at 09:04

## 2020-11-20 RX ADMIN — ALBUTEROL SULFATE 1 PUFF: 90 AEROSOL, METERED RESPIRATORY (INHALATION) at 03:00

## 2020-11-20 RX ADMIN — AMLODIPINE BESYLATE 5 MG: 5 TABLET ORAL at 09:04

## 2020-11-20 RX ADMIN — OXYCODONE HYDROCHLORIDE AND ACETAMINOPHEN 500 MG: 500 TABLET ORAL at 17:18

## 2020-11-20 RX ADMIN — ALBUTEROL SULFATE 1 PUFF: 90 AEROSOL, METERED RESPIRATORY (INHALATION) at 07:38

## 2020-11-20 RX ADMIN — Medication 10 ML: at 05:52

## 2020-11-20 RX ADMIN — ACETAMINOPHEN 650 MG: 325 TABLET ORAL at 13:20

## 2020-11-20 RX ADMIN — Medication 10 ML: at 21:43

## 2020-11-20 RX ADMIN — CARVEDILOL 25 MG: 12.5 TABLET, FILM COATED ORAL at 17:18

## 2020-11-20 RX ADMIN — ALLOPURINOL 300 MG: 300 TABLET ORAL at 09:03

## 2020-11-20 RX ADMIN — ALBUTEROL SULFATE 1 PUFF: 90 AEROSOL, METERED RESPIRATORY (INHALATION) at 13:50

## 2020-11-20 RX ADMIN — MONTELUKAST 10 MG: 10 TABLET, FILM COATED ORAL at 21:43

## 2020-11-20 RX ADMIN — CLOPIDOGREL BISULFATE 75 MG: 75 TABLET ORAL at 09:04

## 2020-11-20 RX ADMIN — GUAIFENESIN 600 MG: 600 TABLET, EXTENDED RELEASE ORAL at 21:43

## 2020-11-20 RX ADMIN — HYDROXYCHLOROQUINE SULFATE 200 MG: 200 TABLET, FILM COATED ORAL at 17:18

## 2020-11-20 RX ADMIN — HYDROXYCHLOROQUINE SULFATE 200 MG: 200 TABLET, FILM COATED ORAL at 09:04

## 2020-11-20 RX ADMIN — ROSUVASTATIN 20 MG: 10 TABLET, FILM COATED ORAL at 21:43

## 2020-11-20 RX ADMIN — ZINC SULFATE 220 MG (50 MG) CAPSULE 1 CAPSULE: CAPSULE at 09:04

## 2020-11-20 RX ADMIN — BUMETANIDE 0.5 MG: 1 TABLET ORAL at 09:03

## 2020-11-20 RX ADMIN — CARVEDILOL 25 MG: 12.5 TABLET, FILM COATED ORAL at 09:04

## 2020-11-20 RX ADMIN — Medication 81 MG: at 09:04

## 2020-11-20 RX ADMIN — Medication 10 ML: at 13:21

## 2020-11-20 NOTE — CONSULTS
Infectious Disease Consult    Today's Date: 11/20/2020   Admit Date: 11/18/2020    Impression:   Acute respiratory failure secondary to COVID 19 & PNA  Allergic asthma  - COVID 19 (+)     CRP 8.87, Ferritin 293, procal 0.25    D-dimer 3.15->1.90    WBC 9.7    CXR: Mild worsening in the bilateral pulmonary infiltrates    Plan:   Remdesivir in progress  Pulmonology will offer convalescent plasma therapy if over all symptoms get worse in 24 hours  Continue with decadron, zinc, and vit c  Bacterial vs viral PNA, hold off on ABX for now. Will continue to monitor    Anti-infectives:   · none    Subjective:   Date of Consultation:  November 20, 2020  Referring Physician: Dr. Benito Sanchez    Patient is a 72 y.o. female admitted to the hospital on 11/18 with almost 10 day history of worsening of sob and cough. She is c/o fever, chills, body aches, loose stools, and non productive coughs during visit. Pt has allergic asthma, has been using rescue inhaler at least couple times a day since the symptom started. Pt would like to receive Remdesivir therapy after the discussion. Pt has medical history of allergic asthma, CAD, CHF, depression, and BONILLA. No hx of smoking or alcohol consumption. ID team was consulted for Remdesivir therapy.     Patient Active Problem List   Diagnosis Code    Knee osteoarthritis M17.10    Chronic systolic heart failure (HCC) I50.22    Diarrhea R19.7    Epigastric pain R10.13    SOB (shortness of breath) R06.02     Past Medical History:   Diagnosis Date    Anxiety     Arrhythmia     per pt, treated with med    Arthritis     Asthma     CAD (coronary artery disease)     EF=33 1/3    Chronic pain     fibromyalgia    COVID-19     Diarrhea 3/24/2017    Epigastric pain 3/24/2017    Fibromyalgia     GERD (gastroesophageal reflux disease)     ibs    Gout     Heart failure ([facility] [address]     cardiomyopathy    Hypertension     Psychiatric disorder     h/o severe depression    Stroke ([facility] [address] possible TIA    Unspecified sleep apnea     wears CPAP      Family History   Problem Relation Age of Onset    Diabetes Mother       Social History     Tobacco Use    Smoking status: Never Smoker    Smokeless tobacco: Never Used   Substance Use Topics    Alcohol use: No     Past Surgical History:   Procedure Laterality Date    COLONOSCOPY N/A 8/23/2016    COLONOSCOPY performed by Damion Alvarez MD at hospitals ENDOSCOPY    COLONOSCOPY N/A 2/21/2020    COLONOSCOPY performed by Davis Stoll MD at [place] [address] N/A 3/24/2017    FLEXIBLE SIGMOIDOSCOPY  performed by Myron Arellano MD at hospitals AMBULATORY OR    HX APPENDECTOMY      HX ENDOSCOPY      HX GYN      hysterectomy    HX HEENT      left eye surgery    HX KNEE ARTHROSCOPY Right     HX KNEE REPLACEMENT Right     HX ORTHOPAEDIC Right     plantar fascitis repair with implant    HX OTHER SURGICAL      hand surgery left (carpal tunnel, reconstruction of small finger)    SIGMOIDOSCOPY,BIOPSY  3/24/2017         VASCULAR SURGERY PROCEDURE UNLIST      stent in bilateral legs      Prior to Admission medications    Medication Sig Start Date End Date Taking? Authorizing Provider   albuterol (PROVENTIL HFA, VENTOLIN HFA, PROAIR HFA) 90 mcg/actuation inhaler Take 1 Puff by inhalation every four (4) hours as needed for Wheezing. Yes Provider, Historical   colchicine 0.6 mg tablet Take 0.6 mg by mouth daily as needed for Gout or Pain. Yes Provider, Historical   gabapentin (NEURONTIN) 300 mg capsule Take 300 mg by mouth three (3) times daily as needed for Pain (arthritis flare ups). Yes Provider, Historical   famotidine (PEPCID) 40 mg tablet Take 40 mg by mouth daily. Yes Provider, Historical   sulfaSALAzine (AZULFIDINE) 500 mg tablet Take 500 mg by mouth two (2) times a day. Yes Provider, Historical   montelukast (SINGULAIR) 10 mg tablet Take 10 mg by mouth daily.    Yes Provider, Historical   fluticasone propionate (FLONASE) 50 mcg/actuation nasal spray 2 Sprays by Both Nostrils route daily as needed. Yes Provider, Historical   rosuvastatin (CRESTOR) 20 mg tablet Take 20 mg by mouth nightly. Yes Provider, Historical   dulaglutide (TRULICITY) 1.5 CX/6.6 mL sub-q pen 1.5 mg by SubCUTAneous route every Sunday. Yes Provider, Historical   clopidogreL (PLAVIX) 75 mg tab Take 75 mg by mouth daily. Yes Provider, Historical   aspirin delayed-release 81 mg tablet Take 81 mg by mouth daily. Yes Provider, Historical   hydroxychloroquine (PLAQUENIL) 200 mg tablet Take 200 mg by mouth two (2) times a day. Yes Other, MD Fabio   allopurinol (ZYLOPRIM) 300 mg tablet Take 300 mg by mouth daily. Yes Other, MD Fabio   plecanatide (TRULANCE) 3 mg tab Take 3 mg by mouth daily as needed. Yes Provider, Historical   lidocaine (LIDODERM) 5 % 1 Patch by TransDERmal route daily as needed. Apply patch to the affected area for 12 hours a day and remove for 12 hours a day. Yes Provider, Historical   diclofenac (VOLTAREN) 1 % gel Apply  to affected area as needed. Yes Provider, Historical   amLODIPine-benazepril (LOTREL) 5-20 mg per capsule Take 1 Cap by mouth daily. Yes Provider, Historical   carvedilol (COREG) 25 mg tablet Take 25 mg by mouth two (2) times daily (with meals). Yes Other, MD Fabio   bumetanide (BUMEX) 1 mg tablet Take 1 mg by mouth every other day. Yes Other, MD Fabio   DULoxetine (CYMBALTA) 60 mg capsule Take 60 mg by mouth daily. Yes Other, MD Fabio   cholecalciferol, vitamin D3, 2,000 unit tab Take 2,000 Units by mouth daily. Yes Other, MD Fabio   cyanocobalamin 1,000 mcg tablet Take 1,000 mcg by mouth daily. Yes Other, MD Fabio   spironolactone (ALDACTONE) 25 mg tablet Take 25 mg by mouth two (2) times a day.  11/9/10  Yes Provider, Historical     a  Allergies   Allergen Reactions    Codeine Itching    Demerol [Meperidine] Nausea Only    Ivp [Fd And C Blue No.1] Itching    Minoxidil Itching    Penicillamine Itching and Other (comments)     Drops blood pressure    Percocet [Oxycodone-Acetaminophen] Nausea and Vomiting        REVIEW OF SYSTEMS:     Total of 12 systems reviewed as follows:   I am not able to complete the review of systems because: The patient is intubated and sedated    The patient has altered mental status due to his acute medical problems    The patient has baseline aphasia from prior stroke(s)    The patient has baseline dementia and is not reliable historian                 POSITIVE= underlined text  Negative = text not underlined  General:  fever, chills, sweats, generalized weakness, weight loss/gain,      loss of appetite   Eyes:    blurred vision, eye pain, loss of vision, double vision  ENT:    rhinorrhea, pharyngitis   Respiratory:   cough, sputum production, SOB, wheezing, GALEANA, pleuritic pain   Cardiology:   chest pain, palpitations, orthopnea, PND, edema, syncope   Gastrointestinal:  abdominal pain , N/V, dysphagia, diarrhea, constipation, bleeding   Genitourinary:  frequency, urgency, dysuria, hematuria, incontinence   Muskuloskeletal :  arthralgia, myalgia   Hematology:  easy bruising, nose or gum bleeding, lymphadenopathy   Dermatological: rash, ulceration, pruritis   Endocrine:   hot flashes or polydipsia   Neurological:  headache, dizziness, confusion, focal weakness, paresthesia,     Speech difficulties, memory loss, gait disturbance  Psychological: Feelings of anxiety, depression, agitation    Objective:     Visit Vitals  /72 (BP 1 Location: Left arm, BP Patient Position: At rest)   Pulse (!) 106   Temp 98.5 °F (36.9 °C)   Resp 27   Ht 5' 7\" (1.702 m)   Wt 109.9 kg (242 lb 4.8 oz)   SpO2 91%   BMI 37.95 kg/m²     Temp (24hrs), Av.6 °F (37 °C), Min:98.1 °F (36.7 °C), Max:99.7 °F (37.6 °C)       Lines:  Peripheral line    PHYSICAL EXAM:   General:    Obese, Alert, cooperative, no distress, appears stated age.      HEENT: Atraumatic, anicteric sclerae, pink conjunctivae     No oral ulcers, mucosa moist, throat clear  Neck:  Supple, symmetrical,  thyroid: non tender  Lungs:   Clear in apex with decreased breath sounds at bases. No Wheezing or Rhonchi. No rales. Chest wall:  No tenderness  No Accessory muscle use. Heart:   Regular  rhythm,  No  murmur   No edema  Abdomen:   Soft, non-tender. Not distended. Bowel sounds normal  Extremities: No cyanosis. No clubbing  Skin:     Not pale. Not Jaundiced  No rashes   Psych:  Good insight. Not depressed. Not anxious or agitated. Neurologic: EOMs intact. No facial asymmetry. No aphasia or slurred speech. Symmetrical strength, Alert and oriented X 4. Data Review:     CBC:  Recent Labs     11/20/20 0311 11/19/20 0045 11/18/20  1343   WBC 9.7 9.2 7.8   GRANS  --   --  88*   MONOS  --   --  3*   EOS  --   --  0   ANEU  --   --  6.9   ABL  --   --  0.6*   HGB 10.6* 11.3* 12.6   HCT 33.0* 34.0* 38.7    188 199       BMP:  Recent Labs     11/20/20 0311 11/19/20 0045 11/18/20  1343   CREA 1.32* 1.43* 1.60*   BUN 36* 34* 35*   * 135* 134*   K 4.0 3.5 3.6    100 99   CO2 23 28 28   AGAP 8 7 7   * 137* 180*       LFTS:  Recent Labs     11/18/20  1343   TBILI 0.4   ALT 47   AP 63   TP 8.6*   ALB 3.6       Microbiology:     All Micro Results     Procedure Component Value Units Date/Time    LITZY Ramirez, UR/CSF [[id number]] Collected:  11/19/20 0045    Order Status:  Completed Updated:  11/19/20 0114    LEGIONELLA PNEUMOPHILA AG, URINE [[id number]] Collected:  11/19/20 0045    Order Status:  Completed Specimen:  Urine Updated:  11/19/20 0114            Signed By: Eder Vaughn NP     November 20, 2020

## 2020-11-20 NOTE — PROGRESS NOTES
Bedside and Verbal shift change report given to Suzanne Vargas RN (oncoming nurse) by Ricky Casanova RN (offgoing nurse). Report included the following information SBAR, Kardex, ED Summary, Intake/Output, MAR, Recent Results and Cardiac Rhythm NSR. Patient Vitals for the past 12 hrs:   Temp Pulse Resp BP SpO2   11/20/20 0312 99.7 °F (37.6 °C) 87 19 126/66 97 %   11/20/20 0302     94 %   11/19/20 2327 98.5 °F (36.9 °C) 84 23 139/75 94 %   11/19/20 2044 98.3 °F (36.8 °C) 85 27 121/61 94 %   11/19/20 1732  89  114/67      Last 3 Recorded Weights in this Encounter    11/18/20 2003 11/19/20 0319 11/20/20 0312   Weight: 116.6 kg (257 lb 0.9 oz) 113.4 kg (249 lb 14.4 oz) 109.9 kg (242 lb 4.8 oz)     Weight changed noted-- will pass to day shift. Pt  Has had poor appetite recently    Problem: Gas Exchange - Impaired  Goal: Absence of hypoxia  Outcome: Progressing Towards Goal  Note: Absence of hypoxia on 3L O2     Problem:  Body Temperature -  Risk of, Imbalanced  Goal: Ability to maintain a body temperature within defined limits  Outcome: Progressing Towards Goal  Note: Temp WDL throughout shift     Problem: Risk for Fluid Volume Deficit  Goal: Maintain normal heart rhythm  Outcome: Progressing Towards Goal  Note: NSR throughout shift

## 2020-11-20 NOTE — PROGRESS NOTES
[facility]  Hospitalist Group                                                                                          Hospitalist Progress Note  [address] [facility],   Answering service: [phone] from in house phone        Date of Service:  2020  NAME:  Raj Whitehead  :  1955  MRN:  [id number]      Admission Summary:   72 y.o. female who presents with cough and shortness of breath since  patient was tested positive Covid at an outside testing center and was taking antibiotics nebulizer and Medrol pack did not improve patient said that cough is increasing in frequency and thickening whitish in color no blood in it patient  also have cold-like symptoms and admitted at [facility] patient has chronic systolic heart failure but she takes all her medications regularly does not seem to be in exacerbation. Currently patient was saturating well in room air in the ED patient was admitted for further management    Interval history / Subjective: Follow up COVID 19. Patient seen and examined. Having rigors during my exam with subsequent documented fevers. Now with worsening oxygen requirements.       Assessment & Plan:     COVID-19 pneumonia:  Acute hypoxic respiratory failure (89% on RA + respiratory distress, tachypnea)  -ID consulted for remdesivir  -Dexamethasone 6 mg once daily  -Vitamin C, zinc  -Pulmonary consult for JODY pulse- patient deciding  -tylenol for fevers  -check type and screen for convalescent plasma- will obtain consent  -Hold antibiotics, pro calcitonin 0.25  -Trend inflammatory markers    Chronic systolic heart failure NYHA II  -Initiate home diuretic   -Continue home carvedilol  -Continue home Plavix, aspirin, rosuvastatin     VICKI on CKD 2: Improved  -Monitor closely, reinitiate diuretic      History of rheumatoid arthritis:  -hold sulfasalazine, continue Plaquenil    History of depression:  -Continue home Cymbalta    Gout  -Hold colchicine, continue allopurinol      Diabetes type 2 with hyperglycemia  -continue SSI, monitor closely with steroids, consider long acting insulin  -obtain Ha1c     Hypertension:  -continue carvedilol, amlodipine     Obesity BMI 39.47    Code status: Full  DVT prophylaxis: Lovenox per Covid protocol    Care Plan discussed with: Patient/Family  Anticipated Disposition: Home w/Family  Anticipated Discharge: Greater than 48 hours     Hospital Problems  Date Reviewed: 11/18/2020          Codes Class Noted POA    SOB (shortness of breath) ICD-10-CM: R06.02  ICD-9-CM: 786.05  11/18/2020 Unknown        * (Principal) Chronic systolic heart failure ([facility] [address] ICD-10-CM: I50.22  ICD-9-CM: 428.22  7/2/2014 Yes                Review of Systems:   Negative unless stated above      Vital Signs:    Last 24hrs VS reviewed since prior progress note. Most recent are:  Visit Vitals  /62 (BP 1 Location: Left arm, BP Patient Position: At rest)   Pulse 100   Temp (!) 101.2 °F (38.4 °C)   Resp 24   Ht 5' 7\" (1.702 m)   Wt 109.9 kg (242 lb 4.8 oz)   SpO2 93%   BMI 37.95 kg/m²         Intake/Output Summary (Last 24 hours) at 11/20/2020 1547  Last data filed at 11/19/2020 1558  Gross per 24 hour   Intake 0 ml   Output    Net 0 ml        Physical Examination:     I had a face to face encounter with this patient and independently examined them on 11/20/2020 as outlined below:          Constitutional:  No acute distress, cooperative, pleasant    ENT:  Oral mucosa moist, oropharynx benign. Resp:  Bilateral coarse crackles. Ronette Rudder + accessory muscle use when talking   CV:  Regular rhythm, normal rate, no murmurs, gallops, rubs    GI:  Soft, non distended, non tender. normoactive bowel sounds, no hepatosplenomegaly     Musculoskeletal:  No edema, warm, 2+ pulses throughout    Neurologic:  Moves all extremities.              Data Review:    Review and/or order of clinical lab test  Review and/or order of tests in the radiology section of CPT  Review and/or order of tests in the medicine section of CPT      Labs:     Recent Labs     11/20/20 0311 11/19/20  0045   WBC 9.7 9.2   HGB 10.6* 11.3*   HCT 33.0* 34.0*    188     Recent Labs     11/20/20  0311 11/19/20  0045 11/18/20  1343   * 135* 134*   K 4.0 3.5 3.6    100 99   CO2 23 28 28   BUN 36* 34* 35*   CREA 1.32* 1.43* 1.60*   * 137* 180*   CA 9.6 9.5 10.1   MG 2.0  --   --    PHOS 3.6  --   --      Recent Labs     11/18/20  1343   ALT 47   AP 63   TBILI 0.4   TP 8.6*   ALB 3.6   GLOB 5.0*     Recent Labs     11/20/20 0316 11/19/20 0045 11/18/20  1343   INR 1.2* 1.1 1.1   PTP 12.1* 11.8* 11.0      Recent Labs     11/18/20  1343   FERR 293*      No results found for: FOL, RBCF   No results for input(s): PH, PCO2, PO2 in the last 72 hours. No results for input(s): CPK, CKNDX, TROIQ in the last 72 hours.     No lab exists for component: CPKMB  No results found for: CHOL, CHOLX, CHLST, CHOLV, HDL, HDLP, LDL, LDLC, DLDLP, TGLX, TRIGL, TRIGP, CHHD, CHHDX  Lab Results   Component Value Date/Time    Glucose (POC) 132 (H) 11/20/2020 11:41 AM    Glucose (POC) 123 (H) 11/20/2020 08:33 AM    Glucose (POC) 184 (H) 11/19/2020 09:53 PM    Glucose (POC) 147 (H) 11/19/2020 05:08 PM    Glucose (POC) 93 09/15/2019 08:03 PM     Lab Results   Component Value Date/Time    Color YELLOW/STRAW 09/15/2019 09:47 PM    Appearance CLEAR 09/15/2019 09:47 PM    Specific gravity 1.013 09/15/2019 09:47 PM    pH (UA) 6.0 09/15/2019 09:47 PM    Protein NEGATIVE  09/15/2019 09:47 PM    Glucose NEGATIVE  09/15/2019 09:47 PM    Ketone NEGATIVE  09/15/2019 09:47 PM    Bilirubin NEGATIVE  09/15/2019 09:47 PM    Urobilinogen 0.2 09/15/2019 09:47 PM    Nitrites NEGATIVE  09/15/2019 09:47 PM    Leukocyte Esterase NEGATIVE  09/15/2019 09:47 PM    Epithelial cells FEW 09/15/2019 09:47 PM    Bacteria NEGATIVE  09/15/2019 09:47 PM    WBC 0-4 09/15/2019 09:47 PM    RBC 0-5 09/15/2019 09:47 PM         Medications Reviewed:     Current Facility-Administered Medications   Medication Dose Route Frequency    guaiFENesin ER (MUCINEX) tablet 600 mg  600 mg Oral Q12H    [START ON 11/21/2020] bumetanide (BUMEX) tablet 1 mg  1 mg Oral DAILY    remdesivir 200 mg in 0.9% sodium chloride 250 mL IVPB  200 mg IntraVENous ONCE    Followed by   Cecilia Alicea ON 11/21/2020] remdesivir 100 mg in 0.9% sodium chloride 250 mL IVPB  100 mg IntraVENous Q24H    glucose chewable tablet 16 g  4 Tab Oral PRN    glucagon (GLUCAGEN) injection 1 mg  1 mg IntraMUSCular PRN    dextrose 10% infusion 0-250 mL  0-250 mL IntraVENous PRN    insulin lispro (HUMALOG) injection   SubCUTAneous AC&HS    amLODIPine (NORVASC) tablet 5 mg  5 mg Oral DAILY    enoxaparin (LOVENOX) injection 40 mg  40 mg SubCUTAneous Q12H    ascorbic acid (vitamin C) (VITAMIN C) tablet 500 mg  500 mg Oral BID    zinc sulfate (ZINCATE) 220 (50) mg capsule 1 Cap  1 Cap Oral DAILY    sulfaSALAzine (AZULFIDINE) tablet 500 mg  500 mg Oral BID WITH MEALS    montelukast (SINGULAIR) tablet 10 mg  10 mg Oral QHS    dexAMETHasone (DECADRON) tablet 6 mg  6 mg Oral Q24H    albuterol (PROVENTIL HFA, VENTOLIN HFA, PROAIR HFA) inhaler 1 Puff  1 Puff Inhalation TID RT    allopurinoL (ZYLOPRIM) tablet 300 mg  300 mg Oral DAILY    aspirin delayed-release tablet 81 mg  81 mg Oral DAILY    carvediloL (COREG) tablet 25 mg  25 mg Oral BID WITH MEALS    clopidogreL (PLAVIX) tablet 75 mg  75 mg Oral DAILY    DULoxetine (CYMBALTA) capsule 60 mg  60 mg Oral DAILY    hydrOXYchloroQUINE (PLAQUENIL) tablet 200 mg  200 mg Oral BID    rosuvastatin (CRESTOR) tablet 20 mg  20 mg Oral QHS    acetaminophen (TYLENOL) tablet 650 mg  650 mg Oral Q6H PRN    Or    acetaminophen (TYLENOL) suppository 650 mg  650 mg Rectal Q6H PRN    guaiFENesin-dextromethorphan (ROBITUSSIN DM) 100-10 mg/5 mL syrup 5 mL  5 mL Oral Q4H PRN    sodium chloride (NS) flush 5-40 mL  5-40 mL IntraVENous Q8H    sodium chloride (NS) flush 5-40 mL  5-40 mL IntraVENous PRN    polyethylene glycol (MIRALAX) packet 17 g  17 g Oral DAILY PRN    promethazine (PHENERGAN) tablet 12.5 mg  12.5 mg Oral Q6H PRN    Or    ondansetron (ZOFRAN) injection 4 mg  4 mg IntraVENous Q6H PRN     ______________________________________________________________________  EXPECTED LENGTH OF STAY: 4d 4h  ACTUAL LENGTH OF STAY:          [address] Far[address] Almanza, DO

## 2020-11-20 NOTE — PROGRESS NOTES
Remdesivir Initiation Note    Kang Aldana meets criteria for initiation of remdesivir based on the following:   Known or suspected COVID-19   Severe disease (SpO2 ? 94% on RA, requiring supplemental O2)   Acceptable renal function  o CrCl ? 30 ml/min based on SCr obtained prior to initiation   Acceptable hepatic function (ALT within 5 times ULN)    I have discussed with the patient/proxy information consistent with the Fact Sheet for Patients and Parents/Caregivers\" and the patient/proxy has agreed to initiating remdesivir after being:   Given the Fact Sheet for Patients and Parents/Caregivers    Liver function tests will be monitored daily while on remdesivir.

## 2020-11-20 NOTE — PROGRESS NOTES
1300 - pt complains of being cold, and is experiencing chills and shaking. No fever. Blankets added, thermostat adjusted, and Tylenol administered per Dr. Apolonia Zapien' order. 1515 - pt no longer experiencing chills, but has developed a fever of 101.2 F.    1700 - Bia Atkins RN witnessed phone consent between Dr. Apolonia Zapien and pt's family member for convalescent plasma treatment. 1718 - Remdesivir started (/65, temp 100.9). 1733 - 15 minute BP check 140/70.    1900 - started convalescent plasma transfusion at 75ml/hr with India Beard RN. No adverse reactions noted. Continued the infusion, rate increased to 275ml/hr. Problem: Gas Exchange - Impaired  Goal: Promote optimal lung function  Outcome: Progressing Towards Goal   Pt desated to 80s upon exertion and ambulation on 6L. Dr. Apolonia Zapien notified. Orders received for 9L midflow. RT notified to set up midflow. Problem: Breathing Pattern - Ineffective  Goal: Ability to achieve and maintain a regular respiratory rate  Outcome: Progressing Towards Goal  Pt educated on slow, deep breathing through nares with NC to increase and maintain adequate O2 levels. Problem: Heart Failure: Day 1  Goal: Treatments/Interventions/Procedures  Outcome: Progressing Towards Goal   Pt on continuous cardiac monitoring, and had an EKG performed today. Educated on cardiac diet and medications including daily aspirin, carvedilol, amlodipine, and others. Bedside shift change report given to DEVAUGHN Cuevas and DEVAUGHN Maguire (oncoming nurse) by Bia Atkins RN and India Beard RN (offgoing nurse). Report included the following information SBAR, Kardex, ED Summary, Procedure Summary, Intake/Output, MAR, Recent Results and Cardiac Rhythm Sinus Tach.

## 2020-11-20 NOTE — CONSULTS
Name: [address]: [facility] JEFFRY   : 1955 Admit Date: 2020   Phone: [phone]  Room: [facility]/01   PCP: Forrest Eastman MD  MRN: [id number]   Date: 2020  Code: Full Code        HPI:    12:08 PM       History was obtained from patient. I was asked by Aura Whitmore MD to see Blas Saab in consultation for a chief complaint of shortness of breath. History of Present Illness: 73 yo female  has a past medical history of Anxiety, Arrhythmia, Arthritis, Asthma, CAD (coronary artery disease), Chronic pain, COVID-19, Diarrhea (3/24/2017), Epigastric pain (3/24/2017), Fibromyalgia, GERD (gastroesophageal reflux disease), Gout, Heart failure ([address] Hypertension, Psychiatric disorder, Stroke ([facility] [address] and Unspecified sleep apnea presented with cough and shortness of breath. Reportedly tested positive for COVID 19 on  and has not improved. She has a reported h/o asthma   also tested positive who is admitted to [facility] and is getting plasma their today. Data reviewed:   CXR suggestive of patchy lower lobe infiltrates.   Fibrinogen > 800  D-dimer 1.90  Lactate 0.8    Past Medical History:   Diagnosis Date    Anxiety     Arrhythmia     per pt, treated with med    Arthritis     Asthma     CAD (coronary artery disease)     EF=33 1/3    Chronic pain     fibromyalgia    COVID-19     Diarrhea 3/24/2017    Epigastric pain 3/24/2017    Fibromyalgia     GERD (gastroesophageal reflux disease)     ibs    Gout     Heart failure ([address]     cardiomyopathy    Hypertension     Psychiatric disorder     h/o severe depression    Stroke ([address]     possible TIA    Unspecified sleep apnea     wears CPAP       Past Surgical History:   Procedure Laterality Date    COLONOSCOPY N/A 2016    COLONOSCOPY performed by Charisma Guajardo MD at [facility] ENDOSCOPY    COLONOSCOPY N/A 2020    COLONOSCOPY performed by Sarah Bailey MD at [address] [place] [address] SIGMOIDOSCOPY N/A 3/24/2017    FLEXIBLE SIGMOIDOSCOPY  performed by Brandi Chao MD at [facility] AMBULATORY OR    HX APPENDECTOMY      HX ENDOSCOPY      HX GYN      hysterectomy    HX HEENT      left eye surgery    HX KNEE ARTHROSCOPY Right     HX KNEE REPLACEMENT Right     HX ORTHOPAEDIC Right     plantar fascitis repair with implant    HX OTHER SURGICAL      hand surgery left (carpal tunnel, reconstruction of small finger)    SIGMOIDOSCOPY,BIOPSY  3/24/2017         VASCULAR SURGERY PROCEDURE UNLIST      stent in bilateral legs       Family History   Problem Relation Age of Onset    Diabetes Mother        Social History     Tobacco Use    Smoking status: Never Smoker    Smokeless tobacco: Never Used   Substance Use Topics    Alcohol use: No       Allergies   Allergen Reactions    Codeine Itching    Demerol [Meperidine] Nausea Only    Ivp [Fd And C Blue No.1] Itching    Minoxidil Itching    Penicillamine Itching and Other (comments)     Drops blood pressure    Percocet [Oxycodone-Acetaminophen] Nausea and Vomiting       Current Facility-Administered Medications   Medication Dose Route Frequency    glucose chewable tablet 16 g  4 Tab Oral PRN    glucagon (GLUCAGEN) injection 1 mg  1 mg IntraMUSCular PRN    dextrose 10% infusion 0-250 mL  0-250 mL IntraVENous PRN    insulin lispro (HUMALOG) injection   SubCUTAneous AC&HS    amLODIPine (NORVASC) tablet 5 mg  5 mg Oral DAILY    enoxaparin (LOVENOX) injection 40 mg  40 mg SubCUTAneous Q12H    ascorbic acid (vitamin C) (VITAMIN C) tablet 500 mg  500 mg Oral BID    zinc sulfate (ZINCATE) 220 (50) mg capsule 1 Cap  1 Cap Oral DAILY    sulfaSALAzine (AZULFIDINE) tablet 500 mg  500 mg Oral BID WITH MEALS    montelukast (SINGULAIR) tablet 10 mg  10 mg Oral QHS    dexAMETHasone (DECADRON) tablet 6 mg  6 mg Oral Q24H    albuterol (PROVENTIL HFA, VENTOLIN HFA, PROAIR HFA) inhaler 1 Puff  1 Puff Inhalation TID RT    bumetanide (BUMEX) tablet 0.5 mg  0.5 mg Oral DAILY    allopurinoL (ZYLOPRIM) tablet 300 mg  300 mg Oral DAILY    aspirin delayed-release tablet 81 mg  81 mg Oral DAILY    carvediloL (COREG) tablet 25 mg  25 mg Oral BID WITH MEALS    clopidogreL (PLAVIX) tablet 75 mg  75 mg Oral DAILY    DULoxetine (CYMBALTA) capsule 60 mg  60 mg Oral DAILY    hydrOXYchloroQUINE (PLAQUENIL) tablet 200 mg  200 mg Oral BID    rosuvastatin (CRESTOR) tablet 20 mg  20 mg Oral QHS    acetaminophen (TYLENOL) tablet 650 mg  650 mg Oral Q6H PRN    Or    acetaminophen (TYLENOL) suppository 650 mg  650 mg Rectal Q6H PRN    guaiFENesin-dextromethorphan (ROBITUSSIN DM) 100-10 mg/5 mL syrup 5 mL  5 mL Oral Q4H PRN    sodium chloride (NS) flush 5-40 mL  5-40 mL IntraVENous Q8H    sodium chloride (NS) flush 5-40 mL  5-40 mL IntraVENous PRN    polyethylene glycol (MIRALAX) packet 17 g  17 g Oral DAILY PRN    promethazine (PHENERGAN) tablet 12.5 mg  12.5 mg Oral Q6H PRN    Or    ondansetron (ZOFRAN) injection 4 mg  4 mg IntraVENous Q6H PRN         REVIEW OF SYSTEMS   Negative except as stated in the HPI. Physical Exam:   Visit Vitals  /72 (BP 1 Location: Left arm, BP Patient Position: At rest)   Pulse 99   Temp 98.5 °F (36.9 °C)   Resp 27   Ht 5' 7\" (1.702 m)   Wt 109.9 kg (242 lb 4.8 oz)   SpO2 93%   BMI 37.95 kg/m²     Examination deferred due to COVID-19 pandemic and patient has COVID-19 pneumonia.     General:  Alert, cooperative,                                                           Neurologic: Grossly nonfocal       Lab Results   Component Value Date/Time    Sodium 131 (L) 11/20/2020 03:11 AM    Potassium 4.0 11/20/2020 03:11 AM    Chloride 100 11/20/2020 03:11 AM    CO2 23 11/20/2020 03:11 AM    BUN 36 (H) 11/20/2020 03:11 AM    Creatinine 1.32 (H) 11/20/2020 03:11 AM    Glucose 153 (H) 11/20/2020 03:11 AM    Calcium 9.6 11/20/2020 03:11 AM    Magnesium 2.0 11/20/2020 03:11 AM    Phosphorus 3.6 11/20/2020 03:11 AM    Lactic acid 0.8 11/18/2020 03:41 PM Lab Results   Component Value Date/Time    WBC 9.7 11/20/2020 03:11 AM    HGB 10.6 (L) 11/20/2020 03:11 AM    PLATELET 045 73/53/9497 03:11 AM    MCV 94.3 11/20/2020 03:11 AM       Lab Results   Component Value Date/Time    INR 1.2 (H) 11/20/2020 03:16 AM    aPTT 31.5 05/13/2010 12:33 PM    Alk. phosphatase 63 11/18/2020 01:43 PM    Protein, total 8.6 (H) 11/18/2020 01:43 PM    Albumin 3.6 11/18/2020 01:43 PM    Globulin 5.0 (H) 11/18/2020 01:43 PM       Lab Results   Component Value Date/Time    Ferritin 293 (H) 11/18/2020 01:43 PM       Lab Results   Component Value Date/Time    C-Reactive protein 8.87 (H) 11/18/2020 01:43 PM    TSH 0.84 10/15/2010 10:57 AM     IMPRESSION:  ===========  -COVID 19 pneumonia. -Systolic CHF. -H/o asthma. -H/o CAD  -Mild renal insufficiency above baseline - improving. PLAN:  =====  -O2 supplementation.  -I have discussed with her about [address] study and provided information. She says she like to discuss with her  who also has COVID-19 and admitted to [facility]. I will check back with her later today. -ID to see for remdesivir.  -convalescent plasma if no improvement in next 24 hours.  -On decadron 6 mg iv q 24 x 10 days  -lovenox per protocol.  -d-dimer, crp daily. Thank you for the consult. Will follow.     Lily Lemus MD

## 2020-11-20 NOTE — PROGRESS NOTES
Bedside shift change report given to DEVAUGHN Lawrence (oncoming nurse) by Drake Fernandez RN (offgoing nurse). Report included the following information SBAR, Kardex, ED Summary, Procedure Summary, Intake/Output, MAR, Recent Results and Cardiac Rhythm NSR.

## 2020-11-21 LAB
ALBUMIN SERPL-MCNC: 3 G/DL (ref 3.5–5)
ALBUMIN/GLOB SERPL: 0.7 {RATIO} (ref 1.1–2.2)
ALP SERPL-CCNC: 97 U/L (ref 45–117)
ALT SERPL-CCNC: 60 U/L (ref 12–78)
ANION GAP SERPL CALC-SCNC: 11 MMOL/L (ref 5–15)
AST SERPL-CCNC: 59 U/L (ref 15–37)
BILIRUB SERPL-MCNC: 0.4 MG/DL (ref 0.2–1)
BLD PROD TYP BPU: NORMAL
BPU ID: NORMAL
BUN SERPL-MCNC: 32 MG/DL (ref 6–20)
BUN/CREAT SERPL: 26 (ref 12–20)
CALCIUM SERPL-MCNC: 9.5 MG/DL (ref 8.5–10.1)
CHLORIDE SERPL-SCNC: 99 MMOL/L (ref 97–108)
CO2 SERPL-SCNC: 26 MMOL/L (ref 21–32)
CREAT SERPL-MCNC: 1.21 MG/DL (ref 0.55–1.02)
CRP SERPL-MCNC: 23.1 MG/DL (ref 0–0.6)
D DIMER PPP FEU-MCNC: 1.34 MG/L FEU (ref 0–0.65)
D DIMER PPP FEU-MCNC: 1.45 MG/L FEU (ref 0–0.65)
ERYTHROCYTE [DISTWIDTH] IN BLOOD BY AUTOMATED COUNT: 13.7 % (ref 11.5–14.5)
FIBRINOGEN PPP-MCNC: >800 MG/DL (ref 200–475)
GLOBULIN SER CALC-MCNC: 4.4 G/DL (ref 2–4)
GLUCOSE BLD STRIP.AUTO-MCNC: 112 MG/DL (ref 65–100)
GLUCOSE BLD STRIP.AUTO-MCNC: 113 MG/DL (ref 65–100)
GLUCOSE BLD STRIP.AUTO-MCNC: 116 MG/DL (ref 65–100)
GLUCOSE BLD STRIP.AUTO-MCNC: 166 MG/DL (ref 65–100)
GLUCOSE SERPL-MCNC: 151 MG/DL (ref 65–100)
HCT VFR BLD AUTO: 32.5 % (ref 35–47)
HGB BLD-MCNC: 10.8 G/DL (ref 11.5–16)
INR PPP: 1.1 (ref 0.9–1.1)
MCH RBC QN AUTO: 29.9 PG (ref 26–34)
MCHC RBC AUTO-ENTMCNC: 33.2 G/DL (ref 30–36.5)
MCV RBC AUTO: 90 FL (ref 80–99)
NRBC # BLD: 0 K/UL (ref 0–0.01)
NRBC BLD-RTO: 0 PER 100 WBC
PLATELET # BLD AUTO: 215 K/UL (ref 150–400)
PMV BLD AUTO: 10.4 FL (ref 8.9–12.9)
POTASSIUM SERPL-SCNC: 3.9 MMOL/L (ref 3.5–5.1)
PROCALCITONIN SERPL-MCNC: 1.62 NG/ML
PROT SERPL-MCNC: 7.4 G/DL (ref 6.4–8.2)
PROTHROMBIN TIME: 11.5 SEC (ref 9–11.1)
RBC # BLD AUTO: 3.61 M/UL (ref 3.8–5.2)
SERVICE CMNT-IMP: ABNORMAL
SODIUM SERPL-SCNC: 136 MMOL/L (ref 136–145)
STATUS OF UNIT,%ST: NORMAL
UNIT DIVISION, %UDIV: 0
WBC # BLD AUTO: 10.2 K/UL (ref 3.6–11)

## 2020-11-21 PROCEDURE — 65660000000 HC RM CCU STEPDOWN

## 2020-11-21 PROCEDURE — 74011250637 HC RX REV CODE- 250/637: Performed by: HOSPITALIST

## 2020-11-21 PROCEDURE — 85384 FIBRINOGEN ACTIVITY: CPT

## 2020-11-21 PROCEDURE — 94640 AIRWAY INHALATION TREATMENT: CPT

## 2020-11-21 PROCEDURE — 85610 PROTHROMBIN TIME: CPT

## 2020-11-21 PROCEDURE — 74011250636 HC RX REV CODE- 250/636: Performed by: INTERNAL MEDICINE

## 2020-11-21 PROCEDURE — 87040 BLOOD CULTURE FOR BACTERIA: CPT

## 2020-11-21 PROCEDURE — 77010033711 HC HIGH FLOW OXYGEN

## 2020-11-21 PROCEDURE — 74011000250 HC RX REV CODE- 250: Performed by: INTERNAL MEDICINE

## 2020-11-21 PROCEDURE — 86140 C-REACTIVE PROTEIN: CPT

## 2020-11-21 PROCEDURE — 74011000250 HC RX REV CODE- 250: Performed by: NURSE PRACTITIONER

## 2020-11-21 PROCEDURE — 80053 COMPREHEN METABOLIC PANEL: CPT

## 2020-11-21 PROCEDURE — 74011000258 HC RX REV CODE- 258: Performed by: NURSE PRACTITIONER

## 2020-11-21 PROCEDURE — 85379 FIBRIN DEGRADATION QUANT: CPT

## 2020-11-21 PROCEDURE — XW033E5 INTRODUCTION OF REMDESIVIR ANTI-INFECTIVE INTO PERIPHERAL VEIN, PERCUTANEOUS APPROACH, NEW TECHNOLOGY GROUP 5: ICD-10-PCS | Performed by: INTERNAL MEDICINE

## 2020-11-21 PROCEDURE — 82962 GLUCOSE BLOOD TEST: CPT

## 2020-11-21 PROCEDURE — 74011250637 HC RX REV CODE- 250/637: Performed by: INTERNAL MEDICINE

## 2020-11-21 PROCEDURE — 36415 COLL VENOUS BLD VENIPUNCTURE: CPT

## 2020-11-21 PROCEDURE — 84145 PROCALCITONIN (PCT): CPT

## 2020-11-21 PROCEDURE — 94762 N-INVAS EAR/PLS OXIMTRY CONT: CPT

## 2020-11-21 PROCEDURE — 85027 COMPLETE CBC AUTOMATED: CPT

## 2020-11-21 RX ORDER — BUMETANIDE 0.25 MG/ML
1 INJECTION INTRAMUSCULAR; INTRAVENOUS DAILY
Status: DISCONTINUED | OUTPATIENT
Start: 2020-11-22 | End: 2020-11-21

## 2020-11-21 RX ORDER — LEVOFLOXACIN 5 MG/ML
500 INJECTION, SOLUTION INTRAVENOUS EVERY 24 HOURS
Status: DISCONTINUED | OUTPATIENT
Start: 2020-11-21 | End: 2020-11-23

## 2020-11-21 RX ORDER — BUMETANIDE 0.25 MG/ML
1 INJECTION INTRAMUSCULAR; INTRAVENOUS 2 TIMES DAILY
Status: DISCONTINUED | OUTPATIENT
Start: 2020-11-21 | End: 2020-11-22

## 2020-11-21 RX ADMIN — HYDROXYCHLOROQUINE SULFATE 200 MG: 200 TABLET, FILM COATED ORAL at 17:10

## 2020-11-21 RX ADMIN — CARVEDILOL 25 MG: 12.5 TABLET, FILM COATED ORAL at 17:10

## 2020-11-21 RX ADMIN — Medication 10 ML: at 14:54

## 2020-11-21 RX ADMIN — OXYCODONE HYDROCHLORIDE AND ACETAMINOPHEN 500 MG: 500 TABLET ORAL at 17:10

## 2020-11-21 RX ADMIN — ZINC SULFATE 220 MG (50 MG) CAPSULE 1 CAPSULE: CAPSULE at 09:45

## 2020-11-21 RX ADMIN — OXYCODONE HYDROCHLORIDE AND ACETAMINOPHEN 500 MG: 500 TABLET ORAL at 09:46

## 2020-11-21 RX ADMIN — ENOXAPARIN SODIUM 40 MG: 40 INJECTION SUBCUTANEOUS at 17:10

## 2020-11-21 RX ADMIN — Medication 10 ML: at 07:00

## 2020-11-21 RX ADMIN — MONTELUKAST 10 MG: 10 TABLET, FILM COATED ORAL at 21:25

## 2020-11-21 RX ADMIN — REMDESIVIR 100 MG: 100 INJECTION, POWDER, LYOPHILIZED, FOR SOLUTION INTRAVENOUS at 15:43

## 2020-11-21 RX ADMIN — GUAIFENESIN 600 MG: 600 TABLET, EXTENDED RELEASE ORAL at 09:46

## 2020-11-21 RX ADMIN — ALBUTEROL SULFATE 1 PUFF: 90 AEROSOL, METERED RESPIRATORY (INHALATION) at 07:20

## 2020-11-21 RX ADMIN — Medication 10 ML: at 21:25

## 2020-11-21 RX ADMIN — LEVOFLOXACIN 500 MG: 5 INJECTION, SOLUTION INTRAVENOUS at 09:43

## 2020-11-21 RX ADMIN — ALBUTEROL SULFATE 1 PUFF: 90 AEROSOL, METERED RESPIRATORY (INHALATION) at 20:17

## 2020-11-21 RX ADMIN — CARVEDILOL 25 MG: 12.5 TABLET, FILM COATED ORAL at 09:45

## 2020-11-21 RX ADMIN — ALLOPURINOL 300 MG: 300 TABLET ORAL at 09:45

## 2020-11-21 RX ADMIN — ALBUTEROL SULFATE 1 PUFF: 90 AEROSOL, METERED RESPIRATORY (INHALATION) at 13:37

## 2020-11-21 RX ADMIN — GUAIFENESIN 600 MG: 600 TABLET, EXTENDED RELEASE ORAL at 21:25

## 2020-11-21 RX ADMIN — ENOXAPARIN SODIUM 40 MG: 40 INJECTION SUBCUTANEOUS at 07:00

## 2020-11-21 RX ADMIN — DEXAMETHASONE 6 MG: 4 TABLET ORAL at 14:52

## 2020-11-21 RX ADMIN — CLOPIDOGREL BISULFATE 75 MG: 75 TABLET ORAL at 09:45

## 2020-11-21 RX ADMIN — Medication 81 MG: at 09:45

## 2020-11-21 RX ADMIN — HYDROXYCHLOROQUINE SULFATE 200 MG: 200 TABLET, FILM COATED ORAL at 09:44

## 2020-11-21 RX ADMIN — BUMETANIDE 1 MG: 1 TABLET ORAL at 09:44

## 2020-11-21 RX ADMIN — BUMETANIDE 1 MG: 0.25 INJECTION, SOLUTION INTRAMUSCULAR; INTRAVENOUS at 17:11

## 2020-11-21 RX ADMIN — AMLODIPINE BESYLATE 5 MG: 5 TABLET ORAL at 09:44

## 2020-11-21 RX ADMIN — ROSUVASTATIN 20 MG: 10 TABLET, FILM COATED ORAL at 21:25

## 2020-11-21 NOTE — PROGRESS NOTES
Verbal shift change report given to Kendra Esparza RN  (oncoming nurse) by Ronak Galeana  (offgoing nurse). Report included the following information SBAR.

## 2020-11-21 NOTE — PROGRESS NOTES
Problem: Airway Clearance - Ineffective  Goal: Achieve or maintain patent airway  Outcome: Progressing Towards Goal     Problem: Gas Exchange - Impaired  Goal: Absence of hypoxia  Outcome: Progressing Towards Goal  Goal: Promote optimal lung function  Outcome: Progressing Towards Goal     Problem: Breathing Pattern - Ineffective  Goal: Ability to achieve and maintain a regular respiratory rate  Outcome: Progressing Towards Goal     Problem: Body Temperature -  Risk of, Imbalanced  Goal: Ability to maintain a body temperature within defined limits  Outcome: Progressing Towards Goal  Goal: Will regain or maintain usual level of consciousness  Outcome: Progressing Towards Goal  Goal: Complications related to the disease process, condition or treatment will be avoided or minimized  Outcome: Progressing Towards Goal     Problem: Isolation Precautions - Risk of Spread of Infection  Goal: Prevent transmission of infectious organism to others  Outcome: Progressing Towards Goal     Problem: Nutrition Deficits  Goal: Optimize nutrtional status  Outcome: Progressing Towards Goal     Problem: Risk for Fluid Volume Deficit  Goal: Maintain normal heart rhythm  Outcome: Progressing Towards Goal  Goal: Maintain absence of muscle cramping  Outcome: Progressing Towards Goal  Goal: Maintain normal serum potassium, sodium, calcium, phosphorus, and pH  Outcome: Progressing Towards Goal     Problem: Loneliness or Risk for Loneliness  Goal: Demonstrate positive use of time alone when socialization is not possible  Outcome: Progressing Towards Goal     Problem: Fatigue  Goal: Verbalize increase energy and improved vitality  Outcome: Progressing Towards Goal     Problem: Patient Education: Go to Patient Education Activity  Goal: Patient/Family Education  Outcome: Progressing Towards Goal     Problem: Falls - Risk of  Goal: *Absence of Falls  Description: Document Christiano Fall Risk and appropriate interventions in the flowsheet.   Outcome: Progressing Towards Goal  Note: Fall Risk Interventions:            Medication Interventions: Patient to call before getting OOB    Elimination Interventions: Call light in reach              Problem: Patient Education: Go to Patient Education Activity  Goal: Patient/Family Education  Outcome: Progressing Towards Goal     Problem: Diabetes Self-Management  Goal: *Disease process and treatment process  Description: Define diabetes and identify own type of diabetes; list 3 options for treating diabetes. Outcome: Progressing Towards Goal  Goal: *Incorporating nutritional management into lifestyle  Description: Describe effect of type, amount and timing of food on blood glucose; list 3 methods for planning meals. Outcome: Progressing Towards Goal  Goal: *Incorporating physical activity into lifestyle  Description: State effect of exercise on blood glucose levels. Outcome: Progressing Towards Goal  Goal: *Developing strategies to promote health/change behavior  Description: Define the ABC's of diabetes; identify appropriate screenings, schedule and personal plan for screenings. Outcome: Progressing Towards Goal  Goal: *Using medications safely  Description: State effect of diabetes medications on diabetes; name diabetes medication taking, action and side effects. Outcome: Progressing Towards Goal  Goal: *Monitoring blood glucose, interpreting and using results  Description: Identify recommended blood glucose targets  and personal targets. Outcome: Progressing Towards Goal  Goal: *Prevention, detection, treatment of acute complications  Description: List symptoms of hyper- and hypoglycemia; describe how to treat low blood sugar and actions for lowering  high blood glucose level.   Outcome: Progressing Towards Goal  Goal: *Prevention, detection and treatment of chronic complications  Description: Define the natural course of diabetes and describe the relationship of blood glucose levels to long term complications of diabetes.   Outcome: Progressing Towards Goal  Goal: *Developing strategies to address psychosocial issues  Description: Describe feelings about living with diabetes; identify support needed and support network  Outcome: Progressing Towards Goal  Goal: *Insulin pump training  Outcome: Progressing Towards Goal  Goal: *Sick day guidelines  Outcome: Progressing Towards Goal  Goal: *Patient Specific Goal (EDIT GOAL, INSERT TEXT)  Outcome: Progressing Towards Goal     Problem: Patient Education: Go to Patient Education Activity  Goal: Patient/Family Education  Outcome: Progressing Towards Goal     Problem: Breathing Pattern - Ineffective  Goal: *Absence of hypoxia  Outcome: Progressing Towards Goal  Goal: *Use of effective breathing techniques  Outcome: Progressing Towards Goal  Goal: *PALLIATIVE CARE:  Alleviation of Dyspnea  Outcome: Progressing Towards Goal     Problem: Patient Education: Go to Patient Education Activity  Goal: Patient/Family Education  Outcome: Progressing Towards Goal     Problem: Heart Failure: Day 1  Goal: Treatments/Interventions/Procedures  Outcome: Progressing Towards Goal     Problem: Breathing Pattern - Ineffective  Goal: *Absence of hypoxia  Outcome: Progressing Towards Goal  Goal: *Use of effective breathing techniques  Outcome: Progressing Towards Goal  Goal: *PALLIATIVE CARE:  Alleviation of Dyspnea  Outcome: Progressing Towards Goal     Problem: Patient Education: Go to Patient Education Activity  Goal: Patient/Family Education  Outcome: Progressing Towards Goal

## 2020-11-21 NOTE — PROGRESS NOTES
Bedside shift change report given to Tasia RN (oncoming nurse) by [employer] Shane, RN (offgoing nurse). Report included the following information Kardex, Intake/Output, MAR, Recent Results and Cardiac Rhythm NSR     Patient Vitals for the past 12 hrs:   Temp Pulse Resp BP SpO2   11/21/20 0720     91 %   11/21/20 0701 98.2 °F (36.8 °C) 81 26 (!) 149/81 95 %   11/21/20 0553     94 %   11/21/20 0356 97.9 °F (36.6 °C) 81 16 (!) 142/78 97 %   11/20/20 2350     97 %   11/20/20 2330 98.6 °F (37 °C) 77 18 127/61 96 %   11/20/20 2140 98.5 °F (36.9 °C) 87 20 (!) 142/76 94 %   11/20/20 2121     91 %   11/20/20 2100  90 23 (!) 116/59 (!) 89 %       Last 3 Recorded Weights in this Encounter    11/19/20 0319 11/20/20 0312 11/21/20 0356   Weight: 113.4 kg (249 lb 14.4 oz) 109.9 kg (242 lb 4.8 oz) 111.7 kg (246 lb 4.1 oz)     Weight variance; suspect items, such as additional blankets on bed. Will recheck weight. Problem: Airway Clearance - Ineffective  Goal: Achieve or maintain patent airway  Outcome: Progressing Towards Goal     Problem: Gas Exchange - Impaired  Goal: Absence of hypoxia  Outcome: Progressing Towards Goal  Goal: Promote optimal lung function  Outcome: Progressing Towards Goal     Problem: Breathing Pattern - Ineffective  Goal: Ability to achieve and maintain a regular respiratory rate  Outcome: Progressing Towards Goal     Problem: Body Temperature -  Risk of, Imbalanced  Goal: Ability to maintain a body temperature within defined limits  Outcome: Progressing Towards Goal-   Patient received Tylenol prior to start of shift for increased temp; temperature decreased as noted in vital signs section.        Problem: Isolation Precautions - Risk of Spread of Infection  Goal: Prevent transmission of infectious organism to others  Outcome: Progressing Towards Goal     Problem: Falls - Risk of  Goal: *Absence of Falls  Description: Document Rochele Henning Fall Risk and appropriate interventions in the flowsheet. Outcome: Progressing Towards Goal  Note: Fall Risk Interventions:       Medication Interventions: Evaluate medications/consider consulting pharmacy, Patient to call before getting OOB, Teach patient to arise slowly     Problem: Diabetes Self-Management  Goal: *Incorporating physical activity into lifestyle  Description: State effect of exercise on blood glucose levels. Outcome: Progressing Towards Goal  Goal: *Monitoring blood glucose, interpreting and using results  Description: Identify recommended blood glucose targets  and personal targets. Outcome: Progressing Towards Goal  Goal: *Prevention, detection, treatment of acute complications  Description: List symptoms of hyper- and hypoglycemia; describe how to treat low blood sugar and actions for lowering  high blood glucose level.   Outcome: Progressing Towards Goal     Problem: Breathing Pattern - Ineffective  Goal: *Absence of hypoxia  Outcome: Progressing Towards Goal     Problem: Patient Education: Go to Patient Education Activity  Goal: Patient/Family Education  Outcome: Progressing Towards Goal

## 2020-11-21 NOTE — PROGRESS NOTES
[address] [facility] Adult  Hospitalist Group                                                                                          Hospitalist Progress Note  [address] [facility],   Answering service: [phone] from in house phone        Date of Service:  2020  NAME:  Felicita Copeland  :  1955  MRN:  [id number]      Admission Summary:   72 y.o. female who presents with cough and shortness of breath since  patient was tested positive Covid at an outside testing center and was taking antibiotics nebulizer and Medrol pack did not improve patient said that cough is increasing in frequency and thickening whitish in color no blood in it patient  also have cold-like symptoms and admitted at [facility] patient has chronic systolic heart failure but she takes all her medications regularly does not seem to be in exacerbation. Currently patient was saturating well in room air in the ED patient was admitted for further management    Interval history / Subjective: Follow up COVID 19. Patient seen and examined. No acute complaints. Worsening oxygen requirements overnight with fever. Procalcitonin elevated.       Assessment & Plan:     COVID-19 pneumonia:  Acute hypoxic respiratory failure (89% on RA + respiratory distress, tachypnea)  -ID consulted for remdesivir  -Dexamethasone 6 mg once daily  -Vitamin C, zinc  -Pulmonary consult for JODY pulse  -tylenol for fevers  -s/p convalescent plasma  -Check blood cultures, initiate levaquin   -Trend inflammatory markers    Chronic systolic heart failure NYHA II  -Transition to bumex IV  -Continue home carvedilol  -Continue home Plavix, aspirin, rosuvastatin     VICKI on CKD 2: Improving  -Monitor closely     History of rheumatoid arthritis:  -hold sulfasalazine, continue Plaquenil    History of depression:  -Continue home Cymbalta    Gout  -Hold colchicine, continue allopurinol      Diabetes type 2 with hyperglycemia  -continue SSI, monitor closely with steroids, consider long acting insulin  -obtain Ha1c     Hypertension:  -continue carvedilol, amlodipine     Obesity BMI 39.47    Code status: Full  DVT prophylaxis: Lovenox per Covid protocol    Care Plan discussed with: Patient/Family  Anticipated Disposition: Home w/Family  Anticipated Discharge: Greater than 48 hours     Hospital Problems  Date Reviewed: 11/18/2020          Codes Class Noted POA    SOB (shortness of breath) ICD-10-CM: R06.02  ICD-9-CM: 786.05  11/18/2020 Unknown        * (Principal) Chronic systolic heart failure ([facility] [address] ICD-10-CM: I50.22  ICD-9-CM: 428.22  7/2/2014 Yes                Review of Systems:   Negative unless stated above      Vital Signs:    Last 24hrs VS reviewed since prior progress note. Most recent are:  Visit Vitals  BP (!) 142/81 (BP 1 Location: Left arm, BP Patient Position: At rest)   Pulse 82   Temp 98.1 °F (36.7 °C)   Resp 24   Ht 5' 7\" (1.702 m)   Wt 111.7 kg (246 lb 4.1 oz)   SpO2 94%   BMI 38.57 kg/m²         Intake/Output Summary (Last 24 hours) at 11/21/2020 1340  Last data filed at 11/21/2020 0356  Gross per 24 hour   Intake 1140.4 ml   Output 300 ml   Net 840.4 ml        Physical Examination:     I had a face to face encounter with this patient and independently examined them on 11/21/2020 as outlined below:          Constitutional:  No acute distress, cooperative, pleasant    ENT:  Oral mucosa moist, oropharynx benign. Resp:  Bilateral coarse crackles. + accessory muscle use when talking   CV:  Regular rhythm, normal rate, no murmurs, gallops, rubs    GI:  Soft, non distended, non tender.  normoactive bowel sounds, no hepatosplenomegaly     Musculoskeletal:  No edema, warm, 2+ pulses throughout    Neurologic:  Moves all extremities            Data Review:    Review and/or order of clinical lab test  Review and/or order of tests in the radiology section of CPT  Review and/or order of tests in the medicine section of CPT      Labs:     Recent Labs 11/21/20 0424 11/20/20 0311   WBC 10.2 9.7   HGB 10.8* 10.6*   HCT 32.5* 33.0*    208     Recent Labs     11/21/20 0424 11/20/20 0311 11/19/20  0045    131* 135*   K 3.9 4.0 3.5   CL 99 100 100   CO2 26 23 28   BUN 32* 36* 34*   CREA 1.21* 1.32* 1.43*   * 153* 137*   CA 9.5 9.6 9.5   MG  --  2.0  --    PHOS  --  3.6  --      Recent Labs     11/21/20 0424 11/18/20  1343   ALT 60 47   AP 97 63   TBILI 0.4 0.4   TP 7.4 8.6*   ALB 3.0* 3.6   GLOB 4.4* 5.0*     Recent Labs     11/21/20 0424 11/20/20 0316 11/19/20  0045   INR 1.1 1.2* 1.1   PTP 11.5* 12.1* 11.8*      Recent Labs     11/18/20  1343   FERR 293*      No results found for: FOL, RBCF   No results for input(s): PH, PCO2, PO2 in the last 72 hours. No results for input(s): CPK, CKNDX, TROIQ in the last 72 hours.     No lab exists for component: CPKMB  No results found for: CHOL, CHOLX, CHLST, CHOLV, HDL, HDLP, LDL, LDLC, DLDLP, TGLX, TRIGL, TRIGP, CHHD, CHHDX  Lab Results   Component Value Date/Time    Glucose (POC) 116 (H) 11/21/2020 11:49 AM    Glucose (POC) 113 (H) 11/21/2020 09:14 AM    Glucose (POC) 158 (H) 11/20/2020 09:13 PM    Glucose (POC) 109 (H) 11/20/2020 04:58 PM    Glucose (POC) 132 (H) 11/20/2020 11:41 AM     Lab Results   Component Value Date/Time    Color YELLOW/STRAW 09/15/2019 09:47 PM    Appearance CLEAR 09/15/2019 09:47 PM    Specific gravity 1.013 09/15/2019 09:47 PM    pH (UA) 6.0 09/15/2019 09:47 PM    Protein NEGATIVE  09/15/2019 09:47 PM    Glucose NEGATIVE  09/15/2019 09:47 PM    Ketone NEGATIVE  09/15/2019 09:47 PM    Bilirubin NEGATIVE  09/15/2019 09:47 PM    Urobilinogen 0.2 09/15/2019 09:47 PM    Nitrites NEGATIVE  09/15/2019 09:47 PM    Leukocyte Esterase NEGATIVE  09/15/2019 09:47 PM    Epithelial cells FEW 09/15/2019 09:47 PM    Bacteria NEGATIVE  09/15/2019 09:47 PM    WBC 0-4 09/15/2019 09:47 PM    RBC 0-5 09/15/2019 09:47 PM         Medications Reviewed:     Current Facility-Administered Medications Medication Dose Route Frequency    levoFLOXacin (LEVAQUIN) 500 mg in D5W IVPB  500 mg IntraVENous Q24H    guaiFENesin ER (MUCINEX) tablet 600 mg  600 mg Oral Q12H    bumetanide (BUMEX) tablet 1 mg  1 mg Oral DAILY    remdesivir 100 mg in 0.9% sodium chloride 250 mL IVPB  100 mg IntraVENous Q24H    loperamide (IMODIUM) capsule 4 mg  4 mg Oral Q4H PRN    0.9% sodium chloride infusion 250 mL  250 mL IntraVENous PRN    glucose chewable tablet 16 g  4 Tab Oral PRN    glucagon (GLUCAGEN) injection 1 mg  1 mg IntraMUSCular PRN    dextrose 10% infusion 0-250 mL  0-250 mL IntraVENous PRN    insulin lispro (HUMALOG) injection   SubCUTAneous AC&HS    amLODIPine (NORVASC) tablet 5 mg  5 mg Oral DAILY    enoxaparin (LOVENOX) injection 40 mg  40 mg SubCUTAneous Q12H    ascorbic acid (vitamin C) (VITAMIN C) tablet 500 mg  500 mg Oral BID    zinc sulfate (ZINCATE) 220 (50) mg capsule 1 Cap  1 Cap Oral DAILY    [Held by provider] sulfaSALAzine (AZULFIDINE) tablet 500 mg  500 mg Oral BID WITH MEALS    montelukast (SINGULAIR) tablet 10 mg  10 mg Oral QHS    dexAMETHasone (DECADRON) tablet 6 mg  6 mg Oral Q24H    albuterol (PROVENTIL HFA, VENTOLIN HFA, PROAIR HFA) inhaler 1 Puff  1 Puff Inhalation TID RT    allopurinoL (ZYLOPRIM) tablet 300 mg  300 mg Oral DAILY    aspirin delayed-release tablet 81 mg  81 mg Oral DAILY    carvediloL (COREG) tablet 25 mg  25 mg Oral BID WITH MEALS    clopidogreL (PLAVIX) tablet 75 mg  75 mg Oral DAILY    DULoxetine (CYMBALTA) capsule 60 mg  60 mg Oral DAILY    hydrOXYchloroQUINE (PLAQUENIL) tablet 200 mg  200 mg Oral BID    rosuvastatin (CRESTOR) tablet 20 mg  20 mg Oral QHS    acetaminophen (TYLENOL) tablet 650 mg  650 mg Oral Q6H PRN    Or    acetaminophen (TYLENOL) suppository 650 mg  650 mg Rectal Q6H PRN    guaiFENesin-dextromethorphan (ROBITUSSIN DM) 100-10 mg/5 mL syrup 5 mL  5 mL Oral Q4H PRN    sodium chloride (NS) flush 5-40 mL  5-40 mL IntraVENous Q8H    sodium chloride (NS) flush 5-40 mL  5-40 mL IntraVENous PRN    polyethylene glycol (MIRALAX) packet 17 g  17 g Oral DAILY PRN    promethazine (PHENERGAN) tablet 12.5 mg  12.5 mg Oral Q6H PRN    Or    ondansetron (ZOFRAN) injection 4 mg  4 mg IntraVENous Q6H PRN     ______________________________________________________________________  EXPECTED LENGTH OF STAY: 4d 4h  ACTUAL LENGTH OF STAY:          [address] [facility] [address],

## 2020-11-22 ENCOUNTER — APPOINTMENT (OUTPATIENT)
Dept: GENERAL RADIOLOGY | Age: 65
DRG: 177 | End: 2020-11-22
Attending: INTERNAL MEDICINE
Payer: MEDICARE

## 2020-11-22 LAB
ALBUMIN SERPL-MCNC: 2.8 G/DL (ref 3.5–5)
ALBUMIN/GLOB SERPL: 0.5 {RATIO} (ref 1.1–2.2)
ALP SERPL-CCNC: 89 U/L (ref 45–117)
ALT SERPL-CCNC: 56 U/L (ref 12–78)
ANION GAP SERPL CALC-SCNC: 10 MMOL/L (ref 5–15)
ARTERIAL PATENCY WRIST A: YES
AST SERPL-CCNC: 46 U/L (ref 15–37)
BASE EXCESS BLD CALC-SCNC: 8 MMOL/L
BDY SITE: ABNORMAL
BILIRUB SERPL-MCNC: 0.4 MG/DL (ref 0.2–1)
BNP SERPL-MCNC: 414 PG/ML
BNP SERPL-MCNC: 656 PG/ML
BUN SERPL-MCNC: 42 MG/DL (ref 6–20)
BUN/CREAT SERPL: 29 (ref 12–20)
CA-I BLD-SCNC: 1.24 MMOL/L (ref 1.12–1.32)
CALCIUM SERPL-MCNC: 10.4 MG/DL (ref 8.5–10.1)
CHLORIDE SERPL-SCNC: 97 MMOL/L (ref 97–108)
CO2 SERPL-SCNC: 26 MMOL/L (ref 21–32)
COMMENT, HOLDF: NORMAL
CREAT SERPL-MCNC: 1.45 MG/DL (ref 0.55–1.02)
D DIMER PPP FEU-MCNC: 2.09 MG/L FEU (ref 0–0.65)
FIBRINOGEN PPP-MCNC: >800 MG/DL (ref 200–475)
GAS FLOW.O2 O2 DELIVERY SYS: ABNORMAL L/MIN
GLOBULIN SER CALC-MCNC: 5.7 G/DL (ref 2–4)
GLUCOSE BLD STRIP.AUTO-MCNC: 107 MG/DL (ref 65–100)
GLUCOSE BLD STRIP.AUTO-MCNC: 121 MG/DL (ref 65–100)
GLUCOSE BLD STRIP.AUTO-MCNC: 181 MG/DL (ref 65–100)
GLUCOSE BLD STRIP.AUTO-MCNC: 95 MG/DL (ref 65–100)
GLUCOSE SERPL-MCNC: 129 MG/DL (ref 65–100)
HCO3 BLD-SCNC: 31.3 MMOL/L (ref 22–26)
INR PPP: 1.1 (ref 0.9–1.1)
O2/TOTAL GAS SETTING VFR VENT: 60 %
PCO2 BLD: 42.9 MMHG (ref 35–45)
PEEP RESPIRATORY: 5 CMH2O
PH BLD: 7.47 [PH] (ref 7.35–7.45)
PIP ISTAT,IPIP: 14
PO2 BLD: 83 MMHG (ref 80–100)
POTASSIUM SERPL-SCNC: 3.9 MMOL/L (ref 3.5–5.1)
PRESSURE SUPPORT SETTING VENT: 9 CMH2O
PROT SERPL-MCNC: 8.5 G/DL (ref 6.4–8.2)
PROTHROMBIN TIME: 11.9 SEC (ref 9–11.1)
SAMPLES BEING HELD,HOLD: NORMAL
SAO2 % BLD: 97 % (ref 92–97)
SERVICE CMNT-IMP: ABNORMAL
SERVICE CMNT-IMP: NORMAL
SODIUM SERPL-SCNC: 133 MMOL/L (ref 136–145)
SPECIMEN TYPE: ABNORMAL

## 2020-11-22 PROCEDURE — 36415 COLL VENOUS BLD VENIPUNCTURE: CPT

## 2020-11-22 PROCEDURE — 94762 N-INVAS EAR/PLS OXIMTRY CONT: CPT

## 2020-11-22 PROCEDURE — 74011000250 HC RX REV CODE- 250: Performed by: NURSE PRACTITIONER

## 2020-11-22 PROCEDURE — 82962 GLUCOSE BLOOD TEST: CPT

## 2020-11-22 PROCEDURE — 74011250637 HC RX REV CODE- 250/637: Performed by: NURSE PRACTITIONER

## 2020-11-22 PROCEDURE — 94660 CPAP INITIATION&MGMT: CPT

## 2020-11-22 PROCEDURE — 74011250637 HC RX REV CODE- 250/637: Performed by: HOSPITALIST

## 2020-11-22 PROCEDURE — 74011250637 HC RX REV CODE- 250/637: Performed by: INTERNAL MEDICINE

## 2020-11-22 PROCEDURE — 85384 FIBRINOGEN ACTIVITY: CPT

## 2020-11-22 PROCEDURE — 85379 FIBRIN DEGRADATION QUANT: CPT

## 2020-11-22 PROCEDURE — 77010033711 HC HIGH FLOW OXYGEN

## 2020-11-22 PROCEDURE — 65660000000 HC RM CCU STEPDOWN

## 2020-11-22 PROCEDURE — 71045 X-RAY EXAM CHEST 1 VIEW: CPT

## 2020-11-22 PROCEDURE — 74011000258 HC RX REV CODE- 258: Performed by: NURSE PRACTITIONER

## 2020-11-22 PROCEDURE — 83880 ASSAY OF NATRIURETIC PEPTIDE: CPT

## 2020-11-22 PROCEDURE — 85610 PROTHROMBIN TIME: CPT

## 2020-11-22 PROCEDURE — 74011250636 HC RX REV CODE- 250/636: Performed by: INTERNAL MEDICINE

## 2020-11-22 PROCEDURE — 5A09357 ASSISTANCE WITH RESPIRATORY VENTILATION, LESS THAN 24 CONSECUTIVE HOURS, CONTINUOUS POSITIVE AIRWAY PRESSURE: ICD-10-PCS | Performed by: INTERNAL MEDICINE

## 2020-11-22 PROCEDURE — 80053 COMPREHEN METABOLIC PANEL: CPT

## 2020-11-22 PROCEDURE — 36600 WITHDRAWAL OF ARTERIAL BLOOD: CPT

## 2020-11-22 PROCEDURE — 82803 BLOOD GASES ANY COMBINATION: CPT

## 2020-11-22 PROCEDURE — 94640 AIRWAY INHALATION TREATMENT: CPT

## 2020-11-22 PROCEDURE — 74011000250 HC RX REV CODE- 250: Performed by: INTERNAL MEDICINE

## 2020-11-22 RX ORDER — AMOXICILLIN 250 MG
1 CAPSULE ORAL DAILY
Status: DISCONTINUED | OUTPATIENT
Start: 2020-11-22 | End: 2020-11-30 | Stop reason: HOSPADM

## 2020-11-22 RX ORDER — BUMETANIDE 0.25 MG/ML
2 INJECTION INTRAMUSCULAR; INTRAVENOUS 2 TIMES DAILY
Status: DISCONTINUED | OUTPATIENT
Start: 2020-11-23 | End: 2020-11-23

## 2020-11-22 RX ORDER — BUMETANIDE 0.25 MG/ML
2 INJECTION INTRAMUSCULAR; INTRAVENOUS ONCE
Status: COMPLETED | OUTPATIENT
Start: 2020-11-22 | End: 2020-11-22

## 2020-11-22 RX ADMIN — GUAIFENESIN AND DEXTROMETHORPHAN 5 ML: 100; 10 SYRUP ORAL at 04:49

## 2020-11-22 RX ADMIN — HYDROXYCHLOROQUINE SULFATE 200 MG: 200 TABLET, FILM COATED ORAL at 10:09

## 2020-11-22 RX ADMIN — OXYCODONE HYDROCHLORIDE AND ACETAMINOPHEN 500 MG: 500 TABLET ORAL at 10:09

## 2020-11-22 RX ADMIN — ALLOPURINOL 300 MG: 300 TABLET ORAL at 10:09

## 2020-11-22 RX ADMIN — ALBUTEROL SULFATE 1 PUFF: 90 AEROSOL, METERED RESPIRATORY (INHALATION) at 21:43

## 2020-11-22 RX ADMIN — CLOPIDOGREL BISULFATE 75 MG: 75 TABLET ORAL at 10:09

## 2020-11-22 RX ADMIN — DOCUSATE SODIUM 50MG AND SENNOSIDES 8.6MG 1 TABLET: 8.6; 5 TABLET, FILM COATED ORAL at 10:09

## 2020-11-22 RX ADMIN — OXYCODONE HYDROCHLORIDE AND ACETAMINOPHEN 500 MG: 500 TABLET ORAL at 19:10

## 2020-11-22 RX ADMIN — ALBUTEROL SULFATE 1 PUFF: 90 AEROSOL, METERED RESPIRATORY (INHALATION) at 08:23

## 2020-11-22 RX ADMIN — GUAIFENESIN 600 MG: 600 TABLET, EXTENDED RELEASE ORAL at 21:57

## 2020-11-22 RX ADMIN — BUMETANIDE 1 MG: 0.25 INJECTION, SOLUTION INTRAMUSCULAR; INTRAVENOUS at 10:05

## 2020-11-22 RX ADMIN — Medication 10 ML: at 15:49

## 2020-11-22 RX ADMIN — ZINC SULFATE 220 MG (50 MG) CAPSULE 1 CAPSULE: CAPSULE at 10:09

## 2020-11-22 RX ADMIN — SALINE NASAL SPRAY 2 SPRAY: 1.5 SOLUTION NASAL at 05:52

## 2020-11-22 RX ADMIN — CARVEDILOL 25 MG: 12.5 TABLET, FILM COATED ORAL at 19:10

## 2020-11-22 RX ADMIN — CARVEDILOL 25 MG: 12.5 TABLET, FILM COATED ORAL at 10:09

## 2020-11-22 RX ADMIN — LEVOFLOXACIN 500 MG: 5 INJECTION, SOLUTION INTRAVENOUS at 10:11

## 2020-11-22 RX ADMIN — Medication 10 ML: at 21:57

## 2020-11-22 RX ADMIN — ROSUVASTATIN 20 MG: 10 TABLET, FILM COATED ORAL at 21:57

## 2020-11-22 RX ADMIN — REMDESIVIR 100 MG: 100 INJECTION, POWDER, LYOPHILIZED, FOR SOLUTION INTRAVENOUS at 15:50

## 2020-11-22 RX ADMIN — Medication 81 MG: at 10:09

## 2020-11-22 RX ADMIN — BUMETANIDE 2 MG: 0.25 INJECTION, SOLUTION INTRAMUSCULAR; INTRAVENOUS at 16:36

## 2020-11-22 RX ADMIN — ENOXAPARIN SODIUM 40 MG: 40 INJECTION SUBCUTANEOUS at 05:44

## 2020-11-22 RX ADMIN — HYDROXYCHLOROQUINE SULFATE 200 MG: 200 TABLET, FILM COATED ORAL at 19:10

## 2020-11-22 RX ADMIN — DEXAMETHASONE 6 MG: 4 TABLET ORAL at 15:50

## 2020-11-22 RX ADMIN — GUAIFENESIN 600 MG: 600 TABLET, EXTENDED RELEASE ORAL at 10:09

## 2020-11-22 RX ADMIN — Medication 10 ML: at 05:44

## 2020-11-22 RX ADMIN — MONTELUKAST 10 MG: 10 TABLET, FILM COATED ORAL at 21:57

## 2020-11-22 RX ADMIN — ENOXAPARIN SODIUM 40 MG: 40 INJECTION SUBCUTANEOUS at 19:10

## 2020-11-22 RX ADMIN — ALBUTEROL SULFATE 1 PUFF: 90 AEROSOL, METERED RESPIRATORY (INHALATION) at 13:49

## 2020-11-22 RX ADMIN — AMLODIPINE BESYLATE 5 MG: 5 TABLET ORAL at 10:09

## 2020-11-22 NOTE — PROGRESS NOTES
Bedside shift change report given to Ainsley Reyes RN (oncoming nurse) by Pooja Perez RN (offgoing nurse). Report included the following information Kardex, Intake/Output, MAR, Recent Results and Cardiac Rhythm NSR. Patient Vitals for the past 12 hrs:   Temp Pulse Resp BP SpO2   11/22/20 0717 97.8 °F (36.6 °C) 84 14 (!) 141/87 94 %   11/22/20 0625     96 %   11/22/20 0350     93 %   11/22/20 0314 98.4 °F (36.9 °C) 87 23 (!) 156/98 95 %   11/22/20 0121     94 %   11/21/20 2339 98.2 °F (36.8 °C) 95 23 125/82 91 %   11/21/20 2017     92 %   11/21/20 2007 98.3 °F (36.8 °C) 91 25 (!) 157/82 93 %     Last 3 Recorded Weights in this Encounter    11/20/20 0312 11/21/20 0356 11/22/20 0314   Weight: 109.9 kg (242 lb 4.8 oz) 111.7 kg (246 lb 4.1 oz) 113.8 kg (250 lb 14.1 oz)     Problem: Gas Exchange - Impaired  Goal: Absence of hypoxia  Outcome: Progressing Towards Goal  Goal: Promote optimal lung function  Outcome: Progressing Towards Goal  Patient has moments of coughing, then experiences oxygen desaturation, with increased oxygen requirements. Patient encouraged to perform deep breathing exercises and to use bipap at night. MD on call notified of patient c/o \"stuff\" nose. New order for [facility] given. Medication administered; patient confirms relief.      Problem: Isolation Precautions - Risk of Spread of Infection  Goal: Prevent transmission of infectious organism to others  Outcome: Progressing Towards Goal     Problem: Nutrition Deficits  Goal: Optimize nutrtional status  Outcome: Progressing Towards Goal     Problem: Risk for Fluid Volume Deficit  Goal: Maintain normal heart rhythm  Outcome: Progressing Towards Goal  Goal: Maintain normal serum potassium, sodium, calcium, phosphorus, and pH  Outcome: Progressing Towards Goal     Problem: Loneliness or Risk for Loneliness  Goal: Demonstrate positive use of time alone when socialization is not possible  Outcome: Progressing Towards Goal     Problem: Fatigue  Goal: Verbalize increase energy and improved vitality  Outcome: Progressing Towards Goal     Problem: Falls - Risk of  Goal: *Absence of Falls  Description: Document Asiya Molina Fall Risk and appropriate interventions in the flowsheet.   Outcome: Progressing Towards Goal  Note: Fall Risk Interventions:            Medication Interventions: Patient to call before getting OOB, Teach patient to arise slowly    Elimination Interventions: Call light in reach, Patient to call for help with toileting needs

## 2020-11-22 NOTE — PROGRESS NOTES
[address] [facility] Adult  Hospitalist Group                                                                                          Hospitalist Progress Note  [address] [facility],   Answering service: [phone] from in house phone        Date of Service:  2020  NAME:  Ezra Franco  :  1955  MRN:  [id number]      Admission Summary:   72 y.o. female who presents with cough and shortness of breath since  patient was tested positive Covid at an outside testing center and was taking antibiotics nebulizer and Medrol pack did not improve patient said that cough is increasing in frequency and thickening whitish in color no blood in it patient  also have cold-like symptoms and admitted at  [facility] patient has chronic systolic heart failure but she takes all her medications regularly does not seem to be in exacerbation. Currently patient was saturating well in room air in the ED patient was admitted for further management    Interval history / Subjective: Follow up COVID 19. Patient seen and examined. Unable to take deep breath. Significant oxygen requirements up to high flow 30L. Discussed worsening status with patient and she's agreeable to intubation if needed. Attempted to call daughter for update- left message.       Assessment & Plan:     COVID-19 pneumonia:  Acute hypoxic respiratory failure (89% on RA + respiratory distress, tachypnea)  -ID consulted for remdesivir- initiated   -Dexamethasone 6 mg once daily  -Vitamin C, zinc  -Pulmonary consult for JODY pulse trial- not candidate due to worsening respiratory status   -tylenol for fevers  -s/p convalescent plasma   -Check blood cultures, continue levaquin, elevated procalcitonin   -bumex BID, monitor I&Os    Chronic systolic heart failure NYHA II  -Continue bumex IV  -Continue home carvedilol  -Continue home Plavix, aspirin, rosuvastatin     VICKI on CKD 2: persistent  -Monitor closely on diurectics     History of rheumatoid arthritis:  -hold sulfasalazine, continue Plaquenil    History of depression:  -Continue home Cymbalta    Gout  -Hold colchicine, continue allopurinol      Diabetes type 2 with hyperglycemia  -continue SSI, monitor closely with steroids, consider long acting insulin  -obtain Ha1c     Hypertension:  -continue carvedilol, amlodipine    Difficult IV access: CVL placement      Obesity BMI 39.47    Code status: Full  DVT prophylaxis: Lovenox per Covid protocol    Care Plan discussed with: Patient/Family  Anticipated Disposition: Home w/Family  Anticipated Discharge: Greater than 48 hours     Hospital Problems  Date Reviewed: 11/18/2020          Codes Class Noted POA    SOB (shortness of breath) ICD-10-CM: R06.02  ICD-9-CM: 786.05  11/18/2020 Unknown        * (Principal) Chronic systolic heart failure ([facility] [address] ICD-10-CM: I50.22  ICD-9-CM: 428.22  7/2/2014 Yes                Review of Systems:   Negative unless stated above      Vital Signs:    Last 24hrs VS reviewed since prior progress note. Most recent are:  Visit Vitals  BP (!) 141/87 (BP 1 Location: Left arm, BP Patient Position: At rest)   Pulse 84   Temp 97.8 °F (36.6 °C)   Resp 14   Ht 5' 7\" (1.702 m)   Wt 113.8 kg (250 lb 14.1 oz)   SpO2 90%   BMI 39.29 kg/m²         Intake/Output Summary (Last 24 hours) at 11/22/2020 1006  Last data filed at 11/22/2020 5533  Gross per 24 hour   Intake 240 ml   Output 1350 ml   Net -1110 ml        Physical Examination:     I had a face to face encounter with this patient and independently examined them on 11/22/2020 as outlined below:          Constitutional:  mild acute distress, cooperative, pleasant    ENT:  Oral mucosa moist, oropharynx benign. Resp:  very diminished breath sounds with limited airflow, + accessory muscle use when talking   CV:  Regular rhythm, normal rate, no murmurs, gallops, rubs    GI:  Soft, non distended, non tender.  normoactive bowel sounds, no hepatosplenomegaly Musculoskeletal:  No edema, warm, 2+ pulses throughout    Neurologic:  Moves all extremities            Data Review:    Review and/or order of clinical lab test  Review and/or order of tests in the radiology section of CPT  Review and/or order of tests in the medicine section of CPT      Labs:     Recent Labs     11/21/20  0424 11/20/20 0311   WBC 10.2 9.7   HGB 10.8* 10.6*   HCT 32.5* 33.0*    208     Recent Labs     11/22/20  0353 11/21/20 0424 11/20/20 0311   * 136 131*   K 3.9 3.9 4.0   CL 97 99 100   CO2 26 26 23   BUN 42* 32* 36*   CREA 1.45* 1.21* 1.32*   * 151* 153*   CA 10.4* 9.5 9.6   MG  --   --  2.0   PHOS  --   --  3.6     Recent Labs     11/22/20 0353 11/21/20 0424   ALT 56 60   AP 89 97   TBILI 0.4 0.4   TP 8.5* 7.4   ALB 2.8* 3.0*   GLOB 5.7* 4.4*     Recent Labs     11/22/20  0400 11/21/20 0424 11/20/20 0316   INR 1.1 1.1 1.2*   PTP 11.9* 11.5* 12.1*      No results for input(s): FE, TIBC, PSAT, FERR in the last 72 hours. No results found for: FOL, RBCF   No results for input(s): PH, PCO2, PO2 in the last 72 hours. No results for input(s): CPK, CKNDX, TROIQ in the last 72 hours.     No lab exists for component: CPKMB  No results found for: CHOL, CHOLX, CHLST, CHOLV, HDL, HDLP, LDL, LDLC, DLDLP, TGLX, TRIGL, TRIGP, CHHD, CHHDX  Lab Results   Component Value Date/Time    Glucose (POC) 107 (H) 11/22/2020 08:40 AM    Glucose (POC) 166 (H) 11/21/2020 09:33 PM    Glucose (POC) 112 (H) 11/21/2020 04:48 PM    Glucose (POC) 116 (H) 11/21/2020 11:49 AM    Glucose (POC) 113 (H) 11/21/2020 09:14 AM     Lab Results   Component Value Date/Time    Color YELLOW/STRAW 09/15/2019 09:47 PM    Appearance CLEAR 09/15/2019 09:47 PM    Specific gravity 1.013 09/15/2019 09:47 PM    pH (UA) 6.0 09/15/2019 09:47 PM    Protein NEGATIVE  09/15/2019 09:47 PM    Glucose NEGATIVE  09/15/2019 09:47 PM    Ketone NEGATIVE  09/15/2019 09:47 PM    Bilirubin NEGATIVE  09/15/2019 09:47 PM    Urobilinogen 0.2 09/15/2019 09:47 PM    Nitrites NEGATIVE  09/15/2019 09:47 PM    Leukocyte Esterase NEGATIVE  09/15/2019 09:47 PM    Epithelial cells FEW 09/15/2019 09:47 PM    Bacteria NEGATIVE  09/15/2019 09:47 PM    WBC 0-4 09/15/2019 09:47 PM    RBC 0-5 09/15/2019 09:47 PM         Medications Reviewed:     Current Facility-Administered Medications   Medication Dose Route Frequency    sodium chloride (OCEAN) 0.65 % nasal squeeze bottle 2 Spray  2 Spray Both Nostrils Q2H PRN    senna-docusate (PERICOLACE) 8.6-50 mg per tablet 1 Tab  1 Tab Oral DAILY    levoFLOXacin (LEVAQUIN) 500 mg in D5W IVPB  500 mg IntraVENous Q24H    bumetanide (BUMEX) injection 1 mg  1 mg IntraVENous BID    guaiFENesin ER (MUCINEX) tablet 600 mg  600 mg Oral Q12H    remdesivir 100 mg in 0.9% sodium chloride 250 mL IVPB  100 mg IntraVENous Q24H    loperamide (IMODIUM) capsule 4 mg  4 mg Oral Q4H PRN    0.9% sodium chloride infusion 250 mL  250 mL IntraVENous PRN    glucose chewable tablet 16 g  4 Tab Oral PRN    glucagon (GLUCAGEN) injection 1 mg  1 mg IntraMUSCular PRN    dextrose 10% infusion 0-250 mL  0-250 mL IntraVENous PRN    insulin lispro (HUMALOG) injection   SubCUTAneous AC&HS    amLODIPine (NORVASC) tablet 5 mg  5 mg Oral DAILY    enoxaparin (LOVENOX) injection 40 mg  40 mg SubCUTAneous Q12H    ascorbic acid (vitamin C) (VITAMIN C) tablet 500 mg  500 mg Oral BID    zinc sulfate (ZINCATE) 220 (50) mg capsule 1 Cap  1 Cap Oral DAILY    [Held by provider] sulfaSALAzine (AZULFIDINE) tablet 500 mg  500 mg Oral BID WITH MEALS    montelukast (SINGULAIR) tablet 10 mg  10 mg Oral QHS    dexAMETHasone (DECADRON) tablet 6 mg  6 mg Oral Q24H    albuterol (PROVENTIL HFA, VENTOLIN HFA, PROAIR HFA) inhaler 1 Puff  1 Puff Inhalation TID RT    allopurinoL (ZYLOPRIM) tablet 300 mg  300 mg Oral DAILY    aspirin delayed-release tablet 81 mg  81 mg Oral DAILY    carvediloL (COREG) tablet 25 mg  25 mg Oral BID WITH MEALS    clopidogreL (PLAVIX) tablet 75 mg  75 mg Oral DAILY    DULoxetine (CYMBALTA) capsule 60 mg  60 mg Oral DAILY    hydrOXYchloroQUINE (PLAQUENIL) tablet 200 mg  200 mg Oral BID    rosuvastatin (CRESTOR) tablet 20 mg  20 mg Oral QHS    acetaminophen (TYLENOL) tablet 650 mg  650 mg Oral Q6H PRN    Or    acetaminophen (TYLENOL) suppository 650 mg  650 mg Rectal Q6H PRN    guaiFENesin-dextromethorphan (ROBITUSSIN DM) 100-10 mg/5 mL syrup 5 mL  5 mL Oral Q4H PRN    sodium chloride (NS) flush 5-40 mL  5-40 mL IntraVENous Q8H    sodium chloride (NS) flush 5-40 mL  5-40 mL IntraVENous PRN    polyethylene glycol (MIRALAX) packet 17 g  17 g Oral DAILY PRN    promethazine (PHENERGAN) tablet 12.5 mg  12.5 mg Oral Q6H PRN    Or    ondansetron (ZOFRAN) injection 4 mg  4 mg IntraVENous Q6H PRN     ______________________________________________________________________  EXPECTED LENGTH OF STAY: 4d 4h  ACTUAL LENGTH OF STAY:          [address] [facility]

## 2020-11-22 NOTE — CONSULTS
[facility]    ICU Consult    Name: Jai Cantrell   : 1955   MRN: [id number]   Date: 2020      Subjective:     Reason for ICU Consult: hypoxic respiratory failure // COVID-19 pneumonia     HPI: 71 y/o F with history of obesity (BMI 40), DM2, systolic heart failure, rheumatoid arthritis on plaquenil and sulfasalazine, and CKD stage 2 presenting to the hospital with SOB in the setting Sars-CoV-2 pneumonia diagnosed on . We were consulted as she has had a consistent decline in her respiratory status and increased oxygen requirements today. I was able to examine her shortly after the initiation of bipap. She reports feeling better with bipap. Is saturating 96% on 60% fio2. May require vieyra catheter as purewick does not seem to be working well for her. On my review of her new CXR, no significant change noted in extent of b/l pneumonia      Assessment and Recommendations:     Sars-CoV-2 Pneumonia: Pt clearly deteriorating from a respiratory perspective. She is at high risk for intubation in the near future. Given her age and comorbid conditions, if she is intubated it is extremely unlikely that she would survive this illness. Continue evidence based therapies as ordered (remdesivir, decadron, Vit C, Zinc, convalescent plasma) and begin with the following. Should she fail to improve with bipap, we would be happy to reevaluate and consider for ICU admission/intubation. a. Recheck pro-BNP  b. Start pt on Bipap - given habitus would start with PEEP of 8-10  c. Repeat CXR  d. Consider ABG  e.  Agree with decision for central line placement      Active Problem List:     Problem List  Date Reviewed: 2020          Codes Class    SOB (shortness of breath) ICD-10-CM: R06.02  ICD-9-CM: 786.05         Diarrhea ICD-10-CM: R19.7  ICD-9-CM: 787.91         Epigastric pain ICD-10-CM: R10.13  ICD-9-CM: 789.06         * (Principal) Chronic systolic heart failure ([facility] [address] ICD-10-CM: I50.22  ICD-9-CM: 428.22         Knee osteoarthritis ICD-10-CM: M17.10  ICD-9-CM: 715.36               Past Medical History:      has a past medical history of Anxiety, Arrhythmia, Arthritis, Asthma, CAD (coronary artery disease), Chronic pain, COVID-19, Diarrhea (3/24/2017), Epigastric pain (3/24/2017), Fibromyalgia, GERD (gastroesophageal reflux disease), Gout, Heart failure ([facility] [address] Hypertension, Psychiatric disorder, Stroke ([facility] [address] and Unspecified sleep apnea. Past Surgical History:      has a past surgical history that includes colonoscopy (N/A, 8/23/2016); sigmoidoscopy,biopsy (3/24/2017); flexible sigmoidoscopy (N/A, 3/24/2017); hx gyn; hx appendectomy; hx heent; hx endoscopy; vascular surgery procedure unlist; hx other surgical; hx knee arthroscopy (Right); hx knee replacement (Right); hx orthopaedic (Right); and colonoscopy (N/A, 2/21/2020). Home Medications:     Prior to Admission medications    Medication Sig Start Date End Date Taking? Authorizing Provider   albuterol (PROVENTIL HFA, VENTOLIN HFA, PROAIR HFA) 90 mcg/actuation inhaler Take 1 Puff by inhalation every four (4) hours as needed for Wheezing. Yes Provider, Historical   colchicine 0.6 mg tablet Take 0.6 mg by mouth daily as needed for Gout or Pain. Yes Provider, Historical   gabapentin (NEURONTIN) 300 mg capsule Take 300 mg by mouth three (3) times daily as needed for Pain (arthritis flare ups). Yes Provider, Historical   famotidine (PEPCID) 40 mg tablet Take 40 mg by mouth daily. Yes Provider, Historical   sulfaSALAzine (AZULFIDINE) 500 mg tablet Take 500 mg by mouth two (2) times a day. Yes Provider, Historical   montelukast (SINGULAIR) 10 mg tablet Take 10 mg by mouth daily. Yes Provider, Historical   fluticasone propionate (FLONASE) 50 mcg/actuation nasal spray 2 Sprays by Both Nostrils route daily as needed. Yes Provider, Historical   rosuvastatin (CRESTOR) 20 mg tablet Take 20 mg by mouth nightly.    Yes Provider, Historical   dulaglutide (TRULICITY) 1.5 JV/4.9 mL sub-q pen 1.5 mg by SubCUTAneous route every Sunday. Yes Provider, Historical   clopidogreL (PLAVIX) 75 mg tab Take 75 mg by mouth daily. Yes Provider, Historical   aspirin delayed-release 81 mg tablet Take 81 mg by mouth daily. Yes Provider, Historical   hydroxychloroquine (PLAQUENIL) 200 mg tablet Take 200 mg by mouth two (2) times a day. Yes Other, MD Fabio   allopurinol (ZYLOPRIM) 300 mg tablet Take 300 mg by mouth daily. Yes Other, MD Fabio   plecanatide (TRULANCE) 3 mg tab Take 3 mg by mouth daily as needed. Yes Provider, Historical   lidocaine (LIDODERM) 5 % 1 Patch by TransDERmal route daily as needed. Apply patch to the affected area for 12 hours a day and remove for 12 hours a day. Yes Provider, Historical   diclofenac (VOLTAREN) 1 % gel Apply  to affected area as needed. Yes Provider, Historical   amLODIPine-benazepril (LOTREL) 5-20 mg per capsule Take 1 Cap by mouth daily. Yes Provider, Historical   carvedilol (COREG) 25 mg tablet Take 25 mg by mouth two (2) times daily (with meals). Yes Other, MD Fabio   bumetanide (BUMEX) 1 mg tablet Take 1 mg by mouth every other day. Yes Other, MD Fabio   DULoxetine (CYMBALTA) 60 mg capsule Take 60 mg by mouth daily. Yes Other, MD Fabio   cholecalciferol, vitamin D3, 2,000 unit tab Take 2,000 Units by mouth daily. Yes Other, MD Fabio   cyanocobalamin 1,000 mcg tablet Take 1,000 mcg by mouth daily. Yes Other, MD Fabio   spironolactone (ALDACTONE) 25 mg tablet Take 25 mg by mouth two (2) times a day. 11/9/10  Yes Provider, Historical       Allergies/Social/Family History:      Allergies   Allergen Reactions    Codeine Itching    Demerol [Meperidine] Nausea Only    Ivp [Fd And C Blue No.1] Itching    Minoxidil Itching    Penicillamine Itching and Other (comments)     Drops blood pressure    Percocet [Oxycodone-Acetaminophen] Nausea and Vomiting      Social History     Tobacco Use    Smoking status: Never Smoker    Smokeless tobacco: Never Used   Substance Use Topics    Alcohol use: No      Family History   Problem Relation Age of Onset    Diabetes Mother        Review of Systems:     Review of systems not obtained due to patient factors. Objective:   Vital Signs:  Visit Vitals  BP (!) 132/97   Pulse 83   Temp 98.2 °F (36.8 °C)   Resp 22   Ht 5' 7\" (1.702 m)   Wt 113.8 kg (250 lb 14.1 oz)   SpO2 92%   BMI 39.29 kg/m²    O2 Flow Rate (L/min): 45 l/min O2 Device: Hi flow nasal cannula Temp (24hrs), Av.2 °F (36.8 °C), Min:97.8 °F (36.6 °C), Max:98.4 °F (36.9 °C)        Intake/Output:     Intake/Output Summary (Last 24 hours) at 2020 1633  Last data filed at 2020 0314  Gross per 24 hour   Intake 240 ml   Output 950 ml   Net -710 ml       Physical Exam:  General:  Alert, cooperative, no distress, obese   Eyes:  Sclera anicteric. Pupils equally round and reactive to light. Mouth/Throat: Mucous membranes normal, oral pharynx clear   Neck: Supple   Lungs:   B/l coarse sounds, crackles   CV:  Regular rate and rhythm,no murmur, click, rub or gallop   Abdomen:   Soft, non-tender.  bowel sounds normal. non-distended   Extremities: No cyanosis or edema   Skin: Skin color, texture, turgor normal. no acute rash or lesions   Lymph nodes: Cervical and supraclavicular normal   Musculoskeletal: No swelling or deformity   Lines/Devices:  Intact, no erythema, drainage or tenderness   Psych: Alert and oriented, normal mood affect given the setting       LABS AND  DATA: Personally reviewed  Recent Labs     20   WBC 10.2 9.7   HGB 10.8* 10.6*   HCT 32.5* 33.0*    208     Recent Labs     20  0353 20   * 136 131*   K 3.9 3.9 4.0   CL 97 99 100   CO2 26 26 23   BUN 42* 32* 36*   CREA 1.45* 1.21* 1.32*   * 151* 153*   CA 10.4* 9.5 9.6   MG  --   --  2.0   PHOS  --   --  3.6     Recent Labs     20  0359 11/21/20 0424   AP 89 97   TP 8.5* 7.4   ALB 2.8* 3.0*   GLOB 5.7* 4.4*     Recent Labs     11/22/20  0400 11/21/20 0424   INR 1.1 1.1   PTP 11.9* 11.5*      MEDS: Reviewed    Chest X-Ray:     TTE: EF 45% in 2014    DISPOSITION  Continue care in IM for now - low threshold to transfer to ICU    CRITICAL CARE CONSULTANT NOTE  I had a face to face encounter with the patient, reviewed and interpreted patient data including clinical events, labs, images, vital signs, I/O's, and examined patient. I have discussed the case and the plan and management of the patient's care with the consulting services, the bedside nurses and the respiratory therapist.      I personally spent 90 minutes of critical care time. This is time spent at this critically ill patient's bedside actively involved in patient care as well as the coordination of care and discussions with the patient's family. This does not include any procedural time which has been billed separately.     Ragini Rosenbaum MD  Anesthesiology and [address] [place] [address]

## 2020-11-22 NOTE — PROGRESS NOTES
11/22/20 0931   Oxygen Therapy   O2 Sat (%) 90 %   Pulse via Oximetry 86 beats per minute   O2 Device Hi flow nasal cannula   O2 Flow Rate (L/min) 30 l/min   O2 Temperature 87.8 °F (31 °C)   FIO2 (%) 80 %       Patient placed on HFNC per MD order.

## 2020-11-22 NOTE — PROGRESS NOTES
ID Progress Note  2020    Subjective: Increase needs of supplemental O2; high flow 40ML/min  C/o worsening of SOB and GALEANA    Review of Systems:            Symptom Y/N Comments   Symptom Y/N Comments   Fever/Chills n      Chest Pain  n      Poor Appetite       Edema        Cough  y     Abdominal Pain        Sputum n      Joint Pain        SOB/GALEANA y      Pruritis/Rash        Nausea/vomit n      Tolerating PT/OT        Diarrhea  n     Tolerating Diet        Constipation  n     Other           Could NOT obtain due to:       Objective:     Vitals:   Visit Vitals  /74 (BP 1 Location: Left arm, BP Patient Position: At rest)   Pulse 93   Temp 98.3 °F (36.8 °C)   Resp 23   Ht 5' 7\" (1.702 m)   Wt 113.8 kg (250 lb 14.1 oz)   SpO2 93%   BMI 39.29 kg/m²        Tmax:  Temp (24hrs), Av.2 °F (36.8 °C), Min:97.8 °F (36.6 °C), Max:98.4 °F (36.9 °C)      PHYSICAL EXAM:  General: Ill appearing, obese. Alert, cooperative, no acute distress    EENT:  EOMI. Anicteric sclerae. MMM  Resp:  Coarse breath sounds in apex with diminished breath sounds at bases, no wheezing or rales. No accessory muscle use  CV:  Regular  rhythm,  No edema  GI:  Soft, Non distended, Non tender. +Bowel sounds  Neurologic:  Alert and oriented X 3, normal speech,   Psych:   Good insight. Not anxious nor agitated  Skin:  No rashes.   No jaundice    Labs:   Lab Results   Component Value Date/Time    WBC 10.2 2020 04:24 AM    HGB 10.8 (L) 2020 04:24 AM    Hematocrit (POC) 37 09/15/2019 08:03 PM    HCT 32.5 (L) 2020 04:24 AM    PLATELET 214  04:24 AM    MCV 90.0 2020 04:24 AM     Lab Results   Component Value Date/Time    Sodium 133 (L) 2020 03:53 AM    Potassium 3.9 2020 03:53 AM    Chloride 97 2020 03:53 AM    CO2 26 2020 03:53 AM    Anion gap 10 2020 03:53 AM    Glucose 129 (H) 2020 03:53 AM    BUN 42 (H) 2020 03:53 AM    Creatinine 1.45 (H) 2020 03:53 AM BUN/Creatinine ratio 29 (H) 11/22/2020 03:53 AM    GFR est AA 44 (L) 11/22/2020 03:53 AM    GFR est non-AA 36 (L) 11/22/2020 03:53 AM    Calcium 10.4 (H) 11/22/2020 03:53 AM    Bilirubin, total 0.4 11/22/2020 03:53 AM    Alk.  phosphatase 89 11/22/2020 03:53 AM    Protein, total 8.5 (H) 11/22/2020 03:53 AM    Albumin 2.8 (L) 11/22/2020 03:53 AM    Globulin 5.7 (H) 11/22/2020 03:53 AM    A-G Ratio 0.5 (L) 11/22/2020 03:53 AM    ALT (SGPT) 56 11/22/2020 03:53 AM           Assessment and Plan   Acute respiratory failure secondary to COVID 19 & PNA  Allergic asthma  - COVID 19 (+)     CRP 8.87, Ferritin 293, procal 0.25->1.62, CRP 8.87->23.10    D-dimer 3.15->1.90, legionella (-)    WBC 9.7->10.2    CXR: Mild worsening in the bilateral pulmonary infiltrates    Increase needs of supplemental O2; now on high flow 10L/min    Continue with 5 day course of Levo (QTC 417ms on admission)    Remdesivir in progress, last dose 11/24, CrCl 50.4ml/min    S/p convalescent plasma 11/20    Continue with decadron (last dose 11/28), vit C, and Zinc    On plaquenil POA for arthritis per the physician at [facility]    Supportive care    Prognosis guarded    VICKI  - Cret 1.2->1.4    Ildefonso Avery NP

## 2020-11-23 LAB
ALBUMIN SERPL-MCNC: 2.8 G/DL (ref 3.5–5)
ALBUMIN/GLOB SERPL: 0.5 {RATIO} (ref 1.1–2.2)
ALP SERPL-CCNC: 79 U/L (ref 45–117)
ALT SERPL-CCNC: 48 U/L (ref 12–78)
ANION GAP SERPL CALC-SCNC: 12 MMOL/L (ref 5–15)
AST SERPL-CCNC: 35 U/L (ref 15–37)
BILIRUB SERPL-MCNC: 0.4 MG/DL (ref 0.2–1)
BUN SERPL-MCNC: 59 MG/DL (ref 6–20)
BUN/CREAT SERPL: 36 (ref 12–20)
CALCIUM SERPL-MCNC: 10.8 MG/DL (ref 8.5–10.1)
CHLORIDE SERPL-SCNC: 97 MMOL/L (ref 97–108)
CO2 SERPL-SCNC: 26 MMOL/L (ref 21–32)
COMMENT, HOLDF: NORMAL
CREAT SERPL-MCNC: 1.64 MG/DL (ref 0.55–1.02)
D DIMER PPP FEU-MCNC: 2.07 MG/L FEU (ref 0–0.65)
ERYTHROCYTE [DISTWIDTH] IN BLOOD BY AUTOMATED COUNT: 13.7 % (ref 11.5–14.5)
FIBRINOGEN PPP-MCNC: >800 MG/DL (ref 200–475)
GLOBULIN SER CALC-MCNC: 5.9 G/DL (ref 2–4)
GLUCOSE BLD STRIP.AUTO-MCNC: 119 MG/DL (ref 65–100)
GLUCOSE BLD STRIP.AUTO-MCNC: 284 MG/DL (ref 65–100)
GLUCOSE BLD STRIP.AUTO-MCNC: 94 MG/DL (ref 65–100)
GLUCOSE BLD STRIP.AUTO-MCNC: 98 MG/DL (ref 65–100)
GLUCOSE SERPL-MCNC: 155 MG/DL (ref 65–100)
HCT VFR BLD AUTO: 37.6 % (ref 35–47)
HGB BLD-MCNC: 12.5 G/DL (ref 11.5–16)
INR PPP: 1.2 (ref 0.9–1.1)
MAGNESIUM SERPL-MCNC: 2 MG/DL (ref 1.6–2.4)
MCH RBC QN AUTO: 30 PG (ref 26–34)
MCHC RBC AUTO-ENTMCNC: 33.2 G/DL (ref 30–36.5)
MCV RBC AUTO: 90.2 FL (ref 80–99)
NRBC # BLD: 0 K/UL (ref 0–0.01)
NRBC BLD-RTO: 0 PER 100 WBC
PHOSPHATE SERPL-MCNC: 5.8 MG/DL (ref 2.6–4.7)
PLATELET # BLD AUTO: 276 K/UL (ref 150–400)
PMV BLD AUTO: 10.1 FL (ref 8.9–12.9)
POTASSIUM SERPL-SCNC: 4 MMOL/L (ref 3.5–5.1)
PROT SERPL-MCNC: 8.7 G/DL (ref 6.4–8.2)
PROTHROMBIN TIME: 12.6 SEC (ref 9–11.1)
RBC # BLD AUTO: 4.17 M/UL (ref 3.8–5.2)
SAMPLES BEING HELD,HOLD: NORMAL
SERVICE CMNT-IMP: ABNORMAL
SERVICE CMNT-IMP: ABNORMAL
SERVICE CMNT-IMP: NORMAL
SERVICE CMNT-IMP: NORMAL
SODIUM SERPL-SCNC: 135 MMOL/L (ref 136–145)
WBC # BLD AUTO: 9.4 K/UL (ref 3.6–11)

## 2020-11-23 PROCEDURE — 74011250637 HC RX REV CODE- 250/637: Performed by: HOSPITALIST

## 2020-11-23 PROCEDURE — 94660 CPAP INITIATION&MGMT: CPT

## 2020-11-23 PROCEDURE — 74011636637 HC RX REV CODE- 636/637: Performed by: INTERNAL MEDICINE

## 2020-11-23 PROCEDURE — 84100 ASSAY OF PHOSPHORUS: CPT

## 2020-11-23 PROCEDURE — 74011000250 HC RX REV CODE- 250: Performed by: INTERNAL MEDICINE

## 2020-11-23 PROCEDURE — 74011250637 HC RX REV CODE- 250/637: Performed by: INTERNAL MEDICINE

## 2020-11-23 PROCEDURE — 74011250636 HC RX REV CODE- 250/636: Performed by: INTERNAL MEDICINE

## 2020-11-23 PROCEDURE — 82962 GLUCOSE BLOOD TEST: CPT

## 2020-11-23 PROCEDURE — 65660000001 HC RM ICU INTERMED STEPDOWN

## 2020-11-23 PROCEDURE — 85610 PROTHROMBIN TIME: CPT

## 2020-11-23 PROCEDURE — 85384 FIBRINOGEN ACTIVITY: CPT

## 2020-11-23 PROCEDURE — 85379 FIBRIN DEGRADATION QUANT: CPT

## 2020-11-23 PROCEDURE — 80053 COMPREHEN METABOLIC PANEL: CPT

## 2020-11-23 PROCEDURE — 85027 COMPLETE CBC AUTOMATED: CPT

## 2020-11-23 PROCEDURE — 74011000258 HC RX REV CODE- 258: Performed by: NURSE PRACTITIONER

## 2020-11-23 PROCEDURE — 83735 ASSAY OF MAGNESIUM: CPT

## 2020-11-23 PROCEDURE — 94640 AIRWAY INHALATION TREATMENT: CPT

## 2020-11-23 PROCEDURE — 74011000250 HC RX REV CODE- 250: Performed by: NURSE PRACTITIONER

## 2020-11-23 PROCEDURE — 36415 COLL VENOUS BLD VENIPUNCTURE: CPT

## 2020-11-23 RX ORDER — LEVOFLOXACIN 5 MG/ML
250 INJECTION, SOLUTION INTRAVENOUS EVERY 24 HOURS
Status: COMPLETED | OUTPATIENT
Start: 2020-11-24 | End: 2020-11-25

## 2020-11-23 RX ORDER — BUMETANIDE 0.25 MG/ML
3 INJECTION INTRAMUSCULAR; INTRAVENOUS 2 TIMES DAILY
Status: DISCONTINUED | OUTPATIENT
Start: 2020-11-23 | End: 2020-11-25

## 2020-11-23 RX ADMIN — ENOXAPARIN SODIUM 40 MG: 40 INJECTION SUBCUTANEOUS at 06:24

## 2020-11-23 RX ADMIN — BUMETANIDE 3 MG: 0.25 INJECTION INTRAMUSCULAR; INTRAVENOUS at 17:35

## 2020-11-23 RX ADMIN — GUAIFENESIN 600 MG: 600 TABLET, EXTENDED RELEASE ORAL at 09:16

## 2020-11-23 RX ADMIN — SALINE NASAL SPRAY 2 SPRAY: 1.5 SOLUTION NASAL at 03:48

## 2020-11-23 RX ADMIN — HYDROXYCHLOROQUINE SULFATE 200 MG: 200 TABLET, FILM COATED ORAL at 17:35

## 2020-11-23 RX ADMIN — AMLODIPINE BESYLATE 5 MG: 5 TABLET ORAL at 09:15

## 2020-11-23 RX ADMIN — MONTELUKAST 10 MG: 10 TABLET, FILM COATED ORAL at 21:09

## 2020-11-23 RX ADMIN — INSULIN LISPRO 3 UNITS: 100 INJECTION, SOLUTION INTRAVENOUS; SUBCUTANEOUS at 21:21

## 2020-11-23 RX ADMIN — ALBUTEROL SULFATE 1 PUFF: 90 AEROSOL, METERED RESPIRATORY (INHALATION) at 09:22

## 2020-11-23 RX ADMIN — ENOXAPARIN SODIUM 40 MG: 40 INJECTION SUBCUTANEOUS at 17:35

## 2020-11-23 RX ADMIN — ZINC SULFATE 220 MG (50 MG) CAPSULE 1 CAPSULE: CAPSULE at 09:16

## 2020-11-23 RX ADMIN — Medication 10 ML: at 06:24

## 2020-11-23 RX ADMIN — ACETAMINOPHEN 650 MG: 325 TABLET ORAL at 03:43

## 2020-11-23 RX ADMIN — ALLOPURINOL 300 MG: 300 TABLET ORAL at 09:15

## 2020-11-23 RX ADMIN — CLOPIDOGREL BISULFATE 75 MG: 75 TABLET ORAL at 09:15

## 2020-11-23 RX ADMIN — Medication 10 ML: at 21:09

## 2020-11-23 RX ADMIN — LEVOFLOXACIN 500 MG: 5 INJECTION, SOLUTION INTRAVENOUS at 09:17

## 2020-11-23 RX ADMIN — OXYCODONE HYDROCHLORIDE AND ACETAMINOPHEN 500 MG: 500 TABLET ORAL at 09:15

## 2020-11-23 RX ADMIN — ALBUTEROL SULFATE 1 PUFF: 90 AEROSOL, METERED RESPIRATORY (INHALATION) at 21:57

## 2020-11-23 RX ADMIN — GUAIFENESIN 600 MG: 600 TABLET, EXTENDED RELEASE ORAL at 21:09

## 2020-11-23 RX ADMIN — OXYCODONE HYDROCHLORIDE AND ACETAMINOPHEN 500 MG: 500 TABLET ORAL at 17:35

## 2020-11-23 RX ADMIN — REMDESIVIR 100 MG: 100 INJECTION, POWDER, LYOPHILIZED, FOR SOLUTION INTRAVENOUS at 15:40

## 2020-11-23 RX ADMIN — CARVEDILOL 25 MG: 12.5 TABLET, FILM COATED ORAL at 17:35

## 2020-11-23 RX ADMIN — ALBUTEROL SULFATE 1 PUFF: 90 AEROSOL, METERED RESPIRATORY (INHALATION) at 14:28

## 2020-11-23 RX ADMIN — BUMETANIDE 2 MG: 0.25 INJECTION INTRAMUSCULAR; INTRAVENOUS at 09:17

## 2020-11-23 RX ADMIN — Medication 10 ML: at 15:40

## 2020-11-23 RX ADMIN — ACETAMINOPHEN 650 MG: 325 TABLET ORAL at 09:36

## 2020-11-23 RX ADMIN — ROSUVASTATIN 20 MG: 10 TABLET, FILM COATED ORAL at 21:09

## 2020-11-23 RX ADMIN — CARVEDILOL 25 MG: 12.5 TABLET, FILM COATED ORAL at 09:15

## 2020-11-23 RX ADMIN — HYDROXYCHLOROQUINE SULFATE 200 MG: 200 TABLET, FILM COATED ORAL at 09:15

## 2020-11-23 RX ADMIN — Medication 81 MG: at 09:15

## 2020-11-23 RX ADMIN — DEXAMETHASONE 6 MG: 4 TABLET ORAL at 15:40

## 2020-11-23 NOTE — PROGRESS NOTES
[address] [facility] Adult  Hospitalist Group                                                                                          Hospitalist Progress Note  [address] [facility],   Answering service: [phone] from in house phone        Date of Service:  2020  NAME:  Poornima Leavitt  :  1955  MRN:  [id number]      Admission Summary:   72 y.o. female who presents with cough and shortness of breath since  patient was tested positive Covid at an outside testing center and was taking antibiotics nebulizer and Medrol pack did not improve patient said that cough is increasing in frequency and thickening whitish in color no blood in it patient  also have cold-like symptoms and admitted at [facility] patient has chronic systolic heart failure but she takes all her medications regularly does not seem to be in exacerbation. Currently patient was saturating well in room air in the ED patient was admitted for further management    Interval history / Subjective: Follow up COVID 19. Patient seen and examined earlier today on Bipap. Requesting to take a break from it. Feels breathing may be better. Has been maintained on High-flow throughout the day. Nephrology consulted for assistance with diuresis. Updated her daughter, April, via phone     Assessment & Plan:     COVID-19 pneumonia:  Acute hypoxic respiratory failure (89% on RA + respiratory distress, tachypnea): worsening  -ID consulted for remdesivir- initiated   -Dexamethasone 6 mg once daily  -Vitamin C, zinc  -tylenol for fevers  -s/p convalescent plasma   -recurrent fevers with elevated procalcitonin, continue levaquin for now.  Cultures NGTD   -Nephrology consulted, appreciate recommendations, continue Bumex 3 mg IV BID, monitor I&Os  -Okay to continue high-flow, transition to Bipap prn     Chronic systolic heart failure NYHA II  -Continue bumex IV  -Continue home carvedilol  -Continue home Plavix, aspirin, rosuvastatin     VICKI on CKD 2: improving  -Monitor closely on diurectics     History of rheumatoid arthritis:  -hold sulfasalazine, continue Plaquenil    History of depression:  -Continue home Cymbalta    Gout  -Hold colchicine, continue allopurinol      Diabetes type 2 with hyperglycemia  -continue SSI, monitor closely with steroids  -Ha1c 6.5     Hypertension:  -continue carvedilol, amlodipine    Difficult IV access: CVL placement      Obesity BMI 39.47    Code status: Full  DVT prophylaxis: Lovenox per Covid protocol    Care Plan discussed with: Patient/Family  Anticipated Disposition: Home w/Family  Anticipated Discharge: Greater than 48 hours     Hospital Problems  Date Reviewed: 11/18/2020          Codes Class Noted POA    SOB (shortness of breath) ICD-10-CM: R06.02  ICD-9-CM: 786.05  11/18/2020 Unknown        * (Principal) Chronic systolic heart failure ([facility] [address] ICD-10-CM: I50.22  ICD-9-CM: 428.22  7/2/2014 Yes                Review of Systems:   Negative unless stated above      Vital Signs:    Last 24hrs VS reviewed since prior progress note. Most recent are:  Visit Vitals  /61 (BP 1 Location: Left arm, BP Patient Position: Sitting)   Pulse 78   Temp 98 °F (36.7 °C)   Resp 17   Ht 5' 7\" (1.702 m)   Wt 114.9 kg (253 lb 4.9 oz)   SpO2 100%   BMI 39.67 kg/m²         Intake/Output Summary (Last 24 hours) at 11/23/2020 1600  Last data filed at 11/23/2020 1412  Gross per 24 hour   Intake 500 ml   Output 2050 ml   Net -1550 ml        Physical Examination:     I had a face to face encounter with this patient and independently examined them on 11/23/2020 as outlined below:          Constitutional:  no acute distress, cooperative, pleasant    ENT:  Oral mucosa moist, oropharynx benign. Resp:  very diminished breath sounds with limited airflow, +  accessory muscle at rest   CV:  Regular rhythm, normal rate, no murmurs, gallops, rubs    GI:  Soft, non distended, non tender.  normoactive bowel sounds, no hepatosplenomegaly     Musculoskeletal:  No edema, warm, 2+ pulses throughout    Neurologic:  Moves all extremities            Data Review:    Review and/or order of clinical lab test  Review and/or order of tests in the radiology section of CPT  Review and/or order of tests in the medicine section of [facility]      Labs:     Recent Labs     11/23/20 0403 11/21/20 0424   WBC 9.4 10.2   HGB 12.5 10.8*   HCT 37.6 32.5*    215     Recent Labs     11/23/20 0403 11/22/20 0353 11/21/20 0424   * 133* 136   K 4.0 3.9 3.9   CL 97 97 99   CO2 26 26 26   BUN 59* 42* 32*   CREA 1.64* 1.45* 1.21*   * 129* 151*   CA 10.8* 10.4* 9.5   MG 2.0  --   --    PHOS 5.8*  --   --      Recent Labs     11/23/20 0403 11/22/20 0353 11/21/20 0424   ALT 48 56 60   AP 79 89 97   TBILI 0.4 0.4 0.4   TP 8.7* 8.5* 7.4   ALB 2.8* 2.8* 3.0*   GLOB 5.9* 5.7* 4.4*     Recent Labs     11/23/20 0403 11/22/20 0400 11/21/20 0424   INR 1.2* 1.1 1.1   PTP 12.6* 11.9* 11.5*      No results for input(s): FE, TIBC, PSAT, FERR in the last 72 hours. No results found for: FOL, RBCF   No results for input(s): PH, PCO2, PO2 in the last 72 hours. No results for input(s): CPK, CKNDX, TROIQ in the last 72 hours.     No lab exists for component: CPKMB  No results found for: CHOL, CHOLX, CHLST, CHOLV, HDL, HDLP, LDL, LDLC, DLDLP, TGLX, TRIGL, TRIGP, CHHD, CHHDX  Lab Results   Component Value Date/Time    Glucose (POC) 98 11/23/2020 11:41 AM    Glucose (POC) 119 (H) 11/23/2020 08:45 AM    Glucose (POC) 181 (H) 11/22/2020 10:05 PM    Glucose (POC) 95 11/22/2020 04:34 PM    Glucose (POC) 121 (H) 11/22/2020 11:39 AM     Lab Results   Component Value Date/Time    Color YELLOW/STRAW 09/15/2019 09:47 PM    Appearance CLEAR 09/15/2019 09:47 PM    Specific gravity 1.013 09/15/2019 09:47 PM    pH (UA) 6.0 09/15/2019 09:47 PM    Protein NEGATIVE  09/15/2019 09:47 PM    Glucose NEGATIVE  09/15/2019 09:47 PM    Ketone NEGATIVE  09/15/2019 09:47 PM Bilirubin NEGATIVE  09/15/2019 09:47 PM    Urobilinogen 0.2 09/15/2019 09:47 PM    Nitrites NEGATIVE  09/15/2019 09:47 PM    Leukocyte Esterase NEGATIVE  09/15/2019 09:47 PM    Epithelial cells FEW 09/15/2019 09:47 PM    Bacteria NEGATIVE  09/15/2019 09:47 PM    WBC 0-4 09/15/2019 09:47 PM    RBC 0-5 09/15/2019 09:47 PM         Medications Reviewed:     Current Facility-Administered Medications   Medication Dose Route Frequency    bumetanide (BUMEX) injection 3 mg  3 mg IntraVENous BID    [START ON 11/24/2020] levoFLOXacin (LEVAQUIN) 250 mg in D5W IVPB  250 mg IntraVENous Q24H    sodium chloride (OCEAN) 0.65 % nasal squeeze bottle 2 Spray  2 Spray Both Nostrils Q2H PRN    senna-docusate (PERICOLACE) 8.6-50 mg per tablet 1 Tab  1 Tab Oral DAILY    guaiFENesin ER (MUCINEX) tablet 600 mg  600 mg Oral Q12H    remdesivir 100 mg in 0.9% sodium chloride 250 mL IVPB  100 mg IntraVENous Q24H    loperamide (IMODIUM) capsule 4 mg  4 mg Oral Q4H PRN    0.9% sodium chloride infusion 250 mL  250 mL IntraVENous PRN    glucose chewable tablet 16 g  4 Tab Oral PRN    glucagon (GLUCAGEN) injection 1 mg  1 mg IntraMUSCular PRN    dextrose 10% infusion 0-250 mL  0-250 mL IntraVENous PRN    insulin lispro (HUMALOG) injection   SubCUTAneous AC&HS    amLODIPine (NORVASC) tablet 5 mg  5 mg Oral DAILY    enoxaparin (LOVENOX) injection 40 mg  40 mg SubCUTAneous Q12H    ascorbic acid (vitamin C) (VITAMIN C) tablet 500 mg  500 mg Oral BID    zinc sulfate (ZINCATE) 220 (50) mg capsule 1 Cap  1 Cap Oral DAILY    [Held by provider] sulfaSALAzine (AZULFIDINE) tablet 500 mg  500 mg Oral BID WITH MEALS    montelukast (SINGULAIR) tablet 10 mg  10 mg Oral QHS    dexAMETHasone (DECADRON) tablet 6 mg  6 mg Oral Q24H    albuterol (PROVENTIL HFA, VENTOLIN HFA, PROAIR HFA) inhaler 1 Puff  1 Puff Inhalation TID RT    allopurinoL (ZYLOPRIM) tablet 300 mg  300 mg Oral DAILY    aspirin delayed-release tablet 81 mg  81 mg Oral DAILY    carvediloL (COREG) tablet 25 mg  25 mg Oral BID WITH MEALS    clopidogreL (PLAVIX) tablet 75 mg  75 mg Oral DAILY    DULoxetine (CYMBALTA) capsule 60 mg  60 mg Oral DAILY    hydrOXYchloroQUINE (PLAQUENIL) tablet 200 mg  200 mg Oral BID    rosuvastatin (CRESTOR) tablet 20 mg  20 mg Oral QHS    acetaminophen (TYLENOL) tablet 650 mg  650 mg Oral Q6H PRN    Or    acetaminophen (TYLENOL) suppository 650 mg  650 mg Rectal Q6H PRN    guaiFENesin-dextromethorphan (ROBITUSSIN DM) 100-10 mg/5 mL syrup 5 mL  5 mL Oral Q4H PRN    sodium chloride (NS) flush 5-40 mL  5-40 mL IntraVENous Q8H    sodium chloride (NS) flush 5-40 mL  5-40 mL IntraVENous PRN    polyethylene glycol (MIRALAX) packet 17 g  17 g Oral DAILY PRN    promethazine (PHENERGAN) tablet 12.5 mg  12.5 mg Oral Q6H PRN    Or    ondansetron (ZOFRAN) injection 4 mg  4 mg IntraVENous Q6H PRN     ______________________________________________________________________  EXPECTED LENGTH OF STAY: 4d 4h  ACTUAL LENGTH OF STAY:          [address] [facility],

## 2020-11-23 NOTE — PROGRESS NOTES
Bedside shift change report given to DEVAUGHN Luis (oncoming nurse) by Ang Aly RN (offgoing nurse). Report included the following information SBAR, Kardex, Intake/Output, MAR, Recent Results, and Cardiac Rhythm NSR . Patient Vitals for the past 12 hrs:   Temp Pulse Resp BP SpO2   11/23/20 0715 97.6 °F (36.4 °C) 77 19 131/75 95 %   11/23/20 0610     98 %   11/23/20 0331 97.7 °F (36.5 °C) 75 20 (!) 141/74 98 %   11/23/20 0244     97 %   11/23/20 0026 98 °F (36.7 °C) 73 20 (!) 140/90    11/22/20 2356 98.4 °F (36.9 °C) 75 19 (!) 141/87 97 %   11/22/20 2143     95 %   11/22/20 2003 99 °F (37.2 °C) 84 19 (!) 144/90 92 %       Last 3 Recorded Weights in this Encounter    11/21/20 0356 11/22/20 0314 11/23/20 0331   Weight: 111.7 kg (246 lb 4.1 oz) 113.8 kg (250 lb 14.1 oz) 114.9 kg (253 lb 4.9 oz)         Problem: Airway Clearance - Ineffective  Goal: Achieve or maintain patent airway  Outcome: Progressing Towards Goal     Problem: Gas Exchange - Impaired  Goal: Absence of hypoxia  Outcome: Progressing Towards Goal     Problem: Breathing Pattern - Ineffective  Goal: Ability to achieve and maintain a regular respiratory rate  Outcome: Progressing Towards Goal     Problem: Body Temperature -  Risk of, Imbalanced  Goal: Ability to maintain a body temperature within defined limits  Outcome: Progressing Towards Goal  Goal: Will regain or maintain usual level of consciousness  Outcome: Progressing Towards Goal     Problem: Fatigue  Goal: Verbalize increase energy and improved vitality  Outcome: Progressing Towards Goal     Problem: Falls - Risk of  Goal: *Absence of Falls  Description: Document Christiano Fall Risk and appropriate interventions in the flowsheet.   Outcome: Progressing Towards Goal  Note: Fall Risk Interventions:      Medication Interventions: Evaluate medications/consider consulting pharmacy    Elimination Interventions: Call light in reach    History of Falls Interventions: Consult care management for discharge planning, Evaluate medications/consider consulting pharmacy, Utilize gait belt for transfer/ambulation

## 2020-11-23 NOTE — PROGRESS NOTES
Bedside and Verbal shift change report given to Kay Mckeon (oncoming nurse) by Dayday (offgoing nurse). Report included the following information SBAR, Kardex, MAR, Recent Results and Cardiac Rhythm NSR. Patient Vitals for the past 12 hrs:   Temp Pulse Resp BP SpO2   11/23/20 1916 98.2 °F (36.8 °C) 91 21 105/65 100 %   11/23/20 1735  74  124/67    11/23/20 1540 98 °F (36.7 °C) 78 17 115/61 100 %   11/23/20 1428  79  133/77    11/23/20 1116 97.8 °F (36.6 °C) 81 21 (!) 156/98 98 %   11/23/20 0921     93 %   11/23/20 0919  80  137/74         Problem: Falls - Risk of  Goal: *Absence of Falls  Description: Document Christiano Fall Risk and appropriate interventions in the flowsheet. Outcome: Progressing Towards Goal  Note: Fall Risk Interventions:  Mobility Interventions: Assess mobility with egress test, OT consult for ADLs, Patient to call before getting OOB, PT Consult for mobility concerns, PT Consult for assist device competence    Medication Interventions: Assess postural VS orthostatic hypotension, Patient to call before getting OOB, Teach patient to arise slowly    Elimination Interventions: Call light in reach, Patient to call for help with toileting needs    History of Falls Interventions: Consult care management for discharge planning, Investigate reason for fall    Problem: Heart Failure: Day 5  Goal: Activity/Safety  Outcome: Progressing Towards Goal   Pt remains safe.

## 2020-11-23 NOTE — CONSULTS
NEPHROLOGY CONSULT NOTE     Patient: Stephany Wolff MRN: [id number]  PCP: Bud Dick MD   :     1955  Age:   72 y.o. Sex:  female      Referring physician: Chinmay Kumar DO  Reason for consultation: 72 y.o. female with SOB (shortness of breath) [B29.36] complicated by VICKI   Admission Date: 2020  1:29 PM  LOS: 5 days      ASSESSMENT and PLAN :   CKD IIIb:  - presumed from chronic HTN/DM2 + CMP and ongoing need for diuretics  - increase bumex to 3mg IV BID  - daily labs and strict I/Os    Hypervolemia:  - diuretics as above    COVID-19 PNA:  - s/p remdesivir, convalescent plasma   - on dexamethasone  - per ID and primary team    HFrEF:  - diuretics as above    HTN:  - cont present care  - no ACE/ARB    Gout:  - on allopurinol    DM2:  - per hospitalist    Obesity     Active Problems / Assessment AAActive  :   Principal Problem:    Chronic systolic heart failure ([facility] [address] (2014)    Active Problems:    SOB (shortness of breath) (2020)         Subjective:   HPI: Stephany Wolff is a 72 y.o.  female who has been admitted to the hospital for increasing hypoxia on . She has a + COIVD prior to admission, was given abx, medrol, nebulizer w/ no improvement. She was admitted on  and has received dexamethazone, remdesivir and convalescent plasma. She was on continuous BiPAP, now on high flow. UOP improving on bumex. He Cr on admission was 1.6 and has fluctuated around 1.4 to 1.6. She is on bumex 2mg IV BID and her UOP was 1.4 in the past 24 hrs. Unable to obtain ROS at this time.     Past Medical Hx:   Past Medical History:   Diagnosis Date    Anxiety     Arrhythmia     per pt, treated with med    Arthritis     Asthma     CAD (coronary artery disease)     EF=33 1/3    Chronic pain     fibromyalgia    COVID-19     Diarrhea 3/24/2017    Epigastric pain 3/24/2017    Fibromyalgia     GERD (gastroesophageal reflux disease)     ibs    Gout     [facility] cardiomyopathy    Hypertension     Psychiatric disorder     h/o severe depression    Stroke ([facility] [address]     possible TIA    Unspecified sleep apnea     wears CPAP        Past Surgical Hx:     Past Surgical History:   Procedure Laterality Date    COLONOSCOPY N/A 8/23/2016    COLONOSCOPY performed by Elsa Leahy MD at [facility] ENDOSCOPY    COLONOSCOPY N/A 2/21/2020    COLONOSCOPY performed by Luna Rebolledo MD at [place] [address] N/A 3/24/2017    FLEXIBLE SIGMOIDOSCOPY  performed by Aele Sauceda MD at [facility] AMBULATORY OR    HX APPENDECTOMY      HX ENDOSCOPY      HX GYN      hysterectomy    HX HEENT      left eye surgery    HX KNEE ARTHROSCOPY Right     HX KNEE REPLACEMENT Right     HX ORTHOPAEDIC Right     plantar fascitis repair with implant    HX OTHER SURGICAL      hand surgery left (carpal tunnel, reconstruction of small finger)    SIGMOIDOSCOPY,BIOPSY  3/24/2017         VASCULAR SURGERY PROCEDURE UNLIST      stent in bilateral legs       Medications:  Prior to Admission medications    Medication Sig Start Date End Date Taking? Authorizing Provider   albuterol (PROVENTIL HFA, VENTOLIN HFA, PROAIR HFA) 90 mcg/actuation inhaler Take 1 Puff by inhalation every four (4) hours as needed for Wheezing. Yes Provider, Historical   colchicine 0.6 mg tablet Take 0.6 mg by mouth daily as needed for Gout or Pain. Yes Provider, Historical   gabapentin (NEURONTIN) 300 mg capsule Take 300 mg by mouth three (3) times daily as needed for Pain (arthritis flare ups). Yes Provider, Historical   famotidine (PEPCID) 40 mg tablet Take 40 mg by mouth daily. Yes Provider, Historical   sulfaSALAzine (AZULFIDINE) 500 mg tablet Take 500 mg by mouth two (2) times a day. Yes Provider, Historical   montelukast (SINGULAIR) 10 mg tablet Take 10 mg by mouth daily.    Yes Provider, Historical   fluticasone propionate (FLONASE) 50 mcg/actuation nasal spray 2 Sprays by Both Nostrils route daily as needed. Yes Provider, Historical   rosuvastatin (CRESTOR) 20 mg tablet Take 20 mg by mouth nightly. Yes Provider, Historical   dulaglutide (TRULICITY) 1.5 LB/1.3 mL sub-q pen 1.5 mg by SubCUTAneous route every Sunday. Yes Provider, Historical   clopidogreL (PLAVIX) 75 mg tab Take 75 mg by mouth daily. Yes Provider, Historical   aspirin delayed-release 81 mg tablet Take 81 mg by mouth daily. Yes Provider, Historical   hydroxychloroquine (PLAQUENIL) 200 mg tablet Take 200 mg by mouth two (2) times a day. Yes Other, MD Fabio   allopurinol (ZYLOPRIM) 300 mg tablet Take 300 mg by mouth daily. Yes Other, MD Fabio   plecanatide (TRULANCE) 3 mg tab Take 3 mg by mouth daily as needed. Yes Provider, Historical   lidocaine (LIDODERM) 5 % 1 Patch by TransDERmal route daily as needed. Apply patch to the affected area for 12 hours a day and remove for 12 hours a day. Yes Provider, Historical   diclofenac (VOLTAREN) 1 % gel Apply  to affected area as needed. Yes Provider, Historical   amLODIPine-benazepril (LOTREL) 5-20 mg per capsule Take 1 Cap by mouth daily. Yes Provider, Historical   carvedilol (COREG) 25 mg tablet Take 25 mg by mouth two (2) times daily (with meals). Yes Other, MD Fabio   bumetanide (BUMEX) 1 mg tablet Take 1 mg by mouth every other day. Yes Other, MD Fabio   DULoxetine (CYMBALTA) 60 mg capsule Take 60 mg by mouth daily. Yes Other, MD Fabio   cholecalciferol, vitamin D3, 2,000 unit tab Take 2,000 Units by mouth daily. Yes Other, MD Fabio   cyanocobalamin 1,000 mcg tablet Take 1,000 mcg by mouth daily. Yes Other, MD Fabio   spironolactone (ALDACTONE) 25 mg tablet Take 25 mg by mouth two (2) times a day.  11/9/10  Yes Provider, Historical       Allergies   Allergen Reactions    Codeine Itching    Demerol [Meperidine] Nausea Only    Ivp [Fd And C Blue No.1] Itching    Minoxidil Itching    Penicillamine Itching and Other (comments)     Drops blood pressure    Percocet [Oxycodone-Acetaminophen] Nausea and Vomiting       Social Hx:  reports that she has never smoked. She has never used smokeless tobacco. She reports that she does not drink alcohol or use drugs.      Family History   Problem Relation Age of Onset    Diabetes Mother        Review of Systems:  Unable to obtain due to patient's condition     Objective:    Vitals:    Vitals:    11/23/20 0610 11/23/20 0715 11/23/20 0919 11/23/20 0921   BP:  131/75 137/74    Pulse:  77 82    Resp:  19     Temp:  97.6 °F (36.4 °C)     SpO2: 98% 95%  93%   Weight:       Height:         I&O's:  11/22 0701 - 11/23 0700  In: 930 [P.O.:480; I.V.:450]  Out: 1400 [Urine:1400]  Visit Vitals  /74   Pulse 82   Temp 97.6 °F (36.4 °C)   Resp 19   Ht 5' 7\" (1.702 m)   Wt 114.9 kg (253 lb 4.9 oz)   SpO2 93%   BMI 39.67 kg/m²       Physical Exam:  Not examined in the room due to COVID-19 infection:    General: on high flow  Lungs : on high flow  CVS: RRR on monitor, + edema  Neurologic: non focal per RN  Psych: normal affect  : purewick in place    Laboratory Results:    Lab Results   Component Value Date    BUN 59 (H) 11/23/2020     (L) 11/23/2020    K 4.0 11/23/2020    CL 97 11/23/2020    CO2 26 11/23/2020       Lab Results   Component Value Date    BUN 59 (H) 11/23/2020    BUN 42 (H) 11/22/2020    BUN 32 (H) 11/21/2020    BUN 36 (H) 11/20/2020    BUN 34 (H) 11/19/2020    K 4.0 11/23/2020    K 3.9 11/22/2020    K 3.9 11/21/2020    K 4.0 11/20/2020    K 3.5 11/19/2020       Lab Results   Component Value Date    WBC 9.4 11/23/2020    RBC 4.17 11/23/2020    HGB 12.5 11/23/2020    HCT 37.6 11/23/2020    MCV 90.2 11/23/2020    MCH 30.0 11/23/2020    RDW 13.7 11/23/2020     11/23/2020       Lab Results   Component Value Date    PHOS 5.8 (H) 11/23/2020       Urine dipstick:   Lab Results   Component Value Date/Time    Color YELLOW/STRAW 09/15/2019 09:47 PM    Appearance CLEAR 09/15/2019 09:47 PM    Specific gravity 1.013 09/15/2019 09:47 PM    pH (UA) 6.0 09/15/2019 09:47 PM    Protein NEGATIVE  09/15/2019 09:47 PM    Glucose NEGATIVE  09/15/2019 09:47 PM    Ketone NEGATIVE  09/15/2019 09:47 PM    Bilirubin NEGATIVE  09/15/2019 09:47 PM    Urobilinogen 0.2 09/15/2019 09:47 PM    Nitrites NEGATIVE  09/15/2019 09:47 PM    Leukocyte Esterase NEGATIVE  09/15/2019 09:47 PM    Epithelial cells FEW 09/15/2019 09:47 PM    Bacteria NEGATIVE  09/15/2019 09:47 PM    WBC 0-4 09/15/2019 09:47 PM    RBC 0-5 09/15/2019 09:47 PM             Thank you for allowing us to participate in the care of this patient. We will follow patient. Please dont hesitate to call with any questions    Nakia Loja MD  11/23/2020    [address] [place] [address] [address]ph[address]  Phone - [phone]   Fax - [phone]  [email or web address]

## 2020-11-23 NOTE — PROGRESS NOTES
Transition Plan of Care  RUR23%-Med  Covid positive-supplemental oxygen hi-flow at 45 lpm. Currently on remdesivir and s/p convalescent plasma. At baseline she is independent and lives with her . Will need PT/OT evaluation to better determine level of care needed at discharge. Of note her  is also Covid positive and inpatient at [place].   Becka Brown RN CRM  Ext [phone]

## 2020-11-23 NOTE — PROGRESS NOTES
Problem: Airway Clearance - Ineffective  Goal: Achieve or maintain patent airway  Outcome: Progressing Towards Goal     Problem: Nutrition Deficits  Goal: Optimize nutrtional status  Outcome: Progressing Towards Goal   Pt remains NPO. Problem: Heart Failure: Day 5  Goal: Medications  Outcome: Progressing Towards Goal  Goal: Respiratory  Outcome: Progressing Towards Goal   Pt transitioned from continuous bipap to HFNC. Continuing to monitor and use PRN bipap. 1130: Bedside shift change report given to Chriss Nelson RN (oncoming nurse) by Neda Cordoba RN (offgoing nurse). Report included the following information SBAR, ED Summary, Intake/Output, MAR, Recent Results, Med Rec Status and Cardiac Rhythm NSR.

## 2020-11-23 NOTE — PROGRESS NOTES
Took over patient care at 12 noon. Patient assisted up to bedside commode and into the chair. Call bell within reach and chair locked, bed in lowest position. Assessment charted. Bedside shift change report given to [employer] (oncoming nurse) by Aria Rodrigues (offgoing nurse). Report included the following information SBAR, Kardex, Intake/Output, MAR, Recent Results, Med Rec Status, Cardiac Rhythm NSR, Alarm Parameters  and Quality Measures.

## 2020-11-23 NOTE — PROGRESS NOTES
Renal Dosing/Monitoring  Medication: Levofloxacin   Current regimen:  500 mg IV every 24 hr  Recent Labs     11/23/20  0403 11/22/20  0353 11/21/20  0424   CREA 1.64* 1.45* 1.21*   BUN 59* 42* 32*     Estimated CrCl:  44.8 ml/min  Plan: Change to 250 mg IV Q 24 hours per [facility] P&T Committee Protocol with respect to renal function. Pharmacy will continue to monitor patient daily and will make dosage adjustments based upon changing renal function.

## 2020-11-23 NOTE — PROGRESS NOTES
ID Progress Note  2020    Subjective:     Afebrile. She says she is breathing fine. She has no cough. She is on high flow though at 45 L/min. Objective:     Vitals:   Visit Vitals  /67   Pulse 74   Temp 98 °F (36.7 °C)   Resp 17   Ht 5' 7\" (1.702 m)   Wt 114.9 kg (253 lb 4.9 oz)   SpO2 100%   BMI 39.67 kg/m²        Tmax:  Temp (24hrs), Av.1 °F (36.7 °C), Min:97.6 °F (36.4 °C), Max:99 °F (37.2 °C)      PHYSICAL EXAM:  Not in distress  Lungs bilateral crackles  Heart: s1, s2, RRR, no murmurs rubs or clicks  Abdomen: soft nontender, no guarding or rebound  Speech fluent     Labs:   Lab Results   Component Value Date/Time    WBC 9.4 2020 04:03 AM    HGB 12.5 2020 04:03 AM    Hematocrit (POC) 37 09/15/2019 08:03 PM    HCT 37.6 2020 04:03 AM    PLATELET 365  04:03 AM    MCV 90.2 2020 04:03 AM     Lab Results   Component Value Date/Time    Sodium 135 (L) 2020 04:03 AM    Potassium 4.0 2020 04:03 AM    Chloride 97 2020 04:03 AM    CO2 26 2020 04:03 AM    Anion gap 12 2020 04:03 AM    Glucose 155 (H) 2020 04:03 AM    BUN 59 (H) 2020 04:03 AM    Creatinine 1.64 (H) 2020 04:03 AM    BUN/Creatinine ratio 36 (H) 2020 04:03 AM    GFR est AA 38 (L) 2020 04:03 AM    GFR est non-AA 31 (L) 2020 04:03 AM    Calcium 10.8 (H) 2020 04:03 AM    Bilirubin, total 0.4 2020 04:03 AM    Alk.  phosphatase 79 2020 04:03 AM    Protein, total 8.7 (H) 2020 04:03 AM    Albumin 2.8 (L) 2020 04:03 AM    Globulin 5.9 (H) 2020 04:03 AM    A-G Ratio 0.5 (L) 2020 04:03 AM    ALT (SGPT) 48 2020 04:03 AM     Convalescent plasma transfused on   Remdesivir  present  Dexamethasone present      Assessment and Plan   Acute respiratory failure secondary to COVID 19 & PNA  Allergic asthma  - COVID 19 (+)     CRP 8.87, Ferritin 293, procal 0.25->1.62, CRP 8.87->23.10 D-dimer 3.15->1.90 -> 2.07, legionella (-)    WBC 9.7->10.2 -> 9.4    Continue with 5 day course of Levo (QTC 417ms on admission)    Remdesivir in progress, last dose 11/24, CrCl 50.4ml/min    S/p convalescent plasma 11/20    Continue with decadron (last dose 11/28), vit C, and Zinc    On plaquenil POA for arthritis per the physician at [address] [place] [address]    Supportive care      VICKI  - Creat 1.2->1.4 -> 1.64    Seth Khan MD

## 2020-11-23 NOTE — PROGRESS NOTES
Name: [address]: [address]   : 1955 Admit Date: 2020   Phone: [phone]  Room: [facility]   PCP: Fabienne Mcmahan MD  MRN: [id number]   Date: 2020  Code: Full Code        HPI:      On high flow 45lpm and 90% FiO2   D dimer 2.07  probnp 414  Chest film yesterday was consistent in appearance       12:08 PM       History was obtained from patient. I was asked by Deanna Rodriguez MD to see Felicita Copeland in consultation for a chief complaint of shortness of breath. History of Present Illness: 73 yo female  has a past medical history of Anxiety, Arrhythmia, Arthritis, Asthma, CAD (coronary artery disease), Chronic pain, COVID-19, Diarrhea (3/24/2017), Epigastric pain (3/24/2017), Fibromyalgia, GERD (gastroesophageal reflux disease), Gout, Heart failure ([address] Hypertension, Psychiatric disorder, Stroke ([facility] and Unspecified sleep apnea presented with cough and shortness of breath. Reportedly tested positive for COVID 19 on  and has not improved. She has a reported h/o asthma   also tested positive who is admitted to [facility] and is getting plasma their today. Data reviewed:   CXR suggestive of patchy lower lobe infiltrates.   Fibrinogen > 800  D-dimer 1.90  Lactate 0.8    Past Medical History:   Diagnosis Date    Anxiety     Arrhythmia     per pt, treated with med    Arthritis     Asthma     CAD (coronary artery disease)     EF=33 1/3    Chronic pain     fibromyalgia    COVID-19     Diarrhea 3/24/2017    Epigastric pain 3/24/2017    Fibromyalgia     GERD (gastroesophageal reflux disease)     ibs    Gout     Heart failure ([address]     cardiomyopathy    Hypertension     Psychiatric disorder     h/o severe depression    Stroke ([address]     possible TIA    Unspecified sleep apnea     wears CPAP       Past Surgical History:   Procedure Laterality Date    COLONOSCOPY N/A 2016    [facility]on [facility] ENDOSCOPY    COLONOSCOPY N/A 2/21/2020    COLONOSCOPY performed by Gio Aiken MD at [facility] ENDOSCOPY    FLEXIBLE SIGMOIDOSCOPY N/A 3/24/2017    FLEXIBLE SIGMOIDOSCOPY  performed by Dipesh Maddox MD at [facility] AMBULATORY OR    HX APPENDECTOMY      HX ENDOSCOPY      HX GYN      hysterectomy    HX HEENT      left eye surgery    HX KNEE ARTHROSCOPY Right     HX KNEE REPLACEMENT Right     HX ORTHOPAEDIC Right     plantar fascitis repair with implant    HX OTHER SURGICAL      hand surgery left (carpal tunnel, reconstruction of small finger)    SIGMOIDOSCOPY,BIOPSY  3/24/2017         VASCULAR SURGERY PROCEDURE UNLIST      stent in bilateral legs       Family History   Problem Relation Age of Onset    Diabetes Mother        Social History     Tobacco Use    Smoking status: Never Smoker    Smokeless tobacco: Never Used   Substance Use Topics    Alcohol use: No       Allergies   Allergen Reactions    Codeine Itching    Demerol [Meperidine] Nausea Only    Ivp [Fd And C Blue No.1] Itching    Minoxidil Itching    Penicillamine Itching and Other (comments)     Drops blood pressure    Percocet [Oxycodone-Acetaminophen] Nausea and Vomiting       Current Facility-Administered Medications   Medication Dose Route Frequency    bumetanide (BUMEX) injection 3 mg  3 mg IntraVENous BID    sodium chloride (OCEAN) 0.65 % nasal squeeze bottle 2 Spray  2 Spray Both Nostrils Q2H PRN    senna-docusate (PERICOLACE) 8.6-50 mg per tablet 1 Tab  1 Tab Oral DAILY    levoFLOXacin (LEVAQUIN) 500 mg in D5W IVPB  500 mg IntraVENous Q24H    guaiFENesin ER (MUCINEX) tablet 600 mg  600 mg Oral Q12H    remdesivir 100 mg in 0.9% sodium chloride 250 mL IVPB  100 mg IntraVENous Q24H    loperamide (IMODIUM) capsule 4 mg  4 mg Oral Q4H PRN    0.9% sodium chloride infusion 250 mL  250 mL IntraVENous PRN    glucose chewable tablet 16 g  4 Tab Oral PRN    glucagon (GLUCAGEN) injection 1 mg  1 mg IntraMUSCular PRN    dextrose 10% infusion 0-250 mL  0-250 mL IntraVENous PRN    insulin lispro (HUMALOG) injection   SubCUTAneous AC&HS    amLODIPine (NORVASC) tablet 5 mg  5 mg Oral DAILY    enoxaparin (LOVENOX) injection 40 mg  40 mg SubCUTAneous Q12H    ascorbic acid (vitamin C) (VITAMIN C) tablet 500 mg  500 mg Oral BID    zinc sulfate (ZINCATE) 220 (50) mg capsule 1 Cap  1 Cap Oral DAILY    [Held by provider] sulfaSALAzine (AZULFIDINE) tablet 500 mg  500 mg Oral BID WITH MEALS    montelukast (SINGULAIR) tablet 10 mg  10 mg Oral QHS    dexAMETHasone (DECADRON) tablet 6 mg  6 mg Oral Q24H    albuterol (PROVENTIL HFA, VENTOLIN HFA, PROAIR HFA) inhaler 1 Puff  1 Puff Inhalation TID RT    allopurinoL (ZYLOPRIM) tablet 300 mg  300 mg Oral DAILY    aspirin delayed-release tablet 81 mg  81 mg Oral DAILY    carvediloL (COREG) tablet 25 mg  25 mg Oral BID WITH MEALS    clopidogreL (PLAVIX) tablet 75 mg  75 mg Oral DAILY    DULoxetine (CYMBALTA) capsule 60 mg  60 mg Oral DAILY    hydrOXYchloroQUINE (PLAQUENIL) tablet 200 mg  200 mg Oral BID    rosuvastatin (CRESTOR) tablet 20 mg  20 mg Oral QHS    acetaminophen (TYLENOL) tablet 650 mg  650 mg Oral Q6H PRN    Or    acetaminophen (TYLENOL) suppository 650 mg  650 mg Rectal Q6H PRN    guaiFENesin-dextromethorphan (ROBITUSSIN DM) 100-10 mg/5 mL syrup 5 mL  5 mL Oral Q4H PRN    sodium chloride (NS) flush 5-40 mL  5-40 mL IntraVENous Q8H    sodium chloride (NS) flush 5-40 mL  5-40 mL IntraVENous PRN    polyethylene glycol (MIRALAX) packet 17 g  17 g Oral DAILY PRN    promethazine (PHENERGAN) tablet 12.5 mg  12.5 mg Oral Q6H PRN    Or    ondansetron (ZOFRAN) injection 4 mg  4 mg IntraVENous Q6H PRN         REVIEW OF SYSTEMS   Negative except as stated in the HPI.       Physical Exam:   Visit Vitals  /74   Pulse 82   Temp 97.6 °F (36.4 °C)   Resp 19   Ht 5' 7\" (1.702 m)   Wt 114.9 kg (253 lb 4.9 oz)   SpO2 93%   BMI 39.67 kg/m²     Examination deferred due to COVID-19 pandemic and patient has COVID-19 pneumonia. General:  Alert, cooperative,                                                           Neurologic: Grossly nonfocal       Lab Results   Component Value Date/Time    Sodium 135 (L) 11/23/2020 04:03 AM    Potassium 4.0 11/23/2020 04:03 AM    Chloride 97 11/23/2020 04:03 AM    CO2 26 11/23/2020 04:03 AM    BUN 59 (H) 11/23/2020 04:03 AM    Creatinine 1.64 (H) 11/23/2020 04:03 AM    Glucose 155 (H) 11/23/2020 04:03 AM    Calcium 10.8 (H) 11/23/2020 04:03 AM    Magnesium 2.0 11/23/2020 04:03 AM    Phosphorus 5.8 (H) 11/23/2020 04:03 AM    Lactic acid 0.8 11/18/2020 03:41 PM       Lab Results   Component Value Date/Time    WBC 9.4 11/23/2020 04:03 AM    HGB 12.5 11/23/2020 04:03 AM    PLATELET 596 83/02/1089 04:03 AM    MCV 90.2 11/23/2020 04:03 AM       Lab Results   Component Value Date/Time    INR 1.2 (H) 11/23/2020 04:03 AM    aPTT 31.5 05/13/2010 12:33 PM    Alk. phosphatase 79 11/23/2020 04:03 AM    Protein, total 8.7 (H) 11/23/2020 04:03 AM    Albumin 2.8 (L) 11/23/2020 04:03 AM    Globulin 5.9 (H) 11/23/2020 04:03 AM       Lab Results   Component Value Date/Time    Ferritin 293 (H) 11/18/2020 01:43 PM       Lab Results   Component Value Date/Time    C-Reactive protein 23.10 (H) 11/21/2020 04:24 AM    TSH 0.84 10/15/2010 10:57 AM     IMPRESSION:  ===========  -COVID 19 pneumonia. -Systolic CHF. -H/o asthma. Followed by Abril LONDONO, followed at [address]   -H/o CAD  -RA on plaquenil her e  -Mild renal insufficiency above baseline  -A +     PLAN:  =====  -O2 supplementation. -on remdesivir.   On Levaquin  -s/p convalescent plasma on 11/20  -agree with diuretics   -lovenox per protocol   -wean flow and FiO2 as possible   -On decadron 6 mg iv q 24 x 10 days  -lovenox per protocol.  -d-dimer, monitor   -inflammatory, monitor     Clinton Pulido PA-C all other ROS negative except as per HPI

## 2020-11-23 NOTE — PROGRESS NOTES
Bedside and Verbal shift change report given to [facility] AND [facility] - THE [facility] RN (oncoming nurse) by Cliff Oviedo RN (offgoing nurse). Report included the following information SBAR, Kardex, Intake/Output, MAR, Accordion, and Recent Results. Problem: Airway Clearance - Ineffective  Goal: Achieve or maintain patent airway  Outcome: Progressing Towards Goal     Problem: Gas Exchange - Impaired  Goal: Absence of hypoxia  Outcome: Progressing Towards Goal  Goal: Promote optimal lung function  Outcome: Progressing Towards Goal     Problem: Breathing Pattern - Ineffective  Goal: Ability to achieve and maintain a regular respiratory rate  Outcome: Progressing Towards Goal     Problem:  Body Temperature -  Risk of, Imbalanced  Goal: Ability to maintain a body temperature within defined limits  Outcome: Progressing Towards Goal  Goal: Will regain or maintain usual level of consciousness  Outcome: Progressing Towards Goal  Goal: Complications related to the disease process, condition or treatment will be avoided or minimized  Outcome: Progressing Towards Goal     Problem: Isolation Precautions - Risk of Spread of Infection  Goal: Prevent transmission of infectious organism to others  Outcome: Progressing Towards Goal     Problem: Nutrition Deficits  Goal: Optimize nutrtional status  Outcome: Progressing Towards Goal     Problem: Risk for Fluid Volume Deficit  Goal: Maintain normal heart rhythm  Outcome: Progressing Towards Goal     Problem: Loneliness or Risk for Loneliness  Goal: Demonstrate positive use of time alone when socialization is not possible  Outcome: Progressing Towards Goal     Problem: Fatigue  Goal: Verbalize increase energy and improved vitality  Outcome: Progressing Towards Goal     Problem: Patient Education: Go to Patient Education Activity  Goal: Patient/Family Education  Outcome: Progressing Towards Goal     Problem: Falls - Risk of  Goal: *Absence of Falls  Description: Document Christiano Fall Risk and appropriate interventions in the flowsheet. Outcome: Progressing Towards Goal  Note: Fall Risk Interventions:            Medication Interventions: Evaluate medications/consider consulting pharmacy, Patient to call before getting OOB, Teach patient to arise slowly, Utilize gait belt for transfers/ambulation    Elimination Interventions: Call light in reach, Patient to call for help with toileting needs, Stay With Me (per policy), Toilet paper/wipes in reach, Toileting schedule/hourly rounds              Problem: Patient Education: Go to Patient Education Activity  Goal: Patient/Family Education  Outcome: Progressing Towards Goal     Problem: Diabetes Self-Management  Goal: *Disease process and treatment process  Description: Define diabetes and identify own type of diabetes; list 3 options for treating diabetes. Outcome: Progressing Towards Goal  Goal: *Incorporating nutritional management into lifestyle  Description: Describe effect of type, amount and timing of food on blood glucose; list 3 methods for planning meals. Outcome: Progressing Towards Goal  Goal: *Incorporating physical activity into lifestyle  Description: State effect of exercise on blood glucose levels. Outcome: Progressing Towards Goal     Problem: Patient Education: Go to Patient Education Activity  Goal: Patient/Family Education  Outcome: Progressing Towards Goal     Problem: Pressure Injury - Risk of  Goal: *Prevention of pressure injury  Description: Document Anthony Scale and appropriate interventions in the flowsheet.   Outcome: Progressing Towards Goal  Note: Pressure Injury Interventions:       Moisture Interventions: Absorbent underpads, Check for incontinence Q2 hours and as needed, Internal/External urinary devices, Maintain skin hydration (lotion/cream), Minimize layers, Moisture barrier, Offer toileting Q_hr    Activity Interventions: Assess need for specialty bed, Increase time out of bed, Pressure redistribution bed/mattress(bed type), PT/OT evaluation    Mobility Interventions: Assess need for specialty bed, HOB 30 degrees or less, PT/OT evaluation, Turn and reposition approx. every two hours(pillow and wedges)    Nutrition Interventions: Document food/fluid/supplement intake, Discuss nutritional consult with provider, Offer support with meals,snacks and hydration                     Problem: Patient Education: Go to Patient Education Activity  Goal: Patient/Family Education  Outcome: Progressing Towards Goal     Problem: Risk for Spread of Infection  Goal: Prevent transmission of infectious organism to others  Description: Prevent the transmission of infectious organisms to other patients, staff members, and visitors.   Outcome: Progressing Towards Goal     Problem: Patient Education:  Go to Education Activity  Goal: Patient/Family Education  Outcome: Progressing Towards Goal

## 2020-11-24 LAB
ALBUMIN SERPL-MCNC: 2.9 G/DL (ref 3.5–5)
ALBUMIN/GLOB SERPL: 0.5 {RATIO} (ref 1.1–2.2)
ALP SERPL-CCNC: 81 U/L (ref 45–117)
ALT SERPL-CCNC: 43 U/L (ref 12–78)
ANION GAP SERPL CALC-SCNC: 12 MMOL/L (ref 5–15)
AST SERPL-CCNC: 32 U/L (ref 15–37)
BILIRUB SERPL-MCNC: 0.3 MG/DL (ref 0.2–1)
BNP SERPL-MCNC: 137 PG/ML
BUN SERPL-MCNC: 75 MG/DL (ref 6–20)
BUN/CREAT SERPL: 41 (ref 12–20)
CALCIUM SERPL-MCNC: 9.9 MG/DL (ref 8.5–10.1)
CHLORIDE SERPL-SCNC: 95 MMOL/L (ref 97–108)
CO2 SERPL-SCNC: 26 MMOL/L (ref 21–32)
CREAT SERPL-MCNC: 1.83 MG/DL (ref 0.55–1.02)
CRP SERPL-MCNC: 4.46 MG/DL (ref 0–0.6)
D DIMER PPP FEU-MCNC: 1.63 MG/L FEU (ref 0–0.65)
ERYTHROCYTE [SEDIMENTATION RATE] IN BLOOD: >140 MM/HR (ref 0–30)
FERRITIN SERPL-MCNC: 661 NG/ML (ref 26–388)
FIBRINOGEN PPP-MCNC: >800 MG/DL (ref 200–475)
FLUID CULTURE, SPNG2: NORMAL
GLOBULIN SER CALC-MCNC: 5.7 G/DL (ref 2–4)
GLUCOSE BLD STRIP.AUTO-MCNC: 117 MG/DL (ref 65–100)
GLUCOSE BLD STRIP.AUTO-MCNC: 121 MG/DL (ref 65–100)
GLUCOSE BLD STRIP.AUTO-MCNC: 137 MG/DL (ref 65–100)
GLUCOSE BLD STRIP.AUTO-MCNC: 256 MG/DL (ref 65–100)
GLUCOSE SERPL-MCNC: 167 MG/DL (ref 65–100)
INR PPP: 1.2 (ref 0.9–1.1)
LDH SERPL L TO P-CCNC: 353 U/L (ref 81–246)
ORGANISM ID, SPNG3: NORMAL
PLEASE NOTE, SPNG4: NORMAL
POTASSIUM SERPL-SCNC: 3.6 MMOL/L (ref 3.5–5.1)
PROCALCITONIN SERPL-MCNC: 0.44 NG/ML
PROT SERPL-MCNC: 8.6 G/DL (ref 6.4–8.2)
PROTHROMBIN TIME: 12.5 SEC (ref 9–11.1)
S PNEUM AG SPEC QL LA: NEGATIVE
SERVICE CMNT-IMP: ABNORMAL
SODIUM SERPL-SCNC: 133 MMOL/L (ref 136–145)
SPECIMEN SOURCE: NORMAL
SPECIMEN, SPNG1: NORMAL

## 2020-11-24 PROCEDURE — 74011250637 HC RX REV CODE- 250/637: Performed by: HOSPITALIST

## 2020-11-24 PROCEDURE — 85379 FIBRIN DEGRADATION QUANT: CPT

## 2020-11-24 PROCEDURE — 74011250637 HC RX REV CODE- 250/637: Performed by: INTERNAL MEDICINE

## 2020-11-24 PROCEDURE — 80053 COMPREHEN METABOLIC PANEL: CPT

## 2020-11-24 PROCEDURE — 74011000250 HC RX REV CODE- 250: Performed by: INTERNAL MEDICINE

## 2020-11-24 PROCEDURE — 74011250636 HC RX REV CODE- 250/636: Performed by: INTERNAL MEDICINE

## 2020-11-24 PROCEDURE — 83880 ASSAY OF NATRIURETIC PEPTIDE: CPT

## 2020-11-24 PROCEDURE — 82728 ASSAY OF FERRITIN: CPT

## 2020-11-24 PROCEDURE — 82962 GLUCOSE BLOOD TEST: CPT

## 2020-11-24 PROCEDURE — 74011000250 HC RX REV CODE- 250: Performed by: NURSE PRACTITIONER

## 2020-11-24 PROCEDURE — 86140 C-REACTIVE PROTEIN: CPT

## 2020-11-24 PROCEDURE — 85384 FIBRINOGEN ACTIVITY: CPT

## 2020-11-24 PROCEDURE — 74011000258 HC RX REV CODE- 258: Performed by: NURSE PRACTITIONER

## 2020-11-24 PROCEDURE — 36415 COLL VENOUS BLD VENIPUNCTURE: CPT

## 2020-11-24 PROCEDURE — 97161 PT EVAL LOW COMPLEX 20 MIN: CPT

## 2020-11-24 PROCEDURE — 85610 PROTHROMBIN TIME: CPT

## 2020-11-24 PROCEDURE — 83615 LACTATE (LD) (LDH) ENZYME: CPT

## 2020-11-24 PROCEDURE — 74011250636 HC RX REV CODE- 250/636: Performed by: HOSPITALIST

## 2020-11-24 PROCEDURE — 74011636637 HC RX REV CODE- 636/637: Performed by: INTERNAL MEDICINE

## 2020-11-24 PROCEDURE — 65660000001 HC RM ICU INTERMED STEPDOWN

## 2020-11-24 PROCEDURE — 84145 PROCALCITONIN (PCT): CPT

## 2020-11-24 PROCEDURE — 85652 RBC SED RATE AUTOMATED: CPT

## 2020-11-24 RX ORDER — ALBUTEROL SULFATE 90 UG/1
1 AEROSOL, METERED RESPIRATORY (INHALATION)
Status: DISCONTINUED | OUTPATIENT
Start: 2020-11-24 | End: 2020-11-30 | Stop reason: HOSPADM

## 2020-11-24 RX ADMIN — BUMETANIDE 3 MG: 0.25 INJECTION INTRAMUSCULAR; INTRAVENOUS at 17:33

## 2020-11-24 RX ADMIN — CARVEDILOL 25 MG: 12.5 TABLET, FILM COATED ORAL at 09:32

## 2020-11-24 RX ADMIN — OXYCODONE HYDROCHLORIDE AND ACETAMINOPHEN 500 MG: 500 TABLET ORAL at 17:33

## 2020-11-24 RX ADMIN — HYDROXYCHLOROQUINE SULFATE 200 MG: 200 TABLET, FILM COATED ORAL at 09:32

## 2020-11-24 RX ADMIN — CARVEDILOL 25 MG: 12.5 TABLET, FILM COATED ORAL at 17:33

## 2020-11-24 RX ADMIN — ZINC SULFATE 220 MG (50 MG) CAPSULE 1 CAPSULE: CAPSULE at 09:32

## 2020-11-24 RX ADMIN — HYDROXYCHLOROQUINE SULFATE 200 MG: 200 TABLET, FILM COATED ORAL at 17:33

## 2020-11-24 RX ADMIN — GUAIFENESIN 600 MG: 600 TABLET, EXTENDED RELEASE ORAL at 22:11

## 2020-11-24 RX ADMIN — ENOXAPARIN SODIUM 40 MG: 40 INJECTION SUBCUTANEOUS at 17:33

## 2020-11-24 RX ADMIN — MONTELUKAST 10 MG: 10 TABLET, FILM COATED ORAL at 22:08

## 2020-11-24 RX ADMIN — BUMETANIDE 3 MG: 0.25 INJECTION INTRAMUSCULAR; INTRAVENOUS at 09:32

## 2020-11-24 RX ADMIN — ONDANSETRON 4 MG: 2 INJECTION INTRAMUSCULAR; INTRAVENOUS at 20:20

## 2020-11-24 RX ADMIN — Medication 10 ML: at 17:34

## 2020-11-24 RX ADMIN — Medication 10 ML: at 06:34

## 2020-11-24 RX ADMIN — GUAIFENESIN 600 MG: 600 TABLET, EXTENDED RELEASE ORAL at 09:32

## 2020-11-24 RX ADMIN — Medication 10 ML: at 22:11

## 2020-11-24 RX ADMIN — DOCUSATE SODIUM 50MG AND SENNOSIDES 8.6MG 1 TABLET: 8.6; 5 TABLET, FILM COATED ORAL at 09:32

## 2020-11-24 RX ADMIN — CLOPIDOGREL BISULFATE 75 MG: 75 TABLET ORAL at 09:32

## 2020-11-24 RX ADMIN — ALLOPURINOL 300 MG: 300 TABLET ORAL at 09:32

## 2020-11-24 RX ADMIN — DEXAMETHASONE 6 MG: 4 TABLET ORAL at 17:33

## 2020-11-24 RX ADMIN — ROSUVASTATIN 20 MG: 10 TABLET, FILM COATED ORAL at 22:08

## 2020-11-24 RX ADMIN — INSULIN LISPRO 3 UNITS: 100 INJECTION, SOLUTION INTRAVENOUS; SUBCUTANEOUS at 22:08

## 2020-11-24 RX ADMIN — OXYCODONE HYDROCHLORIDE AND ACETAMINOPHEN 500 MG: 500 TABLET ORAL at 09:32

## 2020-11-24 RX ADMIN — Medication 81 MG: at 09:32

## 2020-11-24 RX ADMIN — AMLODIPINE BESYLATE 5 MG: 5 TABLET ORAL at 09:32

## 2020-11-24 RX ADMIN — LEVOFLOXACIN 250 MG: 5 INJECTION, SOLUTION INTRAVENOUS at 09:28

## 2020-11-24 RX ADMIN — ACETAMINOPHEN 650 MG: 325 TABLET ORAL at 09:46

## 2020-11-24 RX ADMIN — ENOXAPARIN SODIUM 40 MG: 40 INJECTION SUBCUTANEOUS at 06:34

## 2020-11-24 RX ADMIN — REMDESIVIR 100 MG: 100 INJECTION, POWDER, LYOPHILIZED, FOR SOLUTION INTRAVENOUS at 17:33

## 2020-11-24 NOTE — PROGRESS NOTES
Bedside and Verbal shift change report given to Kar (oncoming nurse) by Figueroa West (offgoing nurse). Report included the following information SBAR, Intake/Output, MAR, Recent Results, and Cardiac Rhythm NSR . Problem: Airway Clearance - Ineffective  Goal: Achieve or maintain patent airway  Outcome: Progressing Towards Goal     Problem: Gas Exchange - Impaired  Goal: Absence of hypoxia  Outcome: Progressing Towards Goal  Goal: Promote optimal lung function  Outcome: Progressing Towards Goal     Problem: Breathing Pattern - Ineffective  Goal: Ability to achieve and maintain a regular respiratory rate  Outcome: Progressing Towards Goal     Problem:  Body Temperature -  Risk of, Imbalanced  Goal: Ability to maintain a body temperature within defined limits  Outcome: Progressing Towards Goal  Goal: Will regain or maintain usual level of consciousness  Outcome: Progressing Towards Goal  Goal: Complications related to the disease process, condition or treatment will be avoided or minimized  Outcome: Progressing Towards Goal     Problem: Isolation Precautions - Risk of Spread of Infection  Goal: Prevent transmission of infectious organism to others  Outcome: Progressing Towards Goal     Problem: Nutrition Deficits  Goal: Optimize nutrtional status  Outcome: Progressing Towards Goal     Problem: Risk for Fluid Volume Deficit  Goal: Maintain normal heart rhythm  Outcome: Progressing Towards Goal  Goal: Maintain absence of muscle cramping  Outcome: Progressing Towards Goal  Goal: Maintain normal serum potassium, sodium, calcium, phosphorus, and pH  Outcome: Progressing Towards Goal     Problem: Loneliness or Risk for Loneliness  Goal: Demonstrate positive use of time alone when socialization is not possible  Outcome: Progressing Towards Goal     Problem: Fatigue  Goal: Verbalize increase energy and improved vitality  Outcome: Progressing Towards Goal     Problem: Patient Education: Go to Patient Education Activity  Goal: Patient/Family Education  Outcome: Progressing Towards Goal     Problem: Falls - Risk of  Goal: *Absence of Falls  Description: Document Kaykay Mistry Fall Risk and appropriate interventions in the flowsheet. Outcome: Progressing Towards Goal  Note: Fall Risk Interventions:  Mobility Interventions: Patient to call before getting OOB         Medication Interventions: Evaluate medications/consider consulting pharmacy    Elimination Interventions: Call light in reach, Patient to call for help with toileting needs    History of Falls Interventions: Consult care management for discharge planning, Investigate reason for fall         Problem: Patient Education: Go to Patient Education Activity  Goal: Patient/Family Education  Outcome: Progressing Towards Goal     Problem: Diabetes Self-Management  Goal: *Disease process and treatment process  Description: Define diabetes and identify own type of diabetes; list 3 options for treating diabetes. Outcome: Progressing Towards Goal  Goal: *Incorporating nutritional management into lifestyle  Description: Describe effect of type, amount and timing of food on blood glucose; list 3 methods for planning meals. Outcome: Progressing Towards Goal  Goal: *Incorporating physical activity into lifestyle  Description: State effect of exercise on blood glucose levels. Outcome: Progressing Towards Goal  Goal: *Developing strategies to promote health/change behavior  Description: Define the ABC's of diabetes; identify appropriate screenings, schedule and personal plan for screenings. Outcome: Progressing Towards Goal  Goal: *Using medications safely  Description: State effect of diabetes medications on diabetes; name diabetes medication taking, action and side effects. Outcome: Progressing Towards Goal  Goal: *Monitoring blood glucose, interpreting and using results  Description: Identify recommended blood glucose targets  and personal targets.   Outcome: Progressing Towards Goal  Goal: *Prevention, detection, treatment of acute complications  Description: List symptoms of hyper- and hypoglycemia; describe how to treat low blood sugar and actions for lowering  high blood glucose level. Outcome: Progressing Towards Goal  Goal: *Prevention, detection and treatment of chronic complications  Description: Define the natural course of diabetes and describe the relationship of blood glucose levels to long term complications of diabetes.   Outcome: Progressing Towards Goal  Goal: *Developing strategies to address psychosocial issues  Description: Describe feelings about living with diabetes; identify support needed and support network  Outcome: Progressing Towards Goal  Goal: *Insulin pump training  Outcome: Progressing Towards Goal  Goal: *Sick day guidelines  Outcome: Progressing Towards Goal  Goal: *Patient Specific Goal (EDIT GOAL, INSERT TEXT)  Outcome: Progressing Towards Goal     Problem: Patient Education: Go to Patient Education Activity  Goal: Patient/Family Education  Outcome: Progressing Towards Goal     Problem: Breathing Pattern - Ineffective  Goal: *Absence of hypoxia  Outcome: Progressing Towards Goal  Goal: *Use of effective breathing techniques  Outcome: Progressing Towards Goal  Goal: *PALLIATIVE CARE:  Alleviation of Dyspnea  Outcome: Progressing Towards Goal     Problem: Patient Education: Go to Patient Education Activity  Goal: Patient/Family Education  Outcome: Progressing Towards Goal     Problem: Heart Failure: Day 1  Goal: Treatments/Interventions/Procedures  Outcome: Progressing Towards Goal     Problem: Breathing Pattern - Ineffective  Goal: *Absence of hypoxia  Outcome: Progressing Towards Goal  Goal: *Use of effective breathing techniques  Outcome: Progressing Towards Goal  Goal: *PALLIATIVE CARE:  Alleviation of Dyspnea  Outcome: Progressing Towards Goal     Problem: Patient Education: Go to Patient Education Activity  Goal: Patient/Family Education  Outcome: Progressing Towards Goal     Problem: Breathing Pattern - Ineffective  Goal: *Absence of hypoxia  Outcome: Progressing Towards Goal  Goal: *Use of effective breathing techniques  Outcome: Progressing Towards Goal  Goal: *PALLIATIVE CARE:  Alleviation of Dyspnea  Outcome: Progressing Towards Goal     Problem: Patient Education: Go to Patient Education Activity  Goal: Patient/Family Education  Outcome: Progressing Towards Goal     Problem: Patient Education: Go to Patient Education Activity  Goal: Patient/Family Education  Outcome: Progressing Towards Goal     Problem: Heart Failure: Day 1  Goal: Off Pathway (Use only if patient is Off Pathway)  Outcome: Progressing Towards Goal  Goal: Activity/Safety  Outcome: Progressing Towards Goal  Goal: Consults, if ordered  Outcome: Progressing Towards Goal  Goal: Diagnostic Test/Procedures  Outcome: Progressing Towards Goal  Goal: Nutrition/Diet  Outcome: Progressing Towards Goal  Goal: Discharge Planning  Outcome: Progressing Towards Goal  Goal: Medications  Outcome: Progressing Towards Goal  Goal: Respiratory  Outcome: Progressing Towards Goal  Goal: Treatments/Interventions/Procedures  Outcome: Progressing Towards Goal  Goal: Psychosocial  Outcome: Progressing Towards Goal  Goal: *Oxygen saturation within defined limits  Outcome: Progressing Towards Goal  Goal: *Hemodynamically stable  Outcome: Progressing Towards Goal  Goal: *Optimal pain control at patient's stated goal  Outcome: Progressing Towards Goal  Goal: *Anxiety reduced or absent  Outcome: Progressing Towards Goal     Problem: Heart Failure: Day 2  Goal: Off Pathway (Use only if patient is Off Pathway)  Outcome: Progressing Towards Goal  Goal: Activity/Safety  Outcome: Progressing Towards Goal  Goal: Consults, if ordered  Outcome: Progressing Towards Goal  Goal: Diagnostic Test/Procedures  Outcome: Progressing Towards Goal  Goal: Nutrition/Diet  Outcome: Progressing Towards Goal  Goal: Discharge Planning  Outcome: Progressing Towards Goal  Goal: Medications  Outcome: Progressing Towards Goal  Goal: Respiratory  Outcome: Progressing Towards Goal  Goal: Treatments/Interventions/Procedures  Outcome: Progressing Towards Goal  Goal: Psychosocial  Outcome: Progressing Towards Goal  Goal: *Oxygen saturation within defined limits  Outcome: Progressing Towards Goal  Goal: *Hemodynamically stable  Outcome: Progressing Towards Goal  Goal: *Optimal pain control at patient's stated goal  Outcome: Progressing Towards Goal  Goal: *Anxiety reduced or absent  Outcome: Progressing Towards Goal  Goal: *Demonstrates progressive activity  Outcome: Progressing Towards Goal     Problem: Heart Failure: Day 3  Goal: Off Pathway (Use only if patient is Off Pathway)  Outcome: Progressing Towards Goal  Goal: Activity/Safety  Outcome: Progressing Towards Goal  Goal: Diagnostic Test/Procedures  Outcome: Progressing Towards Goal  Goal: Nutrition/Diet  Outcome: Progressing Towards Goal  Goal: Discharge Planning  Outcome: Progressing Towards Goal  Goal: Medications  Outcome: Progressing Towards Goal  Goal: Respiratory  Outcome: Progressing Towards Goal  Goal: Treatments/Interventions/Procedures  Outcome: Progressing Towards Goal  Goal: Psychosocial  Outcome: Progressing Towards Goal  Goal: *Oxygen saturation within defined limits  Outcome: Progressing Towards Goal  Goal: *Hemodynamically stable  Outcome: Progressing Towards Goal  Goal: *Optimal pain control at patient's stated goal  Outcome: Progressing Towards Goal  Goal: *Anxiety reduced or absent  Outcome: Progressing Towards Goal  Goal: *Demonstrates progressive activity  Outcome: Progressing Towards Goal     Problem: Heart Failure: Day 4  Goal: Off Pathway (Use only if patient is Off Pathway)  Outcome: Progressing Towards Goal  Goal: Activity/Safety  Outcome: Progressing Towards Goal  Goal: Diagnostic Test/Procedures  Outcome: Progressing Towards Goal  Goal: Nutrition/Diet  Outcome: Progressing Towards Goal  Goal: Discharge Planning  Outcome: Progressing Towards Goal  Goal: Medications  Outcome: Progressing Towards Goal  Goal: Respiratory  Outcome: Progressing Towards Goal  Goal: Treatments/Interventions/Procedures  Outcome: Progressing Towards Goal  Goal: Psychosocial  Outcome: Progressing Towards Goal  Goal: *Oxygen saturation within defined limits  Outcome: Progressing Towards Goal  Goal: *Hemodynamically stable  Outcome: Progressing Towards Goal  Goal: *Optimal pain control at patient's stated goal  Outcome: Progressing Towards Goal  Goal: *Anxiety reduced or absent  Outcome: Progressing Towards Goal  Goal: *Demonstrates progressive activity  Outcome: Progressing Towards Goal     Problem: Heart Failure: Day 5  Goal: Off Pathway (Use only if patient is Off Pathway)  Outcome: Progressing Towards Goal  Goal: Activity/Safety  Outcome: Progressing Towards Goal  Goal: Diagnostic Test/Procedures  Outcome: Progressing Towards Goal  Goal: Nutrition/Diet  Outcome: Progressing Towards Goal  Goal: Discharge Planning  Outcome: Progressing Towards Goal  Goal: Medications  Outcome: Progressing Towards Goal  Goal: Respiratory  Outcome: Progressing Towards Goal  Goal: Treatments/Interventions/Procedures  Outcome: Progressing Towards Goal  Goal: Psychosocial  Outcome: Progressing Towards Goal     Problem: Heart Failure: Discharge Outcomes  Goal: *Demonstrates ability to perform prescribed activity without shortness of breath or discomfort  Outcome: Progressing Towards Goal  Goal: *Left ventricular function assessment completed prior to or during stay, or planned for post-discharge  Outcome: Progressing Towards Goal  Goal: *ACEI prescribed if LVEF less than 40% and no contraindications or ARB prescribed  Outcome: Progressing Towards Goal  Goal: *Verbalizes understanding and describes prescribed diet  Outcome: Progressing Towards Goal  Goal: *Verbalizes understanding/describes prescribed medications  Outcome: Progressing Towards Goal  Goal: *Describes available resources and support systems  Description: (eg: Home Health, Palliative Care, Advanced Medical Directive)  Outcome: Progressing Towards Goal  Goal: *Describes smoking cessation resources  Outcome: Progressing Towards Goal  Goal: *Understands and describes signs and symptoms to report to providers(Stroke Metric)  Outcome: Progressing Towards Goal  Goal: *Describes/verbalizes understanding of follow-up/return appt  Description: (eg: to physicians, diabetes treatment coordinator, and other resources  Outcome: Progressing Towards Goal  Goal: *Describes importance of continuing daily weights and changes to report to physician  Outcome: Progressing Towards Goal

## 2020-11-24 NOTE — PROGRESS NOTES
Nephrology Progress Note  Jose E Calderon  Date of Admission : 11/18/2020    CC: Follow up for CKD and hypervolemia       Assessment and Plan     CKD IIIb:  - presumed from chronic HTN/DM2 + CMP   - Cr up slightly   - cont diuretics for now  - can reduce dose or transition to oral in AM if she continues to improve  - daily labs     Hypervolemia:  - diuretics as above     COVID-19 PNA:  - s/p remdesivir, convalescent plasma   - on dexamethasone  - per ID and primary team     HFrEF:  - diuretics as above     HTN:  - cont present care  - no ACE/ARB     Gout:  - on allopurinol     DM2:  - per hospitalist     Obesity       Interval History:  Not examined in the room. Per RN and Dr. Radha Dietz, pt improving. Cr up slightly. UOP not all recorded. Oxygenation stable. Current Medications: all current  Medications have been eviewed in EPIC  Review of Systems: Pertinent items are noted in HPI. Objective:  Vitals:    Vitals:    11/23/20 2327 11/24/20 0305 11/24/20 0702 11/24/20 0932   BP: 124/86 139/75 130/71 121/77   Pulse: 82 70 80 77   Resp: 20 16 18    Temp: 97.6 °F (36.4 °C) 97.6 °F (36.4 °C) 97.6 °F (36.4 °C)    SpO2: 99% 98% 99%    Weight:  114.3 kg (251 lb 15.8 oz)     Height:         Intake and Output:  No intake/output data recorded. 11/22 1901 - 11/24 0700  In: 56 [P.O.:390; I.V.:350]  Out: 2050 [Urine:2050]    Physical Examination:  Not examined in the room due to COVID-19 infection    Pt intubated     No  General: stable  Resp:  Stable oxygenation, on high flow  CV:  RRR on monitor, ++ edema  Neurologic:  Non focal  :  No vieyra    []    High complexity decision making was performed  []    Patient is at high-risk of decompensation with multiple organ involvement    Lab Data Personally Reviewed: I have reviewed all the pertinent labs, microbiology data and radiology studies during assessment.     Recent Labs     11/24/20  0319 11/23/20  0403 11/22/20  0400 11/22/20  0353   * 135*  --  133*   K 3.6 4.0  --  3.9   CL 95* 97  --  97   CO2 26 26  --  26   * 155*  --  129*   BUN 75* 59*  --  42*   CREA 1.83* 1.64*  --  1.45*   CA 9.9 10.8*  --  10.4*   MG  --  2.0  --   --    PHOS  --  5.8*  --   --    ALB 2.9* 2.8*  --  2.8*   ALT 43 48  --  56   INR 1.2* 1.2* 1.1  --      Recent Labs     11/23/20  0403   WBC 9.4   HGB 12.5   HCT 37.6        No results found for: SDES  Lab Results   Component Value Date/Time    Culture result: NO GROWTH 3 DAYS 11/21/2020 10:11 AM    Culture result: MIXED UROGENITAL TAZ ISOLATED 08/09/2018 02:00 PM    Culture result: NO GROWTH 1 DAY 03/01/2018 09:50 PM     Recent Results (from the past 24 hour(s))   GLUCOSE, POC    Collection Time: 11/23/20 11:41 AM   Result Value Ref Range    Glucose (POC) 98 65 - 100 mg/dL    Performed by Johnie FREED    GLUCOSE, POC    Collection Time: 11/23/20  4:24 PM   Result Value Ref Range    Glucose (POC) 94 65 - 100 mg/dL    Performed by Chandler Srinivasan, POC    Collection Time: 11/23/20  9:12 PM   Result Value Ref Range    Glucose (POC) 284 (H) 65 - 100 mg/dL    Performed by Yoana Bill    METABOLIC PANEL, COMPREHENSIVE    Collection Time: 11/24/20  3:19 AM   Result Value Ref Range    Sodium 133 (L) 136 - 145 mmol/L    Potassium 3.6 3.5 - 5.1 mmol/L    Chloride 95 (L) 97 - 108 mmol/L    CO2 26 21 - 32 mmol/L    Anion gap 12 5 - 15 mmol/L    Glucose 167 (H) 65 - 100 mg/dL    BUN 75 (H) 6 - 20 MG/DL    Creatinine 1.83 (H) 0.55 - 1.02 MG/DL    BUN/Creatinine ratio 41 (H) 12 - 20      GFR est AA 34 (L) >60 ml/min/1.73m2    GFR est non-AA 28 (L) >60 ml/min/1.73m2    Calcium 9.9 8.5 - 10.1 MG/DL    Bilirubin, total 0.3 0.2 - 1.0 MG/DL    ALT (SGPT) 43 12 - 78 U/L    AST (SGOT) 32 15 - 37 U/L    Alk.  phosphatase 81 45 - 117 U/L    Protein, total 8.6 (H) 6.4 - 8.2 g/dL    Albumin 2.9 (L) 3.5 - 5.0 g/dL    Globulin 5.7 (H) 2.0 - 4.0 g/dL    A-G Ratio 0.5 (L) 1.1 - 2.2     D DIMER    Collection Time: 11/24/20  3:19 AM Result Value Ref Range    D-dimer 1.63 (H) 0.00 - 0.65 mg/L FEU   PROTHROMBIN TIME + INR    Collection Time: 11/24/20  3:19 AM   Result Value Ref Range    INR 1.2 (H) 0.9 - 1.1      Prothrombin time 12.5 (H) 9.0 - 11.1 sec   C REACTIVE PROTEIN, QT    Collection Time: 11/24/20  3:20 AM   Result Value Ref Range    C-Reactive protein 4.46 (H) 0.00 - 0.60 mg/dL   FERRITIN    Collection Time: 11/24/20  3:20 AM   Result Value Ref Range    Ferritin 661 (H) 26 - 388 NG/ML   LD    Collection Time: 11/24/20  3:20 AM   Result Value Ref Range     (H) 81 - 246 U/L   NT-PRO BNP    Collection Time: 11/24/20  3:20 AM   Result Value Ref Range    NT pro- (H) <125 PG/ML   PROCALCITONIN    Collection Time: 11/24/20  3:20 AM   Result Value Ref Range    Procalcitonin 0.44 ng/mL   SED RATE (ESR)    Collection Time: 11/24/20  3:20 AM   Result Value Ref Range    Sed rate, automated >140 (H) 0 - 30 mm/hr   FIBRINOGEN    Collection Time: 11/24/20  3:22 AM   Result Value Ref Range    Fibrinogen >800 (H) 200 - 475 mg/dL   GLUCOSE, POC    Collection Time: 11/24/20  8:13 AM   Result Value Ref Range    Glucose (POC) 121 (H) 65 - 100 mg/dL    Performed by Josefina Fried MD  [address], [address] [place] [address]  Phone - [phone]   Fax - [phone]  www. [facility]

## 2020-11-24 NOTE — PROGRESS NOTES
Problem: Gas Exchange - Impaired  Goal: Absence of hypoxia  Outcome: Progressing Towards Goal  Note: O2 sats stable     Problem: Body Temperature -  Risk of, Imbalanced  Goal: Ability to maintain a body temperature within defined limits  Outcome: Progressing Towards Goal  Note: Afebrile  Goal: Will regain or maintain usual level of consciousness  Outcome: Progressing Towards Goal  Note: A$O x 4     Problem: Falls - Risk of  Goal: *Absence of Falls  Description: Document Christiano Fall Risk and appropriate interventions in the flowsheet.   Outcome: Progressing Towards Goal  Note: Fall Risk Interventions:  Mobility Interventions: Communicate number of staff needed for ambulation/transfer, Patient to call before getting OOB, PT Consult for mobility concerns         Medication Interventions: Evaluate medications/consider consulting pharmacy, Patient to call before getting OOB, Teach patient to arise slowly    Elimination Interventions: Call light in reach, Patient to call for help with toileting needs, Stay With Me (per policy), Toilet paper/wipes in reach, Toileting schedule/hourly rounds    History of Falls Interventions: Consult care management for discharge planning, Investigate reason for fall

## 2020-11-24 NOTE — PROGRESS NOTES
Name: [address]: [address]   : 1955 Admit Date: 2020   Phone: [phone]  Room: [facility]/   PCP: Mariaa Mcgrath MD  MRN: [id number]   Date: 2020  Code: Full Code        HPI:      Still on the same flow and same FiO2  Looks better today   D dimer 1.63, down   prbnp 137, down   Ferritin 661  CRP 4.46      On high flow 45lpm and 90% FiO2   D dimer 2.07  probnp 414  Chest film yesterday was consistent in appearance       12:08 PM       History was obtained from patient. I was asked by Linda Finn MD to see Salome Found in consultation for a chief complaint of shortness of breath. History of Present Illness: 71 yo female  has a past medical history of Anxiety, Arrhythmia, Arthritis, Asthma, CAD (coronary artery disease), Chronic pain, COVID-19, Diarrhea (3/24/2017), Epigastric pain (3/24/2017), Fibromyalgia, GERD (gastroesophageal reflux disease), Gout, Heart failure ([address] Hypertension, Psychiatric disorder, Stroke ([facility] [address] and Unspecified sleep apnea presented with cough and shortness of breath. Reportedly tested positive for COVID 19 on  and has not improved. She has a reported h/o asthma   also tested positive who is admitted to [facility] and is getting plasma their today. Data reviewed:   CXR suggestive of patchy lower lobe infiltrates.   Fibrinogen > 800  D-dimer 1.90  Lactate 0.8    Past Medical History:   Diagnosis Date    Anxiety     Arrhythmia     per pt, treated with med    Arthritis     Asthma     CAD (coronary artery disease)     EF=33 1/3    Chronic pain     fibromyalgia    COVID-19     Diarrhea 3/24/2017    Epigastric pain 3/24/2017    Fibromyalgia     GERD (gastroesophageal reflux disease)     ibs    Gout     Heart failure ([address]     cardiomyopathy    Hypertension     Psychiatric disorder     h/o severe depression    Stroke ([facility] [address]     possible TIA    Unspecified sleep apnea     wears CPAP       Past Surgical History:   Procedure Laterality Date    COLONOSCOPY N/A 8/23/2016    COLONOSCOPY performed by Michele Raza MD at [facility] ENDOSCOPY    COLONOSCOPY N/A 2/21/2020    COLONOSCOPY performed by Lila Dietz MD at [address] [place] [address] N/A 3/24/2017    FLEXIBLE SIGMOIDOSCOPY  performed by Macy Altamirano MD at [facility] AMBULATORY OR    HX APPENDECTOMY      HX ENDOSCOPY      HX GYN      hysterectomy    HX HEENT      left eye surgery    HX KNEE ARTHROSCOPY Right     HX KNEE REPLACEMENT Right     HX ORTHOPAEDIC Right     plantar fascitis repair with implant    HX OTHER SURGICAL      hand surgery left (carpal tunnel, reconstruction of small finger)    SIGMOIDOSCOPY,BIOPSY  3/24/2017         VASCULAR SURGERY PROCEDURE UNLIST      stent in bilateral legs       Family History   Problem Relation Age of Onset    Diabetes Mother        Social History     Tobacco Use    Smoking status: Never Smoker    Smokeless tobacco: Never Used   Substance Use Topics    Alcohol use: No       Allergies   Allergen Reactions    Codeine Itching    Demerol [Meperidine] Nausea Only    Ivp [Fd And C Blue No.1] Itching    Minoxidil Itching    Penicillamine Itching and Other (comments)     Drops blood pressure    Percocet [Oxycodone-Acetaminophen] Nausea and Vomiting       Current Facility-Administered Medications   Medication Dose Route Frequency    bumetanide (BUMEX) injection 3 mg  3 mg IntraVENous BID    levoFLOXacin (LEVAQUIN) 250 mg in D5W IVPB  250 mg IntraVENous Q24H    sodium chloride (OCEAN) 0.65 % nasal squeeze bottle 2 Spray  2 Spray Both Nostrils Q2H PRN    senna-docusate (PERICOLACE) 8.6-50 mg per tablet 1 Tab  1 Tab Oral DAILY    guaiFENesin ER (MUCINEX) tablet 600 mg  600 mg Oral Q12H    remdesivir 100 mg in 0.9% sodium chloride 250 mL IVPB  100 mg IntraVENous Q24H    loperamide (IMODIUM) capsule 4 mg  4 mg Oral Q4H PRN    0.9% sodium chloride infusion 250 mL  250 mL IntraVENous PRN  glucose chewable tablet 16 g  4 Tab Oral PRN    glucagon (GLUCAGEN) injection 1 mg  1 mg IntraMUSCular PRN    dextrose 10% infusion 0-250 mL  0-250 mL IntraVENous PRN    insulin lispro (HUMALOG) injection   SubCUTAneous AC&HS    amLODIPine (NORVASC) tablet 5 mg  5 mg Oral DAILY    enoxaparin (LOVENOX) injection 40 mg  40 mg SubCUTAneous Q12H    ascorbic acid (vitamin C) (VITAMIN C) tablet 500 mg  500 mg Oral BID    zinc sulfate (ZINCATE) 220 (50) mg capsule 1 Cap  1 Cap Oral DAILY    [Held by provider] sulfaSALAzine (AZULFIDINE) tablet 500 mg  500 mg Oral BID WITH MEALS    montelukast (SINGULAIR) tablet 10 mg  10 mg Oral QHS    dexAMETHasone (DECADRON) tablet 6 mg  6 mg Oral Q24H    albuterol (PROVENTIL HFA, VENTOLIN HFA, PROAIR HFA) inhaler 1 Puff  1 Puff Inhalation TID RT    allopurinoL (ZYLOPRIM) tablet 300 mg  300 mg Oral DAILY    aspirin delayed-release tablet 81 mg  81 mg Oral DAILY    carvediloL (COREG) tablet 25 mg  25 mg Oral BID WITH MEALS    clopidogreL (PLAVIX) tablet 75 mg  75 mg Oral DAILY    DULoxetine (CYMBALTA) capsule 60 mg  60 mg Oral DAILY    hydrOXYchloroQUINE (PLAQUENIL) tablet 200 mg  200 mg Oral BID    rosuvastatin (CRESTOR) tablet 20 mg  20 mg Oral QHS    acetaminophen (TYLENOL) tablet 650 mg  650 mg Oral Q6H PRN    Or    acetaminophen (TYLENOL) suppository 650 mg  650 mg Rectal Q6H PRN    guaiFENesin-dextromethorphan (ROBITUSSIN DM) 100-10 mg/5 mL syrup 5 mL  5 mL Oral Q4H PRN    sodium chloride (NS) flush 5-40 mL  5-40 mL IntraVENous Q8H    sodium chloride (NS) flush 5-40 mL  5-40 mL IntraVENous PRN    polyethylene glycol (MIRALAX) packet 17 g  17 g Oral DAILY PRN    promethazine (PHENERGAN) tablet 12.5 mg  12.5 mg Oral Q6H PRN    Or    ondansetron (ZOFRAN) injection 4 mg  4 mg IntraVENous Q6H PRN         REVIEW OF SYSTEMS   Negative except as stated in the HPI. Physical Exam:   Visit Vitals  /72 (BP 1 Location: Right arm, BP Patient Position:  At rest)   Pulse 77   Temp 97.5 °F (36.4 °C)   Resp 17   Ht 5' 7\" (1.702 m)   Wt 114.3 kg (251 lb 15.8 oz)   SpO2 97%   BMI 39.47 kg/m²     Examination deferred due to COVID-19 pandemic and patient has COVID-19 pneumonia. General:  Alert, cooperative,                                                           Neurologic: Grossly nonfocal       Lab Results   Component Value Date/Time    Sodium 133 (L) 11/24/2020 03:19 AM    Potassium 3.6 11/24/2020 03:19 AM    Chloride 95 (L) 11/24/2020 03:19 AM    CO2 26 11/24/2020 03:19 AM    BUN 75 (H) 11/24/2020 03:19 AM    Creatinine 1.83 (H) 11/24/2020 03:19 AM    Glucose 167 (H) 11/24/2020 03:19 AM    Calcium 9.9 11/24/2020 03:19 AM    Magnesium 2.0 11/23/2020 04:03 AM    Phosphorus 5.8 (H) 11/23/2020 04:03 AM    Lactic acid 0.8 11/18/2020 03:41 PM       Lab Results   Component Value Date/Time    WBC 9.4 11/23/2020 04:03 AM    HGB 12.5 11/23/2020 04:03 AM    PLATELET 151 83/66/5898 04:03 AM    MCV 90.2 11/23/2020 04:03 AM       Lab Results   Component Value Date/Time    INR 1.2 (H) 11/24/2020 03:19 AM    aPTT 31.5 05/13/2010 12:33 PM    Alk. phosphatase 81 11/24/2020 03:19 AM    Protein, total 8.6 (H) 11/24/2020 03:19 AM    Albumin 2.9 (L) 11/24/2020 03:19 AM    Globulin 5.7 (H) 11/24/2020 03:19 AM       Lab Results   Component Value Date/Time    Ferritin 661 (H) 11/24/2020 03:20 AM       Lab Results   Component Value Date/Time    C-Reactive protein 4.46 (H) 11/24/2020 03:20 AM    TSH 0.84 10/15/2010 10:57 AM     IMPRESSION:  ===========  -COVID 19 pneumonia. -Systolic CHF. -H/o asthma. Followed by Danielle Lucille   -BONILLA, followed at 1656 e   -H/o CAD  -RA on plaquenil   -Mild renal insufficiency above baseline  -A +     PLAN:  =====  -O2 supplementation; I reduced to 30 lpm and 85%, if does well needs trial of midflow. Discussed with RN   -on remdesivir.   -s/p convalescent plasma on 11/20  -agree with diuretics   -lovenox per protocol   -wean flow and FiO2 as possible   -On decadron 6 mg iv q 24 x 10 days  -d-dimer, monitor   -inflammatory, monitor     Clinton Rodriguez PA-C

## 2020-11-24 NOTE — PROGRESS NOTES
[facility] Adult  Hospitalist Group                                                                                          Hospitalist Progress Note  [facility],   Answering service: [phone] from in house phone        Date of Service:  2020  NAME:  Jose E Calderon  :  1955  MRN:  [id number]      Admission Summary:   72 y.o. female who presents with cough and shortness of breath since  patient was tested positive Covid at an outside testing center and was taking antibiotics nebulizer and Medrol pack did not improve patient said that cough is increasing in frequency and thickening whitish in color no blood in it patient  also have cold-like symptoms and admitted at [facility] patient has chronic systolic heart failure but she takes all her medications regularly does not seem to be in exacerbation. Currently patient was saturating well in room air in the ED patient was admitted for further management    Interval history / Subjective: Follow up COVID 19. Patient seen and examined earlier today. States she feels better. Her Fi02 and supplemental oxygen requirements decreasing. Good urine output. Assessment & Plan:     COVID-19 pneumonia:  Acute hypoxic respiratory failure (89% on RA + respiratory distress, tachypnea): improving  -ID consulted for remdesivir- initiated   -Dexamethasone 6 mg once daily  -Vitamin C, zinc  -tylenol for fevers  -s/p convalescent plasma   -recurrent fevers with elevated procalcitonin, continue levaquin for now.  Cultures NGTD   -Nephrology consulted, appreciate recommendations, continue Bumex 3 mg IV BID, monitor I&Os  -Continue high flow, decrease as able, can use Bipap as needed     Chronic systolic heart failure NYHA II  -Continue bumex IV  -Continue home carvedilol  -Continue home Plavix, aspirin, rosuvastatin     VICKI on CKD 2: worsening (expected)  -Monitor closely on diurectics     History of rheumatoid arthritis:  -hold sulfasalazine, continue Plaquenil    History of depression:  -Continue home Cymbalta    Gout  -Hold colchicine, continue allopurinol      Diabetes type 2 with hyperglycemia  -continue SSI, monitor closely with steroids  -Ha1c 6.5     Hypertension:  -continue carvedilol, amlodipine    Obesity BMI 39.47    Code status: Full  DVT prophylaxis: Lovenox per Covid protocol    Care Plan discussed with: Patient/Family  Anticipated Disposition: Home w/Family  Anticipated Discharge: Greater than 48 hours     Hospital Problems  Date Reviewed: 11/18/2020          Codes Class Noted POA    SOB (shortness of breath) ICD-10-CM: R06.02  ICD-9-CM: 786.05  11/18/2020 Unknown        * (Principal) Chronic systolic heart failure ([facility] [address] ICD-10-CM: I50.22  ICD-9-CM: 428.22  7/2/2014 Yes                Review of Systems:   Negative unless stated above      Vital Signs:    Last 24hrs VS reviewed since prior progress note. Most recent are:  Visit Vitals  /72 (BP 1 Location: Right arm, BP Patient Position: At rest)   Pulse 77   Temp 97.5 °F (36.4 °C)   Resp 17   Ht 5' 7\" (1.702 m)   Wt 114.3 kg (251 lb 15.8 oz)   SpO2 97%   BMI 39.47 kg/m²         Intake/Output Summary (Last 24 hours) at 11/24/2020 1255  Last data filed at 11/23/2020 2000  Gross per 24 hour   Intake 240 ml   Output 1150 ml   Net -910 ml        Physical Examination:     I had a face to face encounter with this patient and independently examined them on 11/24/2020 as outlined below:          Constitutional:  no acute distress, cooperative, pleasant    ENT:  Oral mucosa moist, oropharynx benign. Resp:  very diminished breath sounds with limited airflow but improved from day prior, no accessory muscle use, can state short sentences    CV:  Regular rhythm, normal rate, no murmurs, gallops, rubs    GI:  Soft, non distended, non tender.  normoactive bowel sounds, no hepatosplenomegaly     Musculoskeletal:  No edema, warm, 2+ pulses throughout    Neurologic: Moves all extremities            Data Review:    Review and/or order of clinical lab test  Review and/or order of tests in the radiology section of CPT  Review and/or order of tests in the medicine section of CPT      Labs:     Recent Labs     11/23/20 0403   WBC 9.4   HGB 12.5   HCT 37.6        Recent Labs     11/24/20 0319 11/23/20 0403 11/22/20  0353   * 135* 133*   K 3.6 4.0 3.9   CL 95* 97 97   CO2 26 26 26   BUN 75* 59* 42*   CREA 1.83* 1.64* 1.45*   * 155* 129*   CA 9.9 10.8* 10.4*   MG  --  2.0  --    PHOS  --  5.8*  --      Recent Labs     11/24/20 0319 11/23/20 0403 11/22/20  0353   ALT 43 48 56   AP 81 79 89   TBILI 0.3 0.4 0.4   TP 8.6* 8.7* 8.5*   ALB 2.9* 2.8* 2.8*   GLOB 5.7* 5.9* 5.7*     Recent Labs     11/24/20 0319 11/23/20  0403 11/22/20  0400   INR 1.2* 1.2* 1.1   PTP 12.5* 12.6* 11.9*      Recent Labs     11/24/20  0320   FERR 661*      No results found for: FOL, RBCF   No results for input(s): PH, PCO2, PO2 in the last 72 hours. No results for input(s): CPK, CKNDX, TROIQ in the last 72 hours.     No lab exists for component: CPKMB  No results found for: CHOL, CHOLX, CHLST, CHOLV, HDL, HDLP, LDL, LDLC, DLDLP, TGLX, TRIGL, TRIGP, CHHD, CHHDX  Lab Results   Component Value Date/Time    Glucose (POC) 137 (H) 11/24/2020 12:15 PM    Glucose (POC) 121 (H) 11/24/2020 08:13 AM    Glucose (POC) 284 (H) 11/23/2020 09:12 PM    Glucose (POC) 94 11/23/2020 04:24 PM    Glucose (POC) 98 11/23/2020 11:41 AM     Lab Results   Component Value Date/Time    Color YELLOW/STRAW 09/15/2019 09:47 PM    Appearance CLEAR 09/15/2019 09:47 PM    Specific gravity 1.013 09/15/2019 09:47 PM    pH (UA) 6.0 09/15/2019 09:47 PM    Protein NEGATIVE  09/15/2019 09:47 PM    Glucose NEGATIVE  09/15/2019 09:47 PM    Ketone NEGATIVE  09/15/2019 09:47 PM    Bilirubin NEGATIVE  09/15/2019 09:47 PM    Urobilinogen 0.2 09/15/2019 09:47 PM    Nitrites NEGATIVE  09/15/2019 09:47 PM    Leukocyte Esterase NEGATIVE 09/15/2019 09:47 PM    Epithelial cells FEW 09/15/2019 09:47 PM    Bacteria NEGATIVE  09/15/2019 09:47 PM    WBC 0-4 09/15/2019 09:47 PM    RBC 0-5 09/15/2019 09:47 PM         Medications Reviewed:     Current Facility-Administered Medications   Medication Dose Route Frequency    albuterol (PROVENTIL HFA, VENTOLIN HFA, PROAIR HFA) inhaler 1 Puff  1 Puff Inhalation Q4H PRN    bumetanide (BUMEX) injection 3 mg  3 mg IntraVENous BID    levoFLOXacin (LEVAQUIN) 250 mg in D5W IVPB  250 mg IntraVENous Q24H    sodium chloride (OCEAN) 0.65 % nasal squeeze bottle 2 Spray  2 Spray Both Nostrils Q2H PRN    senna-docusate (PERICOLACE) 8.6-50 mg per tablet 1 Tab  1 Tab Oral DAILY    guaiFENesin ER (MUCINEX) tablet 600 mg  600 mg Oral Q12H    remdesivir 100 mg in 0.9% sodium chloride 250 mL IVPB  100 mg IntraVENous Q24H    loperamide (IMODIUM) capsule 4 mg  4 mg Oral Q4H PRN    0.9% sodium chloride infusion 250 mL  250 mL IntraVENous PRN    glucose chewable tablet 16 g  4 Tab Oral PRN    glucagon (GLUCAGEN) injection 1 mg  1 mg IntraMUSCular PRN    dextrose 10% infusion 0-250 mL  0-250 mL IntraVENous PRN    insulin lispro (HUMALOG) injection   SubCUTAneous AC&HS    amLODIPine (NORVASC) tablet 5 mg  5 mg Oral DAILY    enoxaparin (LOVENOX) injection 40 mg  40 mg SubCUTAneous Q12H    ascorbic acid (vitamin C) (VITAMIN C) tablet 500 mg  500 mg Oral BID    zinc sulfate (ZINCATE) 220 (50) mg capsule 1 Cap  1 Cap Oral DAILY    [Held by provider] sulfaSALAzine (AZULFIDINE) tablet 500 mg  500 mg Oral BID WITH MEALS    montelukast (SINGULAIR) tablet 10 mg  10 mg Oral QHS    dexAMETHasone (DECADRON) tablet 6 mg  6 mg Oral Q24H    allopurinoL (ZYLOPRIM) tablet 300 mg  300 mg Oral DAILY    aspirin delayed-release tablet 81 mg  81 mg Oral DAILY    carvediloL (COREG) tablet 25 mg  25 mg Oral BID WITH MEALS    clopidogreL (PLAVIX) tablet 75 mg  75 mg Oral DAILY    DULoxetine (CYMBALTA) capsule 60 mg  60 mg Oral DAILY    hydrOXYchloroQUINE (PLAQUENIL) tablet 200 mg  200 mg Oral BID    rosuvastatin (CRESTOR) tablet 20 mg  20 mg Oral QHS    acetaminophen (TYLENOL) tablet 650 mg  650 mg Oral Q6H PRN    Or    acetaminophen (TYLENOL) suppository 650 mg  650 mg Rectal Q6H PRN    guaiFENesin-dextromethorphan (ROBITUSSIN DM) 100-10 mg/5 mL syrup 5 mL  5 mL Oral Q4H PRN    sodium chloride (NS) flush 5-40 mL  5-40 mL IntraVENous Q8H    sodium chloride (NS) flush 5-40 mL  5-40 mL IntraVENous PRN    polyethylene glycol (MIRALAX) packet 17 g  17 g Oral DAILY PRN    promethazine (PHENERGAN) tablet 12.5 mg  12.5 mg Oral Q6H PRN    Or    ondansetron (ZOFRAN) injection 4 mg  4 mg IntraVENous Q6H PRN     ______________________________________________________________________  EXPECTED LENGTH OF STAY: 4d 4h  ACTUAL LENGTH OF STAY:          [facility]

## 2020-11-24 NOTE — PROGRESS NOTES
Problem: Mobility Impaired (Adult and Pediatric)  Goal: *Acute Goals and Plan of Care (Insert Text)  Description: FUNCTIONAL STATUS PRIOR TO ADMISSION: Patient was independent and active without use of DME.    HOME SUPPORT PRIOR TO ADMISSION: The patient lived with  but did not require assist. Pt's  is currently at the [place] with plans to d/c home on O2. Pt states daughter may be able to assist.    Physical Therapy Goals  Initiated 11/24/2020  1. Patient will move from supine to sit and sit to supine , scoot up and down, and roll side to side in bed with modified independence within 7 day(s). 2.  Patient will transfer from bed to chair and chair to bed with modified independence using the least restrictive device within 7 day(s). 3.  Patient will perform sit to stand with modified independence within 7 day(s). 4.  Patient will ambulate with modified independence for 300 feet with the least restrictive device within 7 day(s). 5.  Patient will ascend/descend 10 stairs with one handrail(s) with modified independence within 7 day(s). Outcome: Not Met   PHYSICAL THERAPY EVALUATION  Patient: Tasia Florence (37 y.o. female)  Date: 11/24/2020  Primary Diagnosis: SOB (shortness of breath) [R06.02]        Precautions:   (droplet plus)      ASSESSMENT  Based on the objective data described below, the patient presents with decreased activity tolerance, balance deficits, and weakness. Pt currently on HFNC (FiO2 100%, 45 L/min). Pt reports her  is sick as well and planned to d/c home from the [place] on O2. Pt reports she primarily performed IADLs. Pt able to ambulate several steps to chair and reported fatigue. Pt required increased time to perform 5 sit to stands. Provided pt with written instructions for seated LE exercises to prevent deconditioning.     Recommend with nursing patient to complete as able in order to maintain strength, endurance and independence once Egress Test complete: OOB to chair 3x/day and walking daily with 1 person A. Current Level of Function Impacting Discharge (mobility/balance): CGA for transfers    Functional Outcome Measure: The patient scored Total: 50/100 on the Barthel Index which is indicative of 50% impaired ability to care for basic self needs/dependency on others. Other factors to consider for discharge: fall risk, COVID+, O2 requirements, 13 stairs at home     Patient will benefit from skilled therapy intervention to address the above noted impairments. PLAN :  Recommendations and Planned Interventions: bed mobility training, transfer training, gait training, therapeutic exercises, neuromuscular re-education, patient and family training/education, and therapeutic activities      Frequency/Duration: Patient will be followed by physical therapy:  5 times a week to address goals. Recommendation for discharge: (in order for the patient to meet his/her long term goals)  Therapy 3 hours per day 5-7 days per week vs HHPT with assistance for mobility pending progress    This discharge recommendation:  Has not yet been discussed the attending provider and/or case management    IF patient discharges home will need the following DME: wheelchair         SUBJECTIVE:   Patient stated my daughter can help.     OBJECTIVE DATA SUMMARY:   HISTORY:    Past Medical History:   Diagnosis Date    Anxiety     Arrhythmia     per pt, treated with med    Arthritis     Asthma     CAD (coronary artery disease)     EF=33 1/3    Chronic pain     fibromyalgia    COVID-19     Diarrhea 3/24/2017    Epigastric pain 3/24/2017    Fibromyalgia     GERD (gastroesophageal reflux disease)     ibs    Gout     Heart failure ([facility] [address]     cardiomyopathy    Hypertension     Psychiatric disorder     h/o severe depression    Stroke ([facility] [address]     possible TIA    Unspecified sleep apnea     wears CPAP     Past Surgical History:   Procedure Laterality Date    COLONOSCOPY N/A 8/23/2016    COLONOSCOPY performed by Rodolfo Hawkins MD at [facility] ENDOSCOPY    COLONOSCOPY N/A 2/21/2020    COLONOSCOPY performed by Rakesh Franklin MD at [address] N/A 3/24/2017    FLEXIBLE SIGMOIDOSCOPY  performed by Jamar Berger MD at [facility] AMBULATORY OR    HX APPENDECTOMY      HX ENDOSCOPY      HX GYN      hysterectomy    HX HEENT      left eye surgery    HX KNEE ARTHROSCOPY Right     HX KNEE REPLACEMENT Right     HX ORTHOPAEDIC Right     plantar fascitis repair with implant    HX OTHER SURGICAL      hand surgery left (carpal tunnel, reconstruction of small finger)    SIGMOIDOSCOPY,BIOPSY  3/24/2017         VASCULAR SURGERY PROCEDURE UNLIST      stent in bilateral legs       Personal factors and/or comorbidities impacting plan of care: PMH, COVID+    Home Situation  Home Environment: Private residence  # Steps to Enter: 1  One/Two Story Residence: Two story  # of Interior Steps: 15  Interior Rails: Right  Living Alone: No  Support Systems: Child(oneil), Family member(s)  Patient Expects to be Discharged to[de-identified] Private residence  Current DME Used/Available at Home: Marianne Loud, rollator    EXAMINATION/PRESENTATION/DECISION MAKING:   Critical Behavior:  Neurologic State: Alert  Orientation Level: Oriented X4  Cognition: Appropriate decision making, Appropriate for age attention/concentration, Appropriate safety awareness, Follows commands     Hearing:   Auditory  Auditory Impairment: None  Skin:  intact  Edema: none noted  Range Of Motion:  AROM: Generally decreased, functional           PROM: Generally decreased, functional           Strength:    Strength: Generally decreased, functional                    Tone & Sensation:   Tone: Normal              Sensation: Intact               Coordination:  Coordination: Generally decreased, functional  Vision:      Functional Mobility:  Bed Mobility:     Supine to Sit: Supervision        Transfers:  Sit to Stand: Contact guard assistance  Stand to Sit: Contact guard assistance                       Balance:   Sitting: Intact  Standing: Impaired; Without support  Standing - Static: Fair  Standing - Dynamic : Fair;Occasional  Ambulation/Gait Training:  Distance (ft): 3 Feet (ft)     Ambulation - Level of Assistance: Contact guard assistance        Gait Abnormalities: Decreased step clearance;Trunk sway increased        Base of Support: Widened     Speed/Lorena: Shuffled; Slow  Step Length: Left shortened;Right shortened  Swing Pattern: Left asymmetrical;Right asymmetrical    Functional Measure:  Barthel Index:    Bathin  Bladder: 0  Bowels: 10  Groomin  Dressing: 10  Feeding: 10  Mobility: 0  Stairs: 0  Toilet Use: 5  Transfer (Bed to Chair and Back): 10  Total: 50/100       The Barthel ADL Index: Guidelines  1. The index should be used as a record of what a patient does, not as a record of what a patient could do. 2. The main aim is to establish degree of independence from any help, physical or verbal, however minor and for whatever reason. 3. The need for supervision renders the patient not independent. 4. A patient's performance should be established using the best available evidence. Asking the patient, friends/relatives and nurses are the usual sources, but direct observation and common sense are also important. However direct testing is not needed. 5. Usually the patient's performance over the preceding 24-48 hours is important, but occasionally longer periods will be relevant. 6. Middle categories imply that the patient supplies over 50 per cent of the effort. 7. Use of aids to be independent is allowed. Blaine Nguyen., Barthel, D.W. (0885). Functional evaluation: the Barthel Index. [facility] (14)2. SHER Castle, Diallo Osullivan., [facility], [address] Measuring the change indisability after inpatient rehabilitation; comparison of the responsiveness of the Barthel Index and Functional Martin Measure.  Journal of Neurology, Neurosurgery, and Psychiatry, 66(4), 321-140. DARREN Davila, ALICIA Fischer, & Francisco Oliveira M.A. (2004.) Assessment of post-stroke quality of life in cost-effectiveness studies: The usefulness of the Barthel Index and the EuroQoL-5D. Quality of Life Research, 15, 979-01        Physical Therapy Evaluation Charge Determination   History Examination Presentation Decision-Making   MEDIUM  Complexity : 1-2 comorbidities / personal factors will impact the outcome/ POC  LOW Complexity : 1-2 Standardized tests and measures addressing body structure, function, activity limitation and / or participation in recreation  MEDIUM Complexity : Evolving with changing characteristics  Other outcome measures barthel  MEDIUM      Based on the above components, the patient evaluation is determined to be of the following complexity level: LOW       Activity Tolerance:   Fair and desaturates with exertion and requires oxygen  SpO2 >90% with mobility on HFNC     After treatment patient left in no apparent distress:   Sitting in chair and Call bell within reach    COMMUNICATION/EDUCATION:   The patients plan of care was discussed with: Registered nurse. Fall prevention education was provided and the patient/caregiver indicated understanding., Patient/family have participated as able in goal setting and plan of care. , and Patient/family agree to work toward stated goals and plan of care.     Thank you for this referral.  Lily Abad, PT, DPT   Time Calculation: 13 mins

## 2020-11-25 LAB
ALBUMIN SERPL-MCNC: 3 G/DL (ref 3.5–5)
ALBUMIN/GLOB SERPL: 0.6 {RATIO} (ref 1.1–2.2)
ALP SERPL-CCNC: 71 U/L (ref 45–117)
ALT SERPL-CCNC: 39 U/L (ref 12–78)
ANION GAP SERPL CALC-SCNC: 10 MMOL/L (ref 5–15)
AST SERPL-CCNC: 22 U/L (ref 15–37)
BILIRUB SERPL-MCNC: 0.4 MG/DL (ref 0.2–1)
BUN SERPL-MCNC: 86 MG/DL (ref 6–20)
BUN/CREAT SERPL: 41 (ref 12–20)
CALCIUM SERPL-MCNC: 9.8 MG/DL (ref 8.5–10.1)
CHLORIDE SERPL-SCNC: 93 MMOL/L (ref 97–108)
CO2 SERPL-SCNC: 27 MMOL/L (ref 21–32)
CREAT SERPL-MCNC: 2.09 MG/DL (ref 0.55–1.02)
D DIMER PPP FEU-MCNC: 1.1 MG/L FEU (ref 0–0.65)
FIBRINOGEN PPP-MCNC: 744 MG/DL (ref 200–475)
GLOBULIN SER CALC-MCNC: 5.3 G/DL (ref 2–4)
GLUCOSE BLD STRIP.AUTO-MCNC: 134 MG/DL (ref 65–100)
GLUCOSE BLD STRIP.AUTO-MCNC: 136 MG/DL (ref 65–100)
GLUCOSE BLD STRIP.AUTO-MCNC: 144 MG/DL (ref 65–100)
GLUCOSE BLD STRIP.AUTO-MCNC: 214 MG/DL (ref 65–100)
GLUCOSE SERPL-MCNC: 209 MG/DL (ref 65–100)
INR PPP: 1.2 (ref 0.9–1.1)
POTASSIUM SERPL-SCNC: 3.7 MMOL/L (ref 3.5–5.1)
PROT SERPL-MCNC: 8.3 G/DL (ref 6.4–8.2)
PROTHROMBIN TIME: 12.4 SEC (ref 9–11.1)
SERVICE CMNT-IMP: ABNORMAL
SODIUM SERPL-SCNC: 130 MMOL/L (ref 136–145)

## 2020-11-25 PROCEDURE — 65660000001 HC RM ICU INTERMED STEPDOWN

## 2020-11-25 PROCEDURE — 97165 OT EVAL LOW COMPLEX 30 MIN: CPT

## 2020-11-25 PROCEDURE — 85384 FIBRINOGEN ACTIVITY: CPT

## 2020-11-25 PROCEDURE — 36415 COLL VENOUS BLD VENIPUNCTURE: CPT

## 2020-11-25 PROCEDURE — 74011250637 HC RX REV CODE- 250/637: Performed by: INTERNAL MEDICINE

## 2020-11-25 PROCEDURE — 82962 GLUCOSE BLOOD TEST: CPT

## 2020-11-25 PROCEDURE — 80053 COMPREHEN METABOLIC PANEL: CPT

## 2020-11-25 PROCEDURE — 97116 GAIT TRAINING THERAPY: CPT

## 2020-11-25 PROCEDURE — 85379 FIBRIN DEGRADATION QUANT: CPT

## 2020-11-25 PROCEDURE — 85610 PROTHROMBIN TIME: CPT

## 2020-11-25 PROCEDURE — 97535 SELF CARE MNGMENT TRAINING: CPT

## 2020-11-25 PROCEDURE — 74011250637 HC RX REV CODE- 250/637: Performed by: HOSPITALIST

## 2020-11-25 PROCEDURE — 74011636637 HC RX REV CODE- 636/637: Performed by: INTERNAL MEDICINE

## 2020-11-25 PROCEDURE — 94640 AIRWAY INHALATION TREATMENT: CPT

## 2020-11-25 PROCEDURE — 74011250636 HC RX REV CODE- 250/636: Performed by: INTERNAL MEDICINE

## 2020-11-25 RX ORDER — OXYMETAZOLINE HCL 0.05 %
2 SPRAY, NON-AEROSOL (ML) NASAL
Status: DISPENSED | OUTPATIENT
Start: 2020-11-25 | End: 2020-11-28

## 2020-11-25 RX ORDER — BUMETANIDE 1 MG/1
2 TABLET ORAL EVERY 12 HOURS
Status: DISCONTINUED | OUTPATIENT
Start: 2020-11-25 | End: 2020-11-27

## 2020-11-25 RX ADMIN — BUMETANIDE 2 MG: 1 TABLET ORAL at 17:38

## 2020-11-25 RX ADMIN — GUAIFENESIN 600 MG: 600 TABLET, EXTENDED RELEASE ORAL at 21:38

## 2020-11-25 RX ADMIN — ENOXAPARIN SODIUM 40 MG: 40 INJECTION SUBCUTANEOUS at 17:38

## 2020-11-25 RX ADMIN — LEVOFLOXACIN 250 MG: 5 INJECTION, SOLUTION INTRAVENOUS at 09:09

## 2020-11-25 RX ADMIN — ENOXAPARIN SODIUM 40 MG: 40 INJECTION SUBCUTANEOUS at 07:24

## 2020-11-25 RX ADMIN — ALBUTEROL SULFATE 1 PUFF: 90 AEROSOL, METERED RESPIRATORY (INHALATION) at 09:03

## 2020-11-25 RX ADMIN — ZINC SULFATE 220 MG (50 MG) CAPSULE 1 CAPSULE: CAPSULE at 09:10

## 2020-11-25 RX ADMIN — INSULIN LISPRO 2 UNITS: 100 INJECTION, SOLUTION INTRAVENOUS; SUBCUTANEOUS at 22:00

## 2020-11-25 RX ADMIN — INSULIN LISPRO 2 UNITS: 100 INJECTION, SOLUTION INTRAVENOUS; SUBCUTANEOUS at 17:38

## 2020-11-25 RX ADMIN — CLOPIDOGREL BISULFATE 75 MG: 75 TABLET ORAL at 09:10

## 2020-11-25 RX ADMIN — OXYCODONE HYDROCHLORIDE AND ACETAMINOPHEN 500 MG: 500 TABLET ORAL at 17:38

## 2020-11-25 RX ADMIN — HYDROXYCHLOROQUINE SULFATE 200 MG: 200 TABLET, FILM COATED ORAL at 17:38

## 2020-11-25 RX ADMIN — DEXAMETHASONE 6 MG: 4 TABLET ORAL at 17:38

## 2020-11-25 RX ADMIN — HYDROXYCHLOROQUINE SULFATE 200 MG: 200 TABLET, FILM COATED ORAL at 09:09

## 2020-11-25 RX ADMIN — AMLODIPINE BESYLATE 5 MG: 5 TABLET ORAL at 09:09

## 2020-11-25 RX ADMIN — CARVEDILOL 25 MG: 12.5 TABLET, FILM COATED ORAL at 17:38

## 2020-11-25 RX ADMIN — Medication 10 ML: at 07:18

## 2020-11-25 RX ADMIN — ROSUVASTATIN 20 MG: 10 TABLET, FILM COATED ORAL at 21:38

## 2020-11-25 RX ADMIN — CARVEDILOL 25 MG: 12.5 TABLET, FILM COATED ORAL at 09:10

## 2020-11-25 RX ADMIN — Medication 81 MG: at 09:09

## 2020-11-25 RX ADMIN — MONTELUKAST 10 MG: 10 TABLET, FILM COATED ORAL at 21:38

## 2020-11-25 RX ADMIN — GUAIFENESIN 600 MG: 600 TABLET, EXTENDED RELEASE ORAL at 09:10

## 2020-11-25 RX ADMIN — OXYCODONE HYDROCHLORIDE AND ACETAMINOPHEN 500 MG: 500 TABLET ORAL at 09:10

## 2020-11-25 RX ADMIN — ALLOPURINOL 300 MG: 300 TABLET ORAL at 09:10

## 2020-11-25 RX ADMIN — Medication 10 ML: at 21:46

## 2020-11-25 NOTE — PROGRESS NOTES
ID Progress Note  2020    Subjective:     Decrease supplemental O2; high flow 40ML/min to 30L/min this morning  Still C/o worsening of SOB and GALEANA    Review of Systems:            Symptom Y/N Comments   Symptom Y/N Comments   Fever/Chills n      Chest Pain  n      Poor Appetite       Edema        Cough  y     Abdominal Pain        Sputum n      Joint Pain        SOB/GALEANA y      Pruritis/Rash        Nausea/vomit n      Tolerating PT/OT        Diarrhea  n     Tolerating Diet        Constipation  n     Other           Could NOT obtain due to:       Objective:     Vitals:   Visit Vitals  BP (!) 140/76 (BP 1 Location: Right arm, BP Patient Position: At rest)   Pulse 74   Temp 97.6 °F (36.4 °C)   Resp 24   Ht 5' 7\" (1.702 m)   Wt 114.1 kg (251 lb 8.7 oz)   SpO2 98%   BMI 39.40 kg/m²        Tmax:  Temp (24hrs), Av.7 °F (36.5 °C), Min:97.5 °F (36.4 °C), Max:98.2 °F (36.8 °C)      PHYSICAL EXAM:  General: Ill appearing, obese. Alert, cooperative, no acute distress    EENT:  EOMI. Anicteric sclerae. MMM  Resp:  Coarse breath sounds in apex with diminished breath sounds at bases, no wheezing or rales. No accessory muscle use  CV:  Regular  rhythm,  No edema  GI:  Soft, Non distended, Non tender. +Bowel sounds  Neurologic:  Alert and oriented X 3, normal speech,   Psych:   Good insight. Not anxious nor agitated  Skin:  No rashes.   No jaundice    Labs:   Lab Results   Component Value Date/Time    WBC 9.4 2020 04:03 AM    HGB 12.5 2020 04:03 AM    Hematocrit (POC) 37 09/15/2019 08:03 PM    HCT 37.6 2020 04:03 AM    PLATELET 250  04:03 AM    MCV 90.2 2020 04:03 AM     Lab Results   Component Value Date/Time    Sodium 130 (L) 2020 02:41 AM    Potassium 3.7 2020 02:41 AM    Chloride 93 (L) 2020 02:41 AM    CO2 27 2020 02:41 AM    Anion gap 10 2020 02:41 AM    Glucose 209 (H) 2020 02:41 AM    BUN 86 (H) 2020 02:41 AM    Creatinine 2.09 (H) 11/25/2020 02:41 AM    BUN/Creatinine ratio 41 (H) 11/25/2020 02:41 AM    GFR est AA 29 (L) 11/25/2020 02:41 AM    GFR est non-AA 24 (L) 11/25/2020 02:41 AM    Calcium 9.8 11/25/2020 02:41 AM    Bilirubin, total 0.4 11/25/2020 02:41 AM    Alk.  phosphatase 71 11/25/2020 02:41 AM    Protein, total 8.3 (H) 11/25/2020 02:41 AM    Albumin 3.0 (L) 11/25/2020 02:41 AM    Globulin 5.3 (H) 11/25/2020 02:41 AM    A-G Ratio 0.6 (L) 11/25/2020 02:41 AM    ALT (SGPT) 39 11/25/2020 02:41 AM           Assessment and Plan   Acute respiratory failure secondary to COVID 19 & PNA  Allergic asthma  - COVID 19 (+)     Ferritin 293, procal 1.62->0.44, CRP 8.87->23.10->4.46    D-dimer 3.15->1.1, legionella (-)    WBC 9.4    CXR: Mild worsening in the bilateral pulmonary infiltrates    Decrease of supplemental O2; 30L today from 45L    Completed 5 day course of Levo    Completed  Remdesivir as of 11/24    S/p convalescent plasma 11/20    Continue with decadron (last dose 11/28), vit C, and Zinc    On plaquenil POA for arthritis per the physician at [facility]    Supportive care    Prognosis guarded    VICKI  - Cret 2.0    Avoid nephrotoxic agent    Ildefonso Braun Clarity, NP

## 2020-11-25 NOTE — PROGRESS NOTES
[facility] Adult  Hospitalist Group                                                                                          Hospitalist Progress Note  [address] [facility],   Answering service: [phone] from in house phone        Date of Service:  2020  NAME:  Mario Clements  :  1955  MRN:  [id number]      Admission Summary:   72 y.o. female who presents with cough and shortness of breath since  patient was tested positive Covid at an outside testing center and was taking antibiotics nebulizer and Medrol pack did not improve patient said that cough is increasing in frequency and thickening whitish in color no blood in it patient  also have cold-like symptoms and admitted at [facility] patient has chronic systolic heart failure but she takes all her medications regularly does not seem to be in exacerbation. Currently patient was saturating well in room air in the ED patient was admitted for further management    Interval history / Subjective: Follow up COVID 19. Patient seen and examined. Oxygen weaned to 15 liters, 92% FIi02. She feels improved. Her  was discharged home from the [place] yesterday. Assessment & Plan:     COVID-19 pneumonia:  Acute hypoxic respiratory failure (89% on RA + respiratory distress, tachypnea): improving  -ID consulted for remdesivir- initiated   -Dexamethasone 6 mg once daily  -Vitamin C, zinc  -tylenol for fevers  -s/p convalescent plasma   -recurrent fevers with elevated procalcitonin, continue levaquin x 5 days.  Cultures NGTD   -Nephrology consulted, appreciate recommendations, transitioned to bumex oral   -Continue high flow, wean as able     Chronic systolic heart failure NYHA II  -Continue bumex   -Continue home carvedilol  -Continue home Plavix, aspirin, rosuvastatin     VICKI on CKD 2: worsening (expected)  -Monitor closely on diurectics     History of rheumatoid arthritis:  -hold sulfasalazine, continue Plaquenil    History of depression:  -Continue home Cymbalta    Gout  -Hold colchicine, continue allopurinol      Diabetes type 2 with hyperglycemia  -continue SSI, monitor closely with steroids  -Ha1c 6.5     Hypertension:  -continue carvedilol, amlodipine    Obesity BMI 39.47    Code status: Full  DVT prophylaxis: Lovenox per Covid protocol    Care Plan discussed with: Patient/Family  Anticipated Disposition: Home w/Family  Anticipated Discharge: Greater than 48 hours     Hospital Problems  Date Reviewed: 11/18/2020          Codes Class Noted POA    SOB (shortness of breath) ICD-10-CM: R06.02  ICD-9-CM: 786.05  11/18/2020 Unknown        * (Principal) Chronic systolic heart failure ([facility] [address] ICD-10-CM: I50.22  ICD-9-CM: 428.22  7/2/2014 Yes                Review of Systems:   Negative unless stated above      Vital Signs:    Last 24hrs VS reviewed since prior progress note. Most recent are:  Visit Vitals  /63 (BP 1 Location: Right arm, BP Patient Position: Sitting)   Pulse 72   Temp 97.8 °F (36.6 °C)   Resp 19   Ht 5' 7\" (1.702 m)   Wt 114.1 kg (251 lb 8.7 oz)   SpO2 96%   BMI 39.40 kg/m²         Intake/Output Summary (Last 24 hours) at 11/25/2020 1454  Last data filed at 11/24/2020 1600  Gross per 24 hour   Intake 240 ml   Output 800 ml   Net -560 ml        Physical Examination:     I had a face to face encounter with this patient and independently examined them on 11/25/2020 as outlined below:          Constitutional:  no acute distress, cooperative, pleasant    ENT:  Oral mucosa moist, oropharynx benign. Resp:  diminished breath sounds with increased airflow from day prior, no accessory muscle use, can state short sentences    CV:  Regular rhythm, normal rate, no murmurs, gallops, rubs    GI:  Soft, non distended, non tender.  normoactive bowel sounds, no hepatosplenomegaly     Musculoskeletal:  No edema, warm, 2+ pulses throughout    Neurologic:  Moves all extremities            Data Review:    Review and/or order of clinical lab test  Review and/or order of tests in the radiology section of CPT  Review and/or order of tests in the medicine section of CPT      Labs:     Recent Labs     11/23/20  0403   WBC 9.4   HGB 12.5   HCT 37.6        Recent Labs     11/25/20 0241 11/24/20 0319 11/23/20  0403   * 133* 135*   K 3.7 3.6 4.0   CL 93* 95* 97   CO2 27 26 26   BUN 86* 75* 59*   CREA 2.09* 1.83* 1.64*   * 167* 155*   CA 9.8 9.9 10.8*   MG  --   --  2.0   PHOS  --   --  5.8*     Recent Labs     11/25/20 0241 11/24/20 0319 11/23/20  0403   ALT 39 43 48   AP 71 81 79   TBILI 0.4 0.3 0.4   TP 8.3* 8.6* 8.7*   ALB 3.0* 2.9* 2.8*   GLOB 5.3* 5.7* 5.9*     Recent Labs     11/25/20 0241 11/24/20 0319 11/23/20  0403   INR 1.2* 1.2* 1.2*   PTP 12.4* 12.5* 12.6*      Recent Labs     11/24/20  0320   FERR 661*      No results found for: FOL, RBCF   No results for input(s): PH, PCO2, PO2 in the last 72 hours. No results for input(s): CPK, CKNDX, TROIQ in the last 72 hours.     No lab exists for component: CPKMB  No results found for: CHOL, CHOLX, CHLST, CHOLV, HDL, HDLP, LDL, LDLC, DLDLP, TGLX, TRIGL, TRIGP, CHHD, CHHDX  Lab Results   Component Value Date/Time    Glucose (POC) 136 (H) 11/25/2020 12:19 PM    Glucose (POC) 134 (H) 11/25/2020 08:27 AM    Glucose (POC) 256 (H) 11/24/2020 09:39 PM    Glucose (POC) 117 (H) 11/24/2020 05:01 PM    Glucose (POC) 137 (H) 11/24/2020 12:15 PM     Lab Results   Component Value Date/Time    Color YELLOW/STRAW 09/15/2019 09:47 PM    Appearance CLEAR 09/15/2019 09:47 PM    Specific gravity 1.013 09/15/2019 09:47 PM    pH (UA) 6.0 09/15/2019 09:47 PM    Protein NEGATIVE  09/15/2019 09:47 PM    Glucose NEGATIVE  09/15/2019 09:47 PM    Ketone NEGATIVE  09/15/2019 09:47 PM    Bilirubin NEGATIVE  09/15/2019 09:47 PM    Urobilinogen 0.2 09/15/2019 09:47 PM    Nitrites NEGATIVE  09/15/2019 09:47 PM    Leukocyte Esterase NEGATIVE  09/15/2019 09:47 PM    Epithelial cells FEW 09/15/2019 09:47 PM    Bacteria NEGATIVE  09/15/2019 09:47 PM    WBC 0-4 09/15/2019 09:47 PM    RBC 0-5 09/15/2019 09:47 PM         Medications Reviewed:     Current Facility-Administered Medications   Medication Dose Route Frequency    bumetanide (BUMEX) tablet 2 mg  2 mg Oral Q12H    albuterol (PROVENTIL HFA, VENTOLIN HFA, PROAIR HFA) inhaler 1 Puff  1 Puff Inhalation Q4H PRN    sodium chloride (OCEAN) 0.65 % nasal squeeze bottle 2 Spray  2 Spray Both Nostrils Q2H PRN    senna-docusate (PERICOLACE) 8.6-50 mg per tablet 1 Tab  1 Tab Oral DAILY    guaiFENesin ER (MUCINEX) tablet 600 mg  600 mg Oral Q12H    loperamide (IMODIUM) capsule 4 mg  4 mg Oral Q4H PRN    0.9% sodium chloride infusion 250 mL  250 mL IntraVENous PRN    glucose chewable tablet 16 g  4 Tab Oral PRN    glucagon (GLUCAGEN) injection 1 mg  1 mg IntraMUSCular PRN    dextrose 10% infusion 0-250 mL  0-250 mL IntraVENous PRN    insulin lispro (HUMALOG) injection   SubCUTAneous AC&HS    amLODIPine (NORVASC) tablet 5 mg  5 mg Oral DAILY    enoxaparin (LOVENOX) injection 40 mg  40 mg SubCUTAneous Q12H    ascorbic acid (vitamin C) (VITAMIN C) tablet 500 mg  500 mg Oral BID    zinc sulfate (ZINCATE) 220 (50) mg capsule 1 Cap  1 Cap Oral DAILY    [Held by provider] sulfaSALAzine (AZULFIDINE) tablet 500 mg  500 mg Oral BID WITH MEALS    montelukast (SINGULAIR) tablet 10 mg  10 mg Oral QHS    dexAMETHasone (DECADRON) tablet 6 mg  6 mg Oral Q24H    allopurinoL (ZYLOPRIM) tablet 300 mg  300 mg Oral DAILY    aspirin delayed-release tablet 81 mg  81 mg Oral DAILY    carvediloL (COREG) tablet 25 mg  25 mg Oral BID WITH MEALS    clopidogreL (PLAVIX) tablet 75 mg  75 mg Oral DAILY    DULoxetine (CYMBALTA) capsule 60 mg  60 mg Oral DAILY    hydrOXYchloroQUINE (PLAQUENIL) tablet 200 mg  200 mg Oral BID    rosuvastatin (CRESTOR) tablet 20 mg  20 mg Oral QHS    acetaminophen (TYLENOL) tablet 650 mg  650 mg Oral Q6H PRN    Or    acetaminophen (TYLENOL) suppository 650 mg  650 mg Rectal Q6H PRN    guaiFENesin-dextromethorphan (ROBITUSSIN DM) 100-10 mg/5 mL syrup 5 mL  5 mL Oral Q4H PRN    sodium chloride (NS) flush 5-40 mL  5-40 mL IntraVENous Q8H    sodium chloride (NS) flush 5-40 mL  5-40 mL IntraVENous PRN    polyethylene glycol (MIRALAX) packet 17 g  17 g Oral DAILY PRN    promethazine (PHENERGAN) tablet 12.5 mg  12.5 mg Oral Q6H PRN    Or    ondansetron (ZOFRAN) injection 4 mg  4 mg IntraVENous Q6H PRN     ______________________________________________________________________  EXPECTED LENGTH OF STAY: 4d 4h  ACTUAL LENGTH OF STAY:          [address]

## 2020-11-25 NOTE — PROGRESS NOTES
Comprehensive Nutrition Assessment    Type and Reason for Visit: Initial, RD nutrition re-screen/LOS      Nutrition Recommendations/Plan:    1. Bowel regimen, no BM x 7 days  2. Consistent CHO 2000 kcal diet    Nutrition Assessment:     72 y.o. female who has a past medical history of Anxiety, Arrhythmia, Arthritis, Asthma, CAD (coronary artery disease), Chronic pain, COVID-19, Diarrhea (3/24/2017), Epigastric pain (3/24/2017), Fibromyalgia, GERD (gastroesophageal reflux disease), Gout, Heart failure ([facility] [address] Hypertension, Psychiatric disorder, Stroke ([facility] [address] and Unspecified sleep apnea. Admitted with SOB (shortness of breath) [R06.02]    Pt screened for LOS. MST negative on admission. Wt relatively stable with some fluctuations suspect d/t fluid. No complaints, eating 100% of meals at this time. No overt concerns from nursing either. Respiratory status improving, on 15 L high flow. Noted last BM 7 days ago, on scheduled Pericolace which pt has refused. Miralax ordered PRN. BG not well controlled, on steroids. Hx of DM - takes Trulicity at home with recent HbA1C 6.5% indicating good control PTA.       Malnutrition Assessment:  Malnutrition Status:  No malnutrition        Nutritionally Significant Medications:   Vit C, Bumex, Plavix, Decadron, correctional scale, Crestor, Pericolace, Zincate  PRN: miralax      Estimated Daily Nutrient Needs:  Energy (kcal): 7062-9670(MSJ x 1-1.2)  Protein (g): (0.8-1 gm/kg or ~20%)  Fluid (ml/day): 1 mL/kcal    Nutrition Related Findings:   Edema: trace bilat LE  Last BM: 11/18    Wounds:    None       Current Nutrition Therapies:  DIET CARDIAC Regular  Meal intake:   Patient Vitals for the past 168 hrs:   % Diet Eaten   11/24/20 1600 100 %   11/24/20 1200 100 %   11/24/20 0800 75 %   11/22/20 1548 0 %   11/22/20 1135 75 %   11/22/20 0823 50 %   11/19/20 0951 10 %         Anthropometric Measures:  · Height:  5' 7\" (170.2 cm)  · Current Body Wt:  114.1 kg (251 lb 8.7 oz) · Admission Body Wt:  257 lb 0.9 oz(116.6 kg - standing)    · Ideal Body Wt:  135 lbs:  186.3 %   · BMI Category:  Obese class 2 (BMI 35.0-39. 9)       Wt Readings from Last 20 Encounters:   11/25/20 114.1 kg (251 lb 8.7 oz)   03/03/20 114.3 kg (252 lb)   02/21/20 115.7 kg (255 lb 1.6 oz)   09/15/19 108 kg (238 lb)   02/07/19 111.6 kg (246 lb)   11/20/18 111.6 kg (246 lb 2 oz)   09/09/18 111.1 kg (245 lb)   08/09/18 112.9 kg (248 lb 14.4 oz)   06/06/18 110.6 kg (243 lb 13.3 oz)   03/26/18 112.5 kg (248 lb)   03/01/18 112.7 kg (248 lb 7.3 oz)   07/21/17 108.9 kg (240 lb)   06/30/17 108.9 kg (240 lb)   05/15/17 109 kg (240 lb 6.4 oz)   05/03/17 106.6 kg (235 lb)   03/24/17 105.7 kg (233 lb)   02/14/17 108 kg (238 lb)   11/01/16 110.2 kg (243 lb)   08/23/16 110.3 kg (243 lb 4 oz)   05/30/16 108 kg (238 lb)     Nutrition Diagnosis:   · Altered nutrition-related lab values related to endocrine dysfunction(+ steroids) as evidenced by lab values      Nutrition Interventions:   Food and/or Nutrient Delivery: Continue current diet  Nutrition Education and Counseling: No recommendations at this time  Coordination of Nutrition Care: Continue to monitor while inpatient    Goals:  continued PO >/=75% of meals over next 5-7 days, BG <180 mg/dL, pt will have a BM in next 1-2 days       Nutrition Monitoring and Evaluation:   Behavioral-Environmental Outcomes: None identified  Food/Nutrient Intake Outcomes: Food and nutrient intake  Physical Signs/Symptoms Outcomes: Biochemical data, Hemodynamic status, Fluid status or edema, Weight    Discharge Planning:     Too soon to determine     Recent Labs     11/25/20  0241 11/24/20  0319 11/23/20  0403   * 167* 155*   BUN 86* 75* 59*   CREA 2.09* 1.83* 1.64*   * 133* 135*   K 3.7 3.6 4.0   CL 93* 95* 97   CO2 27 26 26   CA 9.8 9.9 10.8*   PHOS  --   --  5.8*   MG  --   --  2.0       Recent Labs     11/25/20  0241 11/24/20  0319 11/23/20  0403   ALT 39 43 48   AP 71 81 79 TBILI 0.4 0.3 0.4   TP 8.3* 8.6* 8.7*   ALB 3.0* 2.9* 2.8*   GLOB 5.3* 5.7* 5.9*       No results for input(s): LAC in the last 72 hours. Recent Labs     11/23/20  0403   WBC 9.4   HGB 12.5   HCT 37.6          No results for input(s): PREALB in the last 72 hours. No results for input(s): TRIGL in the last 72 hours.     Recent Labs     11/25/20  1219 11/25/20  0827 11/24/20  2139 11/24/20  1701 11/24/20  1215 11/24/20  0813 11/23/20  2112 11/23/20  1624 11/23/20  1141 11/23/20  0845 11/22/20  2205 11/22/20  1634   GLUCPOC 136* 134* 256* 117* 137* 121* 284* 94 98 119* 181* 95       Lab Results   Component Value Date/Time    Hemoglobin A1c 6.5 (H) 11/20/2020 03:11 AM    Hemoglobin A1c 7.0 (H) 06/19/2014 09:05 AM         Ferritin   Date Value Ref Range Status   11/24/2020 661 (H) 26 - 388 NG/ML Final   11/18/2020 293 (H) 8 - 252 NG/ML Final         Princess Lawanda RD  Available via [place]  Or Pager# [phone]

## 2020-11-25 NOTE — PROGRESS NOTES
Problem: Mobility Impaired (Adult and Pediatric)  Goal: *Acute Goals and Plan of Care (Insert Text)  Description: FUNCTIONAL STATUS PRIOR TO ADMISSION: Patient was independent and active without use of DME.    HOME SUPPORT PRIOR TO ADMISSION: The patient lived with  but did not require assist. Pt's  is currently at the [place] with plans to d/c home on O2. Pt states daughter may be able to assist.    Physical Therapy Goals  Initiated 11/24/2020  1. Patient will move from supine to sit and sit to supine , scoot up and down, and roll side to side in bed with modified independence within 7 day(s). 2.  Patient will transfer from bed to chair and chair to bed with modified independence using the least restrictive device within 7 day(s). 3.  Patient will perform sit to stand with modified independence within 7 day(s). 4.  Patient will ambulate with modified independence for 300 feet with the least restrictive device within 7 day(s). 5.  Patient will ascend/descend 10 stairs with one handrail(s) with modified independence within 7 day(s). Outcome: Progressing Towards Goal   PHYSICAL THERAPY TREATMENT  Patient: Jacqueline Da Silva (35 y.o. female)  Date: 11/25/2020  Diagnosis: SOB (shortness of breath) [J53.39]   Chronic systolic heart failure (HCC)       Precautions: (droplet plus)  Chart, physical therapy assessment, plan of care and goals were reviewed. ASSESSMENT  Patient continues with skilled PT services and is progressing towards goals. She was able to wean to 15L at 92% FiO2 earlier today. Throughout session, she maintained SpO2 over 90% but did become tachypneic with progressive activity. Used the RW for transfer and gait training to decrease workload and conserve energy, and this did help her maintain her RR in the teens to 25s for transfers. Educated about importance of monitoring her respiratory effort and pausing activity to allow herself to recover.  She is highly motivated and eager to return home to her , who was recently discharged from the [facility] on  home O2. Expect that she will likely need rehab prior to being able to safely return home, but we will continue to progress her activity as she is able to tolerate towards hopeful D/C home. Current Level of Function Impacting Discharge (mobility/balance): contact guard to min assist with very short distance ambulation with RW    Other factors to consider for discharge: COVID positive         PLAN :  Patient continues to benefit from skilled intervention to address the above impairments. Continue treatment per established plan of care. to address goals. Recommendation for discharge: (in order for the patient to meet his/her long term goals)  To be determined: expect inpatient rehab, but possibly home with HHPT pending progress and O2 weaning    This discharge recommendation:  A follow-up discussion with the attending provider and/or case management is planned    IF patient discharges home will need the following DME: to be determined (TBD)       SUBJECTIVE:   Patient stated I feel better today.     OBJECTIVE DATA SUMMARY:   Critical Behavior:  Neurologic State: Alert  Orientation Level: Oriented X4  Cognition: Appropriate decision making, Appropriate for age attention/concentration, Appropriate safety awareness, Follows commands     Functional Mobility Training:  Bed Mobility:     Supine to Sit: Supervision  Sit to Supine: (up in chair after)           Transfers:  Sit to Stand: Adaptive equipment; Additional time;Stand-by assistance  Stand to Sit: Stand-by assistance; Adaptive equipment; Additional time        Bed to Chair: Stand-by assistance; Adaptive equipment; Additional time                    Balance:  Sitting: Intact; Without support  Standing: Intact; With support(with hands on the walker)  Ambulation/Gait Training:  Distance (ft): 15 Feet (ft)  Assistive Device: Gait belt;Walker, rolling  Ambulation - Level of Assistance: Contact guard assistance; Adaptive equipment; Additional time        Gait Abnormalities: Decreased step clearance        Base of Support: Widened     Speed/Lorena: Slow  Step Length: Right shortened;Left shortened        Interventions: Safety awareness training; Tactile cues; Verbal cues; Visual/Demos           Stairs: Therapeutic Exercises:     Pain Rating:  none    Activity Tolerance:   Fair, desaturates with exertion and requires oxygen, requires rest breaks, and observed SOB with activity    After treatment patient left in no apparent distress:   Sitting in chair and Call bell within reach    COMMUNICATION/COLLABORATION:   The patients plan of care was discussed with: Registered nurse.      Beka Rebolledo, PT   Time Calculation: 20 mins

## 2020-11-25 NOTE — PROGRESS NOTES
Problem: Self Care Deficits Care Plan (Adult)  Goal: *Acute Goals and Plan of Care (Insert Text)  Description:   FUNCTIONAL STATUS PRIOR TO ADMISSION: Patient was independent and active without use of DME. Has a shower chair and rollator she does not use frequently. HOME SUPPORT: The patient lived with  but did not require assist. Patient reports daughter is available to assist with IADLs PRN.  cannot physically assist 2* just getting home from [place] with COVID and being on oxygen. Occupational Therapy Goals  Initiated 11/25/2020  1. Patient will perform grooming standing with VSS for 5 minutes with supervision/set-up within 7 day(s). 2.  Patient will perform bathing using most appropriate DME and with VSS with supervision/set-up within 7 day(s). 3.  Patient will perform lower body dressing using most appropriate DME and with VSS with supervision/set-up within 7 day(s). 4.  Patient will perform toilet transfers with supervision/set-up within 7 day(s). 5.  Patient will perform all aspects of toileting with supervision/set-up within 7 day(s). 6.  Patient will participate in upper extremity therapeutic exercise/activities with supervision/set-up for 5 minutes within 7 day(s). 7.  Patient will utilize energy conservation techniques during functional activities with verbal cues within 7 day(s). Outcome: Not Met    OCCUPATIONAL THERAPY EVALUATION  Patient: Mario Clements (73 y.o. female)  Date: 11/25/2020  Primary Diagnosis: SOB (shortness of breath) [R06.02]        Precautions: falls,  (droplet plus)    ASSESSMENT  Based on the objective data described below, the patient presents with limited ADL performance s/p admission for SOB. Patient noted to be COVID+. Patient ADLs limited by impaired balance, limited lower body access, decreased functional activity tolerance, impaired cardiopulmonary endurance and generalized weakness. At baseline, patient IND with ADLs, IADLs and mobility.  Lives at home with . Today, patient received sitting in chair. Patient able to stand with SBA. Patient required mod A to srinath/doff socks (unable to bend to reach feet and cannot tailor sit with RLE 2). Patient educated on energy conservation and activity pacing. Patient left sitting in chair with call bell in reach, RN aware and all needs met. Will continue to follow, may benefit from short IPR stay pending progress. Current Level of Function Impacting Discharge (ADLs/self-care): up to mod A for ADLs    Functional Outcome Measure: The patient scored 45/100 on the Barthel Index outcome measure which is indicative of moderate deficits in ADLs. Other factors to consider for discharge:   cannot physically assist 2* just getting home from [place] with COVID and being on oxygen. Patient will benefit from skilled therapy intervention to address the above noted impairments. PLAN :  Recommendations and Planned Interventions: self care training, functional mobility training, therapeutic exercise, balance training, therapeutic activities, endurance activities, patient education, home safety training, and family training/education    Frequency/Duration: Patient will be followed by occupational therapy 5 times a week to address goals. Recommendation for discharge: (in order for the patient to meet his/her long term goals)  To be determined: IPR vs. Terryann Late pending progress    This discharge recommendation:  Has not yet been discussed the attending provider and/or case management    IF patient discharges home will need the following DME: bedside commode       SUBJECTIVE:   Patient stated I do it all, girl!  RE: ADLs and IADLs    OBJECTIVE DATA SUMMARY:   HISTORY:   Past Medical History:   Diagnosis Date    Anxiety     Arrhythmia     per pt, treated with med    Arthritis     Asthma     CAD (coronary artery disease)     EF=33 1/3    Chronic pain     fibromyalgia    COVID-19     Diarrhea 3/24/2017 Epigastric pain 3/24/2017    Fibromyalgia     GERD (gastroesophageal reflux disease)     ibs    Gout     Heart failure (HCC)     cardiomyopathy    Hypertension     Psychiatric disorder     h/o severe depression    Stroke [facility])     possible TIA    Unspecified sleep apnea     wears CPAP     Past Surgical History:   Procedure Laterality Date    COLONOSCOPY N/A 8/23/2016    COLONOSCOPY performed by Vernon Lindsay [facility] END[facility]COPY    COLONOSCOPY N/A 2/21/2020    COLONOSCOPY performed by Katherine Levin MD at [facility] N/A 3/24/2017    FLEXIBLE SIGMOIDOSCOPY  performed by Laurie Ventura MD at [facility] AMBULATORY OR    HX APPENDECTOMY      HX ENDOSCOPY      HX GYN      hysterectomy    HX HEENT      left eye surgery    HX KNEE ARTHROSCOPY Right     HX KNEE REPLACEMENT Right     HX ORTHOPAEDIC Right     plantar fascitis repair with implant    HX OTHER SURGICAL      hand surgery left (carpal tunnel, reconstruction of small finger)    SIGMOIDOSCOPY,BIOPSY  3/24/2017         VASCULAR SURGERY PROCEDURE UNLIST      stent in bilateral legs       Expanded or extensive additional review of patient history:     Home Situation  Home Environment: Private residence  # Steps to Enter: 1  One/Two Story Residence: Two story  # of Interior Steps: 15  Interior Rails: Right  Living Alone: No  Support Systems: Spouse/Significant Other/Partner, Family member(s), Child(oneil)  Patient Expects to be Discharged to[de-identified] Private residence  Current DME Used/Available at Home: Lysbeth Lemmings, rollator, Shower chair, Grab bars  Tub or Shower Type: Shower    Hand dominance: Right    EXAMINATION OF PERFORMANCE DEFICITS:  Cognitive/Behavioral Status:  Neurologic State: Alert  Orientation Level: Oriented X4  Cognition: Appropriate for age attention/concentration; Follows commands  Perception: Appears intact  Perseveration: No perseveration noted  Safety/Judgement: Awareness of environment; Fall prevention    Skin: Appears grossly intact    Edema: none noted in BUEs    Hearing: Auditory  Auditory Impairment: None    Vision/Perceptual:    Tracking: Able to track stimulus in all quadrants w/o difficulty    Diplopia: No    Acuity: Within Defined Limits    Corrective Lenses: Glasses    Range of Motion:  In BUEs  AROM: Generally decreased, functional    Strength: In BUEs  Strength: Generally decreased, functional    Coordination:  Coordination: Generally decreased, functional  Fine Motor Skills-Upper: Left Intact; Right Intact    Gross Motor Skills-Upper: Left Intact; Right Intact    Tone & Sensation:  In BUEs  Tone: Normal  Sensation: Intact    Balance:  Sitting: Intact; Without support  Standing: Intact; With support  Standing - Static: Good  Standing - Dynamic : Fair    Functional Mobility and Transfers for ADLs:  Bed Mobility:  Supine to Sit: Supervision  Sit to Supine: (up in chair after)    Transfers:  Sit to Stand: Stand-by assistance; Adaptive equipment; Additional time  Stand to Sit: Stand-by assistance; Adaptive equipment; Additional time  Bed to Chair: Stand-by assistance; Adaptive equipment; Additional time    ADL Assessment:  Feeding: Independent    Oral Facial Hygiene/Grooming: Independent;Stand-by assistance(Infer IND sitting, SBA standing)    Bathing: Minimum assistance(Infer per obs of func reach, balance, strength)    Upper Body Dressing: Setup    Lower Body Dressing: Minimum assistance(Infer per obs of func reach, balance, strength)    Toileting: Minimum assistance(Infer per obs of func reach, balance, strength)    ADL Intervention and task modifications:  Feeding  Feeding Assistance: Independent  Container Management: Independent  Cutting Food: Independent  Utensil Management: Independent  Food to Mouth: Independent  Drink to Mouth: Independent    Lower Body Dressing Assistance  Socks:  Moderate assistance(able to tailor sit with LLE, not RLE )  Leg Crossed Method Used: Yes  Position Performed: Seated in chair  Cues: Don;Doff;Physical assistance;Verbal cues provided    Cognitive Retraining  Safety/Judgement: Awareness of environment; Fall prevention    Functional Measure:  Barthel Index:    Bathin  Bladder: 5  Bowels: 10  Groomin  Dressin  Feeding: 10  Mobility: 0  Stairs: 0  Toilet Use: 5  Transfer (Bed to Chair and Back): 10  Total: 45/100        The Barthel ADL Index: Guidelines  1. The index should be used as a record of what a patient does, not as a record of what a patient could do. 2. The main aim is to establish degree of independence from any help, physical or verbal, however minor and for whatever reason. 3. The need for supervision renders the patient not independent. 4. A patient's performance should be established using the best available evidence. Asking the patient, friends/relatives and nurses are the usual sources, but direct observation and common sense are also important. However direct testing is not needed. 5. Usually the patient's performance over the preceding 24-48 hours is important, but occasionally longer periods will be relevant. 6. Middle categories imply that the patient supplies over 50 per cent of the effort. 7. Use of aids to be independent is allowed. [facility] [place]., Barthel, D.W. ([id number]). Functional evaluation: the Barthel Index. [address] [facility] (14)2. Cass  checo Augustin, J.J.M.F, Rusty Shaffer., Faviola Szymanski.[facility], [address] [place] [address] (). Measuring the change indisability after inpatient rehabilitation; comparison of the responsiveness of the Barthel Index and Functional Baker Measure. Journal of Neurology, Neurosurgery, and Psychiatry, 66(4), 993-355. Joan Borden, N.J.A, ALICIA Fischer, & Rosibel Don, MMichaelA. (2004.) Assessment of post-stroke quality of life in cost-effectiveness studies: The usefulness of the Barthel Index and the EuroQoL-5D.  Quality of Life Research, 15, 872-92       Occupational Therapy Evaluation Charge Determination   History Examination Decision-Making   LOW Complexity : Brief history review  LOW Complexity : 1-3 performance deficits relating to physical, cognitive , or psychosocial skils that result in activity limitations and / or participation restrictions  MEDIUM Complexity : Patient may present with comorbidities that affect occupational performnce. Miniml to moderate modification of tasks or assistance (eg, physical or verbal ) with assesment(s) is necessary to enable patient to complete evaluation       Based on the above components, the patient evaluation is determined to be of the following complexity level: MEDIUM  Pain Rating:  Reporting no pain    Activity Tolerance:   Fair, desaturates with exertion and requires oxygen, and requires rest breaks    After treatment patient left in no apparent distress:    Sitting in chair, Call bell within reach, and RN aware    COMMUNICATION/EDUCATION:   The patients plan of care was discussed with: Physical therapist and Registered nurse. Home safety education was provided and the patient/caregiver indicated understanding. and Patient/family have participated as able in goal setting and plan of care. This patients plan of care is appropriate for delegation to JESSICA.     Thank you for this referral.  John Smith OT  Time Calculation: 17 mins

## 2020-11-25 NOTE — PROGRESS NOTES
Nephrology Progress Note  Neha Murillo  Date of Admission : 11/18/2020    CC: Follow up for CKD and hypervolemia       Assessment and Plan     CKD IIIb:  - presumed from chronic HTN/DM2 + CMP   - Cr up slightly   - change to po diuretics today  - daily labs     Hypervolemia:  - improving  - diuretics as above     COVID-19 PNA:  - s/p remdesivir, convalescent plasma   - on dexamethasone  - per ID and primary team     HFrEF:  - diuretics as above     HTN:  - cont present care  - no ACE/ARB     Gout:  - on allopurinol     DM2:  - per hospitalist     Obesity       Interval History:  Not examined in the room. Per RN and Dr. Sandor Nichols, pt improving. Cr up slightly. UOP stable  Oxygenation improving. Current Medications: all current  Medications have been eviewed in EPIC  Review of Systems: Pertinent items are noted in HPI. Objective:  Vitals:    Vitals:    11/24/20 2000 11/24/20 2324 11/25/20 0241 11/25/20 0718   BP:  107/63 (!) 140/76    Pulse:  70 67 74   Resp:  16 16 24   Temp:  97.5 °F (36.4 °C) 97.7 °F (36.5 °C) 97.6 °F (36.4 °C)   SpO2: 98% 100% 96% 98%   Weight:    114.1 kg (251 lb 8.7 oz)   Height:         Intake and Output:  No intake/output data recorded. 11/23 1901 - 11/25 0700  In: 12 [P.O.:960]  Out: 1450 [Urine:1450]    Physical Examination:  Not examined in the room due to COVID-19 infection    Pt intubated     No  General: stable  Resp:  Stable oxygenation, on high flow  CV:  RRR on monitor, + edema  Neurologic:  Non focal  :  No vieyra    []    High complexity decision making was performed  []    Patient is at high-risk of decompensation with multiple organ involvement    Lab Data Personally Reviewed: I have reviewed all the pertinent labs, microbiology data and radiology studies during assessment.     Recent Labs     11/25/20  0241 11/24/20  0319 11/23/20  0403   * 133* 135*   K 3.7 3.6 4.0   CL 93* 95* 97   CO2 27 26 26   * 167* 155*   BUN 86* 75* 59*   CREA 2.09* 1.83* 1.64*   CA 9.8 9.9 10.8*   MG  --   --  2.0   PHOS  --   --  5.8*   ALB 3.0* 2.9* 2.8*   ALT 39 43 48   INR 1.2* 1.2* 1.2*     Recent Labs     11/23/20  0403   WBC 9.4   HGB 12.5   HCT 37.6        No results found for: SDES  Lab Results   Component Value Date/Time    Culture result: NO GROWTH 3 DAYS 11/21/2020 10:11 AM    Culture result: MIXED UROGENITAL TAZ ISOLATED 08/09/2018 02:00 PM    Culture result: NO GROWTH 1 DAY 03/01/2018 09:50 PM     Recent Results (from the past 24 hour(s))   GLUCOSE, POC    Collection Time: 11/24/20 12:15 PM   Result Value Ref Range    Glucose (POC) 137 (H) 65 - 100 mg/dL    Performed by Anamaria Mcdonald    GLUCOSE, POC    Collection Time: 11/24/20  5:01 PM   Result Value Ref Range    Glucose (POC) 117 (H) 65 - 100 mg/dL    Performed by Yoana Bill    GLUCOSE, POC    Collection Time: 11/24/20  9:39 PM   Result Value Ref Range    Glucose (POC) 256 (H) 65 - 100 mg/dL    Performed by ASIA Vargas 17, COMPREHENSIVE    Collection Time: 11/25/20  2:41 AM   Result Value Ref Range    Sodium 130 (L) 136 - 145 mmol/L    Potassium 3.7 3.5 - 5.1 mmol/L    Chloride 93 (L) 97 - 108 mmol/L    CO2 27 21 - 32 mmol/L    Anion gap 10 5 - 15 mmol/L    Glucose 209 (H) 65 - 100 mg/dL    BUN 86 (H) 6 - 20 MG/DL    Creatinine 2.09 (H) 0.55 - 1.02 MG/DL    BUN/Creatinine ratio 41 (H) 12 - 20      GFR est AA 29 (L) >60 ml/min/1.73m2    GFR est non-AA 24 (L) >60 ml/min/1.73m2    Calcium 9.8 8.5 - 10.1 MG/DL    Bilirubin, total 0.4 0.2 - 1.0 MG/DL    ALT (SGPT) 39 12 - 78 U/L    AST (SGOT) 22 15 - 37 U/L    Alk.  phosphatase 71 45 - 117 U/L    Protein, total 8.3 (H) 6.4 - 8.2 g/dL    Albumin 3.0 (L) 3.5 - 5.0 g/dL    Globulin 5.3 (H) 2.0 - 4.0 g/dL    A-G Ratio 0.6 (L) 1.1 - 2.2     D DIMER    Collection Time: 11/25/20  2:41 AM   Result Value Ref Range    D-dimer 1.10 (H) 0.00 - 0.65 mg/L FEU   PROTHROMBIN TIME + INR    Collection Time: 11/25/20  2:41 AM   Result Value Ref Range INR 1.2 (H) 0.9 - 1.1      Prothrombin time 12.4 (H) 9.0 - 11.1 sec   FIBRINOGEN    Collection Time: 11/25/20  2:41 AM   Result Value Ref Range    Fibrinogen 744 (H) 200 - 475 mg/dL   GLUCOSE, POC    Collection Time: 11/25/20  8:27 AM   Result Value Ref Range    Glucose (POC) 134 (H) 65 - 100 mg/dL    Performed by Edgar Nielsen MD  [address]  Phone - [phone]   Fax - [phone]  [email or web address]

## 2020-11-25 NOTE — PROGRESS NOTES
Name: [address]: [facility]   : 1955 Admit Date: 2020   Phone: [phone]  Room: [facility]/01   PCP: Diana Sanford MD  MRN: [id number]   Date: 2020  Code: Full Code        HPI:      I reduced her to 15 lpm and 92% FiO2  Discussed with RT they will switch to midflow and continue to wean   She is feeling better  Sitting on the side of the bed   D dimer trending downward       Still on the same flow and same FiO2  Looks better today   D dimer 1.63, down   prbnp 137, down   Ferritin 661  CRP 4.46      On high flow 45lpm and 90% FiO2   D dimer 2.07  probnp 414  Chest film yesterday was consistent in appearance       12:08 PM       History was obtained from patient. I was asked by Shani Munoz MD to see Alice Fraser in consultation for a chief complaint of shortness of breath. History of Present Illness: 71 yo female  has a past medical history of Anxiety, Arrhythmia, Arthritis, Asthma, CAD (coronary artery disease), Chronic pain, COVID-19, Diarrhea (3/24/2017), Epigastric pain (3/24/2017), Fibromyalgia, GERD (gastroesophageal reflux disease), Gout, Heart failure ([address] Hypertension, Psychiatric disorder, Stroke ([facility] [address] and Unspecified sleep apnea presented with cough and shortness of breath. Reportedly tested positive for COVID 19 on  and has not improved. She has a reported h/o asthma   also tested positive who is admitted to [facility] and is getting plasma their today. Data reviewed:   CXR suggestive of patchy lower lobe infiltrates.   Fibrinogen > 800  D-dimer 1.90  Lactate 0.8    Past Medical History:   Diagnosis Date    Anxiety     Arrhythmia     per pt, treated with med    Arthritis     Asthma     CAD (coronary artery disease)     EF=33 1/3    Chronic pain     fibromyalgia    COVID-19     Diarrhea 3/24/2017    Epigastric pain 3/24/2017    Fibromyalgia     GERD (gastroesophageal reflux disease)     ibs    Gout     Heart failure (HCC)     cardiomyopathy    Hypertension     Psychiatric disorder     h/o severe depression    Stroke ([address]     possible TIA    Unspecified sleep apnea     wears CPAP       Past Surgical History:   Procedure Laterality Date    COLONOSCOPY N/A 8/23/2016    COLONOSCOPY performed by Kenyon Shearer MD at [facility] ENDOSCOPY    COLONOSCOPY N/A 2/21/2020    COLONOSCOPY performed by Cristin Jacobo MD at [facility] ENDOSCOPY    FLEXIBLE SIGMOIDOSCOPY N/A 3/24/2017    FLEXIBLE SIGMOIDOSCOPY  performed by García Lo MD at [facility] AMBULATORY OR    HX APPENDECTOMY      HX ENDOSCOPY      HX GYN      hysterectomy    HX HEENT      left eye surgery    HX KNEE ARTHROSCOPY Right     HX KNEE REPLACEMENT Right     HX ORTHOPAEDIC Right     plantar fascitis repair with implant    HX OTHER SURGICAL      hand surgery left (carpal tunnel, reconstruction of small finger)    SIGMOIDOSCOPY,BIOPSY  3/24/2017         VASCULAR SURGERY PROCEDURE UNLIST      stent in bilateral legs       Family History   Problem Relation Age of Onset    Diabetes Mother        Social History     Tobacco Use    Smoking status: Never Smoker    Smokeless tobacco: Never Used   Substance Use Topics    Alcohol use: No       Allergies   Allergen Reactions    Codeine Itching    Demerol [Meperidine] Nausea Only    Ivp [Fd And C Blue No.1] Itching    Minoxidil Itching    Penicillamine Itching and Other (comments)     Drops blood pressure    Percocet [Oxycodone-Acetaminophen] Nausea and Vomiting       Current Facility-Administered Medications   Medication Dose Route Frequency    bumetanide (BUMEX) tablet 2 mg  2 mg Oral Q12H    albuterol (PROVENTIL HFA, VENTOLIN HFA, PROAIR HFA) inhaler 1 Puff  1 Puff Inhalation Q4H PRN    levoFLOXacin (LEVAQUIN) 250 mg in D5W IVPB  250 mg IntraVENous Q24H    sodium chloride (OCEAN) 0.65 % nasal squeeze bottle 2 Spray  2 Spray Both Nostrils Q2H PRN    senna-docusate (PERICOLACE) 8.6-50 mg per tablet 1 Tab  1 Tab Oral DAILY    guaiFENesin ER (MUCINEX) tablet 600 mg  600 mg Oral Q12H    loperamide (IMODIUM) capsule 4 mg  4 mg Oral Q4H PRN    0.9% sodium chloride infusion 250 mL  250 mL IntraVENous PRN    glucose chewable tablet 16 g  4 Tab Oral PRN    glucagon (GLUCAGEN) injection 1 mg  1 mg IntraMUSCular PRN    dextrose 10% infusion 0-250 mL  0-250 mL IntraVENous PRN    insulin lispro (HUMALOG) injection   SubCUTAneous AC&HS    amLODIPine (NORVASC) tablet 5 mg  5 mg Oral DAILY    enoxaparin (LOVENOX) injection 40 mg  40 mg SubCUTAneous Q12H    ascorbic acid (vitamin C) (VITAMIN C) tablet 500 mg  500 mg Oral BID    zinc sulfate (ZINCATE) 220 (50) mg capsule 1 Cap  1 Cap Oral DAILY    [Held by provider] sulfaSALAzine (AZULFIDINE) tablet 500 mg  500 mg Oral BID WITH MEALS    montelukast (SINGULAIR) tablet 10 mg  10 mg Oral QHS    dexAMETHasone (DECADRON) tablet 6 mg  6 mg Oral Q24H    allopurinoL (ZYLOPRIM) tablet 300 mg  300 mg Oral DAILY    aspirin delayed-release tablet 81 mg  81 mg Oral DAILY    carvediloL (COREG) tablet 25 mg  25 mg Oral BID WITH MEALS    clopidogreL (PLAVIX) tablet 75 mg  75 mg Oral DAILY    DULoxetine (CYMBALTA) capsule 60 mg  60 mg Oral DAILY    hydrOXYchloroQUINE (PLAQUENIL) tablet 200 mg  200 mg Oral BID    rosuvastatin (CRESTOR) tablet 20 mg  20 mg Oral QHS    acetaminophen (TYLENOL) tablet 650 mg  650 mg Oral Q6H PRN    Or    acetaminophen (TYLENOL) suppository 650 mg  650 mg Rectal Q6H PRN    guaiFENesin-dextromethorphan (ROBITUSSIN DM) 100-10 mg/5 mL syrup 5 mL  5 mL Oral Q4H PRN    sodium chloride (NS) flush 5-40 mL  5-40 mL IntraVENous Q8H    sodium chloride (NS) flush 5-40 mL  5-40 mL IntraVENous PRN    polyethylene glycol (MIRALAX) packet 17 g  17 g Oral DAILY PRN    promethazine (PHENERGAN) tablet 12.5 mg  12.5 mg Oral Q6H PRN    Or    ondansetron (ZOFRAN) injection 4 mg  4 mg IntraVENous Q6H PRN         REVIEW OF SYSTEMS   Negative except as stated in the HPI. Physical Exam:   Visit Vitals  /80   Pulse 80   Temp 97.6 °F (36.4 °C)   Resp 24   Ht 5' 7\" (1.702 m)   Wt 114.1 kg (251 lb 8.7 oz)   SpO2 97%   BMI 39.40 kg/m²     Examination deferred due to COVID-19 pandemic and patient has COVID-19 pneumonia. General:  Alert, cooperative,                                                           Neurologic: Grossly nonfocal       Lab Results   Component Value Date/Time    Sodium 130 (L) 11/25/2020 02:41 AM    Potassium 3.7 11/25/2020 02:41 AM    Chloride 93 (L) 11/25/2020 02:41 AM    CO2 27 11/25/2020 02:41 AM    BUN 86 (H) 11/25/2020 02:41 AM    Creatinine 2.09 (H) 11/25/2020 02:41 AM    Glucose 209 (H) 11/25/2020 02:41 AM    Calcium 9.8 11/25/2020 02:41 AM    Magnesium 2.0 11/23/2020 04:03 AM    Phosphorus 5.8 (H) 11/23/2020 04:03 AM    Lactic acid 0.8 11/18/2020 03:41 PM       Lab Results   Component Value Date/Time    WBC 9.4 11/23/2020 04:03 AM    HGB 12.5 11/23/2020 04:03 AM    PLATELET 797 41/10/4811 04:03 AM    MCV 90.2 11/23/2020 04:03 AM       Lab Results   Component Value Date/Time    INR 1.2 (H) 11/25/2020 02:41 AM    aPTT 31.5 05/13/2010 12:33 PM    Alk. phosphatase 71 11/25/2020 02:41 AM    Protein, total 8.3 (H) 11/25/2020 02:41 AM    Albumin 3.0 (L) 11/25/2020 02:41 AM    Globulin 5.3 (H) 11/25/2020 02:41 AM       Lab Results   Component Value Date/Time    Ferritin 661 (H) 11/24/2020 03:20 AM       Lab Results   Component Value Date/Time    C-Reactive protein 4.46 (H) 11/24/2020 03:20 AM    TSH 0.84 10/15/2010 10:57 AM     IMPRESSION:  ===========  -COVID 19 pneumonia. -Systolic CHF. -H/o asthma. Followed by Kevin Dent   -BONILLA, followed at [address]   -H/o CAD  -RA on plaquenil   -Mild renal insufficiency above baseline  -A +     PLAN:  =====  -O2 supplementation; switch to midflow   -completed  remdesivir.   -s/p convalescent plasma on 11/20  -agree with diuretics   -lovenox per protocol   -On decadron 6 mg iv q 24 x 10 days  -d-dimer, monitor -inflammatory, monitor   PRN over the thanksgiving holiday     Yoel Gomez PA-C

## 2020-11-26 LAB
ALBUMIN SERPL-MCNC: 3.3 G/DL (ref 3.5–5)
ANION GAP SERPL CALC-SCNC: 11 MMOL/L (ref 5–15)
BACTERIA SPEC CULT: NORMAL
BUN SERPL-MCNC: 76 MG/DL (ref 6–20)
BUN/CREAT SERPL: 41 (ref 12–20)
CALCIUM SERPL-MCNC: 9.7 MG/DL (ref 8.5–10.1)
CHLORIDE SERPL-SCNC: 91 MMOL/L (ref 97–108)
CO2 SERPL-SCNC: 28 MMOL/L (ref 21–32)
COMMENT, HOLDF: NORMAL
CREAT SERPL-MCNC: 1.84 MG/DL (ref 0.55–1.02)
D DIMER PPP FEU-MCNC: 0.84 MG/L FEU (ref 0–0.65)
FIBRINOGEN PPP-MCNC: 636 MG/DL (ref 200–475)
GLUCOSE BLD STRIP.AUTO-MCNC: 110 MG/DL (ref 65–100)
GLUCOSE BLD STRIP.AUTO-MCNC: 147 MG/DL (ref 65–100)
GLUCOSE BLD STRIP.AUTO-MCNC: 178 MG/DL (ref 65–100)
GLUCOSE BLD STRIP.AUTO-MCNC: 235 MG/DL (ref 65–100)
GLUCOSE SERPL-MCNC: 156 MG/DL (ref 65–100)
INR PPP: 1.2 (ref 0.9–1.1)
PHOSPHATE SERPL-MCNC: 3.9 MG/DL (ref 2.6–4.7)
POTASSIUM SERPL-SCNC: 3.6 MMOL/L (ref 3.5–5.1)
PROTHROMBIN TIME: 12.2 SEC (ref 9–11.1)
SAMPLES BEING HELD,HOLD: NORMAL
SERVICE CMNT-IMP: ABNORMAL
SERVICE CMNT-IMP: NORMAL
SODIUM SERPL-SCNC: 130 MMOL/L (ref 136–145)

## 2020-11-26 PROCEDURE — 74011250636 HC RX REV CODE- 250/636: Performed by: INTERNAL MEDICINE

## 2020-11-26 PROCEDURE — 80069 RENAL FUNCTION PANEL: CPT

## 2020-11-26 PROCEDURE — 74011250637 HC RX REV CODE- 250/637: Performed by: INTERNAL MEDICINE

## 2020-11-26 PROCEDURE — 77010033711 HC HIGH FLOW OXYGEN

## 2020-11-26 PROCEDURE — 65660000001 HC RM ICU INTERMED STEPDOWN

## 2020-11-26 PROCEDURE — 36415 COLL VENOUS BLD VENIPUNCTURE: CPT

## 2020-11-26 PROCEDURE — 74011250637 HC RX REV CODE- 250/637: Performed by: HOSPITALIST

## 2020-11-26 PROCEDURE — 74011250636 HC RX REV CODE- 250/636: Performed by: HOSPITALIST

## 2020-11-26 PROCEDURE — 94762 N-INVAS EAR/PLS OXIMTRY CONT: CPT

## 2020-11-26 PROCEDURE — 85610 PROTHROMBIN TIME: CPT

## 2020-11-26 PROCEDURE — 85379 FIBRIN DEGRADATION QUANT: CPT

## 2020-11-26 PROCEDURE — 82962 GLUCOSE BLOOD TEST: CPT

## 2020-11-26 PROCEDURE — 74011636637 HC RX REV CODE- 636/637: Performed by: INTERNAL MEDICINE

## 2020-11-26 PROCEDURE — 85384 FIBRINOGEN ACTIVITY: CPT

## 2020-11-26 RX ADMIN — GUAIFENESIN 600 MG: 600 TABLET, EXTENDED RELEASE ORAL at 09:42

## 2020-11-26 RX ADMIN — ENOXAPARIN SODIUM 40 MG: 40 INJECTION SUBCUTANEOUS at 06:59

## 2020-11-26 RX ADMIN — INSULIN LISPRO 2 UNITS: 100 INJECTION, SOLUTION INTRAVENOUS; SUBCUTANEOUS at 13:07

## 2020-11-26 RX ADMIN — Medication 10 ML: at 07:00

## 2020-11-26 RX ADMIN — ONDANSETRON 4 MG: 2 INJECTION INTRAMUSCULAR; INTRAVENOUS at 14:47

## 2020-11-26 RX ADMIN — AMLODIPINE BESYLATE 5 MG: 5 TABLET ORAL at 09:41

## 2020-11-26 RX ADMIN — BUMETANIDE 2 MG: 1 TABLET ORAL at 17:47

## 2020-11-26 RX ADMIN — ALLOPURINOL 300 MG: 300 TABLET ORAL at 09:42

## 2020-11-26 RX ADMIN — BUMETANIDE 2 MG: 1 TABLET ORAL at 06:59

## 2020-11-26 RX ADMIN — GUAIFENESIN 600 MG: 600 TABLET, EXTENDED RELEASE ORAL at 21:33

## 2020-11-26 RX ADMIN — DEXAMETHASONE 6 MG: 4 TABLET ORAL at 14:41

## 2020-11-26 RX ADMIN — ZINC SULFATE 220 MG (50 MG) CAPSULE 1 CAPSULE: CAPSULE at 09:42

## 2020-11-26 RX ADMIN — ROSUVASTATIN 20 MG: 10 TABLET, FILM COATED ORAL at 21:33

## 2020-11-26 RX ADMIN — HYDROXYCHLOROQUINE SULFATE 200 MG: 200 TABLET, FILM COATED ORAL at 09:42

## 2020-11-26 RX ADMIN — HYDROXYCHLOROQUINE SULFATE 200 MG: 200 TABLET, FILM COATED ORAL at 17:47

## 2020-11-26 RX ADMIN — CARVEDILOL 25 MG: 12.5 TABLET, FILM COATED ORAL at 17:47

## 2020-11-26 RX ADMIN — INSULIN LISPRO 2 UNITS: 100 INJECTION, SOLUTION INTRAVENOUS; SUBCUTANEOUS at 21:33

## 2020-11-26 RX ADMIN — MONTELUKAST 10 MG: 10 TABLET, FILM COATED ORAL at 21:33

## 2020-11-26 RX ADMIN — Medication 81 MG: at 09:42

## 2020-11-26 RX ADMIN — INSULIN LISPRO 2 UNITS: 100 INJECTION, SOLUTION INTRAVENOUS; SUBCUTANEOUS at 17:47

## 2020-11-26 RX ADMIN — Medication 10 ML: at 13:08

## 2020-11-26 RX ADMIN — OXYCODONE HYDROCHLORIDE AND ACETAMINOPHEN 500 MG: 500 TABLET ORAL at 09:42

## 2020-11-26 RX ADMIN — Medication 10 ML: at 21:38

## 2020-11-26 RX ADMIN — CARVEDILOL 25 MG: 12.5 TABLET, FILM COATED ORAL at 07:00

## 2020-11-26 RX ADMIN — ENOXAPARIN SODIUM 40 MG: 40 INJECTION SUBCUTANEOUS at 17:47

## 2020-11-26 RX ADMIN — CLOPIDOGREL BISULFATE 75 MG: 75 TABLET ORAL at 09:42

## 2020-11-26 RX ADMIN — OXYCODONE HYDROCHLORIDE AND ACETAMINOPHEN 500 MG: 500 TABLET ORAL at 17:48

## 2020-11-26 NOTE — PROGRESS NOTES
Nephrology Progress Note  Robert Brandon  Date of Admission : 11/18/2020    CC: Follow up for CKD and hypervolemia       Assessment and Plan     CKD IIIb:  - presumed from chronic HTN/DM2 + CMP   - labs pending   - If Cr worse, hold diuretics today      Hypervolemia:     COVID-19 PNA:  - s/p remdesivir, convalescent plasma   - on dexamethasone  - per ID and primary team     HFrEF:  - diuretics as above     HTN:  - cont present care  - no ACE/ARB     Gout:  - on allopurinol     DM2:  - per hospitalist     Obesity       Interval History:  Not examined in the room. Labs pending. BP stable   Oxygenating well. UOP 1.07 L     Current Medications: all current  Medications have been eviewed in EPIC  Review of Systems: Pertinent items are noted in HPI. Objective:  Vitals:    Vitals:    11/26/20 0033 11/26/20 0357 11/26/20 0655 11/26/20 0941   BP: 133/72 (!) 118/52 123/73 (!) 119/57   Pulse: 72 76 72 72   Resp: 14 15 18    Temp: 97.5 °F (36.4 °C) 97.6 °F (36.4 °C) 98 °F (36.7 °C)    SpO2: 97% 98% 99%    Weight:  109.6 kg (241 lb 10 oz)     Height:         Intake and Output:  No intake/output data recorded. 11/24 1901 - 11/26 0700  In: 5 [P.O.:720]  Out: 1075 [Urine:1075]    Physical Examination:  Not examined in the room due to COVID-19 infection    Pt intubated     No  General: stable  Resp:  Stable oxygenation, on high flow  CV:  RRR on monitor, + edema  Neurologic:  Non focal  :  No vieyra    []    High complexity decision making was performed  []    Patient is at high-risk of decompensation with multiple organ involvement    Lab Data Personally Reviewed: I have reviewed all the pertinent labs, microbiology data and radiology studies during assessment.     Recent Labs     11/26/20  0106 11/25/20  0241 11/24/20  0319   NA  --  130* 133*   K  --  3.7 3.6   CL  --  93* 95*   CO2  --  27 26   GLU  --  209* 167*   BUN  --  86* 75*   CREA  --  2.09* 1.83*   CA  --  9.8 9.9   ALB  --  3.0* 2.9*   ALT  --  39 43 INR 1.2* 1.2* 1.2*     No results for input(s): WBC, HGB, HCT, PLT, HGBEXT, HCTEXT, PLTEXT, HGBEXT, HCTEXT, PLTEXT in the last 72 hours. No results found for: SDES  Lab Results   Component Value Date/Time    Culture result: NO GROWTH 5 DAYS 11/21/2020 10:11 AM    Culture result: MIXED UROGENITAL TAZ ISOLATED 08/09/2018 02:00 PM    Culture result: NO GROWTH 1 DAY 03/01/2018 09:50 PM     Recent Results (from the past 24 hour(s))   GLUCOSE, POC    Collection Time: 11/25/20 12:19 PM   Result Value Ref Range    Glucose (POC) 136 (H) 65 - 100 mg/dL    Performed by Willow Mensah, POC    Collection Time: 11/25/20  4:59 PM   Result Value Ref Range    Glucose (POC) 144 (H) 65 - 100 mg/dL    Performed by Willow Mensah, POC    Collection Time: 11/25/20  9:45 PM   Result Value Ref Range    Glucose (POC) 214 (H) 65 - 100 mg/dL    Performed by Mj Gross    D DIMER    Collection Time: 11/26/20  1:06 AM   Result Value Ref Range    D-dimer 0.84 (H) 0.00 - 0.65 mg/L FEU   PROTHROMBIN TIME + INR    Collection Time: 11/26/20  1:06 AM   Result Value Ref Range    INR 1.2 (H) 0.9 - 1.1      Prothrombin time 12.2 (H) 9.0 - 11.1 sec   FIBRINOGEN    Collection Time: 11/26/20  1:06 AM   Result Value Ref Range    Fibrinogen 636 (H) 200 - 475 mg/dL   GLUCOSE, POC    Collection Time: 11/26/20  9:04 AM   Result Value Ref Range    Glucose (POC) 110 (H) 65 - 100 mg/dL    Performed by Johnie Redd MD  [address]  Phone - [phone]   Fax - [phone]  [email or web address]

## 2020-11-26 NOTE — PROGRESS NOTES
[facility]  Hospitalist Group                                                                                          Hospitalist Progress Note  Kristen Christianson MD  Answering service: [phone] from in house phone        Date of Service:  2020  NAME:  Felictia Copeland  :  1955  MRN:  [id number]      Admission Summary:   72 y.o. female who presents with cough and shortness of breath since  patient was tested positive Covid at an outside testing center and was taking antibiotics nebulizer and Medrol pack did not improve patient said that cough is increasing in frequency and thickening whitish in color no blood in it patient  also have cold-like symptoms and admitted at [facility] patient has chronic systolic heart failure but she takes all her medications regularly does not seem to be in exacerbation. Currently patient was saturating well in room air in the ED patient was admitted for further management    Interval history / Subjective: Follow up COVID 19. Patient seen and examined. Oxygen weaned to 13 liters, . No complaints. Afebrile  sodium is 130. Creatinine improving      Assessment & Plan:     COVID-19 pneumonia:  Acute hypoxic respiratory failure (89% on RA + respiratory distress, tachypnea): improving  -ID consulted for remdesivir- initiated   -Dexamethasone 6 mg once daily  -Vitamin C, zinc  -tylenol for fevers  -s/p convalescent plasma   -recurrent fevers with elevated procalcitonin, continue levaquin x 5 days.  Cultures NGTD   -Nephrology consulted, appreciate recommendations, transitioned to bumex oral   -Continue high flow, wean as able     Chronic systolic heart failure NYHA II  -Continue bumex   -Continue home carvedilol  -Continue home Plavix, aspirin, rosuvastatin     VICKI on CKD: improving   -Monitor closely on diurectics     History of rheumatoid arthritis:  -hold sulfasalazine, continue Plaquenil    History of depression:  -Continue home Cymbalta    Gout  -Hold colchicine, continue allopurinol      Diabetes type 2 with hyperglycemia  -continue SSI, monitor closely with steroids  -Ha1c 6.5     Hypertension:  -continue carvedilol, amlodipine    Obesity BMI 39.47    Code status: Full  DVT prophylaxis: Lovenox per Covid protocol    Care Plan discussed with: Patient/Family  Anticipated Disposition: Home w/Family vs rehab   Anticipated Discharge: Greater than 48 hours     Hospital Problems  Date Reviewed: 11/18/2020          Codes Class Noted POA    SOB (shortness of breath) ICD-10-CM: R06.02  ICD-9-CM: 786.05  11/18/2020 Unknown        * (Principal) Chronic systolic heart failure ([facility] [address] ICD-10-CM: I50.22  ICD-9-CM: 428.22  7/2/2014 Yes                Review of Systems:   Negative unless stated above      Vital Signs:    Last 24hrs VS reviewed since prior progress note. Most recent are:  Visit Vitals  /63   Pulse 74   Temp 97.5 °F (36.4 °C)   Resp 16   Ht 5' 7\" (1.702 m)   Wt 109.6 kg (241 lb 10 oz)   SpO2 100%   BMI 37.84 kg/m²         Intake/Output Summary (Last 24 hours) at 11/26/2020 1519  Last data filed at 11/26/2020 1134  Gross per 24 hour   Intake 480 ml   Output 1325 ml   Net -845 ml        Physical Examination:     I had a face to face encounter with this patient and independently examined them on 11/26/2020 as outlined below:          Constitutional:  no acute distress, cooperative, pleasant    ENT:  Oral mucosa moist, oropharynx benign. Resp:  , no accessory muscle use, no audible wheezing   CV:  Regular rhythm, normal rate, no murmurs, gallops, rubs    GI:  Soft, non distended, non tender.  normoactive bowel sounds, no hepatosplenomegaly     Musculoskeletal:  No edema, warm, 2+ pulses throughout    Neurologic:  Moves all extremities            Data Review:    Review and/or order of clinical lab test  Review and/or order of tests in the radiology section of CPT  Review and/or order of tests in the medicine section of CPT      Labs:     No results for input(s): WBC, HGB, HCT, PLT, HGBEXT, HCTEXT, PLTEXT, HGBEXT, HCTEXT, PLTEXT in the last 72 hours. Recent Labs     11/26/20  1051 11/25/20 0241 11/24/20 0319   * 130* 133*   K 3.6 3.7 3.6   CL 91* 93* 95*   CO2 28 27 26   BUN 76* 86* 75*   CREA 1.84* 2.09* 1.83*   * 209* 167*   CA 9.7 9.8 9.9   PHOS 3.9  --   --      Recent Labs     11/26/20  1051 11/25/20 0241 11/24/20 0319   ALT  --  39 43   AP  --  71 81   TBILI  --  0.4 0.3   TP  --  8.3* 8.6*   ALB 3.3* 3.0* 2.9*   GLOB  --  5.3* 5.7*     Recent Labs     11/26/20  0106 11/25/20 0241 11/24/20 0319   INR 1.2* 1.2* 1.2*   PTP 12.2* 12.4* 12.5*      Recent Labs     11/24/20  0320   FERR 661*      No results found for: FOL, RBCF   No results for input(s): PH, PCO2, PO2 in the last 72 hours. No results for input(s): CPK, CKNDX, TROIQ in the last 72 hours.     No lab exists for component: CPKMB  No results found for: CHOL, CHOLX, CHLST, CHOLV, HDL, HDLP, LDL, LDLC, DLDLP, TGLX, TRIGL, TRIGP, CHHD, CHHDX  Lab Results   Component Value Date/Time    Glucose (POC) 178 (H) 11/26/2020 12:17 PM    Glucose (POC) 110 (H) 11/26/2020 09:04 AM    Glucose (POC) 214 (H) 11/25/2020 09:45 PM    Glucose (POC) 144 (H) 11/25/2020 04:59 PM    Glucose (POC) 136 (H) 11/25/2020 12:19 PM     Lab Results   Component Value Date/Time    Color YELLOW/STRAW 09/15/2019 09:47 PM    Appearance CLEAR 09/15/2019 09:47 PM    Specific gravity 1.013 09/15/2019 09:47 PM    pH (UA) 6.0 09/15/2019 09:47 PM    Protein NEGATIVE  09/15/2019 09:47 PM    Glucose NEGATIVE  09/15/2019 09:47 PM    Ketone NEGATIVE  09/15/2019 09:47 PM    Bilirubin NEGATIVE  09/15/2019 09:47 PM    Urobilinogen 0.2 09/15/2019 09:47 PM    Nitrites NEGATIVE  09/15/2019 09:47 PM    Leukocyte Esterase NEGATIVE  09/15/2019 09:47 PM    Epithelial cells FEW 09/15/2019 09:47 PM    Bacteria NEGATIVE  09/15/2019 09:47 PM    WBC 0-4 09/15/2019 09:47 PM    RBC 0-5 09/15/2019 09:47 PM         Medications Reviewed:     Current Facility-Administered Medications   Medication Dose Route Frequency    bumetanide (BUMEX) tablet 2 mg  2 mg Oral Q12H    oxymetazoline (AFRIN) 0.05 % nasal spray 2 Spray  2 Spray Both Nostrils BID PRN    albuterol (PROVENTIL HFA, VENTOLIN HFA, PROAIR HFA) inhaler 1 Puff  1 Puff Inhalation Q4H PRN    sodium chloride (OCEAN) 0.65 % nasal squeeze bottle 2 Spray  2 Spray Both Nostrils Q2H PRN    senna-docusate (PERICOLACE) 8.6-50 mg per tablet 1 Tab  1 Tab Oral DAILY    guaiFENesin ER (MUCINEX) tablet 600 mg  600 mg Oral Q12H    loperamide (IMODIUM) capsule 4 mg  4 mg Oral Q4H PRN    0.9% sodium chloride infusion 250 mL  250 mL IntraVENous PRN    glucose chewable tablet 16 g  4 Tab Oral PRN    glucagon (GLUCAGEN) injection 1 mg  1 mg IntraMUSCular PRN    dextrose 10% infusion 0-250 mL  0-250 mL IntraVENous PRN    insulin lispro (HUMALOG) injection   SubCUTAneous AC&HS    amLODIPine (NORVASC) tablet 5 mg  5 mg Oral DAILY    enoxaparin (LOVENOX) injection 40 mg  40 mg SubCUTAneous Q12H    ascorbic acid (vitamin C) (VITAMIN C) tablet 500 mg  500 mg Oral BID    zinc sulfate (ZINCATE) 220 (50) mg capsule 1 Cap  1 Cap Oral DAILY    [Held by provider] sulfaSALAzine (AZULFIDINE) tablet 500 mg  500 mg Oral BID WITH MEALS    montelukast (SINGULAIR) tablet 10 mg  10 mg Oral QHS    dexAMETHasone (DECADRON) tablet 6 mg  6 mg Oral Q24H    allopurinoL (ZYLOPRIM) tablet 300 mg  300 mg Oral DAILY    aspirin delayed-release tablet 81 mg  81 mg Oral DAILY    carvediloL (COREG) tablet 25 mg  25 mg Oral BID WITH MEALS    clopidogreL (PLAVIX) tablet 75 mg  75 mg Oral DAILY    DULoxetine (CYMBALTA) capsule 60 mg  60 mg Oral DAILY    hydrOXYchloroQUINE (PLAQUENIL) tablet 200 mg  200 mg Oral BID    rosuvastatin (CRESTOR) tablet 20 mg  20 mg Oral QHS    acetaminophen (TYLENOL) tablet 650 mg  650 mg Oral Q6H PRN    Or    acetaminophen (TYLENOL) suppository 650 mg  650 mg Rectal Q6H PRN    guaiFENesin-dextromethorphan (ROBITUSSIN DM) 100-10 mg/5 mL syrup 5 mL  5 mL Oral Q4H PRN    sodium chloride (NS) flush 5-40 mL  5-40 mL IntraVENous Q8H    sodium chloride (NS) flush 5-40 mL  5-40 mL IntraVENous PRN    polyethylene glycol (MIRALAX) packet 17 g  17 g Oral DAILY PRN    promethazine (PHENERGAN) tablet 12.5 mg  12.5 mg Oral Q6H PRN    Or    ondansetron (ZOFRAN) injection 4 mg  4 mg IntraVENous Q6H PRN     ______________________________________________________________________  EXPECTED LENGTH OF STAY: 4d 4h  ACTUAL LENGTH OF STAY:          8                 Kristen Christianson MD

## 2020-11-26 NOTE — PROGRESS NOTES
Problem: Gas Exchange - Impaired  Goal: Absence of hypoxia  Outcome: Progressing Towards Goal  Goal: Promote optimal lung function  Outcome: Progressing Towards Goal     Problem:  Body Temperature -  Risk of, Imbalanced  Goal: Will regain or maintain usual level of consciousness  Outcome: Progressing Towards Goal  Goal: Complications related to the disease process, condition or treatment will be avoided or minimized  Outcome: Progressing Towards Goal

## 2020-11-26 NOTE — PROGRESS NOTES
Physician Progress Note      Sheba Kay  CSN #:                  [id number]  :                       1955  ADMIT DATE:       2020 1:29 PM  [address] [place] DATE:  RESPONDING  PROVIDER #:        MARIAJOSE Moreno MD          QUERY TEXT:    Patient admitted with Covid 19 Pneumonia. Noted documentation of CKD IIIb  by Nephrology consult 20 and VICKI on CKD 2 by attending Md PN 20. If possible, please document in progress notes and discharge summary if you are evaluating and /or treating any of the following: The medical record reflects the following:  Risk Factors: ***  Clinical Indicators:  20 Attending Md PN: VICKI on CKD 2: Improved  20 Nephrology consult PN: CKD IIIb:presumed from chronic HTN/DM2 + CMP and ongoing need for diuretics. 2020 13:43 BUN  35 Creatinine: 1.60 (H) GFR est AA: 39 (L)  2020 00:45 BUN: 34 (H) Creatinine: 1.43 (H) GFR est AA: 45 (L)  2020 03:11 BUN: 36 (H) Creatinine: 1.32 (H)  GFR est AA: 49 (L)  2020 03:19BUN: 75 (H) Creatinine: 1.83 (H) GFR est AA: 34 (L)      Treatment: admit, Hospitalist consult, Nephrology consult, bumex 3mg iv bid, daily labs, strict I/O's    Cheryle J Rilee RN,BSN  Clinical Documentation Integrity  [phone]  Options provided:  -- CKD IIIb   confirmed and VICKI on CKD 2 ruled out  -- VICKI on CKD 2 confirmed and CKD IIIb   ruled out  -- Other - I will add my own diagnosis  -- Disagree - Not applicable / Not valid  -- Disagree - Clinically unable to determine / Unknown  -- Refer to Clinical Documentation Reviewer    PROVIDER RESPONSE TEXT:    After study, CKD IIIb confirmed and VICKI on CKD 2 ruled out.     Query created by: Cindi Foy on 2020 3:26 PM      Electronically signed by:  MARIAJOSE Moreno MD 2020 4:05 PM

## 2020-11-26 NOTE — PROGRESS NOTES
Verbal shift change report given to Venita CANTOR (oncoming nurse) by  Serge Kapadia (offgoing nurse). Report included the following information SBAR, ED Summary, Recent Results, and Cardiac Rhythm NSR .        Problem: Gas Exchange - Impaired  Goal: Absence of hypoxia  Outcome: Progressing Towards Goal  13L midflow, o2 remains WDL     Problem: Isolation Precautions - Risk of Spread of Infection  Goal: Prevent transmission of infectious organism to others  Outcome: Progressing Towards Goal  Ppe worn by all staff

## 2020-11-26 NOTE — PROGRESS NOTES
Bedside and Verbal shift change report given to [facility] (oncoming nurse) by Carolyn Lora (offgoing nurse). Report included the following information SBAR, Kardex, Intake/Output, MAR, Accordion and Recent Results. Problem: Airway Clearance - Ineffective  Goal: Achieve or maintain patent airway  Outcome: Progressing Towards Goal     Problem: Gas Exchange - Impaired  Goal: Absence of hypoxia  Outcome: Progressing Towards Goal  Goal: Promote optimal lung function  Outcome: Progressing Towards Goal     Problem: Breathing Pattern - Ineffective  Goal: Ability to achieve and maintain a regular respiratory rate  Outcome: Progressing Towards Goal     Problem:  Body Temperature -  Risk of, Imbalanced  Goal: Ability to maintain a body temperature within defined limits  Outcome: Progressing Towards Goal  Goal: Will regain or maintain usual level of consciousness  Outcome: Progressing Towards Goal  Goal: Complications related to the disease process, condition or treatment will be avoided or minimized  Outcome: Progressing Towards Goal     Problem: Isolation Precautions - Risk of Spread of Infection  Goal: Prevent transmission of infectious organism to others  Outcome: Progressing Towards Goal     Problem: Nutrition Deficits  Goal: Optimize nutrtional status  Outcome: Progressing Towards Goal     Problem: Risk for Fluid Volume Deficit  Goal: Maintain normal heart rhythm  Outcome: Progressing Towards Goal  Goal: Maintain absence of muscle cramping  Outcome: Progressing Towards Goal     Problem: Loneliness or Risk for Loneliness  Goal: Demonstrate positive use of time alone when socialization is not possible  Outcome: Progressing Towards Goal     Problem: Fatigue  Goal: Verbalize increase energy and improved vitality  Outcome: Progressing Towards Goal     Problem: Patient Education: Go to Patient Education Activity  Goal: Patient/Family Education  Outcome: Progressing Towards Goal     Problem: Falls - Risk of  Goal: *Absence of Falls  Description: Document Yue Ashby Fall Risk and appropriate interventions in the flowsheet. Outcome: Progressing Towards Goal  Note: Fall Risk Interventions:  Mobility Interventions: Assess mobility with egress test, Communicate number of staff needed for ambulation/transfer, Patient to call before getting OOB, Strengthening exercises (ROM-active/passive), Utilize gait belt for transfers/ambulation         Medication Interventions: Evaluate medications/consider consulting pharmacy, Patient to call before getting OOB, Teach patient to arise slowly, Utilize gait belt for transfers/ambulation    Elimination Interventions: Call light in reach, Patient to call for help with toileting needs, Stay With Me (per policy), Toilet paper/wipes in reach, Toileting schedule/hourly rounds    History of Falls Interventions: Room close to nurse's station, Utilize gait belt for transfer/ambulation         Problem: Patient Education: Go to Patient Education Activity  Goal: Patient/Family Education  Outcome: Progressing Towards Goal     Problem: Diabetes Self-Management  Goal: *Disease process and treatment process  Description: Define diabetes and identify own type of diabetes; list 3 options for treating diabetes. Outcome: Progressing Towards Goal  Goal: *Incorporating nutritional management into lifestyle  Description: Describe effect of type, amount and timing of food on blood glucose; list 3 methods for planning meals. Outcome: Progressing Towards Goal  Goal: *Incorporating physical activity into lifestyle  Description: State effect of exercise on blood glucose levels. Outcome: Progressing Towards Goal  Goal: *Developing strategies to promote health/change behavior  Description: Define the ABC's of diabetes; identify appropriate screenings, schedule and personal plan for screenings.   Outcome: Progressing Towards Goal  Goal: *Using medications safely  Description: State effect of diabetes medications on diabetes; name diabetes medication taking, action and side effects. Outcome: Progressing Towards Goal  Goal: *Monitoring blood glucose, interpreting and using results  Description: Identify recommended blood glucose targets  and personal targets. Outcome: Progressing Towards Goal  Goal: *Prevention, detection, treatment of acute complications  Description: List symptoms of hyper- and hypoglycemia; describe how to treat low blood sugar and actions for lowering  high blood glucose level. Outcome: Progressing Towards Goal  Goal: *Prevention, detection and treatment of chronic complications  Description: Define the natural course of diabetes and describe the relationship of blood glucose levels to long term complications of diabetes.   Outcome: Progressing Towards Goal  Goal: *Developing strategies to address psychosocial issues  Description: Describe feelings about living with diabetes; identify support needed and support network  Outcome: Progressing Towards Goal  Goal: *Sick day guidelines  Outcome: Progressing Towards Goal     Problem: Patient Education: Go to Patient Education Activity  Goal: Patient/Family Education  Outcome: Progressing Towards Goal     Problem: Heart Failure: Discharge Outcomes  Goal: *Demonstrates ability to perform prescribed activity without shortness of breath or discomfort  Outcome: Progressing Towards Goal  Goal: *Left ventricular function assessment completed prior to or during stay, or planned for post-discharge  Outcome: Progressing Towards Goal  Goal: *ACEI prescribed if LVEF less than 40% and no contraindications or ARB prescribed  Outcome: Progressing Towards Goal  Goal: *Verbalizes understanding and describes prescribed diet  Outcome: Progressing Towards Goal  Goal: *Verbalizes understanding/describes prescribed medications  Outcome: Progressing Towards Goal  Goal: *Describes available resources and support systems  Description: (eg: Home Health, Palliative Care, Advanced Medical Directive)  Outcome: Progressing Towards Goal  Goal: *Understands and describes signs and symptoms to report to providers(Stroke Metric)  Outcome: Progressing Towards Goal  Goal: *Describes importance of continuing daily weights and changes to report to physician  Outcome: Progressing Towards Goal     Problem: Risk for Spread of Infection  Goal: Prevent transmission of infectious organism to others  Description: Prevent the transmission of infectious organisms to other patients, staff members, and visitors.   Outcome: Progressing Towards Goal     Problem: Patient Education:  Go to Education Activity  Goal: Patient/Family Education  Outcome: Progressing Towards Goal

## 2020-11-27 LAB
ALBUMIN SERPL-MCNC: 3.6 G/DL (ref 3.5–5)
ANION GAP SERPL CALC-SCNC: 13 MMOL/L (ref 5–15)
BUN SERPL-MCNC: 77 MG/DL (ref 6–20)
BUN/CREAT SERPL: 42 (ref 12–20)
CALCIUM SERPL-MCNC: 9.4 MG/DL (ref 8.5–10.1)
CHLORIDE SERPL-SCNC: 93 MMOL/L (ref 97–108)
CO2 SERPL-SCNC: 26 MMOL/L (ref 21–32)
CREAT SERPL-MCNC: 1.85 MG/DL (ref 0.55–1.02)
D DIMER PPP FEU-MCNC: 0.56 MG/L FEU (ref 0–0.65)
FIBRINOGEN PPP-MCNC: 567 MG/DL (ref 200–475)
GLUCOSE BLD STRIP.AUTO-MCNC: 128 MG/DL (ref 65–100)
GLUCOSE BLD STRIP.AUTO-MCNC: 160 MG/DL (ref 65–100)
GLUCOSE BLD STRIP.AUTO-MCNC: 185 MG/DL (ref 65–100)
GLUCOSE BLD STRIP.AUTO-MCNC: 309 MG/DL (ref 65–100)
GLUCOSE SERPL-MCNC: 184 MG/DL (ref 65–100)
INR PPP: 1.1 (ref 0.9–1.1)
PHOSPHATE SERPL-MCNC: 3.7 MG/DL (ref 2.6–4.7)
POTASSIUM SERPL-SCNC: 3.8 MMOL/L (ref 3.5–5.1)
PROTHROMBIN TIME: 11.7 SEC (ref 9–11.1)
SERVICE CMNT-IMP: ABNORMAL
SODIUM SERPL-SCNC: 132 MMOL/L (ref 136–145)

## 2020-11-27 PROCEDURE — 97116 GAIT TRAINING THERAPY: CPT

## 2020-11-27 PROCEDURE — 36415 COLL VENOUS BLD VENIPUNCTURE: CPT

## 2020-11-27 PROCEDURE — 85379 FIBRIN DEGRADATION QUANT: CPT

## 2020-11-27 PROCEDURE — 74011250637 HC RX REV CODE- 250/637: Performed by: INTERNAL MEDICINE

## 2020-11-27 PROCEDURE — 74011250637 HC RX REV CODE- 250/637: Performed by: HOSPITALIST

## 2020-11-27 PROCEDURE — 82962 GLUCOSE BLOOD TEST: CPT

## 2020-11-27 PROCEDURE — 65660000001 HC RM ICU INTERMED STEPDOWN

## 2020-11-27 PROCEDURE — 77010033711 HC HIGH FLOW OXYGEN

## 2020-11-27 PROCEDURE — 80069 RENAL FUNCTION PANEL: CPT

## 2020-11-27 PROCEDURE — 94762 N-INVAS EAR/PLS OXIMTRY CONT: CPT

## 2020-11-27 PROCEDURE — 74011636637 HC RX REV CODE- 636/637: Performed by: INTERNAL MEDICINE

## 2020-11-27 PROCEDURE — 85384 FIBRINOGEN ACTIVITY: CPT

## 2020-11-27 PROCEDURE — 85610 PROTHROMBIN TIME: CPT

## 2020-11-27 PROCEDURE — 74011250636 HC RX REV CODE- 250/636: Performed by: INTERNAL MEDICINE

## 2020-11-27 RX ORDER — BUMETANIDE 1 MG/1
2 TABLET ORAL DAILY
Status: DISCONTINUED | OUTPATIENT
Start: 2020-11-28 | End: 2020-11-30 | Stop reason: HOSPADM

## 2020-11-27 RX ADMIN — OXYCODONE HYDROCHLORIDE AND ACETAMINOPHEN 500 MG: 500 TABLET ORAL at 17:24

## 2020-11-27 RX ADMIN — GUAIFENESIN 600 MG: 600 TABLET, EXTENDED RELEASE ORAL at 21:37

## 2020-11-27 RX ADMIN — ALLOPURINOL 300 MG: 300 TABLET ORAL at 09:53

## 2020-11-27 RX ADMIN — HYDROXYCHLOROQUINE SULFATE 200 MG: 200 TABLET, FILM COATED ORAL at 09:52

## 2020-11-27 RX ADMIN — DEXAMETHASONE 6 MG: 4 TABLET ORAL at 14:43

## 2020-11-27 RX ADMIN — CARVEDILOL 25 MG: 12.5 TABLET, FILM COATED ORAL at 17:24

## 2020-11-27 RX ADMIN — Medication 10 ML: at 07:17

## 2020-11-27 RX ADMIN — INSULIN LISPRO 2 UNITS: 100 INJECTION, SOLUTION INTRAVENOUS; SUBCUTANEOUS at 07:30

## 2020-11-27 RX ADMIN — Medication 10 ML: at 14:43

## 2020-11-27 RX ADMIN — ENOXAPARIN SODIUM 40 MG: 40 INJECTION SUBCUTANEOUS at 07:16

## 2020-11-27 RX ADMIN — ROSUVASTATIN 20 MG: 10 TABLET, FILM COATED ORAL at 21:37

## 2020-11-27 RX ADMIN — CLOPIDOGREL BISULFATE 75 MG: 75 TABLET ORAL at 09:52

## 2020-11-27 RX ADMIN — MONTELUKAST 10 MG: 10 TABLET, FILM COATED ORAL at 21:37

## 2020-11-27 RX ADMIN — Medication 81 MG: at 09:52

## 2020-11-27 RX ADMIN — INSULIN LISPRO 4 UNITS: 100 INJECTION, SOLUTION INTRAVENOUS; SUBCUTANEOUS at 21:37

## 2020-11-27 RX ADMIN — GUAIFENESIN 600 MG: 600 TABLET, EXTENDED RELEASE ORAL at 09:52

## 2020-11-27 RX ADMIN — OXYCODONE HYDROCHLORIDE AND ACETAMINOPHEN 500 MG: 500 TABLET ORAL at 09:52

## 2020-11-27 RX ADMIN — Medication 10 ML: at 21:38

## 2020-11-27 RX ADMIN — INSULIN LISPRO 2 UNITS: 100 INJECTION, SOLUTION INTRAVENOUS; SUBCUTANEOUS at 11:30

## 2020-11-27 RX ADMIN — AMLODIPINE BESYLATE 5 MG: 5 TABLET ORAL at 09:53

## 2020-11-27 RX ADMIN — BUMETANIDE 2 MG: 1 TABLET ORAL at 07:16

## 2020-11-27 RX ADMIN — ZINC SULFATE 220 MG (50 MG) CAPSULE 1 CAPSULE: CAPSULE at 09:52

## 2020-11-27 RX ADMIN — HYDROXYCHLOROQUINE SULFATE 200 MG: 200 TABLET, FILM COATED ORAL at 17:24

## 2020-11-27 RX ADMIN — CARVEDILOL 25 MG: 12.5 TABLET, FILM COATED ORAL at 07:16

## 2020-11-27 RX ADMIN — ENOXAPARIN SODIUM 40 MG: 40 INJECTION SUBCUTANEOUS at 17:23

## 2020-11-27 NOTE — PROGRESS NOTES
Verbal shift change report given to Venita CANTOR (oncoming nurse) by Hasmukh More (offgoing nurse). Report included the following information SBAR, ED Summary, Intake/Output, Med Rec Status, and Cardiac Rhythm NSR .      Problem: Gas Exchange - Impaired  Goal: Absence of hypoxia  Outcome: Progressing Towards Goal     Problem: Breathing Pattern - Ineffective  Goal: Ability to achieve and maintain a regular respiratory rate  Outcome: Progressing Towards Goal     Problem: Isolation Precautions - Risk of Spread of Infection  Goal: Prevent transmission of infectious organism to others  Outcome: Progressing Towards Goal

## 2020-11-27 NOTE — PROGRESS NOTES
Bedside and Verbal shift change report given to Trav Ngo (oncoming nurse) by Zafar Fisher (offgoing nurse). Report included the following information SBAR, Kardex, Intake/Output, MAR, Accordion and Recent Results. Problem: Airway Clearance - Ineffective  Goal: Achieve or maintain patent airway  Outcome: Progressing Towards Goal     Problem: Gas Exchange - Impaired  Goal: Absence of hypoxia  Outcome: Progressing Towards Goal  Goal: Promote optimal lung function  Outcome: Progressing Towards Goal     Problem: Breathing Pattern - Ineffective  Goal: Ability to achieve and maintain a regular respiratory rate  Outcome: Progressing Towards Goal     Problem:  Body Temperature -  Risk of, Imbalanced  Goal: Ability to maintain a body temperature within defined limits  Outcome: Progressing Towards Goal  Goal: Will regain or maintain usual level of consciousness  Outcome: Progressing Towards Goal  Goal: Complications related to the disease process, condition or treatment will be avoided or minimized  Outcome: Progressing Towards Goal     Problem: Isolation Precautions - Risk of Spread of Infection  Goal: Prevent transmission of infectious organism to others  Outcome: Progressing Towards Goal     Problem: Nutrition Deficits  Goal: Optimize nutrtional status  Outcome: Progressing Towards Goal     Problem: Risk for Fluid Volume Deficit  Goal: Maintain normal heart rhythm  Outcome: Progressing Towards Goal  Goal: Maintain absence of muscle cramping  Outcome: Progressing Towards Goal  Goal: Maintain normal serum potassium, sodium, calcium, phosphorus, and pH  Outcome: Progressing Towards Goal     Problem: Loneliness or Risk for Loneliness  Goal: Demonstrate positive use of time alone when socialization is not possible  Outcome: Progressing Towards Goal     Problem: Fatigue  Goal: Verbalize increase energy and improved vitality  Outcome: Progressing Towards Goal     Problem: Patient Education: Go to Patient Education Activity  Goal: Patient/Family Education  Outcome: Progressing Towards Goal     Problem: Falls - Risk of  Goal: *Absence of Falls  Description: Document Aman Brumfield Fall Risk and appropriate interventions in the flowsheet. Outcome: Progressing Towards Goal  Note: Fall Risk Interventions:  Mobility Interventions: Assess mobility with egress test, Communicate number of staff needed for ambulation/transfer, Patient to call before getting OOB, Strengthening exercises (ROM-active/passive), Utilize gait belt for transfers/ambulation         Medication Interventions: Evaluate medications/consider consulting pharmacy, Patient to call before getting OOB, Teach patient to arise slowly, Utilize gait belt for transfers/ambulation    Elimination Interventions: Call light in reach, Patient to call for help with toileting needs, Stay With Me (per policy), Toilet paper/wipes in reach, Toileting schedule/hourly rounds    History of Falls Interventions: Room close to nurse's station, Utilize gait belt for transfer/ambulation         Problem: Patient Education: Go to Patient Education Activity  Goal: Patient/Family Education  Outcome: Progressing Towards Goal     Problem: Diabetes Self-Management  Goal: *Disease process and treatment process  Description: Define diabetes and identify own type of diabetes; list 3 options for treating diabetes. Outcome: Progressing Towards Goal  Goal: *Incorporating nutritional management into lifestyle  Description: Describe effect of type, amount and timing of food on blood glucose; list 3 methods for planning meals. Outcome: Progressing Towards Goal  Goal: *Incorporating physical activity into lifestyle  Description: State effect of exercise on blood glucose levels. Outcome: Progressing Towards Goal  Goal: *Developing strategies to promote health/change behavior  Description: Define the ABC's of diabetes; identify appropriate screenings, schedule and personal plan for screenings.   Outcome: Progressing Towards Goal  Goal: *Using medications safely  Description: State effect of diabetes medications on diabetes; name diabetes medication taking, action and side effects. Outcome: Progressing Towards Goal  Goal: *Monitoring blood glucose, interpreting and using results  Description: Identify recommended blood glucose targets  and personal targets. Outcome: Progressing Towards Goal  Goal: *Prevention, detection, treatment of acute complications  Description: List symptoms of hyper- and hypoglycemia; describe how to treat low blood sugar and actions for lowering  high blood glucose level. Outcome: Progressing Towards Goal  Goal: *Prevention, detection and treatment of chronic complications  Description: Define the natural course of diabetes and describe the relationship of blood glucose levels to long term complications of diabetes.   Outcome: Progressing Towards Goal  Goal: *Developing strategies to address psychosocial issues  Description: Describe feelings about living with diabetes; identify support needed and support network  Outcome: Progressing Towards Goal  Goal: *Insulin pump training  Outcome: Progressing Towards Goal  Goal: *Sick day guidelines  Outcome: Progressing Towards Goal  Goal: *Patient Specific Goal (EDIT GOAL, INSERT TEXT)  Outcome: Progressing Towards Goal     Problem: Patient Education: Go to Patient Education Activity  Goal: Patient/Family Education  Outcome: Progressing Towards Goal     Problem: Heart Failure: Discharge Outcomes  Goal: *Demonstrates ability to perform prescribed activity without shortness of breath or discomfort  Outcome: Progressing Towards Goal  Goal: *Left ventricular function assessment completed prior to or during stay, or planned for post-discharge  Outcome: Progressing Towards Goal  Goal: *ACEI prescribed if LVEF less than 40% and no contraindications or ARB prescribed  Outcome: Progressing Towards Goal  Goal: *Verbalizes understanding and describes prescribed diet  Outcome: Progressing Towards Goal  Goal: *Verbalizes understanding/describes prescribed medications  Outcome: Progressing Towards Goal  Goal: *Describes available resources and support systems  Description: (eg: Home Health, Palliative Care, Advanced Medical Directive)  Outcome: Progressing Towards Goal  Goal: *Describes smoking cessation resources  Outcome: Progressing Towards Goal  Goal: *Understands and describes signs and symptoms to report to providers(Stroke Metric)  Outcome: Progressing Towards Goal  Goal: *Describes/verbalizes understanding of follow-up/return appt  Description: (eg: to physicians, diabetes treatment coordinator, and other resources  Outcome: Progressing Towards Goal  Goal: *Describes importance of continuing daily weights and changes to report to physician  Outcome: Progressing Towards Goal     Problem: Risk for Spread of Infection  Goal: Prevent transmission of infectious organism to others  Description: Prevent the transmission of infectious organisms to other patients, staff members, and visitors.   Outcome: Progressing Towards Goal     Problem: Patient Education:  Go to Education Activity  Goal: Patient/Family Education  Outcome: Progressing Towards Goal

## 2020-11-27 NOTE — PROGRESS NOTES
Problem: Mobility Impaired (Adult and Pediatric)  Goal: *Acute Goals and Plan of Care (Insert Text)  Description: FUNCTIONAL STATUS PRIOR TO ADMISSION: Patient was independent and active without use of DME.    HOME SUPPORT PRIOR TO ADMISSION: The patient lived with  but did not require assist. Pt's  is currently at the [place] with plans to d/c home on O2. Pt states daughter may be able to assist.    Physical Therapy Goals  Initiated 11/24/2020  1. Patient will move from supine to sit and sit to supine , scoot up and down, and roll side to side in bed with modified independence within 7 day(s). 2.  Patient will transfer from bed to chair and chair to bed with modified independence using the least restrictive device within 7 day(s). 3.  Patient will perform sit to stand with modified independence within 7 day(s). 4.  Patient will ambulate with modified independence for 300 feet with the least restrictive device within 7 day(s). 5.  Patient will ascend/descend 10 stairs with one handrail(s) with modified independence within 7 day(s). Outcome: Progressing Towards Goal   PHYSICAL THERAPY TREATMENT  Patient: Francisco Botello (25 y.o. female)  Date: 11/27/2020  Diagnosis: SOB (shortness of breath) [H16.56]   Chronic systolic heart failure (HCC)       Precautions: (droplet plus)  Chart, physical therapy assessment, plan of care and goals were reviewed. ASSESSMENT  Patient continues with skilled PT services and is progressing towards goals. Received patient sitting up in the chair on 9L MidFlow NCO2 (down from 15L HiFlow the previous session 2 days ago). SpO2 maintained in the mid to upper 90's, tachycardic (up to 109) with standing therapy activity. Patient ambulates with the RW with SBA for line management. Gait with the walker is steady. She is mildly unsteady without the walker relying on intermittent support of the bed to walk to the window though with no overt LOB.   Reviewed her fall risk with lines when up in the room w/ pt demonstrating improved line management. Hopeful that patient will continue to progress allowing her to return home with HHPT and caregiver support (other than her spouse who recently discharged home from the [facility] [place]) to help out in the home. If no additional caregiver support, then patient may want to consider IPR to improve her functional activity tolerance prior to returning home. Current Level of Function Impacting Discharge (mobility/balance): SBA; Not yet able to ambulate functional household distances. Other factors to consider for discharge: Lives w/ spouse who recently discharged home from the [facility] [place]         PLAN :  Patient continues to benefit from skilled intervention to address the above impairments. Continue treatment per established plan of care. to address goals. Recommendation for discharge: (in order for the patient to meet his/her long term goals)  To be determined: HHPT vs IPR pending progress and caregiver support in the home    This discharge recommendation:  Has not yet been discussed the attending provider and/or case management    IF patient discharges home will need the following DME: to be determined (TBD)       SUBJECTIVE:   Patient stated I get up and walk to the window.     OBJECTIVE DATA SUMMARY:   Critical Behavior:  Neurologic State: Alert  Orientation Level: Oriented X4  Cognition: Follows commands  Safety/Judgement: Awareness of environment, Fall prevention  Functional Mobility Training:  Bed Mobility:  N/t - sitting up in the chair                 Transfers:  Sit to Stand: Supervision  Stand to Sit: Supervision                             Balance:  Sitting: Intact; Without support  Standing: Impaired; Without support  Standing - Static: Fair;Occasional  Standing - Dynamic : Fair;Occasional  Ambulation/Gait Training:     Assistive Device: Walker, rolling; Other (comment)(cruised furniture to get to the window)  Ambulation - Level of Assistance: Contact guard assistance; Adaptive equipment; Additional time        Gait Abnormalities: Decreased step clearance        Base of Support: Widened     Speed/Lorena: Slow  Step Length: Right shortened;Left shortened        Interventions: Safety awareness training; Tactile cues; Verbal cues          Pain Rating:  Voiced no pain    Activity Tolerance:   Fair, desaturates with exertion and requires oxygen, requires rest breaks and observed SOB with activity    After treatment patient left in no apparent distress:   Sitting in chair and Call bell within reach    COMMUNICATION/COLLABORATION:   The patients plan of care was discussed with: Registered nurse.      Nick Artis, PT   Time Calculation: 16 mins

## 2020-11-27 NOTE — PROGRESS NOTES
[facility] Adult  Hospitalist Group                                                                                          Hospitalist Progress Note  Pat Calderon MD  Answering service: [phone] from in house phone        Date of Service:  2020  NAME:  Geovanny Porter  :  1955  MRN:  [id number]      Admission Summary:   72 y.o. female who presents with cough and shortness of breath since  patient was tested positive Covid at an outside testing center and was taking antibiotics nebulizer and Medrol pack did not improve patient said that cough is increasing in frequency and thickening whitish in color no blood in it patient  also have cold-like symptoms and admitted at  [facility] patient has chronic systolic heart failure but she takes all her medications regularly does not seem to be in exacerbation. Currently patient was saturating well in room air in the ED patient was admitted for further management    Interval history / Subjective: Follow up COVID 19. No complaints. Afebrile  sodium is 130. Creatinine improving   No acute issues overnight. Still requiring 13 liters of oxygen. Assessment & Plan:     COVID-19 pneumonia:  Acute hypoxic respiratory failure (89% on RA + respiratory distress, tachypnea): improving  -ID consulted for remdesivir- initiated   -Dexamethasone 6 mg once daily  -Vitamin C, zinc  -tylenol for fevers  -s/p convalescent plasma   -recurrent fevers with elevated procalcitonin, continue levaquin x 5 days.  Cultures NGTD   -Nephrology consulted, appreciate recommendations, transitioned to bumex oral   -Continue high flow, wean as able     Chronic systolic heart failure NYHA II  -Continue bumex   -Continue home carvedilol  -Continue home Plavix, aspirin, rosuvastatin     VICKI on CKD: improving   -Monitor closely on diurectics     History of rheumatoid arthritis:  -hold sulfasalazine, continue Plaquenil    History of depression:  -Continue home Cymbalta    Gout  -Hold colchicine, continue allopurinol      Diabetes type 2 with hyperglycemia  -continue SSI, monitor closely with steroids  -Ha1c 6.5     Hypertension:  -continue carvedilol, amlodipine    Obesity BMI 39.47    Code status: Full  DVT prophylaxis: Lovenox per Covid protocol    Care Plan discussed with: Patient/Family  Anticipated Disposition: Home w/Family vs rehab   Anticipated Discharge: Greater than 48 hours     Hospital Problems  Date Reviewed: 11/18/2020          Codes Class Noted POA    SOB (shortness of breath) ICD-10-CM: R06.02  ICD-9-CM: 786.05  11/18/2020 Unknown        * (Principal) Chronic systolic heart failure ([facility] [address] ICD-10-CM: I50.22  ICD-9-CM: 428.22  7/2/2014 Yes                Review of Systems:   Negative unless stated above      Vital Signs:    Last 24hrs VS reviewed since prior progress note. Most recent are:  Visit Vitals  /74 (BP 1 Location: Left arm, BP Patient Position: At rest)   Pulse 86   Temp 98.5 °F (36.9 °C)   Resp 24   Ht 5' 7\" (1.702 m)   Wt 109 kg (240 lb 4.8 oz)   SpO2 100%   BMI 37.64 kg/m²         Intake/Output Summary (Last 24 hours) at 11/27/2020 1646  Last data filed at 11/27/2020 1452  Gross per 24 hour   Intake 480 ml   Output 2050 ml   Net -1570 ml        Physical Examination:     Phone/audio visit only  Due to the current Covid pandemic, shortage of PPE and to limit exposure I have not done a physical exam on this patient today                  Data Review:    Review and/or order of clinical lab test  Review and/or order of tests in the radiology section of CPT  Review and/or order of tests in the medicine section of CPT      Labs:     No results for input(s): WBC, HGB, HCT, PLT, HGBEXT, HCTEXT, PLTEXT, HGBEXT, HCTEXT, PLTEXT in the last 72 hours.   Recent Labs     11/27/20  1458 11/26/20  1051 11/25/20  0241   * 130* 130*   K 3.8 3.6 3.7   CL 93* 91* 93*   CO2 26 28 27   BUN 77* 76* 86*   CREA 1.85* 1.84* 2.09*   GLU 184* 156* 209*   CA 9.4 9.7 9.8   PHOS 3.7 3.9  --      Recent Labs     11/27/20  1458 11/26/20  1051 11/25/20  0241   ALT  --   --  39   AP  --   --  71   TBILI  --   --  0.4   TP  --   --  8.3*   ALB 3.6 3.3* 3.0*   GLOB  --   --  5.3*     Recent Labs     11/27/20  0554 11/26/20  0106 11/25/20  0241   INR 1.1 1.2* 1.2*   PTP 11.7* 12.2* 12.4*      No results for input(s): FE, TIBC, PSAT, FERR in the last 72 hours. No results found for: FOL, RBCF   No results for input(s): PH, PCO2, PO2 in the last 72 hours. No results for input(s): CPK, CKNDX, TROIQ in the last 72 hours.     No lab exists for component: CPKMB  No results found for: CHOL, CHOLX, CHLST, CHOLV, HDL, HDLP, LDL, LDLC, DLDLP, TGLX, TRIGL, TRIGP, CHHD, CHHDX  Lab Results   Component Value Date/Time    Glucose (POC) 185 (H) 11/27/2020 12:29 PM    Glucose (POC) 160 (H) 11/27/2020 08:27 AM    Glucose (POC) 235 (H) 11/26/2020 08:53 PM    Glucose (POC) 147 (H) 11/26/2020 05:07 PM    Glucose (POC) 178 (H) 11/26/2020 12:17 PM     Lab Results   Component Value Date/Time    Color YELLOW/STRAW 09/15/2019 09:47 PM    Appearance CLEAR 09/15/2019 09:47 PM    Specific gravity 1.013 09/15/2019 09:47 PM    pH (UA) 6.0 09/15/2019 09:47 PM    Protein NEGATIVE  09/15/2019 09:47 PM    Glucose NEGATIVE  09/15/2019 09:47 PM    Ketone NEGATIVE  09/15/2019 09:47 PM    Bilirubin NEGATIVE  09/15/2019 09:47 PM    Urobilinogen 0.2 09/15/2019 09:47 PM    Nitrites NEGATIVE  09/15/2019 09:47 PM    Leukocyte Esterase NEGATIVE  09/15/2019 09:47 PM    Epithelial cells FEW 09/15/2019 09:47 PM    Bacteria NEGATIVE  09/15/2019 09:47 PM    WBC 0-4 09/15/2019 09:47 PM    RBC 0-5 09/15/2019 09:47 PM         Medications Reviewed:     Current Facility-Administered Medications   Medication Dose Route Frequency    [START ON 11/28/2020] bumetanide (BUMEX) tablet 2 mg  2 mg Oral DAILY    oxymetazoline (AFRIN) 0.05 % nasal spray 2 Spray  2 Spray Both Nostrils BID PRN    albuterol (PROVENTIL HFA, VENTOLIN HFA, PROAIR HFA) inhaler 1 Puff  1 Puff Inhalation Q4H PRN    sodium chloride (OCEAN) 0.65 % nasal squeeze bottle 2 Spray  2 Spray Both Nostrils Q2H PRN    senna-docusate (PERICOLACE) 8.6-50 mg per tablet 1 Tab  1 Tab Oral DAILY    guaiFENesin ER (MUCINEX) tablet 600 mg  600 mg Oral Q12H    loperamide (IMODIUM) capsule 4 mg  4 mg Oral Q4H PRN    0.9% sodium chloride infusion 250 mL  250 mL IntraVENous PRN    glucose chewable tablet 16 g  4 Tab Oral PRN    glucagon (GLUCAGEN) injection 1 mg  1 mg IntraMUSCular PRN    dextrose 10% infusion 0-250 mL  0-250 mL IntraVENous PRN    insulin lispro (HUMALOG) injection   SubCUTAneous AC&HS    amLODIPine (NORVASC) tablet 5 mg  5 mg Oral DAILY    enoxaparin (LOVENOX) injection 40 mg  40 mg SubCUTAneous Q12H    ascorbic acid (vitamin C) (VITAMIN C) tablet 500 mg  500 mg Oral BID    zinc sulfate (ZINCATE) 220 (50) mg capsule 1 Cap  1 Cap Oral DAILY    [Held by provider] sulfaSALAzine (AZULFIDINE) tablet 500 mg  500 mg Oral BID WITH MEALS    montelukast (SINGULAIR) tablet 10 mg  10 mg Oral QHS    dexAMETHasone (DECADRON) tablet 6 mg  6 mg Oral Q24H    allopurinoL (ZYLOPRIM) tablet 300 mg  300 mg Oral DAILY    aspirin delayed-release tablet 81 mg  81 mg Oral DAILY    carvediloL (COREG) tablet 25 mg  25 mg Oral BID WITH MEALS    clopidogreL (PLAVIX) tablet 75 mg  75 mg Oral DAILY    DULoxetine (CYMBALTA) capsule 60 mg  60 mg Oral DAILY    hydrOXYchloroQUINE (PLAQUENIL) tablet 200 mg  200 mg Oral BID    rosuvastatin (CRESTOR) tablet 20 mg  20 mg Oral QHS    acetaminophen (TYLENOL) tablet 650 mg  650 mg Oral Q6H PRN    Or    acetaminophen (TYLENOL) suppository 650 mg  650 mg Rectal Q6H PRN    guaiFENesin-dextromethorphan (ROBITUSSIN DM) 100-10 mg/5 mL syrup 5 mL  5 mL Oral Q4H PRN    sodium chloride (NS) flush 5-40 mL  5-40 mL IntraVENous Q8H    sodium chloride (NS) flush 5-40 mL  5-40 mL IntraVENous PRN    polyethylene glycol (MIRALAX) packet 17 g  17 g Oral DAILY PRN    promethazine (PHENERGAN) tablet 12.5 mg  12.5 mg Oral Q6H PRN    Or    ondansetron (ZOFRAN) injection 4 mg  4 mg IntraVENous Q6H PRN     ______________________________________________________________________  EXPECTED LENGTH OF STAY: 4d 4h  ACTUAL LENGTH OF STAY:          Santi Marcus MD

## 2020-11-27 NOTE — PROGRESS NOTES
Nephrology Progress Note  Anila Pitts  Date of Admission : 11/18/2020    CC: Follow up for CKD and hypervolemia       Assessment and Plan     CKD IIIb:  - presumed from chronic HTN/DM2 + CMP   - labs pending   - changed Bumex to 2 mg daily      Hypervolemia:  - improved      COVID-19 PNA:  - s/p remdesivir, convalescent plasma   - s/p dexamethasone  - per ID and primary team     HFrEF:  - no recent Echo      HTN:  - cont present care  - no ACE/ARB     Gout:  - on allopurinol     DM2:  - per hospitalist     Obesity       Interval History:  Seen and examined   Much improved from resp stand point   Labs pending     Current Medications: all current  Medications have been eviewed in EPIC  Review of Systems: Pertinent items are noted in HPI. Objective:  Vitals:    Vitals:    11/27/20 0045 11/27/20 0400 11/27/20 0715 11/27/20 0952   BP: 133/88  135/81 139/83   Pulse: 70  75 72   Resp: 20  17    Temp: 97.6 °F (36.4 °C)  97.5 °F (36.4 °C)    SpO2: 97%  98%    Weight:  109 kg (240 lb 4.8 oz)     Height:         Intake and Output:  No intake/output data recorded. 11/25 1901 - 11/27 0700  In: 480 [P.O.:480]  Out: 2925 [Urine:2925]    Physical Examination:      General: Stable    HEENT : NAD  Neck : supple   Resp:  CTA  CV:  RRR on monitor, + edema  Neurologic:  Non focal  :  No vieyra    []    High complexity decision making was performed  []    Patient is at high-risk of decompensation with multiple organ involvement    Lab Data Personally Reviewed: I have reviewed all the pertinent labs, microbiology data and radiology studies during assessment.     Recent Labs     11/27/20  0554 11/26/20  1051 11/26/20  0106 11/25/20  0241   NA  --  130*  --  130*   K  --  3.6  --  3.7   CL  --  91*  --  93*   CO2  --  28  --  27   GLU  --  156*  --  209*   BUN  --  76*  --  86*   CREA  --  1.84*  --  2.09*   CA  --  9.7  --  9.8   PHOS  --  3.9  --   --    ALB  --  3.3*  --  3.0*   ALT  --   --   --  39   INR 1.1  --  1.2* 1.2* No results for input(s): WBC, HGB, HCT, PLT, HGBEXT, HCTEXT, PLTEXT, HGBEXT, HCTEXT, PLTEXT in the last 72 hours. No results found for: SDES  Lab Results   Component Value Date/Time    Culture result: NO GROWTH 5 DAYS 11/21/2020 10:11 AM    Culture result: MIXED UROGENITAL TAZ ISOLATED 08/09/2018 02:00 PM    Culture result: NO GROWTH 1 DAY 03/01/2018 09:50 PM     Recent Results (from the past 24 hour(s))   GLUCOSE, POC    Collection Time: 11/26/20 12:17 PM   Result Value Ref Range    Glucose (POC) 178 (H) 65 - 100 mg/dL    Performed by Frida Mark, POC    Collection Time: 11/26/20  5:07 PM   Result Value Ref Range    Glucose (POC) 147 (H) 65 - 100 mg/dL    Performed by Simona Bustamante, POC    Collection Time: 11/26/20  8:53 PM   Result Value Ref Range    Glucose (POC) 235 (H) 65 - 100 mg/dL    Performed by Nicola Thompson PCT    D DIMER    Collection Time: 11/27/20  5:54 AM   Result Value Ref Range    D-dimer 0.56 0.00 - 0.65 mg/L FEU   PROTHROMBIN TIME + INR    Collection Time: 11/27/20  5:54 AM   Result Value Ref Range    INR 1.1 0.9 - 1.1      Prothrombin time 11.7 (H) 9.0 - 11.1 sec   FIBRINOGEN    Collection Time: 11/27/20  5:54 AM   Result Value Ref Range    Fibrinogen 567 (H) 200 - 475 mg/dL   GLUCOSE, POC    Collection Time: 11/27/20  8:27 AM   Result Value Ref Range    Glucose (POC) 160 (H) 65 - 100 mg/dL    Performed by Lonnie Martínez MD  [address]  Phone - [phone]   Fax - [phone]  www. [facility]

## 2020-11-27 NOTE — PROGRESS NOTES
ID Progress Note  2020    Subjective:       Breathing a little better today than yesterday    Review of Systems:            Symptom Y/N Comments   Symptom Y/N Comments   Fever/Chills n      Chest Pain  n      Poor Appetite       Edema        Cough  y     Abdominal Pain        Sputum n      Joint Pain        SOB/GALEANA y      Pruritis/Rash        Nausea/vomit n      Tolerating PT/OT        Diarrhea  n     Tolerating Diet        Constipation  n     Other           Could NOT obtain due to:       Objective:     Vitals:   Visit Vitals  /81 (BP 1 Location: Left arm, BP Patient Position: At rest)   Pulse 75   Temp 97.5 °F (36.4 °C)   Resp 17   Ht 5' 7\" (1.702 m)   Wt 109.6 kg (241 lb 10 oz)   SpO2 98%   BMI 37.84 kg/m²        Tmax:  Temp (24hrs), Av.6 °F (36.4 °C), Min:97.5 °F (36.4 °C), Max:97.8 °F (36.6 °C)      PHYSICAL EXAM:  General: Ill appearing, obese. Alert, cooperative, no acute distress    EENT:  EOMI. Anicteric sclerae. MMM  Resp:  Coarse breath sounds in apex with diminished breath sounds at bases, no wheezing or rales. No accessory muscle use  CV:  Regular  rhythm,  No edema  GI:  Soft, Non distended, Non tender. +Bowel sounds  Neurologic:  Alert and oriented X 3, normal speech,   Psych:   Good insight. Not anxious nor agitated  Skin:  No rashes.   No jaundice    Labs:   Lab Results   Component Value Date/Time    WBC 9.4 2020 04:03 AM    HGB 12.5 2020 04:03 AM    Hematocrit (POC) 37 09/15/2019 08:03 PM    HCT 37.6 2020 04:03 AM    PLATELET 761 79/10/0636 04:03 AM    MCV 90.2 2020 04:03 AM     Lab Results   Component Value Date/Time    Sodium 130 (L) 2020 10:51 AM    Potassium 3.6 2020 10:51 AM    Chloride 91 (L) 2020 10:51 AM    CO2 28 2020 10:51 AM    Anion gap 11 2020 10:51 AM    Glucose 156 (H) 2020 10:51 AM    BUN 76 (H) 2020 10:51 AM    Creatinine 1.84 (H) 2020 10:51 AM    BUN/Creatinine ratio 41 (H) 2020 10:51 AM GFR est AA 33 (L) 11/26/2020 10:51 AM    GFR est non-AA 28 (L) 11/26/2020 10:51 AM    Calcium 9.7 11/26/2020 10:51 AM    Bilirubin, total 0.4 11/25/2020 02:41 AM    Alk.  phosphatase 71 11/25/2020 02:41 AM    Protein, total 8.3 (H) 11/25/2020 02:41 AM    Albumin 3.3 (L) 11/26/2020 10:51 AM    Globulin 5.3 (H) 11/25/2020 02:41 AM    A-G Ratio 0.6 (L) 11/25/2020 02:41 AM    ALT (SGPT) 39 11/25/2020 02:41 AM           Assessment and Plan   Acute respiratory failure secondary to COVID 19 & PNA  Allergic asthma  - COVID 19 (+)     Ferritin 293, procal 1.62->0.44, CRP 8.87->23.10->4.46    D-dimer 3.15->1.1, legionella (-)    WBC 9.4    CXR: Mild worsening in the bilateral pulmonary infiltrates    Decrease of supplemental O2; 13L for the past couple days    Completed 5 day course of Levo    Completed  Remdesivir as of 11/24    S/p convalescent plasma 11/20    Continue with decadron (last dose 11/28), vit C, and Zinc    On plaquenil POA for arthritis per the physician at [facility]    Supportive care    Prognosis guarded    VICKI  - Cret 2.0    Avoid nephrotoxic agent    Ildefonso Holcomb, PARAG

## 2020-11-28 LAB
ANION GAP SERPL CALC-SCNC: 10 MMOL/L (ref 5–15)
BASOPHILS # BLD: 0 K/UL (ref 0–0.1)
BASOPHILS NFR BLD: 0 % (ref 0–1)
BUN SERPL-MCNC: 71 MG/DL (ref 6–20)
BUN/CREAT SERPL: 46 (ref 12–20)
CALCIUM SERPL-MCNC: 9.6 MG/DL (ref 8.5–10.1)
CHLORIDE SERPL-SCNC: 92 MMOL/L (ref 97–108)
CO2 SERPL-SCNC: 30 MMOL/L (ref 21–32)
CREAT SERPL-MCNC: 1.55 MG/DL (ref 0.55–1.02)
D DIMER PPP FEU-MCNC: 0.59 MG/L FEU (ref 0–0.65)
DIFFERENTIAL METHOD BLD: ABNORMAL
EOSINOPHIL # BLD: 0 K/UL (ref 0–0.4)
EOSINOPHIL NFR BLD: 0 % (ref 0–7)
ERYTHROCYTE [DISTWIDTH] IN BLOOD BY AUTOMATED COUNT: 13.2 % (ref 11.5–14.5)
FIBRINOGEN PPP-MCNC: 508 MG/DL (ref 200–475)
GLUCOSE BLD STRIP.AUTO-MCNC: 115 MG/DL (ref 65–100)
GLUCOSE BLD STRIP.AUTO-MCNC: 144 MG/DL (ref 65–100)
GLUCOSE BLD STRIP.AUTO-MCNC: 150 MG/DL (ref 65–100)
GLUCOSE BLD STRIP.AUTO-MCNC: 277 MG/DL (ref 65–100)
GLUCOSE SERPL-MCNC: 211 MG/DL (ref 65–100)
HCT VFR BLD AUTO: 35 % (ref 35–47)
HGB BLD-MCNC: 11.8 G/DL (ref 11.5–16)
IMM GRANULOCYTES # BLD AUTO: 0.2 K/UL (ref 0–0.04)
IMM GRANULOCYTES NFR BLD AUTO: 2 % (ref 0–0.5)
INR PPP: 1.1 (ref 0.9–1.1)
LYMPHOCYTES # BLD: 1.1 K/UL (ref 0.8–3.5)
LYMPHOCYTES NFR BLD: 10 % (ref 12–49)
MCH RBC QN AUTO: 29.9 PG (ref 26–34)
MCHC RBC AUTO-ENTMCNC: 33.7 G/DL (ref 30–36.5)
MCV RBC AUTO: 88.8 FL (ref 80–99)
MONOCYTES # BLD: 0.3 K/UL (ref 0–1)
MONOCYTES NFR BLD: 3 % (ref 5–13)
NEUTS BAND NFR BLD MANUAL: 1 %
NEUTS SEG # BLD: 9.7 K/UL (ref 1.8–8)
NEUTS SEG NFR BLD: 84 % (ref 32–75)
NRBC # BLD: 0 K/UL (ref 0–0.01)
NRBC BLD-RTO: 0 PER 100 WBC
PLATELET # BLD AUTO: 314 K/UL (ref 150–400)
PMV BLD AUTO: 10.7 FL (ref 8.9–12.9)
POTASSIUM SERPL-SCNC: 3.6 MMOL/L (ref 3.5–5.1)
PROTHROMBIN TIME: 11.8 SEC (ref 9–11.1)
RBC # BLD AUTO: 3.94 M/UL (ref 3.8–5.2)
RBC MORPH BLD: ABNORMAL
SERVICE CMNT-IMP: ABNORMAL
SODIUM SERPL-SCNC: 132 MMOL/L (ref 136–145)
WBC # BLD AUTO: 11.3 K/UL (ref 3.6–11)

## 2020-11-28 PROCEDURE — 36415 COLL VENOUS BLD VENIPUNCTURE: CPT

## 2020-11-28 PROCEDURE — 65660000001 HC RM ICU INTERMED STEPDOWN

## 2020-11-28 PROCEDURE — 85025 COMPLETE CBC W/AUTO DIFF WBC: CPT

## 2020-11-28 PROCEDURE — 74011636637 HC RX REV CODE- 636/637: Performed by: INTERNAL MEDICINE

## 2020-11-28 PROCEDURE — 85384 FIBRINOGEN ACTIVITY: CPT

## 2020-11-28 PROCEDURE — 74011250637 HC RX REV CODE- 250/637: Performed by: HOSPITALIST

## 2020-11-28 PROCEDURE — 74011250637 HC RX REV CODE- 250/637: Performed by: INTERNAL MEDICINE

## 2020-11-28 PROCEDURE — 80048 BASIC METABOLIC PNL TOTAL CA: CPT

## 2020-11-28 PROCEDURE — 85610 PROTHROMBIN TIME: CPT

## 2020-11-28 PROCEDURE — 85379 FIBRIN DEGRADATION QUANT: CPT

## 2020-11-28 PROCEDURE — 74011250636 HC RX REV CODE- 250/636: Performed by: INTERNAL MEDICINE

## 2020-11-28 PROCEDURE — 82962 GLUCOSE BLOOD TEST: CPT

## 2020-11-28 RX ADMIN — ENOXAPARIN SODIUM 40 MG: 40 INJECTION SUBCUTANEOUS at 17:53

## 2020-11-28 RX ADMIN — OXYCODONE HYDROCHLORIDE AND ACETAMINOPHEN 500 MG: 500 TABLET ORAL at 09:16

## 2020-11-28 RX ADMIN — CLOPIDOGREL BISULFATE 75 MG: 75 TABLET ORAL at 09:22

## 2020-11-28 RX ADMIN — HYDROXYCHLOROQUINE SULFATE 200 MG: 200 TABLET, FILM COATED ORAL at 09:21

## 2020-11-28 RX ADMIN — CARVEDILOL 25 MG: 12.5 TABLET, FILM COATED ORAL at 09:16

## 2020-11-28 RX ADMIN — ENOXAPARIN SODIUM 40 MG: 40 INJECTION SUBCUTANEOUS at 06:37

## 2020-11-28 RX ADMIN — Medication 10 ML: at 06:38

## 2020-11-28 RX ADMIN — ROSUVASTATIN 20 MG: 10 TABLET, FILM COATED ORAL at 20:52

## 2020-11-28 RX ADMIN — INSULIN LISPRO 3 UNITS: 100 INJECTION, SOLUTION INTRAVENOUS; SUBCUTANEOUS at 22:00

## 2020-11-28 RX ADMIN — INSULIN LISPRO 2 UNITS: 100 INJECTION, SOLUTION INTRAVENOUS; SUBCUTANEOUS at 17:53

## 2020-11-28 RX ADMIN — Medication 10 ML: at 20:53

## 2020-11-28 RX ADMIN — GUAIFENESIN 600 MG: 600 TABLET, EXTENDED RELEASE ORAL at 09:22

## 2020-11-28 RX ADMIN — BUMETANIDE 2 MG: 1 TABLET ORAL at 09:22

## 2020-11-28 RX ADMIN — DEXAMETHASONE 6 MG: 4 TABLET ORAL at 16:56

## 2020-11-28 RX ADMIN — INSULIN LISPRO 2 UNITS: 100 INJECTION, SOLUTION INTRAVENOUS; SUBCUTANEOUS at 13:08

## 2020-11-28 RX ADMIN — HYDROXYCHLOROQUINE SULFATE 200 MG: 200 TABLET, FILM COATED ORAL at 17:53

## 2020-11-28 RX ADMIN — ZINC SULFATE 220 MG (50 MG) CAPSULE 1 CAPSULE: CAPSULE at 09:22

## 2020-11-28 RX ADMIN — ALLOPURINOL 300 MG: 300 TABLET ORAL at 09:22

## 2020-11-28 RX ADMIN — GUAIFENESIN 600 MG: 600 TABLET, EXTENDED RELEASE ORAL at 20:51

## 2020-11-28 RX ADMIN — Medication 10 ML: at 13:09

## 2020-11-28 RX ADMIN — CARVEDILOL 25 MG: 12.5 TABLET, FILM COATED ORAL at 16:56

## 2020-11-28 RX ADMIN — AMLODIPINE BESYLATE 5 MG: 5 TABLET ORAL at 09:22

## 2020-11-28 RX ADMIN — MONTELUKAST 10 MG: 10 TABLET, FILM COATED ORAL at 20:52

## 2020-11-28 RX ADMIN — OXYCODONE HYDROCHLORIDE AND ACETAMINOPHEN 500 MG: 500 TABLET ORAL at 17:53

## 2020-11-28 RX ADMIN — Medication 81 MG: at 09:22

## 2020-11-28 NOTE — PROGRESS NOTES
Nephrology Progress Note  Raj Whitehead  Date of Admission : 11/18/2020    CC: Follow up for CKD and hypervolemia       Assessment and Plan     CKD IIIb:  - presumed from chronic HTN/DM2 + CMP   - labs pending   - continue Bumex 2 mg daily      Hypervolemia:  - improved      COVID-19 PNA:  - s/p remdesivir, convalescent plasma   - s/p dexamethasone  - per ID and primary team     HFrEF:  - no recent Echo      HTN:  - cont present care  - no ACE/ARB     Gout:  - on allopurinol     DM2:  - per hospitalist     Obesity       Interval History:  Cr down   Na better   good UOP   Doing well per staff     Current Medications: all current  Medications have been eviewed in EPIC  Review of Systems: Review of systems not obtained due to patient factors. Objective:  Vitals:    Vitals:    11/27/20 2322 11/28/20 0407 11/28/20 0637 11/28/20 0913   BP: 128/79 119/67 137/75 (!) 141/64   Pulse: 70 62 67 68   Resp: 19 12 14 18   Temp: 97.9 °F (36.6 °C) 97.6 °F (36.4 °C) 97.7 °F (36.5 °C) 97.9 °F (36.6 °C)   SpO2: 98% 97% 98% 95%   Weight:  108.6 kg (239 lb 6.7 oz)     Height:         Intake and Output:  No intake/output data recorded. 11/26 1901 - 11/28 0700  In: 80 [P.O.:580]  Out: 3400 [Urine:3400]    Physical Examination:  Not examined in room         []    High complexity decision making was performed  []    Patient is at high-risk of decompensation with multiple organ involvement    Lab Data Personally Reviewed: I have reviewed all the pertinent labs, microbiology data and radiology studies during assessment.     Recent Labs     11/28/20  0431 11/27/20  1458 11/27/20  0554 11/26/20  1051 11/26/20  0106   * 132*  --  130*  --    K 3.6 3.8  --  3.6  --    CL 92* 93*  --  91*  --    CO2 30 26  --  28  --    * 184*  --  156*  --    BUN 71* 77*  --  76*  --    CREA 1.55* 1.85*  --  1.84*  --    CA 9.6 9.4  --  9.7  --    PHOS  --  3.7  --  3.9  --    ALB  --  3.6  --  3.3*  --    INR 1.1  --  1.1  --  1.2* Recent Labs     11/28/20  0431   WBC 11.3*   HGB 11.8   HCT 35.0        No results found for: SDES  Lab Results   Component Value Date/Time    Culture result: NO GROWTH 5 DAYS 11/21/2020 10:11 AM    Culture result: MIXED UROGENITAL TAZ ISOLATED 08/09/2018 02:00 PM    Culture result: NO GROWTH 1 DAY 03/01/2018 09:50 PM     Recent Results (from the past 24 hour(s))   GLUCOSE, POC    Collection Time: 11/27/20 12:29 PM   Result Value Ref Range    Glucose (POC) 185 (H) 65 - 100 mg/dL    Performed by Merline Gills    RENAL FUNCTION PANEL    Collection Time: 11/27/20  2:58 PM   Result Value Ref Range    Sodium 132 (L) 136 - 145 mmol/L    Potassium 3.8 3.5 - 5.1 mmol/L    Chloride 93 (L) 97 - 108 mmol/L    CO2 26 21 - 32 mmol/L    Anion gap 13 5 - 15 mmol/L    Glucose 184 (H) 65 - 100 mg/dL    BUN 77 (H) 6 - 20 MG/DL    Creatinine 1.85 (H) 0.55 - 1.02 MG/DL    BUN/Creatinine ratio 42 (H) 12 - 20      GFR est AA 33 (L) >60 ml/min/1.73m2    GFR est non-AA 27 (L) >60 ml/min/1.73m2    Calcium 9.4 8.5 - 10.1 MG/DL    Phosphorus 3.7 2.6 - 4.7 MG/DL    Albumin 3.6 3.5 - 5.0 g/dL   GLUCOSE, POC    Collection Time: 11/27/20  5:12 PM   Result Value Ref Range    Glucose (POC) 128 (H) 65 - 100 mg/dL    Performed by Merline Gills    GLUCOSE, POC    Collection Time: 11/27/20  9:03 PM   Result Value Ref Range    Glucose (POC) 309 (H) 65 - 100 mg/dL    Performed by Elan Aguilar    D DIMER    Collection Time: 11/28/20  4:31 AM   Result Value Ref Range    D-dimer 0.59 0.00 - 0.65 mg/L FEU   PROTHROMBIN TIME + INR    Collection Time: 11/28/20  4:31 AM   Result Value Ref Range    INR 1.1 0.9 - 1.1      Prothrombin time 11.8 (H) 9.0 - 11.1 sec   FIBRINOGEN    Collection Time: 11/28/20  4:31 AM   Result Value Ref Range    Fibrinogen 508 (H) 200 - 475 mg/dL   CBC WITH AUTOMATED DIFF    Collection Time: 11/28/20  4:31 AM   Result Value Ref Range    WBC 11.3 (H) 3.6 - 11.0 K/uL    RBC 3.94 3.80 - 5.20 M/uL    HGB 11.8 11.5 - 16.0 g/dL HCT 35.0 35.0 - 47.0 %    MCV 88.8 80.0 - 99.0 FL    MCH 29.9 26.0 - 34.0 PG    MCHC 33.7 30.0 - 36.5 g/dL    RDW 13.2 11.5 - 14.5 %    PLATELET 804 758 - 603 K/uL    MPV 10.7 8.9 - 12.9 FL    NRBC 0.0 0  WBC    ABSOLUTE NRBC 0.00 0.00 - 0.01 K/uL    NEUTROPHILS 84 (H) 32 - 75 %    BAND NEUTROPHILS 1 %    LYMPHOCYTES 10 (L) 12 - 49 %    MONOCYTES 3 (L) 5 - 13 %    EOSINOPHILS 0 0 - 7 %    BASOPHILS 0 0 - 1 %    IMMATURE GRANULOCYTES 2 (H) 0.0 - 0.5 %    ABS. NEUTROPHILS 9.7 (H) 1.8 - 8.0 K/UL    ABS. LYMPHOCYTES 1.1 0.8 - 3.5 K/UL    ABS. MONOCYTES 0.3 0.0 - 1.0 K/UL    ABS. EOSINOPHILS 0.0 0.0 - 0.4 K/UL    ABS. BASOPHILS 0.0 0.0 - 0.1 K/UL    ABS. IMM. GRANS. 0.2 (H) 0.00 - 0.04 K/UL    DF SMEAR SCANNED      RBC COMMENTS NORMOCYTIC, NORMOCHROMIC     METABOLIC PANEL, BASIC    Collection Time: 11/28/20  4:31 AM   Result Value Ref Range    Sodium 132 (L) 136 - 145 mmol/L    Potassium 3.6 3.5 - 5.1 mmol/L    Chloride 92 (L) 97 - 108 mmol/L    CO2 30 21 - 32 mmol/L    Anion gap 10 5 - 15 mmol/L    Glucose 211 (H) 65 - 100 mg/dL    BUN 71 (H) 6 - 20 MG/DL    Creatinine 1.55 (H) 0.55 - 1.02 MG/DL    BUN/Creatinine ratio 46 (H) 12 - 20      GFR est AA 41 (L) >60 ml/min/1.73m2    GFR est non-AA 34 (L) >60 ml/min/1.73m2    Calcium 9.6 8.5 - 10.1 MG/DL               Tory Burrell MD  [facility]   [address] [address]  Phone - [phone]   Fax - [phone]  [email or web address]

## 2020-11-28 NOTE — PROGRESS NOTES
Bedside shift change report given to Michele Malik RN (oncoming nurse) by Alejandro Chamberlain RN (offgoing nurse). Report included the following information SBAR, Kardex, Intake/Output, Recent Results, Med Rec Status, and Cardiac Rhythm NSR/PVCs . Patient Vitals for the past 12 hrs:   Temp Pulse Resp BP SpO2   11/28/20 0637 97.7 °F (36.5 °C) 67 14 137/75 98 %   11/28/20 0407 97.6 °F (36.4 °C) 62 12 119/67 97 %   11/27/20 2322 97.9 °F (36.6 °C) 70 19 128/79 98 %   11/27/20 1944 97.7 °F (36.5 °C) 78 20 120/61 100 %       Last 3 Recorded Weights in this Encounter    11/26/20 0357 11/27/20 0400 11/28/20 0407   Weight: 109.6 kg (241 lb 10 oz) 109 kg (240 lb 4.8 oz) 108.6 kg (239 lb 6.7 oz)     Problem: Gas Exchange - Impaired  Goal: Absence of hypoxia  Outcome: Progressing Towards Goal  Goal: Promote optimal lung function  Outcome: Progressing Towards Goal     Problem: Breathing Pattern - Ineffective  Goal: Ability to achieve and maintain a regular respiratory rate  Outcome: Progressing Towards Goal     Problem: Breathing Pattern - Ineffective  Goal: *Use of effective breathing techniques  Outcome: Progressing Towards Goal     Problem: Risk for Spread of Infection  Goal: Prevent transmission of infectious organism to others  Description: Prevent the transmission of infectious organisms to other patients, staff members, and visitors.   Outcome: Progressing Towards Goal     Problem: Heart Failure: Day 1  Goal: Nutrition/Diet  Outcome: Progressing Towards Goal     Problem: Patient Education: Go to Patient Education Activity  Goal: Patient/Family Education  Outcome: Progressing Towards Goal

## 2020-11-28 NOTE — PROGRESS NOTES
[facility]  Hospitalist Group                                                                                          Hospitalist Progress Note  Sim Crane MD  Answering service: [phone] from in house phone        Date of Service:  2020  NAME:  Ezra Franco  :  1955  MRN:  [id number]      Admission Summary:   72 y.o. female who presents with cough and shortness of breath since  patient was tested positive Covid at an outside testing center and was taking antibiotics nebulizer and Medrol pack did not improve patient said that cough is increasing in frequency and thickening whitish in color no blood in it patient  also have cold-like symptoms and admitted at [facility] patient has chronic systolic heart failure but she takes all her medications regularly does not seem to be in exacerbation. Currently patient was saturating well in room air in the ED patient was admitted for further management    Interval history / Subjective: Follow up COVID 19. No complaints. Afebrile  sodium is 132. Creatinine improving   No acute issues overnight. On 2 liters of oxygen now   No complaints. Told me that she has 13 steps on entrance to her home. And she [place] SOB. Working with physicotherapy. HH vs IPR     Assessment & Plan:     COVID-19 pneumonia:  Acute hypoxic respiratory failure (89% on RA + respiratory distress, tachypnea): improving  -ID consulted for remdesivir- initiated   -Dexamethasone 6 mg once daily  -Vitamin C, zinc  -tylenol for fevers  -s/p convalescent plasma   -recurrent fevers with elevated procalcitonin, continue levaquin x 5 days.  Cultures NGTD   -Nephrology consulted, appreciate recommendations, transitioned to bumex oral   -Continue high flow, wean as able     Chronic systolic heart failure NYHA II  -Continue bumex   -Continue home carvedilol  -Continue home Plavix, aspirin, rosuvastatin     VICKI on CKD: improving   -Monitor closely on diurectics     History of rheumatoid arthritis:  -hold sulfasalazine, continue Plaquenil    History of depression:  -Continue home Cymbalta    Gout  -Hold colchicine, continue allopurinol      Diabetes type 2 with hyperglycemia  -continue SSI, monitor closely with steroids  -Ha1c 6.5     Hypertension:  -continue carvedilol, amlodipine    Obesity BMI 39.47    Code status: Full  DVT prophylaxis: Lovenox per Covid protocol    Care Plan discussed with: Patient/Family  Anticipated Disposition: Home w/Family vs rehab   Anticipated Discharge: Greater than 48 hours     Hospital Problems  Date Reviewed: 11/18/2020          Codes Class Noted POA    SOB (shortness of breath) ICD-10-CM: R06.02  ICD-9-CM: 786.05  11/18/2020 Unknown        * (Principal) [facility] ICD-10-CM: I50.22  ICD-9-CM: 428.22  7/2/2014 Yes                Review of Systems:   Negative unless stated above      Vital Signs:    Last 24hrs VS reviewed since prior progress note.  Most recent are:  Visit Vitals  BP 98/64 (BP 1 Location: Right arm, BP Patient Position: At rest)   Pulse 74   Temp 97.7 °F (36.5 °C)   Resp 17   Ht 5' 7\" (1.702 m)   Wt 108.6 kg (239 lb 6.7 oz)   SpO2 97%   BMI 37.50 kg/m²         Intake/Output Summary (Last 24 hours) at 11/28/2020 1438  Last data filed at 11/28/2020 0446  Gross per 24 hour   Intake 100 ml   Output 1600 ml   Net -1500 ml        Physical Examination:     Phone/audio visit only  Due to the current Covid pandemic, shortage of PPE and to limit exposure I have not done a physical exam on this patient today                  Data Review:    Review and/or order of clinical lab test  Review and/or order of tests in the radiology section of CPT  Review and/or order of tests in the medicine section of CPT      Labs:     Recent Labs     11/28/20  0431   WBC 11.3*   HGB 11.8   HCT 35.0        Recent Labs     11/28/20  0431 11/27/20  1458 11/26/20  1051   * 132* 130*   K 3.6 3.8 3.6 CL 92* 93* 91*   CO2 30 26 28   BUN 71* 77* 76*   CREA 1.55* 1.85* 1.84*   * 184* 156*   CA 9.6 9.4 9.7   PHOS  --  3.7 3.9     Recent Labs     11/27/20  1458 11/26/20  1051   ALB 3.6 3.3*     Recent Labs     11/28/20  0431 11/27/20  0554 11/26/20  0106   INR 1.1 1.1 1.2*   PTP 11.8* 11.7* 12.2*      No results for input(s): FE, TIBC, PSAT, FERR in the last 72 hours. No results found for: FOL, RBCF   No results for input(s): PH, PCO2, PO2 in the last 72 hours. No results for input(s): CPK, CKNDX, TROIQ in the last 72 hours.     No lab exists for component: CPKMB  No results found for: CHOL, CHOLX, CHLST, CHOLV, HDL, HDLP, LDL, LDLC, DLDLP, TGLX, TRIGL, TRIGP, CHHD, CHHDX  Lab Results   Component Value Date/Time    Glucose (POC) 150 (H) 11/28/2020 11:59 AM    Glucose (POC) 115 (H) 11/28/2020 09:18 AM    Glucose (POC) 309 (H) 11/27/2020 09:03 PM    Glucose (POC) 128 (H) 11/27/2020 05:12 PM    Glucose (POC) 185 (H) 11/27/2020 12:29 PM     Lab Results   Component Value Date/Time    Color YELLOW/STRAW 09/15/2019 09:47 PM    Appearance CLEAR 09/15/2019 09:47 PM    Specific gravity 1.013 09/15/2019 09:47 PM    pH (UA) 6.0 09/15/2019 09:47 PM    Protein NEGATIVE  09/15/2019 09:47 PM    Glucose NEGATIVE  09/15/2019 09:47 PM    Ketone NEGATIVE  09/15/2019 09:47 PM    Bilirubin NEGATIVE  09/15/2019 09:47 PM    Urobilinogen 0.2 09/15/2019 09:47 PM    Nitrites NEGATIVE  09/15/2019 09:47 PM    Leukocyte Esterase NEGATIVE  09/15/2019 09:47 PM    Epithelial cells FEW 09/15/2019 09:47 PM    Bacteria NEGATIVE  09/15/2019 09:47 PM    WBC 0-4 09/15/2019 09:47 PM    RBC 0-5 09/15/2019 09:47 PM         Medications Reviewed:     Current Facility-Administered Medications   Medication Dose Route Frequency    bumetanide (BUMEX) tablet 2 mg  2 mg Oral DAILY    oxymetazoline (AFRIN) 0.05 % nasal spray 2 Spray  2 Spray Both Nostrils BID PRN    albuterol (PROVENTIL HFA, VENTOLIN HFA, PROAIR HFA) inhaler 1 Puff  1 Puff Inhalation Q4H PRN    sodium chloride (OCEAN) 0.65 % nasal squeeze bottle 2 Spray  2 Spray Both Nostrils Q2H PRN    senna-docusate (PERICOLACE) 8.6-50 mg per tablet 1 Tab  1 Tab Oral DAILY    guaiFENesin ER (MUCINEX) tablet 600 mg  600 mg Oral Q12H    loperamide (IMODIUM) capsule 4 mg  4 mg Oral Q4H PRN    0.9% sodium chloride infusion 250 mL  250 mL IntraVENous PRN    glucose chewable tablet 16 g  4 Tab Oral PRN    glucagon (GLUCAGEN) injection 1 mg  1 mg IntraMUSCular PRN    dextrose 10% infusion 0-250 mL  0-250 mL IntraVENous PRN    insulin lispro (HUMALOG) injection   SubCUTAneous AC&HS    amLODIPine (NORVASC) tablet 5 mg  5 mg Oral DAILY    enoxaparin (LOVENOX) injection 40 mg  40 mg SubCUTAneous Q12H    ascorbic acid (vitamin C) (VITAMIN C) tablet 500 mg  500 mg Oral BID    [Held by provider] sulfaSALAzine (AZULFIDINE) tablet 500 mg  500 mg Oral BID WITH MEALS    montelukast (SINGULAIR) tablet 10 mg  10 mg Oral QHS    dexAMETHasone (DECADRON) tablet 6 mg  6 mg Oral Q24H    allopurinoL (ZYLOPRIM) tablet 300 mg  300 mg Oral DAILY    aspirin delayed-release tablet 81 mg  81 mg Oral DAILY    carvediloL (COREG) tablet 25 mg  25 mg Oral BID WITH MEALS    clopidogreL (PLAVIX) tablet 75 mg  75 mg Oral DAILY    DULoxetine (CYMBALTA) capsule 60 mg  60 mg Oral DAILY    hydrOXYchloroQUINE (PLAQUENIL) tablet 200 mg  200 mg Oral BID    rosuvastatin (CRESTOR) tablet 20 mg  20 mg Oral QHS    acetaminophen (TYLENOL) tablet 650 mg  650 mg Oral Q6H PRN    Or    acetaminophen (TYLENOL) suppository 650 mg  650 mg Rectal Q6H PRN    guaiFENesin-dextromethorphan (ROBITUSSIN DM) 100-10 mg/5 mL syrup 5 mL  5 mL Oral Q4H PRN    sodium chloride (NS) flush 5-40 mL  5-40 mL IntraVENous Q8H    sodium chloride (NS) flush 5-40 mL  5-40 mL IntraVENous PRN    polyethylene glycol (MIRALAX) packet 17 g  17 g Oral DAILY PRN    promethazine (PHENERGAN) tablet 12.5 mg  12.5 mg Oral Q6H PRN    Or    ondansetron (ZOFRAN) injection 4 mg  4 mg IntraVENous Q6H PRN     ______________________________________________________________________  EXPECTED LENGTH OF STAY: 4d 4h  ACTUAL LENGTH OF STAY:          [address], MD

## 2020-11-29 LAB
ANION GAP SERPL CALC-SCNC: 9 MMOL/L (ref 5–15)
BASOPHILS # BLD: 0 K/UL (ref 0–0.1)
BASOPHILS NFR BLD: 0 % (ref 0–1)
BUN SERPL-MCNC: 58 MG/DL (ref 6–20)
BUN/CREAT SERPL: 45 (ref 12–20)
CALCIUM SERPL-MCNC: 9.5 MG/DL (ref 8.5–10.1)
CHLORIDE SERPL-SCNC: 93 MMOL/L (ref 97–108)
CO2 SERPL-SCNC: 29 MMOL/L (ref 21–32)
CREAT SERPL-MCNC: 1.29 MG/DL (ref 0.55–1.02)
D DIMER PPP FEU-MCNC: 0.58 MG/L FEU (ref 0–0.65)
DIFFERENTIAL METHOD BLD: ABNORMAL
EOSINOPHIL # BLD: 0 K/UL (ref 0–0.4)
EOSINOPHIL NFR BLD: 0 % (ref 0–7)
ERYTHROCYTE [DISTWIDTH] IN BLOOD BY AUTOMATED COUNT: 13.1 % (ref 11.5–14.5)
FIBRINOGEN PPP-MCNC: 365 MG/DL (ref 200–475)
GLUCOSE BLD STRIP.AUTO-MCNC: 114 MG/DL (ref 65–100)
GLUCOSE BLD STRIP.AUTO-MCNC: 147 MG/DL (ref 65–100)
GLUCOSE BLD STRIP.AUTO-MCNC: 183 MG/DL (ref 65–100)
GLUCOSE BLD STRIP.AUTO-MCNC: 207 MG/DL (ref 65–100)
GLUCOSE SERPL-MCNC: 191 MG/DL (ref 65–100)
HCT VFR BLD AUTO: 32.9 % (ref 35–47)
HGB BLD-MCNC: 11.1 G/DL (ref 11.5–16)
IMM GRANULOCYTES # BLD AUTO: 0.2 K/UL (ref 0–0.04)
IMM GRANULOCYTES NFR BLD AUTO: 2 % (ref 0–0.5)
INR PPP: 1.1 (ref 0.9–1.1)
LYMPHOCYTES # BLD: 1 K/UL (ref 0.8–3.5)
LYMPHOCYTES NFR BLD: 10 % (ref 12–49)
MCH RBC QN AUTO: 30.2 PG (ref 26–34)
MCHC RBC AUTO-ENTMCNC: 33.7 G/DL (ref 30–36.5)
MCV RBC AUTO: 89.6 FL (ref 80–99)
MONOCYTES # BLD: 0.3 K/UL (ref 0–1)
MONOCYTES NFR BLD: 3 % (ref 5–13)
NEUTS SEG # BLD: 8.4 K/UL (ref 1.8–8)
NEUTS SEG NFR BLD: 85 % (ref 32–75)
NRBC # BLD: 0 K/UL (ref 0–0.01)
NRBC BLD-RTO: 0 PER 100 WBC
PLATELET # BLD AUTO: 289 K/UL (ref 150–400)
PMV BLD AUTO: 10.7 FL (ref 8.9–12.9)
POTASSIUM SERPL-SCNC: 3.8 MMOL/L (ref 3.5–5.1)
PROTHROMBIN TIME: 11.8 SEC (ref 9–11.1)
RBC # BLD AUTO: 3.67 M/UL (ref 3.8–5.2)
SERVICE CMNT-IMP: ABNORMAL
SODIUM SERPL-SCNC: 131 MMOL/L (ref 136–145)
WBC # BLD AUTO: 9.9 K/UL (ref 3.6–11)

## 2020-11-29 PROCEDURE — 74011250637 HC RX REV CODE- 250/637: Performed by: HOSPITALIST

## 2020-11-29 PROCEDURE — 85384 FIBRINOGEN ACTIVITY: CPT

## 2020-11-29 PROCEDURE — 80048 BASIC METABOLIC PNL TOTAL CA: CPT

## 2020-11-29 PROCEDURE — 65270000029 HC RM PRIVATE

## 2020-11-29 PROCEDURE — 85025 COMPLETE CBC W/AUTO DIFF WBC: CPT

## 2020-11-29 PROCEDURE — 36415 COLL VENOUS BLD VENIPUNCTURE: CPT

## 2020-11-29 PROCEDURE — 82962 GLUCOSE BLOOD TEST: CPT

## 2020-11-29 PROCEDURE — 85379 FIBRIN DEGRADATION QUANT: CPT

## 2020-11-29 PROCEDURE — 74011250636 HC RX REV CODE- 250/636: Performed by: INTERNAL MEDICINE

## 2020-11-29 PROCEDURE — 74011250637 HC RX REV CODE- 250/637: Performed by: INTERNAL MEDICINE

## 2020-11-29 PROCEDURE — 85610 PROTHROMBIN TIME: CPT

## 2020-11-29 PROCEDURE — 74011636637 HC RX REV CODE- 636/637: Performed by: INTERNAL MEDICINE

## 2020-11-29 RX ADMIN — CARVEDILOL 25 MG: 12.5 TABLET, FILM COATED ORAL at 16:22

## 2020-11-29 RX ADMIN — MONTELUKAST 10 MG: 10 TABLET, FILM COATED ORAL at 21:53

## 2020-11-29 RX ADMIN — HYDROXYCHLOROQUINE SULFATE 200 MG: 200 TABLET, FILM COATED ORAL at 08:56

## 2020-11-29 RX ADMIN — INSULIN LISPRO 2 UNITS: 100 INJECTION, SOLUTION INTRAVENOUS; SUBCUTANEOUS at 11:30

## 2020-11-29 RX ADMIN — Medication 10 ML: at 16:22

## 2020-11-29 RX ADMIN — CLOPIDOGREL BISULFATE 75 MG: 75 TABLET ORAL at 08:56

## 2020-11-29 RX ADMIN — GUAIFENESIN 600 MG: 600 TABLET, EXTENDED RELEASE ORAL at 21:53

## 2020-11-29 RX ADMIN — ALLOPURINOL 300 MG: 300 TABLET ORAL at 08:57

## 2020-11-29 RX ADMIN — BUMETANIDE 2 MG: 1 TABLET ORAL at 08:57

## 2020-11-29 RX ADMIN — Medication 10 ML: at 05:52

## 2020-11-29 RX ADMIN — AMLODIPINE BESYLATE 5 MG: 5 TABLET ORAL at 08:57

## 2020-11-29 RX ADMIN — GUAIFENESIN 600 MG: 600 TABLET, EXTENDED RELEASE ORAL at 08:56

## 2020-11-29 RX ADMIN — HYDROXYCHLOROQUINE SULFATE 200 MG: 200 TABLET, FILM COATED ORAL at 21:53

## 2020-11-29 RX ADMIN — ENOXAPARIN SODIUM 40 MG: 40 INJECTION SUBCUTANEOUS at 21:53

## 2020-11-29 RX ADMIN — INSULIN LISPRO 2 UNITS: 100 INJECTION, SOLUTION INTRAVENOUS; SUBCUTANEOUS at 08:57

## 2020-11-29 RX ADMIN — OXYCODONE HYDROCHLORIDE AND ACETAMINOPHEN 500 MG: 500 TABLET ORAL at 08:57

## 2020-11-29 RX ADMIN — CARVEDILOL 25 MG: 12.5 TABLET, FILM COATED ORAL at 08:56

## 2020-11-29 RX ADMIN — ROSUVASTATIN 20 MG: 10 TABLET, FILM COATED ORAL at 21:53

## 2020-11-29 RX ADMIN — Medication 81 MG: at 08:56

## 2020-11-29 RX ADMIN — OXYCODONE HYDROCHLORIDE AND ACETAMINOPHEN 500 MG: 500 TABLET ORAL at 21:53

## 2020-11-29 RX ADMIN — ENOXAPARIN SODIUM 40 MG: 40 INJECTION SUBCUTANEOUS at 05:51

## 2020-11-29 RX ADMIN — Medication 10 ML: at 21:54

## 2020-11-29 RX ADMIN — INSULIN LISPRO 2 UNITS: 100 INJECTION, SOLUTION INTRAVENOUS; SUBCUTANEOUS at 21:59

## 2020-11-29 NOTE — PROGRESS NOTES
Problem: Airway Clearance - Ineffective  Goal: Achieve or maintain patent airway  Outcome: Progressing Towards Goal     Problem: Falls - Risk of  Goal: *Absence of Falls  Description: Document Kelly Aly Fall Risk and appropriate interventions in the flowsheet. Outcome: Progressing Towards Goal  Note: Fall Risk Interventions:  Mobility Interventions: Patient to call before getting OOB    Medication Interventions: Evaluate medications/consider consulting pharmacy    Elimination Interventions: Patient to call for help with toileting needs    History of Falls Interventions: Evaluate medications/consider consulting pharmacy    Problem: Heart Failure: Discharge Outcomes  Goal: *Demonstrates ability to perform prescribed activity without shortness of breath or discomfort  Outcome: Progressing Towards Goal     TRANSFER - OUT REPORT:    Verbal report given to Kalyan philip (name) on Felicita Copeland  being transferred to (unit) for routine progression of care       Report consisted of patients Situation, Background, Assessment and   Recommendations(SBAR). Information from the following report(s) SBAR, Kardex, ED Summary, and Quality Measures was reviewed with the receiving nurse. Lines:   Peripheral IV 11/28/20 Anterior;Right Forearm (Active)   Site Assessment Clean, dry, & intact 11/29/20 1620   Phlebitis Assessment 0 11/29/20 1620   Infiltration Assessment 0 11/29/20 1620   Dressing Status Clean, dry, & intact 11/29/20 1620   Dressing Type Transparent 11/29/20 1620   Hub Color/Line Status Blue 11/29/20 1620   Action Taken Open ports on tubing capped 11/29/20 1620   Alcohol Cap Used Yes 11/29/20 1620          Opportunity for questions and clarification was provided.       Patient transported with:   Monitor  Tech

## 2020-11-29 NOTE — PROGRESS NOTES
[facility] Adult  Hospitalist Group                                                                                          Hospitalist Progress Note  Thang Grissom MD  Answering service: [phone] from in house phone        Date of Service:  2020  NAME:  Melva Hicks  :  1955  MRN:  [id number]      Admission Summary:   72 y.o. female who presents with cough and shortness of breath since  patient was tested positive Covid at an outside testing center and was taking antibiotics nebulizer and Medrol pack did not improve patient said that cough is increasing in frequency and thickening whitish in color no blood in it patient  also have cold-like symptoms and admitted at [facility] patient has chronic systolic heart failure but she takes all her medications regularly does not seem to be in exacerbation. Currently patient was saturating well in room air in the ED patient was admitted for further management    Interval history / Subjective: Follow up COVID 19. No complaints. Afebrile  sodium is 131. Creatinine improving   No acute issues overnight. On room air now  No complaints. Told me that she has 13 steps on entrance to her home. And she [place] SOB. Working with physicotherapy. HH vs IPR   IPR vs home. Will be discharged by tomorrow if going home   Assessment & Plan:     COVID-19 pneumonia:  Acute hypoxic respiratory failure (89% on RA + respiratory distress, tachypnea): improving  -ID consulted for remdesivir- initiated   -Dexamethasone 6 mg once daily  -Vitamin C, zinc  -tylenol for fevers  -s/p convalescent plasma   -recurrent fevers with elevated procalcitonin, continue levaquin x 5 days.  Cultures NGTD   -Nephrology consulted, appreciate recommendations, transitioned to bumex oral   -on room air , monitor oxygen saturation     Chronic systolic heart failure NYHA II  -Continue bumex   -Continue home carvedilol  -Continue home Plavix, aspirin, rosuvastatin     VICKI on CKD: improving   -Monitor closely on diurectics     History of rheumatoid arthritis:  -hold sulfasalazine, continue Plaquenil    History of depression:  -Continue home Cymbalta    Gout  -Hold colchicine, continue allopurinol      Diabetes type 2 with hyperglycemia  -continue SSI, monitor closely with steroids  -Ha1c 6.5     Hypertension:  -continue carvedilol, amlodipine    Obesity BMI 39.47    Code status: Full  DVT prophylaxis: Lovenox per Covid protocol    Care Plan discussed with: Patient/Family  Anticipated Disposition: Home w/Family vs rehab   Anticipated Discharge: Greater than 48 hours     Hospital Problems  Date Reviewed: 11/18/2020          Codes Class Noted POA    SOB (shortness of breath) ICD-10-CM: R06.02  ICD-9-CM: 786.05  11/18/2020 Unknown        * (Principal) Chronic systolic heart failure ([facility] [address] ICD-10-CM: I50.22  ICD-9-CM: 428.22  7/2/2014 Yes                Review of Systems:   Negative unless stated above      Vital Signs:    Last 24hrs VS reviewed since prior progress note.  Most recent are:  Visit Vitals  /69 (BP 1 Location: Right arm, BP Patient Position: At rest;Sitting)   Pulse 77   Temp 97.8 °F (36.6 °C)   Resp 15   Ht 5' 7\" (1.702 m)   Wt 109.9 kg (242 lb 4.6 oz)   SpO2 94%   BMI 37.95 kg/m²         Intake/Output Summary (Last 24 hours) at 11/29/2020 1427  Last data filed at 11/28/2020 2045  Gross per 24 hour   Intake 120 ml   Output 500 ml   Net -380 ml        Physical Examination:     Phone/audio visit only  Due to the current Covid pandemic, shortage of PPE and to limit exposure I have not done a physical exam on this patient today                  Data Review:    Review and/or order of clinical lab test  Review and/or order of tests in the radiology section of CPT  Review and/or order of tests in the medicine section of CPT      Labs:     Recent Labs     11/29/20  0355 11/28/20  0431   WBC 9.9 11.3*   HGB 11.1* 11.8   HCT 32.9* 35.0    314     Recent Labs     11/29/20  0355 11/28/20  0431 11/27/20  1458   * 132* 132*   K 3.8 3.6 3.8   CL 93* 92* 93*   CO2 29 30 26   BUN 58* 71* 77*   CREA 1.29* 1.55* 1.85*   * 211* 184*   CA 9.5 9.6 9.4   PHOS  --   --  3.7     Recent Labs     11/27/20  1458   ALB 3.6     Recent Labs     11/29/20  0355 11/28/20 0431 11/27/20  0554   INR 1.1 1.1 1.1   PTP 11.8* 11.8* 11.7*      No results for input(s): FE, TIBC, PSAT, FERR in the last 72 hours. No results found for: FOL, RBCF   No results for input(s): PH, PCO2, PO2 in the last 72 hours. No results for input(s): CPK, CKNDX, TROIQ in the last 72 hours.     No lab exists for component: CPKMB  No results found for: CHOL, CHOLX, CHLST, CHOLV, HDL, HDLP, LDL, LDLC, DLDLP, TGLX, TRIGL, TRIGP, CHHD, CHHDX  Lab Results   Component Value Date/Time    Glucose (POC) 147 (H) 11/29/2020 11:18 AM    Glucose (POC) 183 (H) 11/29/2020 08:33 AM    Glucose (POC) 277 (H) 11/28/2020 09:29 PM    Glucose (POC) 144 (H) 11/28/2020 04:44 PM    Glucose (POC) 150 (H) 11/28/2020 11:59 AM     Lab Results   Component Value Date/Time    Color YELLOW/STRAW 09/15/2019 09:47 PM    Appearance CLEAR 09/15/2019 09:47 PM    Specific gravity 1.013 09/15/2019 09:47 PM    pH (UA) 6.0 09/15/2019 09:47 PM    Protein NEGATIVE  09/15/2019 09:47 PM    Glucose NEGATIVE  09/15/2019 09:47 PM    Ketone NEGATIVE  09/15/2019 09:47 PM    Bilirubin NEGATIVE  09/15/2019 09:47 PM    Urobilinogen 0.2 09/15/2019 09:47 PM    Nitrites NEGATIVE  09/15/2019 09:47 PM    Leukocyte Esterase NEGATIVE  09/15/2019 09:47 PM    Epithelial cells FEW 09/15/2019 09:47 PM    Bacteria NEGATIVE  09/15/2019 09:47 PM    WBC 0-4 09/15/2019 09:47 PM    RBC 0-5 09/15/2019 09:47 PM         Medications Reviewed:     Current Facility-Administered Medications   Medication Dose Route Frequency    bumetanide (BUMEX) tablet 2 mg  2 mg Oral DAILY    albuterol (PROVENTIL HFA, VENTOLIN HFA, PROAIR HFA) inhaler 1 Puff  1 Puff Inhalation Q4H PRN    sodium chloride (OCEAN) 0.65 % nasal squeeze bottle 2 Spray  2 Spray Both Nostrils Q2H PRN    senna-docusate (PERICOLACE) 8.6-50 mg per tablet 1 Tab  1 Tab Oral DAILY    guaiFENesin ER (MUCINEX) tablet 600 mg  600 mg Oral Q12H    loperamide (IMODIUM) capsule 4 mg  4 mg Oral Q4H PRN    0.9% sodium chloride infusion 250 mL  250 mL IntraVENous PRN    glucose chewable tablet 16 g  4 Tab Oral PRN    glucagon (GLUCAGEN) injection 1 mg  1 mg IntraMUSCular PRN    dextrose 10% infusion 0-250 mL  0-250 mL IntraVENous PRN    insulin lispro (HUMALOG) injection   SubCUTAneous AC&HS    amLODIPine (NORVASC) tablet 5 mg  5 mg Oral DAILY    enoxaparin (LOVENOX) injection 40 mg  40 mg SubCUTAneous Q12H    ascorbic acid (vitamin C) (VITAMIN C) tablet 500 mg  500 mg Oral BID    [Held by provider] sulfaSALAzine (AZULFIDINE) tablet 500 mg  500 mg Oral BID WITH MEALS    montelukast (SINGULAIR) tablet 10 mg  10 mg Oral QHS    allopurinoL (ZYLOPRIM) tablet 300 mg  300 mg Oral DAILY    aspirin delayed-release tablet 81 mg  81 mg Oral DAILY    carvediloL (COREG) tablet 25 mg  25 mg Oral BID WITH MEALS    clopidogreL (PLAVIX) tablet 75 mg  75 mg Oral DAILY    DULoxetine (CYMBALTA) capsule 60 mg  60 mg Oral DAILY    hydrOXYchloroQUINE (PLAQUENIL) tablet 200 mg  200 mg Oral BID    rosuvastatin (CRESTOR) tablet 20 mg  20 mg Oral QHS    acetaminophen (TYLENOL) tablet 650 mg  650 mg Oral Q6H PRN    Or    acetaminophen (TYLENOL) suppository 650 mg  650 mg Rectal Q6H PRN    guaiFENesin-dextromethorphan (ROBITUSSIN DM) 100-10 mg/5 mL syrup 5 mL  5 mL Oral Q4H PRN    sodium chloride (NS) flush 5-40 mL  5-40 mL IntraVENous Q8H    sodium chloride (NS) flush 5-40 mL  5-40 mL IntraVENous PRN    polyethylene glycol (MIRALAX) packet 17 g  17 g Oral DAILY PRN    promethazine (PHENERGAN) tablet 12.5 mg  12.5 mg Oral Q6H PRN    Or    ondansetron (ZOFRAN) injection 4 mg  4 mg IntraVENous Q6H PRN ______________________________________________________________________  EXPECTED LENGTH OF STAY: 4d 4h  ACTUAL LENGTH OF STAY:          Roberto Heaton MD

## 2020-11-29 NOTE — ROUTINE PROCESS
TRANSFER - IN REPORT: 
 
Verbal report received from [facility] RN(name) on Nereida Diaz  being received from [facility]) for routine progression of care Report consisted of patients Situation, Background, Assessment and  
Recommendations(SBAR). Information from the following report(s) SBAR and Kardex was reviewed with the receiving nurse. Opportunity for questions and clarification was provided. Assessment completed upon patients arrival to unit and care assumed.

## 2020-11-29 NOTE — PROGRESS NOTES
Bedside shift change report given to DEVAUGHN Orlando (oncoming nurse) by Daniel Acosta RN (offgoing nurse). Report included the following information SBAR, Kardex, Intake/Output, MAR, Recent Results, Med Rec Status, and Cardiac Rhythm NSR with PVCs . Patient Vitals for the past 12 hrs:   Temp Pulse Resp BP SpO2   11/29/20 0720 97.8 °F (36.6 °C) 64 14 (!) 148/71 96 %   11/29/20 0342 97.7 °F (36.5 °C) 74 14 (!) 147/89 97 %   11/29/20 0006 97.7 °F (36.5 °C) 71 23 (!) 140/75 96 %   11/28/20 2045     97 %   11/28/20 1959 97.7 °F (36.5 °C) 73 20 127/61 97 %       Last 3 Recorded Weights in this Encounter    11/27/20 0400 11/28/20 0407 11/29/20 0342   Weight: 109 kg (240 lb 4.8 oz) 108.6 kg (239 lb 6.7 oz) 109.9 kg (242 lb 4.6 oz)        Problem: Gas Exchange - Impaired  Goal: Absence of hypoxia  Outcome: Resolved/Met     Problem: Gas Exchange - Impaired  Goal: Promote optimal lung function  Outcome: Progressing Towards Goal     Problem: Breathing Pattern - Ineffective  Goal: Ability to achieve and maintain a regular respiratory rate  Outcome: Progressing Towards Goal     Problem: Isolation Precautions - Risk of Spread of Infection  Goal: Prevent transmission of infectious organism to others  Outcome: Progressing Towards Goal     Problem: Loneliness or Risk for Loneliness  Goal: Demonstrate positive use of time alone when socialization is not possible  Outcome: Progressing Towards Goal     Problem: Fatigue  Goal: Verbalize increase energy and improved vitality  Outcome: Progressing Towards Goal     Problem: Falls - Risk of  Goal: *Absence of Falls  Description: Document Christiano Fall Risk and appropriate interventions in the flowsheet.   Outcome: Progressing Towards Goal  Note: Fall Risk Interventions:  Mobility Interventions: Patient to call before getting OOB         Medication Interventions: Evaluate medications/consider consulting pharmacy    Elimination Interventions: Patient to call for help with toileting needs    History of Falls Interventions: Evaluate medications/consider consulting pharmacy

## 2020-11-29 NOTE — PROGRESS NOTES
Nephrology Progress Note  Jose E Calderon  Date of Admission : 11/18/2020    CC: Follow up for CKD and hypervolemia       Assessment and Plan     CKD IIIb:  - presumed from chronic HTN/DM2 + CMP   - continue Bumex 2 mg daily   - LABS DAILY      Hypervolemia:  - improved      COVID-19 PNA:  - s/p remdesivir, convalescent plasma   - s/p dexamethasone  - per ID and primary team     HFrEF:  - no recent Echo      HTN:  - cont present care  - no ACE/ARB     Gout:  - on allopurinol     DM2:  - per hospitalist     Obesity       Interval History:  No new sx   Doing well   On room air     Current Medications: all current  Medications have been eviewed in EPIC  Review of Systems: A comprehensive review of systems was negative except for that written in the HPI. Objective:  Vitals:    Vitals:    11/29/20 0006 11/29/20 0342 11/29/20 0720 11/29/20 1119   BP: (!) 140/75 (!) 147/89 (!) 148/71 129/69   Pulse: 71 74 64 77   Resp: 23 14 14 15   Temp: 97.7 °F (36.5 °C) 97.7 °F (36.5 °C) 97.8 °F (36.6 °C) 97.8 °F (36.6 °C)   SpO2: 96% 97% 96% 94%   Weight:  109.9 kg (242 lb 4.6 oz)     Height:         Intake and Output:  No intake/output data recorded. 11/27 1901 - 11/29 0700  In: 220 [P.O.:220]  Out: 1850 [Urine:1850]    Physical Examination:  Not examined in room   gen : No distress  HEENT : NAD  nECK : no enlargement   RS: symmetrical chest movements   CVS: RRR on monitor   Ext: no edema   Neuro : AAO X 3     []    High complexity decision making was performed  []    Patient is at high-risk of decompensation with multiple organ involvement    Lab Data Personally Reviewed: I have reviewed all the pertinent labs, microbiology data and radiology studies during assessment.     Recent Labs     11/29/20  0355 11/28/20  0431 11/27/20  1458 11/27/20  0554   * 132* 132*  --    K 3.8 3.6 3.8  --    CL 93* 92* 93*  --    CO2 29 30 26  --    * 211* 184*  --    BUN 58* 71* 77*  --    CREA 1.29* 1.55* 1.85*  --    CA 9.5 9.6 9.4  --    PHOS  --   --  3.7  --    ALB  --   --  3.6  --    INR 1.1 1.1  --  1.1     Recent Labs     11/29/20  0355 11/28/20  0431   WBC 9.9 11.3*   HGB 11.1* 11.8   HCT 32.9* 35.0    314     No results found for: SDES  Lab Results   Component Value Date/Time    Culture result: NO GROWTH 5 DAYS 11/21/2020 10:11 AM    Culture result: MIXED UROGENITAL TAZ ISOLATED 08/09/2018 02:00 PM    Culture result: NO GROWTH 1 DAY 03/01/2018 09:50 PM     Recent Results (from the past 24 hour(s))   GLUCOSE, POC    Collection Time: 11/28/20  4:44 PM   Result Value Ref Range    Glucose (POC) 144 (H) 65 - 100 mg/dL    Performed by Nishi Munson    GLUCOSE, POC    Collection Time: 11/28/20  9:29 PM   Result Value Ref Range    Glucose (POC) 277 (H) 65 - 100 mg/dL    Performed by Mark FOURNIER    FIBRINOGEN    Collection Time: 11/29/20  3:55 AM   Result Value Ref Range    Fibrinogen 365 200 - 475 mg/dL   PROTHROMBIN TIME + INR    Collection Time: 11/29/20  3:55 AM   Result Value Ref Range    INR 1.1 0.9 - 1.1      Prothrombin time 11.8 (H) 9.0 - 11.1 sec   D DIMER    Collection Time: 11/29/20  3:55 AM   Result Value Ref Range    D-dimer 0.58 0.00 - 0.65 mg/L FEU   CBC WITH AUTOMATED DIFF    Collection Time: 11/29/20  3:55 AM   Result Value Ref Range    WBC 9.9 3.6 - 11.0 K/uL    RBC 3.67 (L) 3.80 - 5.20 M/uL    HGB 11.1 (L) 11.5 - 16.0 g/dL    HCT 32.9 (L) 35.0 - 47.0 %    MCV 89.6 80.0 - 99.0 FL    MCH 30.2 26.0 - 34.0 PG    MCHC 33.7 30.0 - 36.5 g/dL    RDW 13.1 11.5 - 14.5 %    PLATELET 380 795 - 275 K/uL    MPV 10.7 8.9 - 12.9 FL    NRBC 0.0 0  WBC    ABSOLUTE NRBC 0.00 0.00 - 0.01 K/uL    NEUTROPHILS 85 (H) 32 - 75 %    LYMPHOCYTES 10 (L) 12 - 49 %    MONOCYTES 3 (L) 5 - 13 %    EOSINOPHILS 0 0 - 7 %    BASOPHILS 0 0 - 1 %    IMMATURE GRANULOCYTES 2 (H) 0.0 - 0.5 %    ABS. NEUTROPHILS 8.4 (H) 1.8 - 8.0 K/UL    ABS. LYMPHOCYTES 1.0 0.8 - 3.5 K/UL    ABS. MONOCYTES 0.3 0.0 - 1.0 K/UL    ABS.  EOSINOPHILS 0.0 0.0 - 0.4 K/UL    ABS. BASOPHILS 0.0 0.0 - 0.1 K/UL    ABS. IMM. GRANS. 0.2 (H) 0.00 - 0.04 K/UL    DF AUTOMATED     METABOLIC PANEL, BASIC    Collection Time: 11/29/20  3:55 AM   Result Value Ref Range    Sodium 131 (L) 136 - 145 mmol/L    Potassium 3.8 3.5 - 5.1 mmol/L    Chloride 93 (L) 97 - 108 mmol/L    CO2 29 21 - 32 mmol/L    Anion gap 9 5 - 15 mmol/L    Glucose 191 (H) 65 - 100 mg/dL    BUN 58 (H) 6 - 20 MG/DL    Creatinine 1.29 (H) 0.55 - 1.02 MG/DL    BUN/Creatinine ratio 45 (H) 12 - 20      GFR est AA 50 (L) >60 ml/min/1.73m2    GFR est non-AA 41 (L) >60 ml/min/1.73m2    Calcium 9.5 8.5 - 10.1 MG/DL   GLUCOSE, POC    Collection Time: 11/29/20  8:33 AM   Result Value Ref Range    Glucose (POC) 183 (H) 65 - 100 mg/dL    Performed by Mike AQUINO (CON)    GLUCOSE, POC    Collection Time: 11/29/20 11:18 AM   Result Value Ref Range    Glucose (POC) 147 (H) 65 - 100 mg/dL    Performed by Mike Leroy ([facility])                BRENDA Vega [address]   [address] [address]  Phone - [phone]   Fax - [phone]  [email or web address]

## 2020-11-30 VITALS
BODY MASS INDEX: 39.62 KG/M2 | OXYGEN SATURATION: 94 % | SYSTOLIC BLOOD PRESSURE: 112 MMHG | HEART RATE: 79 BPM | RESPIRATION RATE: 18 BRPM | WEIGHT: 252.43 LBS | DIASTOLIC BLOOD PRESSURE: 67 MMHG | HEIGHT: 67 IN | TEMPERATURE: 98.2 F

## 2020-11-30 LAB
ANION GAP SERPL CALC-SCNC: 6 MMOL/L (ref 5–15)
BASOPHILS # BLD: 0 K/UL (ref 0–0.1)
BASOPHILS NFR BLD: 0 % (ref 0–1)
BUN SERPL-MCNC: 45 MG/DL (ref 6–20)
BUN/CREAT SERPL: 36 (ref 12–20)
CALCIUM SERPL-MCNC: 9 MG/DL (ref 8.5–10.1)
CHLORIDE SERPL-SCNC: 97 MMOL/L (ref 97–108)
CO2 SERPL-SCNC: 29 MMOL/L (ref 21–32)
CREAT SERPL-MCNC: 1.26 MG/DL (ref 0.55–1.02)
DIFFERENTIAL METHOD BLD: ABNORMAL
EOSINOPHIL # BLD: 0.1 K/UL (ref 0–0.4)
EOSINOPHIL NFR BLD: 1 % (ref 0–7)
ERYTHROCYTE [DISTWIDTH] IN BLOOD BY AUTOMATED COUNT: 13.3 % (ref 11.5–14.5)
FIBRINOGEN PPP-MCNC: 365 MG/DL (ref 200–475)
GLUCOSE BLD STRIP.AUTO-MCNC: 114 MG/DL (ref 65–100)
GLUCOSE BLD STRIP.AUTO-MCNC: 121 MG/DL (ref 65–100)
GLUCOSE BLD STRIP.AUTO-MCNC: 138 MG/DL (ref 65–100)
GLUCOSE SERPL-MCNC: 148 MG/DL (ref 65–100)
HCT VFR BLD AUTO: 31.5 % (ref 35–47)
HGB BLD-MCNC: 10.7 G/DL (ref 11.5–16)
IMM GRANULOCYTES # BLD AUTO: 0.2 K/UL (ref 0–0.04)
IMM GRANULOCYTES NFR BLD AUTO: 2 % (ref 0–0.5)
INR PPP: 1.1 (ref 0.9–1.1)
LYMPHOCYTES # BLD: 3 K/UL (ref 0.8–3.5)
LYMPHOCYTES NFR BLD: 25 % (ref 12–49)
MCH RBC QN AUTO: 30.1 PG (ref 26–34)
MCHC RBC AUTO-ENTMCNC: 34 G/DL (ref 30–36.5)
MCV RBC AUTO: 88.5 FL (ref 80–99)
MONOCYTES # BLD: 0.7 K/UL (ref 0–1)
MONOCYTES NFR BLD: 6 % (ref 5–13)
NEUTS SEG # BLD: 8.1 K/UL (ref 1.8–8)
NEUTS SEG NFR BLD: 67 % (ref 32–75)
NRBC # BLD: 0 K/UL (ref 0–0.01)
NRBC BLD-RTO: 0 PER 100 WBC
PLATELET # BLD AUTO: 327 K/UL (ref 150–400)
PMV BLD AUTO: 10.3 FL (ref 8.9–12.9)
POTASSIUM SERPL-SCNC: 3.5 MMOL/L (ref 3.5–5.1)
PROTHROMBIN TIME: 11.4 SEC (ref 9–11.1)
RBC # BLD AUTO: 3.56 M/UL (ref 3.8–5.2)
SERVICE CMNT-IMP: ABNORMAL
SODIUM SERPL-SCNC: 132 MMOL/L (ref 136–145)
WBC # BLD AUTO: 12 K/UL (ref 3.6–11)

## 2020-11-30 PROCEDURE — 85025 COMPLETE CBC W/AUTO DIFF WBC: CPT

## 2020-11-30 PROCEDURE — 80048 BASIC METABOLIC PNL TOTAL CA: CPT

## 2020-11-30 PROCEDURE — 74011250636 HC RX REV CODE- 250/636: Performed by: INTERNAL MEDICINE

## 2020-11-30 PROCEDURE — 97535 SELF CARE MNGMENT TRAINING: CPT

## 2020-11-30 PROCEDURE — 97530 THERAPEUTIC ACTIVITIES: CPT

## 2020-11-30 PROCEDURE — 82962 GLUCOSE BLOOD TEST: CPT

## 2020-11-30 PROCEDURE — 85384 FIBRINOGEN ACTIVITY: CPT

## 2020-11-30 PROCEDURE — 36415 COLL VENOUS BLD VENIPUNCTURE: CPT

## 2020-11-30 PROCEDURE — 74011250637 HC RX REV CODE- 250/637: Performed by: HOSPITALIST

## 2020-11-30 PROCEDURE — 85610 PROTHROMBIN TIME: CPT

## 2020-11-30 PROCEDURE — 74011250637 HC RX REV CODE- 250/637: Performed by: INTERNAL MEDICINE

## 2020-11-30 PROCEDURE — 97116 GAIT TRAINING THERAPY: CPT

## 2020-11-30 RX ORDER — BUMETANIDE 2 MG/1
2 TABLET ORAL DAILY
Qty: 30 TAB | Refills: 0 | Status: SHIPPED | OUTPATIENT
Start: 2020-12-01

## 2020-11-30 RX ORDER — AMLODIPINE BESYLATE 5 MG/1
5 TABLET ORAL DAILY
Qty: 30 TAB | Refills: 0 | Status: SHIPPED | OUTPATIENT
Start: 2020-12-01

## 2020-11-30 RX ORDER — GUAIFENESIN 600 MG/1
600 TABLET, EXTENDED RELEASE ORAL EVERY 12 HOURS
Qty: 10 TAB | Refills: 0 | Status: SHIPPED | OUTPATIENT
Start: 2020-11-30 | End: 2021-06-10

## 2020-11-30 RX ADMIN — Medication 10 ML: at 14:08

## 2020-11-30 RX ADMIN — ENOXAPARIN SODIUM 40 MG: 40 INJECTION SUBCUTANEOUS at 07:22

## 2020-11-30 RX ADMIN — AMLODIPINE BESYLATE 5 MG: 5 TABLET ORAL at 09:51

## 2020-11-30 RX ADMIN — HYDROXYCHLOROQUINE SULFATE 200 MG: 200 TABLET, FILM COATED ORAL at 09:51

## 2020-11-30 RX ADMIN — CARVEDILOL 25 MG: 12.5 TABLET, FILM COATED ORAL at 07:16

## 2020-11-30 RX ADMIN — Medication 81 MG: at 09:51

## 2020-11-30 RX ADMIN — OXYCODONE HYDROCHLORIDE AND ACETAMINOPHEN 500 MG: 500 TABLET ORAL at 09:51

## 2020-11-30 RX ADMIN — ALLOPURINOL 300 MG: 300 TABLET ORAL at 09:51

## 2020-11-30 RX ADMIN — BUMETANIDE 2 MG: 1 TABLET ORAL at 09:51

## 2020-11-30 RX ADMIN — GUAIFENESIN 600 MG: 600 TABLET, EXTENDED RELEASE ORAL at 09:51

## 2020-11-30 RX ADMIN — Medication 10 ML: at 07:16

## 2020-11-30 RX ADMIN — CLOPIDOGREL BISULFATE 75 MG: 75 TABLET ORAL at 09:51

## 2020-11-30 NOTE — PROGRESS NOTES
Godwin Weiss To whom it may concern:       RE: Jai Cantrell (YOB: 1955)        Dear Sir/:    This is to certify that the above referenced patient was hospitalized at [facility] 11/18/2020 through 11/30/2020  She is expected to go home and follow up with her Primary Care physician. Daughter, Kacy Jimenez is the primary care giver.        If you have questions please feel to contact the [address] [place] [address] office at [facility].    Sincerely       ________________________________________  Hermes Nair MD    Hospitalist, [facility]

## 2020-11-30 NOTE — PROGRESS NOTES
Problem: Self Care Deficits Care Plan (Adult)  Goal: *Acute Goals and Plan of Care (Insert Text)  Description:   FUNCTIONAL STATUS PRIOR TO ADMISSION: Patient was independent and active without use of DME. Has a shower chair and rollator she does not use frequently. HOME SUPPORT: The patient lived with  but did not require assist. Patient reports daughter is available to assist with IADLs PRN.  cannot physically assist 2* just getting home from [place] with COVID and being on oxygen. Occupational Therapy Goals  Initiated 11/25/2020  1. Patient will perform grooming standing with VSS for 5 minutes with supervision/set-up within 7 day(s). 2.  Patient will perform bathing using most appropriate DME and with VSS with supervision/set-up within 7 day(s). 3.  Patient will perform lower body dressing using most appropriate DME and with VSS with supervision/set-up within 7 day(s). 4.  Patient will perform toilet transfers with supervision/set-up within 7 day(s). 5.  Patient will perform all aspects of toileting with supervision/set-up within 7 day(s). 6.  Patient will participate in upper extremity therapeutic exercise/activities with supervision/set-up for 5 minutes within 7 day(s). 7.  Patient will utilize energy conservation techniques during functional activities with verbal cues within 7 day(s). Outcome: Progressing Towards Goal    OCCUPATIONAL THERAPY TREATMENT  Patient: Neha Murillo (47 y.o. female)  Date: 11/30/2020  Diagnosis: SOB (shortness of breath) [P03.58]   Chronic systolic heart failure (HCC)       Precautions: (droplet plus), fall  Chart, occupational therapy assessment, plan of care, and goals were reviewed. ASSESSMENT  Patient continues with skilled OT services and is progressing towards goals but is limited by mild GALEANA (on RA), impaired functional endurance, and mild standing balance impairment.   Patient practiced ambulating throughout room (contact guard without AD improving to stand-by with RW) and was encouraged to use a rollator at home, which she already owns. She demonstrates sufficient functional reach for UB and LB access for ADLs and was receptive to education on energy conservation/ work simplification/ ADL modifications. Recommend HHOT at d/c. Current Level of Function Impacting Discharge (ADLs): stand-by assistance         PLAN :  Patient continues to benefit from skilled intervention to address the above impairments. Continue treatment per established plan of care. to address goals. Recommendation for discharge: (in order for the patient to meet his/her long term goals)  Occupational therapy at least 2 days/week in the home     This discharge recommendation:  Has been made in collaboration with the attending provider and/or case management    IF patient discharges home will need the following DME: rollator (already owns)       SUBJECTIVE:   Patient stated I just need to catch my breath    OBJECTIVE DATA SUMMARY:   Cognitive/Behavioral Status:  Neurologic State: Alert  Orientation Level: Oriented X4  Cognition: Appropriate decision making; Appropriate for age attention/concentration; Appropriate safety awareness; Follows commands  Perception: Appears intact  Perseveration: No perseveration noted  Safety/Judgement: Awareness of environment; Insight into deficits    Functional Mobility and Transfers for ADLs:  Bed Mobility:  Supine to Sit: Supervision    Transfers:  Sit to Stand: Supervision     Bed to Chair: Stand-by assistance(using RW)    Balance:  Sitting: Intact  Standing: Impaired; With support  Standing - Static: Good  Standing - Dynamic : Good    ADL Intervention:            Lower Body Dressing Assistance  Socks: Set-up(seated EOB, LLE in tailor sit, reached to RLE on floor)         Cognitive Retraining  Safety/Judgement: Awareness of environment; Insight into deficits      Pain:  Patient did not report pain    Activity Tolerance:   Good    After treatment patient left in no apparent distress:   Sitting in chair and Call bell within reach    COMMUNICATION/COLLABORATION:   The patients plan of care was discussed with: Physical therapist and Registered nurse.      Josefina Jeong OT  Time Calculation: 24 mins

## 2020-11-30 NOTE — PROGRESS NOTES
ROBIN:  crm received a call from Zee at [facility] They are awaiting call from patients PCP. Pt was updated on agency and home health visits at home for PT and OT. Home care will call [facility] in am to confirm and if any changes CRM can call patient and follow up.

## 2020-11-30 NOTE — PROGRESS NOTES
Problem: Mobility Impaired (Adult and Pediatric)  Goal: *Acute Goals and Plan of Care (Insert Text)  Description: FUNCTIONAL STATUS PRIOR TO ADMISSION: Patient was independent and active without use of DME.    HOME SUPPORT PRIOR TO ADMISSION: The patient lived with  but did not require assist. Pt's  is currently at the [place] with plans to d/c home on O2. Pt states daughter may be able to assist.    Physical Therapy Goals  Initiated 11/24/2020; carryover 11/30/2020  1. Patient will move from supine to sit and sit to supine , scoot up and down, and roll side to side in bed with modified independence within 7 day(s). 2.  Patient will transfer from bed to chair and chair to bed with modified independence using the least restrictive device within 7 day(s). 3.  Patient will perform sit to stand with modified independence within 7 day(s). 4.  Patient will ambulate with modified independence for 300 feet with the least restrictive device within 7 day(s). 5.  Patient will ascend/descend 10 stairs with one handrail(s) with modified independence within 7 day(s). 11/30/2020 1419 by Dandy Raza PT  Outcome: Progressing Towards Goal    PHYSICAL THERAPY TREATMENT: WEEKLY REASSESSMENT  Patient: Monroe Giordano (89 y.o. female)  Date: 11/30/2020  Primary Diagnosis: SOB (shortness of breath) [R06.02]        Precautions:   (droplet plus)      ASSESSMENT  Patient continues with skilled PT services and is progressing towards goals - remains most limited by generalized weakness and decreased tolerance to activity leading to impaired gait/balance. Received pt supine in bed on room air - reports just finishing her morning ADLs independently in the bathroom. She tolerated increased activity with minimal GALEANA today and was able to ambulate multiple laps in room with CGA initially without AD, improving to SBA with RW - no major LOB observed.  Provided education on home safety, use of RW, and added assist as needed while recovering. Advised to avoid stairs to 2nd level for the first few days if able (can stay all on the main level), progressing to doing once per day as strength/endurance improves. Pt in agreement and feels that home will be the best option. Will decr frequency to 3x/wk - recommend continued mobility with RN staff to reduce risk of decline. Patient's progression toward goals since last assessment: progressing towards all goals    Current Level of Function Impacting Discharge (mobility/balance): CGA/SBA    Other factors to consider for discharge: none. Progressing well with mobility; will have support from spouse and daughter; has all equipment needed         PLAN :  Goals have been updated based on progression since last assessment. Patient continues to benefit from skilled intervention to address the above impairments. Recommendations and Planned Interventions: bed mobility training, transfer training, gait training, therapeutic exercises, patient and family training/education, and therapeutic activities      Frequency/Duration: Patient will be followed by physical therapy:  3 times a week to address goals.     Recommendation for discharge: (in order for the patient to meet his/her long term goals)  Physical therapy at least 2 days/week in the home AND ensure assist and/or supervision for safety with stair negotiation    This discharge recommendation:  A follow-up discussion with the attending provider and/or case management is planned    IF patient discharges home will need the following DME: patient owns DME required for discharge         SUBJECTIVE:   Patient stated I feel better - I think home will be best.    OBJECTIVE DATA SUMMARY:   HISTORY:    Past Medical History:   Diagnosis Date    Anxiety     Arrhythmia     per pt, treated with med    Arthritis     Asthma     CAD (coronary artery disease)     EF=33 1/3    Chronic pain     fibromyalgia    COVID-19     Diarrhea 3/24/2017 Epigastric pain 3/24/2017    Fibromyalgia     GERD (gastroesophageal reflux disease)     ibs    Gout     Heart failure (HCC)     cardiomyopathy    Hypertension     Psychiatric disorder     h/o severe depression    Stroke [facility])     possible TIA    Unspecified sleep apnea     wears CPAP     Past Surgical History:   Procedure Laterality Date    COLONOSCOPY N/A 8/23/2016    COLONOSCOPY performed by Jose Plaza MD at [facility] ENDOSCOPY    COLONOSCOPY N/A 2/21/2020    COLONOSCOPY performed by Tonja Bey MD at [address] N/A 3/24/2017    FLEXIBLE SIGMOIDOSCOPY  performed by Anabel Nation MD at [facility] AMBULATORY OR    HX APPENDECTOMY      HX ENDOSCOPY      HX GYN      hysterectomy    HX HEENT      left eye surgery    HX KNEE ARTHROSCOPY Right     HX KNEE REPLACEMENT Right     HX ORTHOPAEDIC Right     plantar fascitis repair with implant    HX OTHER SURGICAL      hand surgery left (carpal tunnel, reconstruction of small finger)    SIGMOIDOSCOPY,BIOPSY  3/24/2017         VASCULAR SURGERY PROCEDURE UNLIST      stent in bilateral legs       Personal factors and/or comorbidities impacting plan of care: PMHx    Home Situation  Home Environment: Private residence  # Steps to Enter: 1  One/Two Story Residence: Two story  # of Interior Steps: 15  Interior Rails: Right  Living Alone: No  Support Systems: Spouse/Significant Other/Partner, Family member(s), Child(oneil)  Patient Expects to be Discharged to[de-identified] Private residence  Current DME Used/Available at Home: Ida Gowers, rollator, Shower chair, Grab bars  Tub or Shower Type: Shower    EXAMINATION/PRESENTATION/DECISION MAKING:   Critical Behavior:  Neurologic State: Alert  Orientation Level: Oriented X4  Cognition: Appropriate decision making, Appropriate for age attention/concentration, Appropriate safety awareness, Follows commands  Safety/Judgement: Awareness of environment, Insight into deficits  Hearing:   Auditory  Auditory Impairment: None    Functional Mobility:  Bed Mobility:     Supine to Sit: Supervision     Transfers:  Sit to Stand: Supervision  Stand to Sit: Supervision  Bed to Chair: Stand-by assistance(using RW)     Balance:   Sitting: Intact  Standing: Impaired  Standing - Static: Good  Standing - Dynamic : Good  Ambulation/Gait Training:  Distance (ft): 80 Feet (ft)  Assistive Device: Gait belt;Walker, rolling  Ambulation - Level of Assistance: Contact guard assistance;Stand-by assistance  Gait Abnormalities: Decreased step clearance;Trunk sway increased  Base of Support: Widened  Speed/Lorena: Slow      Activity Tolerance:   Good and observed SOB with activity    After treatment patient left in no apparent distress:   Sitting in chair and Call bell within reach    COMMUNICATION/EDUCATION:   The patients plan of care was discussed with: Occupational therapist and Registered nurse. Fall prevention education was provided and the patient/caregiver indicated understanding., Patient/family have participated as able in goal setting and plan of care. , and Patient/family agree to work toward stated goals and plan of care.     Thank you for this referral.  Leonie Chowdary, PT, DPT   Time Calculation: 23 mins

## 2020-11-30 NOTE — PROGRESS NOTES
Nephrology Progress Note  Kathy Chavez  Date of Admission : 11/18/2020    CC: Follow up for CKD and hypervolemia       Assessment and Plan     CKD IIIb:  - presumed from chronic HTN/DM2 + CMP   - creat stable     Hypervolemia:  - improved      COVID-19 PNA:  - s/p remdesivir, convalescent plasma   - s/p dexamethasone  - per ID and primary team     HFrEF:  - no recent Echo      HTN:  - cont present care  - no ACE/ARB     Gout:  - on allopurinol     DM2:  - per hospitalist     Obesity       Interval History:  Labs stable as is BP  Current Medications: all current  Medications have been eviewed in EPIC  Review of Systems: Review of systems not obtained due to patient factors. Objective:  Vitals:    Vitals:    11/29/20 1819 11/29/20 2043 11/30/20 0200 11/30/20 0835   BP: 112/76 110/68 108/68 116/69   Pulse: 79 68 70 75   Resp: 20 20 18 18   Temp: 98.8 °F (37.1 °C) 97.5 °F (36.4 °C) 97.8 °F (36.6 °C) 98 °F (36.7 °C)   SpO2: 93% 94% 92% 95%   Weight:   114.5 kg (252 lb 6.8 oz)    Height:         Intake and Output:  No intake/output data recorded. 11/28 1901 - 11/30 0700  In: 120 [P.O.:120]  Out: 500 [Urine:500]    Physical Examination:  Not examined in room         []    High complexity decision making was performed  []    Patient is at high-risk of decompensation with multiple organ involvement    Lab Data Personally Reviewed: I have reviewed all the pertinent labs, microbiology data and radiology studies during assessment.     Recent Labs     11/30/20  0853 11/30/20  0852 11/29/20  0355 11/28/20  0431 11/27/20  1458   NA  --  132* 131* 132* 132*   K  --  3.5 3.8 3.6 3.8   CL  --  97 93* 92* 93*   CO2  --  29 29 30 26   GLU  --  148* 191* 211* 184*   BUN  --  45* 58* 71* 77*   CREA  --  1.26* 1.29* 1.55* 1.85*   CA  --  9.0 9.5 9.6 9.4   PHOS  --   --   --   --  3.7   ALB  --   --   --   --  3.6   INR 1.1  --  1.1 1.1  --      Recent Labs     11/30/20  0852 11/29/20  0355 11/28/20  0431   WBC 12.0* 9.9 11.3* HGB 10.7* 11.1* 11.8   HCT 31.5* 32.9* 35.0    289 314     No results found for: SDES  Lab Results   Component Value Date/Time    Culture result: NO GROWTH 5 DAYS 11/21/2020 10:11 AM    Culture result: MIXED UROGENITAL TAZ ISOLATED 08/09/2018 02:00 PM    Culture result: NO GROWTH 1 DAY 03/01/2018 09:50 PM     Recent Results (from the past 24 hour(s))   GLUCOSE, POC    Collection Time: 11/29/20 11:18 AM   Result Value Ref Range    Glucose (POC) 147 (H) 65 - 100 mg/dL    Performed by Delia AQUINO (CON)    GLUCOSE, POC    Collection Time: 11/29/20  5:07 PM   Result Value Ref Range    Glucose (POC) 114 (H) 65 - 100 mg/dL    Performed by Delia AQUINO (CON)    GLUCOSE, POC    Collection Time: 11/29/20  9:52 PM   Result Value Ref Range    Glucose (POC) 207 (H) 65 - 100 mg/dL    Performed by LINDA MIRAMONTES    GLUCOSE, POC    Collection Time: 11/30/20  7:10 AM   Result Value Ref Range    Glucose (POC) 121 (H) 65 - 100 mg/dL    Performed by Cuca Herndon    CBC WITH AUTOMATED DIFF    Collection Time: 11/30/20  8:52 AM   Result Value Ref Range    WBC 12.0 (H) 3.6 - 11.0 K/uL    RBC 3.56 (L) 3.80 - 5.20 M/uL    HGB 10.7 (L) 11.5 - 16.0 g/dL    HCT 31.5 (L) 35.0 - 47.0 %    MCV 88.5 80.0 - 99.0 FL    MCH 30.1 26.0 - 34.0 PG    MCHC 34.0 30.0 - 36.5 g/dL    RDW 13.3 11.5 - 14.5 %    PLATELET 894 470 - 606 K/uL    MPV 10.3 8.9 - 12.9 FL    NRBC 0.0 0  WBC    ABSOLUTE NRBC 0.00 0.00 - 0.01 K/uL    NEUTROPHILS 67 32 - 75 %    LYMPHOCYTES 25 12 - 49 %    MONOCYTES 6 5 - 13 %    EOSINOPHILS 1 0 - 7 %    BASOPHILS 0 0 - 1 %    IMMATURE GRANULOCYTES 2 (H) 0.0 - 0.5 %    ABS. NEUTROPHILS 8.1 (H) 1.8 - 8.0 K/UL    ABS. LYMPHOCYTES 3.0 0.8 - 3.5 K/UL    ABS. MONOCYTES 0.7 0.0 - 1.0 K/UL    ABS. EOSINOPHILS 0.1 0.0 - 0.4 K/UL    ABS. BASOPHILS 0.0 0.0 - 0.1 K/UL    ABS. IMM.  GRANS. 0.2 (H) 0.00 - 0.04 K/UL    DF AUTOMATED     METABOLIC PANEL, BASIC    Collection Time: 11/30/20  8:52 AM   Result Value Ref Range Sodium 132 (L) 136 - 145 mmol/L    Potassium 3.5 3.5 - 5.1 mmol/L    Chloride 97 97 - 108 mmol/L    CO2 29 21 - 32 mmol/L    Anion gap 6 5 - 15 mmol/L    Glucose 148 (H) 65 - 100 mg/dL    BUN 45 (H) 6 - 20 MG/DL    Creatinine 1.26 (H) 0.55 - 1.02 MG/DL    BUN/Creatinine ratio 36 (H) 12 - 20      GFR est AA 52 (L) >60 ml/min/1.73m2    GFR est non-AA 43 (L) >60 ml/min/1.73m2    Calcium 9.0 8.5 - 10.1 MG/DL   PROTHROMBIN TIME + INR    Collection Time: 11/30/20  8:53 AM   Result Value Ref Range    INR 1.1 0.9 - 1.1      Prothrombin time 11.4 (H) 9.0 - 11.1 sec   FIBRINOGEN    Collection Time: 11/30/20  8:53 AM   Result Value Ref Range    Fibrinogen 365 200 - 475 mg/dL               Boris Mendoza MD  [address] [place] [address]  Phone - [phone]   Fax - [phone]  [email or web address]

## 2020-11-30 NOTE — PROGRESS NOTES
I have reviewed discharge instructions with the patient. The patient verbalized understanding. Opportunity for questions and clarification provided. Education on COVID-19 care post discharge provided. Patient to discharge home via family transport.

## 2020-11-30 NOTE — PROGRESS NOTES
ROBIN;  crm notes that patient is now being discharged home with her daughter providing a ride to home. I have asked patient's nurse to obtain orders for home PT and OT. Patient was given choices and she has elected to use [facility]. Referral sent via cc link.

## 2020-11-30 NOTE — ROUTINE PROCESS
Unable to reach PCP at this time as the messaging system continues to play automated replies. Hospital follow-up visit has been scheduled with [facility] for 12/2/2020.   Office will contact patient prior to arrival.  Kathia Mares, Care Management Specialist.

## 2020-11-30 NOTE — PROGRESS NOTES
ID Progress Note  2020    Subjective:       Up in the chair  Denies any discomfort    Review of Systems:            Symptom Y/N Comments   Symptom Y/N Comments   Fever/Chills n      Chest Pain  n      Poor Appetite       Edema        Cough  n     Abdominal Pain        Sputum n      Joint Pain        SOB/GALEANA n      Pruritis/Rash        Nausea/vomit n      Tolerating PT/OT        Diarrhea  n     Tolerating Diet        Constipation  n     Other           Could NOT obtain due to:       Objective:     Vitals:   Visit Vitals  /68   Pulse 70   Temp 97.8 °F (36.6 °C)   Resp 18   Ht 5' 7\" (1.702 m)   Wt 114.5 kg (252 lb 6.8 oz)   SpO2 92% Comment: 91-93%   BMI 39.54 kg/m²        Tmax:  Temp (24hrs), Av °F (36.7 °C), Min:97.5 °F (36.4 °C), Max:98.8 °F (37.1 °C)      PHYSICAL EXAM:  General: Ill appearing, obese. Alert, cooperative, no acute distress    EENT:  EOMI. Anicteric sclerae. MMM  Resp:  Clear breath sounds through out, no wheezing or rales. No accessory muscle use  CV:  Regular  rhythm,  No edema  GI:  Soft, Non distended, Non tender. +Bowel sounds  Neurologic:  Alert and oriented X 3, normal speech,   Psych:   Good insight. Not anxious nor agitated  Skin:  No rashes.   No jaundice    Labs:   Lab Results   Component Value Date/Time    WBC 9.9 2020 03:55 AM    HGB 11.1 (L) 2020 03:55 AM    Hematocrit (POC) 37 09/15/2019 08:03 PM    HCT 32.9 (L) 2020 03:55 AM    PLATELET 936  03:55 AM    MCV 89.6 2020 03:55 AM     Lab Results   Component Value Date/Time    Sodium 131 (L) 2020 03:55 AM    Potassium 3.8 2020 03:55 AM    Chloride 93 (L) 2020 03:55 AM    CO2 29 2020 03:55 AM    Anion gap 9 2020 03:55 AM    Glucose 191 (H) 2020 03:55 AM    BUN 58 (H) 2020 03:55 AM    Creatinine 1.29 (H) 2020 03:55 AM    BUN/Creatinine ratio 45 (H) 2020 03:55 AM    GFR est AA 50 (L) 2020 03:55 AM    GFR est non-AA 41 (L) 2020 03:55 AM    Calcium 9.5 11/29/2020 03:55 AM    Bilirubin, total 0.4 11/25/2020 02:41 AM    Alk. phosphatase 71 11/25/2020 02:41 AM    Protein, total 8.3 (H) 11/25/2020 02:41 AM    Albumin 3.6 11/27/2020 02:58 PM    Globulin 5.3 (H) 11/25/2020 02:41 AM    A-G Ratio 0.6 (L) 11/25/2020 02:41 AM    ALT (SGPT) 39 11/25/2020 02:41 AM           Assessment and Plan   Acute respiratory failure secondary to COVID 19 & PNA  Allergic asthma  - COVID 19 (+)     Ferritin 293, procal 1.62->0.44, CRP 8.87->23.10->4.46    D-dimer 3.15->1.1, legionella (-)    WBC 9.9    Weaned off from supplemental O2; RA O2 sat 93%    Completed 5 day course of Levo    Completed  Remdesivir as of 11/24    S/p convalescent plasma 11/20    Continue with decadron (last dose 11/28), vit C, and Zinc    On plaquenil POA for arthritis per the physician at [facility] system    VICKI (resolved)  - Cret 1.2    Avoid nephrotoxic agent    ID team signing off. Please contact us with any questions. Ready to be discharge to home.      Ildefonso Vargas, NP

## 2020-11-30 NOTE — DISCHARGE SUMMARY
Inpatient hospitalist discharge summary                Brief Overview    PATIENT ID: Jacqueline Da Silva    MRN: [id number]     YOB: 1955    Admitting Provider: Jael Conde MD    Discharging Provider: Yariel Marr MD   To contact this individual call [phone] and ask the  to page. If unavailable ask to be transferred the Adult Hospitalist Department. PCP at discharge: Milagros Arreola [phone]   [address] / [address]    Admission date: 11/18/2020  Date of Discharge: 11/30/20    Chief complaint:   Chief Complaint   Patient presents with    Shortness of Breath     Patient Active Problem List   Diagnosis Code    Knee osteoarthritis M17.10    Chronic systolic heart failure ([facility] [address] I50.22    Diarrhea R19.7    Epigastric pain R10.13    SOB (shortness of breath) R06.02         Discharge diagnosis, hospital course/plan:  As per initil admission summary  72 y. o. female who presents with cough and shortness of breath since 11/8 patient was tested positive Covid at an outside testing center and was taking antibiotics nebulizer and Medrol pack did not improve patient said that cough is increasing in frequency and thickening whitish in color no blood in it patient  also have cold-like symptoms and admitted at [facility] patient has chronic systolic heart failure but she takes all her medications regularly does not seem to be in exacerbation.  Currently patient was saturating well in room air in the ED patient was admitted for further management    COVID-19 pneumonia:  Acute hypoxic respiratory failure resolved   -ID consulted for remdesivir- initiated 11/20  - Dexamethasone was given   -s/p convalescent plasma 11/20  -patient on room air for more than 24 hours. patient with no complaints. Advised to follow up with PCP.  Also advised to come back to ED in case of any worsening symptoms   Patient told me that she will only go to home and she will not go to any rehab place even if PT recommends. Follow up with pulmonology as tele visit      Chronic systolic heart failure NYHA II  -Continue bumex   -Continue home carvedilol  -Continue home Plavix, aspirin, rosuvastatin     VICKI on CKD: improved   -follow up with nephrology as scheduled     History of rheumatoid arthritis:  -will continue with home meds      History of depression:  -Continue home Cymbalta     Gout  -home meds, continue allopurinol      Diabetes type 2 with hyperglycemia  -continue SSI, monitor closely with steroids  -Ha1c 6.5     Hypertension:  -continue carvedilol, amlodipine     Obesity BMI 39.47    On the date of discharge, diagnostic face to face encounter was performed. Patient was hemodynamically stable, offering no new complaints. Denies any shortness of breath at rest, no fevers or chills, no diarrhea or constipation. Patient is agreeable for discharge. Patient understood and verbalized the understanding of the discharge plan. Patient was advised to seek medical help/ care or return to ED, if symptoms recur, worsen or new symptoms develop. Discharge Disposition:  Home or Self Care    Discharge activity:  Activity as tolerated    Code status at discharge:  Full Code     Outpatient follow up:  Please follow up with your PCP in one week  In addition follow up with nephrology as scheduled       Future appointments-  No future appointments.   Follow-up Information     Follow up With Specialties Details Why Contact Info    Jovanna Epstein MD Internal Medicine In 1 week  [address]  [phone]      Diego Ashby MD Nephrology In 1 week  [facility]  [phone]      Jay Kumar PA-C Pulmonary Disease In 1 week  [address] [facility]  [address] [facility]  [facility] [address]  [phone]            Operative procedures performed:      Consults:  IP CONSULT TO HOSPITALIST  IP CONSULT TO INFECTIOUS DISEASES  IP CONSULT TO ANESTHESIOLOGY  IP CONSULT TO NEPHROLOGY  IP CONSULT TO PULMONOLOGY    Procedures:   * No surgery found *    Diet:  DIET CARDIAC    Pertinent test results:  Xr Chest Port    Result Date: 11/22/2020  INDICATION: worsening hypoxia EXAM:  AP CHEST RADIOGRAPH COMPARISON: November 20, 2020 FINDINGS: AP portable view of the chest demonstrates cardiomegaly. Bibasilar airspace disease without definite change. No pneumothorax. The osseous structures are unremarkable. IMPRESSION: No definite change bilateral airspace disease. Xr Chest Port    Result Date: 11/20/2020  Indication: Worsening hypoxia Comparison to 11/18/2020. Portable exam obtained at 177 demonstrates mild worsening in the bilateral pulmonary infiltrates compared to prior examination. Impression: Mild worsening in the bilateral pulmonary infiltrates. Xr Chest Port    Result Date: 11/18/2020  EXAM:  XR CHEST PORT INDICATION:  sob COMPARISON:  2018 FINDINGS: A portable AP radiograph of the chest was obtained at 1340 hours. The patient is on a cardiac monitor. There are vague patchy opacities in the lower lung zones. Ricka Distad Heart size is borderline enlarged. There is no pleural effusions. Bony structures are intact. IMPRESSION: There is suspicion of vague patchy opacities in the lower lung zones but study is limited by patient's body habitus.        Recent Results (from the past 168 hour(s))   GLUCOSE, POC    Collection Time: 11/23/20  4:24 PM   Result Value Ref Range    Glucose (POC) 94 65 - 100 mg/dL    Performed by Dawood Melvin, POC    Collection Time: 11/23/20  9:12 PM   Result Value Ref Range    Glucose (POC) 284 (H) 65 - 100 mg/dL    Performed by [address] [place] [address], COMPREHENSIVE    Collection Time: 11/24/20  3:19 AM   Result Value Ref Range    Sodium 133 (L) 136 - 145 mmol/L    Potassium 3.6 3.5 - 5.1 mmol/L    Chloride 95 (L) 97 - 108 mmol/L    CO2 26 21 - 32 mmol/L    Anion gap 12 5 - 15 mmol/L    Glucose 167 (H) 65 - 100 mg/dL    BUN 75 (H) 6 - 20 MG/DL    Creatinine 1.83 (H) 0.55 - 1.02 MG/DL    BUN/Creatinine ratio 41 (H) 12 - 20      GFR est AA 34 (L) >60 ml/min/1.73m2    GFR est non-AA 28 (L) >60 ml/min/1.73m2    Calcium 9.9 8.5 - 10.1 MG/DL    Bilirubin, total 0.3 0.2 - 1.0 MG/DL    ALT (SGPT) 43 12 - 78 U/L    AST (SGOT) 32 15 - 37 U/L    Alk.  phosphatase 81 45 - 117 U/L    Protein, total 8.6 (H) 6.4 - 8.2 g/dL    Albumin 2.9 (L) 3.5 - 5.0 g/dL    Globulin 5.7 (H) 2.0 - 4.0 g/dL    A-G Ratio 0.5 (L) 1.1 - 2.2     D DIMER    Collection Time: 11/24/20  3:19 AM   Result Value Ref Range    D-dimer 1.63 (H) 0.00 - 0.65 mg/L FEU   PROTHROMBIN TIME + INR    Collection Time: 11/24/20  3:19 AM   Result Value Ref Range    INR 1.2 (H) 0.9 - 1.1      Prothrombin time 12.5 (H) 9.0 - 11.1 sec   C REACTIVE PROTEIN, QT    Collection Time: 11/24/20  3:20 AM   Result Value Ref Range    C-Reactive protein 4.46 (H) 0.00 - 0.60 mg/dL   FERRITIN    Collection Time: 11/24/20  3:20 AM   Result Value Ref Range    Ferritin 661 (H) 26 - 388 NG/ML   LD    Collection Time: 11/24/20  3:20 AM   Result Value Ref Range     (H) 81 - 246 U/L   NT-PRO BNP    Collection Time: 11/24/20  3:20 AM   Result Value Ref Range    NT pro- (H) <125 PG/ML   PROCALCITONIN    Collection Time: 11/24/20  3:20 AM   Result Value Ref Range    Procalcitonin 0.44 ng/mL   SED RATE (ESR)    Collection Time: 11/24/20  3:20 AM   Result Value Ref Range    Sed rate, automated >140 (H) 0 - 30 mm/hr   FIBRINOGEN    Collection Time: 11/24/20  3:22 AM   Result Value Ref Range    Fibrinogen >800 (H) 200 - 475 mg/dL   GLUCOSE, POC    Collection Time: 11/24/20  8:13 AM   Result Value Ref Range    Glucose (POC) 121 (H) 65 - 100 mg/dL    Performed by Cameron Harrison    GLUCOSE, POC    Collection Time: 11/24/20 12:15 PM   Result Value Ref Range    Glucose (POC) 137 (H) 65 - 100 mg/dL    Performed by Cameron Harrison GLUCOSE, POC    Collection Time: 11/24/20  5:01 PM   Result Value Ref Range    Glucose (POC) 117 (H) 65 - 100 mg/dL    Performed by [facility]    GLUCOSE, POC    Collection Time: 11/24/20  9:39 PM   Result Value Ref Range    Glucose (POC) 256 (H) 65 - 100 mg/dL    Performed by [facility]    METABOLIC PANEL, COMPREHENSIVE    Collection Time: 11/25/20  2:41 AM   Result Value Ref Range    Sodium 130 (L) 136 - 145 mmol/L    Potassium 3.7 3.5 - 5.1 mmol/L    Chloride 93 (L) 97 - 108 mmol/L    CO2 27 21 - 32 mmol/L    Anion gap 10 5 - 15 mmol/L    Glucose 209 (H) 65 - 100 mg/dL    BUN 86 (H) 6 - 20 MG/DL    Creatinine 2.09 (H) 0.55 - 1.02 MG/DL    BUN/Creatinine ratio 41 (H) 12 - 20      GFR est AA 29 (L) >60 ml/min/1.73m2    GFR est non-AA 24 (L) >60 ml/min/1.73m2    Calcium 9.8 8.5 - 10.1 MG/DL    Bilirubin, total 0.4 0.2 - 1.0 MG/DL    ALT (SGPT) 39 12 - 78 U/L    AST (SGOT) 22 15 - 37 U/L    Alk.  phosphatase 71 45 - 117 U/L    Protein, total 8.3 (H) 6.4 - 8.2 g/dL    Albumin 3.0 (L) 3.5 - 5.0 g/dL    Globulin 5.3 (H) 2.0 - 4.0 g/dL    A-G Ratio 0.6 (L) 1.1 - 2.2     D DIMER    Collection Time: 11/25/20  2:41 AM   Result Value Ref Range    D-dimer 1.10 (H) 0.00 - 0.65 mg/L FEU   PROTHROMBIN TIME + INR    Collection Time: 11/25/20  2:41 AM   Result Value Ref Range    INR 1.2 (H) 0.9 - 1.1      Prothrombin time 12.4 (H) 9.0 - 11.1 sec   FIBRINOGEN    Collection Time: 11/25/20  2:41 AM   Result Value Ref Range    Fibrinogen 744 (H) 200 - 475 mg/dL   GLUCOSE, POC    Collection Time: 11/25/20  8:27 AM   Result Value Ref Range    Glucose (POC) 134 (H) 65 - 100 mg/dL    Performed by Harsh Miles, POC    Collection Time: 11/25/20 12:19 PM   Result Value Ref Range    Glucose (POC) 136 (H) 65 - 100 mg/dL    Performed by Harsh Miles, POC    Collection Time: 11/25/20  4:59 PM   Result Value Ref Range    Glucose (POC) 144 (H) 65 - 100 mg/dL    Performed by Harsh Miles, POC Collection Time: 11/25/20  9:45 PM   Result Value Ref Range    Glucose (POC) 214 (H) 65 - 100 mg/dL    Performed by Francisco Clayton    D DIMER    Collection Time: 11/26/20  1:06 AM   Result Value Ref Range    D-dimer 0.84 (H) 0.00 - 0.65 mg/L FEU   PROTHROMBIN TIME + INR    Collection Time: 11/26/20  1:06 AM   Result Value Ref Range    INR 1.2 (H) 0.9 - 1.1      Prothrombin time 12.2 (H) 9.0 - 11.1 sec   FIBRINOGEN    Collection Time: 11/26/20  1:06 AM   Result Value Ref Range    Fibrinogen 636 (H) 200 - 475 mg/dL   GLUCOSE, POC    Collection Time: 11/26/20  9:04 AM   Result Value Ref Range    Glucose (POC) 110 (H) 65 - 100 mg/dL    Performed by Johnie FREED    RENAL FUNCTION PANEL    Collection Time: 11/26/20 10:51 AM   Result Value Ref Range    Sodium 130 (L) 136 - 145 mmol/L    Potassium 3.6 3.5 - 5.1 mmol/L    Chloride 91 (L) 97 - 108 mmol/L    CO2 28 21 - 32 mmol/L    Anion gap 11 5 - 15 mmol/L    Glucose 156 (H) 65 - 100 mg/dL    BUN 76 (H) 6 - 20 MG/DL    Creatinine 1.84 (H) 0.55 - 1.02 MG/DL    BUN/Creatinine ratio 41 (H) 12 - 20      GFR est AA 33 (L) >60 ml/min/1.73m2    GFR est non-AA 28 (L) >60 ml/min/1.73m2    Calcium 9.7 8.5 - 10.1 MG/DL    Phosphorus 3.9 2.6 - 4.7 MG/DL    Albumin 3.3 (L) 3.5 - 5.0 g/dL   SAMPLES BEING HELD    Collection Time: 11/26/20 10:51 AM   Result Value Ref Range    SAMPLES BEING HELD 1BLU,1LAV,1SST,1PST,1RED     COMMENT        Add-on orders for these samples will be processed based on acceptable specimen integrity and analyte stability, which may vary by analyte.    GLUCOSE, POC    Collection Time: 11/26/20 12:17 PM   Result Value Ref Range    Glucose (POC) 178 (H) 65 - 100 mg/dL    Performed by Frida Mark, POC    Collection Time: 11/26/20  5:07 PM   Result Value Ref Range    Glucose (POC) 147 (H) 65 - 100 mg/dL    Performed by Charlene Edwards, POC    Collection Time: 11/26/20  8:53 PM   Result Value Ref Range    Glucose (POC) 235 (H) 65 - 100 mg/dL Performed by Melvin Murray PCT    D DIMER    Collection Time: 11/27/20  5:54 AM   Result Value Ref Range    D-dimer 0.56 0.00 - 0.65 mg/L FEU   PROTHROMBIN TIME + INR    Collection Time: 11/27/20  5:54 AM   Result Value Ref Range    INR 1.1 0.9 - 1.1      Prothrombin time 11.7 (H) 9.0 - 11.1 sec   FIBRINOGEN    Collection Time: 11/27/20  5:54 AM   Result Value Ref Range    Fibrinogen 567 (H) 200 - 475 mg/dL   GLUCOSE, POC    Collection Time: 11/27/20  8:27 AM   Result Value Ref Range    Glucose (POC) 160 (H) 65 - 100 mg/dL    Performed by [facility]    GLUCOSE, POC    Collection Time: 11/27/20 12:29 PM   Result Value Ref Range    Glucose (POC) 185 (H) 65 - 100 mg/dL    Performed by Milo Floro    RENAL FUNCTION PANEL    Collection Time: 11/27/20  2:58 PM   Result Value Ref Range    Sodium 132 (L) 136 - 145 mmol/L    Potassium 3.8 3.5 - 5.1 mmol/L    Chloride 93 (L) 97 - 108 mmol/L    CO2 26 21 - 32 mmol/L    Anion gap 13 5 - 15 mmol/L    Glucose 184 (H) 65 - 100 mg/dL    BUN 77 (H) 6 - 20 MG/DL    Creatinine 1.85 (H) 0.55 - 1.02 MG/DL    BUN/Creatinine ratio 42 (H) 12 - 20      GFR est AA 33 (L) >60 ml/min/1.73m2    GFR est non-AA 27 (L) >60 ml/min/1.73m2    Calcium 9.4 8.5 - 10.1 MG/DL    Phosphorus 3.7 2.6 - 4.7 MG/DL    Albumin 3.6 3.5 - 5.0 g/dL   GLUCOSE, POC    Collection Time: 11/27/20  5:12 PM   Result Value Ref Range    Glucose (POC) 128 (H) 65 - 100 mg/dL    Performed by [facility]    GLUCOSE, POC    Collection Time: 11/27/20  9:03 PM   Result Value Ref Range    Glucose (POC) 309 (H) 65 - 100 mg/dL    Performed by Roland Rand    D DIMER    Collection Time: 11/28/20  4:31 AM   Result Value Ref Range    D-dimer 0.59 0.00 - 0.65 mg/L FEU   PROTHROMBIN TIME + INR    Collection Time: 11/28/20  4:31 AM   Result Value Ref Range    INR 1.1 0.9 - 1.1      Prothrombin time 11.8 (H) 9.0 - 11.1 sec   FIBRINOGEN    Collection Time: 11/28/20  4:31 AM   Result Value Ref Range    Fibrinogen 508 (H) 200 - 475 mg/dL   CBC WITH AUTOMATED DIFF    Collection Time: 11/28/20  4:31 AM   Result Value Ref Range    WBC 11.3 (H) 3.6 - 11.0 K/uL    RBC 3.94 3.80 - 5.20 M/uL    HGB 11.8 11.5 - 16.0 g/dL    HCT 35.0 35.0 - 47.0 %    MCV 88.8 80.0 - 99.0 FL    MCH 29.9 26.0 - 34.0 PG    MCHC 33.7 30.0 - 36.5 g/dL    RDW 13.2 11.5 - 14.5 %    PLATELET 817 335 - 881 K/uL    MPV 10.7 8.9 - 12.9 FL    NRBC 0.0 0  WBC    ABSOLUTE NRBC 0.00 0.00 - 0.01 K/uL    NEUTROPHILS 84 (H) 32 - 75 %    BAND NEUTROPHILS 1 %    LYMPHOCYTES 10 (L) 12 - 49 %    MONOCYTES 3 (L) 5 - 13 %    EOSINOPHILS 0 0 - 7 %    BASOPHILS 0 0 - 1 %    IMMATURE GRANULOCYTES 2 (H) 0.0 - 0.5 %    ABS. NEUTROPHILS 9.7 (H) 1.8 - 8.0 K/UL    ABS. LYMPHOCYTES 1.1 0.8 - 3.5 K/UL    ABS. MONOCYTES 0.3 0.0 - 1.0 K/UL    ABS. EOSINOPHILS 0.0 0.0 - 0.4 K/UL    ABS. BASOPHILS 0.0 0.0 - 0.1 K/UL    ABS. IMM.  GRANS. 0.2 (H) 0.00 - 0.04 K/UL    DF SMEAR SCANNED      RBC COMMENTS NORMOCYTIC, NORMOCHROMIC     METABOLIC PANEL, BASIC    Collection Time: 11/28/20  4:31 AM   Result Value Ref Range    Sodium 132 (L) 136 - 145 mmol/L    Potassium 3.6 3.5 - 5.1 mmol/L    Chloride 92 (L) 97 - 108 mmol/L    CO2 30 21 - 32 mmol/L    Anion gap 10 5 - 15 mmol/L    Glucose 211 (H) 65 - 100 mg/dL    BUN 71 (H) 6 - 20 MG/DL    Creatinine 1.55 (H) 0.55 - 1.02 MG/DL    BUN/Creatinine ratio 46 (H) 12 - 20      GFR est AA 41 (L) >60 ml/min/1.73m2    GFR est non-AA 34 (L) >60 ml/min/1.73m2    Calcium 9.6 8.5 - 10.1 MG/DL   GLUCOSE, POC    Collection Time: 11/28/20  9:18 AM   Result Value Ref Range    Glucose (POC) 115 (H) 65 - 100 mg/dL    Performed by Wil Munson    GLUCOSE, POC    Collection Time: 11/28/20 11:59 AM   Result Value Ref Range    Glucose (POC) 150 (H) 65 - 100 mg/dL    Performed by Wil Munson    GLUCOSE, POC    Collection Time: 11/28/20  4:44 PM   Result Value Ref Range    Glucose (POC) 144 (H) 65 - 100 mg/dL    Performed by Wil Munson    GLUCOSE, POC    Collection Time: 11/28/20 9:29 PM   Result Value Ref Range    Glucose (POC) 277 (H) 65 - 100 mg/dL    Performed by Jean Marie Huber PCT    FIBRINOGEN    Collection Time: 11/29/20  3:55 AM   Result Value Ref Range    Fibrinogen 365 200 - 475 mg/dL   PROTHROMBIN TIME + INR    Collection Time: 11/29/20  3:55 AM   Result Value Ref Range    INR 1.1 0.9 - 1.1      Prothrombin time 11.8 (H) 9.0 - 11.1 sec   D DIMER    Collection Time: 11/29/20  3:55 AM   Result Value Ref Range    D-dimer 0.58 0.00 - 0.65 mg/L FEU   CBC WITH AUTOMATED DIFF    Collection Time: 11/29/20  3:55 AM   Result Value Ref Range    WBC 9.9 3.6 - 11.0 K/uL    RBC 3.67 (L) 3.80 - 5.20 M/uL    HGB 11.1 (L) 11.5 - 16.0 g/dL    HCT 32.9 (L) 35.0 - 47.0 %    MCV 89.6 80.0 - 99.0 FL    MCH 30.2 26.0 - 34.0 PG    MCHC 33.7 30.0 - 36.5 g/dL    RDW 13.1 11.5 - 14.5 %    PLATELET 838 551 - 170 K/uL    MPV 10.7 8.9 - 12.9 FL    NRBC 0.0 0  WBC    ABSOLUTE NRBC 0.00 0.00 - 0.01 K/uL    NEUTROPHILS 85 (H) 32 - 75 %    LYMPHOCYTES 10 (L) 12 - 49 %    MONOCYTES 3 (L) 5 - 13 %    EOSINOPHILS 0 0 - 7 %    BASOPHILS 0 0 - 1 %    IMMATURE GRANULOCYTES 2 (H) 0.0 - 0.5 %    ABS. NEUTROPHILS 8.4 (H) 1.8 - 8.0 K/UL    ABS. LYMPHOCYTES 1.0 0.8 - 3.5 K/UL    ABS. MONOCYTES 0.3 0.0 - 1.0 K/UL    ABS. EOSINOPHILS 0.0 0.0 - 0.4 K/UL    ABS. BASOPHILS 0.0 0.0 - 0.1 K/UL    ABS. IMM.  GRANS. 0.2 (H) 0.00 - 0.04 K/UL    DF AUTOMATED     METABOLIC PANEL, BASIC    Collection Time: 11/29/20  3:55 AM   Result Value Ref Range    Sodium 131 (L) 136 - 145 mmol/L    Potassium 3.8 3.5 - 5.1 mmol/L    Chloride 93 (L) 97 - 108 mmol/L    CO2 29 21 - 32 mmol/L    Anion gap 9 5 - 15 mmol/L    Glucose 191 (H) 65 - 100 mg/dL    BUN 58 (H) 6 - 20 MG/DL    Creatinine 1.29 (H) 0.55 - 1.02 MG/DL    BUN/Creatinine ratio 45 (H) 12 - 20      GFR est AA 50 (L) >60 ml/min/1.73m2    GFR est non-AA 41 (L) >60 ml/min/1.73m2    Calcium 9.5 8.5 - 10.1 MG/DL   GLUCOSE, POC    Collection Time: 11/29/20  8:33 AM   Result Value Ref Range    Glucose (POC) 183 (H) 65 - 100 mg/dL    Performed by Alberto AQUINO (CON)    GLUCOSE, POC    Collection Time: 11/29/20 11:18 AM   Result Value Ref Range    Glucose (POC) 147 (H) 65 - 100 mg/dL    Performed by Alberto AQUINO (CON)    GLUCOSE, POC    Collection Time: 11/29/20  5:07 PM   Result Value Ref Range    Glucose (POC) 114 (H) 65 - 100 mg/dL    Performed by Alberto AQUINO (CON)    GLUCOSE, POC    Collection Time: 11/29/20  9:52 PM   Result Value Ref Range    Glucose (POC) 207 (H) 65 - 100 mg/dL    Performed by LINDA MIRAMONTES    GLUCOSE, POC    Collection Time: 11/30/20  7:10 AM   Result Value Ref Range    Glucose (POC) 121 (H) 65 - 100 mg/dL    Performed by Lenora Norwood    CBC WITH AUTOMATED DIFF    Collection Time: 11/30/20  8:52 AM   Result Value Ref Range    WBC 12.0 (H) 3.6 - 11.0 K/uL    RBC 3.56 (L) 3.80 - 5.20 M/uL    HGB 10.7 (L) 11.5 - 16.0 g/dL    HCT 31.5 (L) 35.0 - 47.0 %    MCV 88.5 80.0 - 99.0 FL    MCH 30.1 26.0 - 34.0 PG    MCHC 34.0 30.0 - 36.5 g/dL    RDW 13.3 11.5 - 14.5 %    PLATELET 726 400 - 142 K/uL    MPV 10.3 8.9 - 12.9 FL    NRBC 0.0 0  WBC    ABSOLUTE NRBC 0.00 0.00 - 0.01 K/uL    NEUTROPHILS 67 32 - 75 %    LYMPHOCYTES 25 12 - 49 %    MONOCYTES 6 5 - 13 %    EOSINOPHILS 1 0 - 7 %    BASOPHILS 0 0 - 1 %    IMMATURE GRANULOCYTES 2 (H) 0.0 - 0.5 %    ABS. NEUTROPHILS 8.1 (H) 1.8 - 8.0 K/UL    ABS. LYMPHOCYTES 3.0 0.8 - 3.5 K/UL    ABS. MONOCYTES 0.7 0.0 - 1.0 K/UL    ABS. EOSINOPHILS 0.1 0.0 - 0.4 K/UL    ABS. BASOPHILS 0.0 0.0 - 0.1 K/UL    ABS. IMM.  GRANS. 0.2 (H) 0.00 - 0.04 K/UL    DF AUTOMATED     METABOLIC PANEL, BASIC    Collection Time: 11/30/20  8:52 AM   Result Value Ref Range    Sodium 132 (L) 136 - 145 mmol/L    Potassium 3.5 3.5 - 5.1 mmol/L    Chloride 97 97 - 108 mmol/L    CO2 29 21 - 32 mmol/L    Anion gap 6 5 - 15 mmol/L    Glucose 148 (H) 65 - 100 mg/dL    BUN 45 (H) 6 - 20 MG/DL    Creatinine 1.26 (H) 0.55 - 1.02 MG/DL BUN/Creatinine ratio 36 (H) 12 - 20      GFR est AA 52 (L) >60 ml/min/1.73m2    GFR est non-AA 43 (L) >60 ml/min/1.73m2    Calcium 9.0 8.5 - 10.1 MG/DL   PROTHROMBIN TIME + INR    Collection Time: 11/30/20  8:53 AM   Result Value Ref Range    INR 1.1 0.9 - 1.1      Prothrombin time 11.4 (H) 9.0 - 11.1 sec   FIBRINOGEN    Collection Time: 11/30/20  8:53 AM   Result Value Ref Range    Fibrinogen 365 200 - 475 mg/dL   GLUCOSE, POC    Collection Time: 11/30/20 11:34 AM   Result Value Ref Range    Glucose (POC) 138 (H) 65 - 100 mg/dL    Performed by Lakisha Rodriguez            Physical Exam on Discharge:    Discharge condition: good    Vital signs:   Patient Vitals for the past 12 hrs:   Temp Pulse Resp BP SpO2   11/30/20 1336 98.2 °F (36.8 °C) 79 18 112/67 94 %   11/30/20 0835 98 °F (36.7 °C) 75 18 116/69 95 %   11/30/20 0200 97.8 °F (36.6 °C) 70 18 108/68 92 %       Visit Vitals  /67 (BP 1 Location: Left arm, BP Patient Position: Sitting)   Pulse 79   Temp 98.2 °F (36.8 °C)   Resp 18   Ht 5' 7\" (1.702 m)   Wt 114.5 kg (252 lb 6.8 oz)   SpO2 94%   BMI 39.54 kg/m²     Due to the current Covid pandemic, shortage of PPE and to limit exposure I have not done a physical exam on this patient today    Phone/audio visit only    Current Discharge Medication List      START taking these medications    Details   amLODIPine (NORVASC) 5 mg tablet Take 1 Tab by mouth daily. Qty: 30 Tab, Refills: 0      guaiFENesin ER (MUCINEX) 600 mg ER tablet Take 1 Tab by mouth every twelve (12) hours. Qty: 10 Tab, Refills: 0         CONTINUE these medications which have CHANGED    Details   bumetanide (BUMEX) 2 mg tablet Take 1 Tab by mouth daily. Qty: 30 Tab, Refills: 0         CONTINUE these medications which have NOT CHANGED    Details   albuterol (PROVENTIL HFA, VENTOLIN HFA, PROAIR HFA) 90 mcg/actuation inhaler Take 1 Puff by inhalation every four (4) hours as needed for Wheezing.       colchicine 0.6 mg tablet Take 0.6 mg by mouth daily as needed for Gout or Pain.      gabapentin (NEURONTIN) 300 mg capsule Take 300 mg by mouth three (3) times daily as needed for Pain (arthritis flare ups). famotidine (PEPCID) 40 mg tablet Take 40 mg by mouth daily. sulfaSALAzine (AZULFIDINE) 500 mg tablet Take 500 mg by mouth two (2) times a day. montelukast (SINGULAIR) 10 mg tablet Take 10 mg by mouth daily. fluticasone propionate (FLONASE) 50 mcg/actuation nasal spray 2 Sprays by Both Nostrils route daily as needed. rosuvastatin (CRESTOR) 20 mg tablet Take 20 mg by mouth nightly. dulaglutide (TRULICITY) 1.5 VX/2.2 mL sub-q pen 1.5 mg by SubCUTAneous route every Sunday. clopidogreL (PLAVIX) 75 mg tab Take 75 mg by mouth daily. aspirin delayed-release 81 mg tablet Take 81 mg by mouth daily. hydroxychloroquine (PLAQUENIL) 200 mg tablet Take 200 mg by mouth two (2) times a day. allopurinol (ZYLOPRIM) 300 mg tablet Take 300 mg by mouth daily. plecanatide (TRULANCE) 3 mg tab Take 3 mg by mouth daily as needed. lidocaine (LIDODERM) 5 % 1 Patch by TransDERmal route daily as needed. Apply patch to the affected area for 12 hours a day and remove for 12 hours a day. diclofenac (VOLTAREN) 1 % gel Apply  to affected area as needed. carvedilol (COREG) 25 mg tablet Take 25 mg by mouth two (2) times daily (with meals). DULoxetine (CYMBALTA) 60 mg capsule Take 60 mg by mouth daily. cholecalciferol, vitamin D3, 2,000 unit tab Take 2,000 Units by mouth daily. cyanocobalamin 1,000 mcg tablet Take 1,000 mcg by mouth daily. STOP taking these medications       amLODIPine-benazepril (LOTREL) 5-20 mg per capsule Comments:   Reason for Stopping:         spironolactone (ALDACTONE) 25 mg tablet Comments:   Reason for Stopping:                  Total time spent on discharge planning, counseling and co-ordination of care:   35 minutes    Yariel Marr MD  11/30/20  9:21 AM

## 2020-11-30 NOTE — DISCHARGE INSTRUCTIONS
Advance Care Planning  People with COVID-19 may have no symptoms, mild symptoms, such as fever, cough, and shortness of breath or they may have more severe illness, developing severe and fatal pneumonia. As a result, Advance Care Planning with attention to naming a health care decision maker (someone you trust to make healthcare decisions for you if you could not speak for yourself) and sharing other health care preferences is important BEFORE a possible health crisis. Please contact your Primary Care Provider to discuss Advance Care Planning. Preventing the Spread of Coronavirus Disease 2019 in Homes and Residential Communities  For the most recent information go to [email or web address]    Prevention steps for People with confirmed or suspected COVID-19 (including persons under investigation) who do not need to be hospitalized  and   People with confirmed COVID-19 who were hospitalized and determined to be medically stable to go home    Your healthcare provider and public health staff will evaluate whether you can be cared for at home. If it is determined that you do not need to be hospitalized and can be isolated at home, you will be monitored by staff from your local or state health department. You should follow the prevention steps below until a healthcare provider or local or state health department says you can return to your normal activities. Stay home except to get medical care  People who are mildly ill with COVID-19 are able to isolate at home during their illness. You should restrict activities outside your home, except for getting medical care. Do not go to work, school, or public areas. Avoid using public transportation, ride-sharing, or taxis. Separate yourself from other people and animals in your home  People: As much as possible, you should stay in a specific room and away from other people in your home.  Also, you should use a separate bathroom, if available. Animals: You should restrict contact with pets and other animals while you are sick with COVID-19, just like you would around other people. Although there have not been reports of pets or other animals becoming sick with COVID-19, it is still recommended that people sick with COVID-19 limit contact with animals until more information is known about the virus. When possible, have another member of your household care for your animals while you are sick. If you are sick with COVID-19, avoid contact with your pet, including petting, snuggling, being kissed or licked, and sharing food. If you must care for your pet or be around animals while you are sick, wash your hands before and after you interact with pets and wear a facemask. Call ahead before visiting your doctor  If you have a medical appointment, call the healthcare provider and tell them that you have or may have COVID-19. This will help the healthcare providers office take steps to keep other people from getting infected or exposed. Wear a facemask  You should wear a facemask when you are around other people (e.g., sharing a room or vehicle) or pets and before you enter a healthcare providers office. If you are not able to wear a facemask (for example, because it causes trouble breathing), then people who live with you should not stay in the same room with you, or they should wear a facemask if they enter your room. Cover your coughs and sneezes  Cover your mouth and nose with a tissue when you cough or sneeze. Throw used tissues in a lined trash can. Immediately wash your hands with soap and water for at least 20 seconds or, if soap and water are not available, clean your hands with an alcohol-based hand  that contains at least 60% alcohol.   Clean your hands often  Wash your hands often with soap and water for at least 20 seconds, especially after blowing your nose, coughing, or sneezing; going to the bathroom; and before eating or preparing food. If soap and water are not readily available, use an alcohol-based hand  with at least 60% alcohol, covering all surfaces of your hands and rubbing them together until they feel dry. Soap and water are the best option if hands are visibly dirty. Avoid touching your eyes, nose, and mouth with unwashed hands. Avoid sharing personal household items  You should not share dishes, drinking glasses, cups, eating utensils, towels, or bedding with other people or pets in your home. After using these items, they should be washed thoroughly with soap and water. Clean all high-touch surfaces everyday  High touch surfaces include counters, tabletops, doorknobs, bathroom fixtures, toilets, phones, keyboards, tablets, and bedside tables. Also, clean any surfaces that may have blood, stool, or body fluids on them. Use a household cleaning spray or wipe, according to the label instructions. Labels contain instructions for safe and effective use of the cleaning product including precautions you should take when applying the product, such as wearing gloves and making sure you have good ventilation during use of the product. Monitor your symptoms  Seek prompt medical attention if your illness is worsening (e.g., difficulty breathing). Before seeking care, call your healthcare provider and tell them that you have, or are being evaluated for, COVID-19. Put on a facemask before you enter the facility. These steps will help the healthcare providers office to keep other people in the office or waiting room from getting infected or exposed. Ask your healthcare provider to call the local or state health department. Persons who are placed under active monitoring or facilitated self-monitoring should follow instructions provided by their local health department or occupational health professionals, as appropriate. When working with your local health department check their available hours.   If you have a medical emergency and need to call 911, notify the dispatch personnel that you have, or are being evaluated for COVID-19. If possible, put on a facemask before emergency medical services arrive. Discontinuing home isolation  Patients with confirmed COVID-19 should remain under home isolation precautions until the risk of secondary transmission to others is thought to be low. The decision to discontinue home isolation precautions should be made on a case-by-case basis, in consultation with healthcare providers and [facility] and local health departments. Discharge Instructions       PATIENT ID: Rhiannon Alexander  MRN: [id number]   YOB: 1955    DATE OF ADMISSION: 11/18/2020  1:29 PM    DATE OF DISCHARGE: 11/30/2020    PRIMARY CARE PROVIDER: Imani Duffy MD     ATTENDING PHYSICIAN: Rosaura Freedman MD  DISCHARGING PROVIDER: Kelsea Caba MD    To contact this individual call [phone] and ask the  to page. If unavailable ask to be transferred the Adult Hospitalist Department. DISCHARGE DIAGNOSES COVID    CONSULTATIONS: IP CONSULT TO HOSPITALIST  IP CONSULT TO INFECTIOUS DISEASES  IP CONSULT TO ANESTHESIOLOGY  IP CONSULT TO NEPHROLOGY  IP CONSULT TO PULMONOLOGY    PROCEDURES/SURGERIES: * No surgery found *      FOLLOW UP APPOINTMENTS:   Follow-up Information     Follow up With Specialties Details Why Contact Info    Imani Duffy MD Internal Medicine In 1 week  [facility] 7 [address] [facility] [address]      Braden Montgomery MD Nephrology In 1 week  [facility]  [phone]             ADDITIONAL CARE RECOMMENDATIONS:   Follow up with PCP in a week  Follow up with nephrology as scheduled     DIET: Cardiac Diet      ACTIVITY: Activity as tolerated        DISCHARGE MEDICATIONS:   See Medication Reconciliation Form    · It is important that you take the medication exactly as they are prescribed.    · Keep your medication in the bottles provided by the pharmacist and keep a list of the medication names, dosages, and times to be taken in your wallet. · Do not take other medications without consulting your doctor. NOTIFY YOUR PHYSICIAN FOR ANY OF THE FOLLOWING:   Fever over 101 degrees for 24 hours. Chest pain, shortness of breath, fever, chills, nausea, vomiting, diarrhea, change in mentation, falling, weakness, bleeding. Severe pain or pain not relieved by medications. Or, any other signs or symptoms that you may have questions about. DISPOSITION:    Home With:   OT  PT  HH  RN       SNF/Inpatient Rehab/LTAC    Independent/assisted living    Hospice    Other:     CDMP Checked:   Yes ***     PROBLEM LIST Updated:  Yes ***       Information obtained by :   I understand that if any problems occur once I am at home I am to contact my physician. I understand and acknowledge receipt of the instructions indicated above.                                                                                                                                              Physician's or R.N.'s Signature                                                                  Date/Time                                                                                                                                              Patient or Representative Signature                                                          Date/Time          Signed:   Yung Lind MD  11/30/2020  1:37 PM

## 2020-11-30 NOTE — PROGRESS NOTES
PCCM:    VSS; on room air     D dimer was consistent yesterday at 0.58    PLAN:    Doing well from a pulmonary aspect  Follow up in 7 days as a telemedicine visit.    Chest film in 4 wks   We will be available again as needed    Jagjit Lester PA-C

## 2020-12-01 ENCOUNTER — HOME HEALTH ADMISSION (OUTPATIENT)
Dept: HOME HEALTH SERVICES | Facility: HOME HEALTH | Age: 65
End: 2020-12-01
Payer: MEDICARE

## 2020-12-01 ENCOUNTER — PATIENT OUTREACH (OUTPATIENT)
Dept: CASE MANAGEMENT | Age: 65
End: 2020-12-01

## 2020-12-01 NOTE — PROGRESS NOTES
Patient contacted regarding COVID-19 diagnosis. Discussed COVID-19 related testing which was available at this time. Test results were positive. Patient informed of results, if available? yes. Outreach made within 2 business days of discharge: Yes    Care Transition Nurse/ Ambulatory Care Manager/ LPN Care Coordinator contacted the patient by telephone to perform post discharge assessment. Verified name and  with patient as identifiers. Provided introduction to self, and explanation of the CTN/ACM/LPN role, and reason for call due to risk factors for infection and/or exposure to COVID-19. Symptoms reviewed with patient who verbalized the following symptoms: fatigue and shortness of breath. Due to no new or worsening symptoms encounter was not routed to provider for escalation. Discussed follow-up appointments. If no appointment was previously scheduled, appointment scheduling offered: yes  [facility]ia follow up appointment(s): No future appointments. Non-[facility] follow up appointment(s): to call      Advance Care Planning:   Does patient have an Advance Directive: currently not on file; patient declined education    Patient has following risk factors of: heart failure, COPD and pneumonia. CTN/ACM/LPN reviewed discharge instructions, medical action plan and red flags such as increased shortness of breath, increasing fever and signs of decompensation with patient who verbalized understanding. Discussed exposure protocols and quarantine with CDC Guidelines What to do if you are sick with coronavirus disease .  Patient was given an opportunity for questions and concerns. The patient agrees to contact the [facility] exposure line [phone], [facility] department [facility]  ([phone]) and PCP office for questions related to their healthcare. CTN/ACM provided contact information for future needs.     Reviewed and educated patient on any new and changed medications related to discharge diagnosis. Patient/family/caregiver given information for [employer] and agrees to enroll no      Plan for follow-up call in 5-7 days based on severity of symptoms and risk factors.

## 2020-12-02 ENCOUNTER — HOME CARE VISIT (OUTPATIENT)
Dept: SCHEDULING | Facility: HOME HEALTH | Age: 65
End: 2020-12-02
Payer: MEDICARE

## 2020-12-02 VITALS
TEMPERATURE: 98.4 F | SYSTOLIC BLOOD PRESSURE: 110 MMHG | RESPIRATION RATE: 18 BRPM | HEART RATE: 86 BPM | DIASTOLIC BLOOD PRESSURE: 70 MMHG | OXYGEN SATURATION: 95 %

## 2020-12-02 PROCEDURE — G0151 HHCP-SERV OF PT,EA 15 MIN: HCPCS

## 2020-12-02 PROCEDURE — 3331090002 HH PPS REVENUE DEBIT

## 2020-12-02 PROCEDURE — 3331090001 HH PPS REVENUE CREDIT

## 2020-12-02 PROCEDURE — 400013 HH SOC

## 2020-12-03 ENCOUNTER — HOME CARE VISIT (OUTPATIENT)
Dept: SCHEDULING | Facility: HOME HEALTH | Age: 65
End: 2020-12-03
Payer: MEDICARE

## 2020-12-03 VITALS — HEART RATE: 87 BPM | RESPIRATION RATE: 20 BRPM | TEMPERATURE: 97.3 F | OXYGEN SATURATION: 94 %

## 2020-12-03 PROCEDURE — 3331090001 HH PPS REVENUE CREDIT

## 2020-12-03 PROCEDURE — 3331090002 HH PPS REVENUE DEBIT

## 2020-12-03 PROCEDURE — G0152 HHCP-SERV OF OT,EA 15 MIN: HCPCS

## 2020-12-04 PROCEDURE — 3331090001 HH PPS REVENUE CREDIT

## 2020-12-04 PROCEDURE — 3331090002 HH PPS REVENUE DEBIT

## 2020-12-05 PROCEDURE — 3331090001 HH PPS REVENUE CREDIT

## 2020-12-05 PROCEDURE — 3331090002 HH PPS REVENUE DEBIT

## 2020-12-06 PROCEDURE — 3331090002 HH PPS REVENUE DEBIT

## 2020-12-06 PROCEDURE — 3331090001 HH PPS REVENUE CREDIT

## 2020-12-07 ENCOUNTER — HOME CARE VISIT (OUTPATIENT)
Dept: SCHEDULING | Facility: HOME HEALTH | Age: 65
End: 2020-12-07
Payer: MEDICARE

## 2020-12-07 VITALS — RESPIRATION RATE: 20 BRPM | HEART RATE: 107 BPM | TEMPERATURE: 97.9 F | OXYGEN SATURATION: 95 %

## 2020-12-07 PROCEDURE — G0152 HHCP-SERV OF OT,EA 15 MIN: HCPCS

## 2020-12-07 PROCEDURE — 3331090001 HH PPS REVENUE CREDIT

## 2020-12-07 PROCEDURE — 3331090002 HH PPS REVENUE DEBIT

## 2020-12-08 ENCOUNTER — PATIENT OUTREACH (OUTPATIENT)
Dept: CASE MANAGEMENT | Age: 65
End: 2020-12-08

## 2020-12-08 PROCEDURE — 3331090001 HH PPS REVENUE CREDIT

## 2020-12-08 PROCEDURE — 3331090002 HH PPS REVENUE DEBIT

## 2020-12-09 ENCOUNTER — HOME CARE VISIT (OUTPATIENT)
Dept: SCHEDULING | Facility: HOME HEALTH | Age: 65
End: 2020-12-09
Payer: MEDICARE

## 2020-12-09 VITALS
RESPIRATION RATE: 18 BRPM | TEMPERATURE: 97.5 F | DIASTOLIC BLOOD PRESSURE: 80 MMHG | HEART RATE: 100 BPM | SYSTOLIC BLOOD PRESSURE: 125 MMHG | OXYGEN SATURATION: 95 %

## 2020-12-09 PROCEDURE — G0151 HHCP-SERV OF PT,EA 15 MIN: HCPCS

## 2020-12-09 PROCEDURE — 3331090002 HH PPS REVENUE DEBIT

## 2020-12-09 PROCEDURE — 3331090001 HH PPS REVENUE CREDIT

## 2020-12-10 ENCOUNTER — HOME CARE VISIT (OUTPATIENT)
Dept: SCHEDULING | Facility: HOME HEALTH | Age: 65
End: 2020-12-10
Payer: MEDICARE

## 2020-12-10 VITALS — RESPIRATION RATE: 20 BRPM | HEART RATE: 94 BPM | TEMPERATURE: 97.9 F | OXYGEN SATURATION: 97 %

## 2020-12-10 PROCEDURE — 3331090002 HH PPS REVENUE DEBIT

## 2020-12-10 PROCEDURE — G0152 HHCP-SERV OF OT,EA 15 MIN: HCPCS

## 2020-12-10 PROCEDURE — 3331090001 HH PPS REVENUE CREDIT

## 2020-12-11 ENCOUNTER — HOME CARE VISIT (OUTPATIENT)
Dept: SCHEDULING | Facility: HOME HEALTH | Age: 65
End: 2020-12-11
Payer: MEDICARE

## 2020-12-11 VITALS
OXYGEN SATURATION: 93 % | TEMPERATURE: 97.3 F | SYSTOLIC BLOOD PRESSURE: 130 MMHG | HEART RATE: 98 BPM | RESPIRATION RATE: 18 BRPM | DIASTOLIC BLOOD PRESSURE: 80 MMHG

## 2020-12-11 PROCEDURE — G0151 HHCP-SERV OF PT,EA 15 MIN: HCPCS

## 2020-12-11 PROCEDURE — 3331090001 HH PPS REVENUE CREDIT

## 2020-12-11 PROCEDURE — 3331090002 HH PPS REVENUE DEBIT

## 2020-12-12 PROCEDURE — 3331090002 HH PPS REVENUE DEBIT

## 2020-12-12 PROCEDURE — 3331090001 HH PPS REVENUE CREDIT

## 2020-12-13 PROCEDURE — 3331090001 HH PPS REVENUE CREDIT

## 2020-12-13 PROCEDURE — 3331090002 HH PPS REVENUE DEBIT

## 2020-12-14 PROCEDURE — 3331090001 HH PPS REVENUE CREDIT

## 2020-12-14 PROCEDURE — 3331090002 HH PPS REVENUE DEBIT

## 2020-12-15 ENCOUNTER — HOME CARE VISIT (OUTPATIENT)
Dept: SCHEDULING | Facility: HOME HEALTH | Age: 65
End: 2020-12-15
Payer: MEDICARE

## 2020-12-15 VITALS
OXYGEN SATURATION: 94 % | DIASTOLIC BLOOD PRESSURE: 65 MMHG | HEART RATE: 94 BPM | TEMPERATURE: 97.5 F | RESPIRATION RATE: 16 BRPM | SYSTOLIC BLOOD PRESSURE: 120 MMHG

## 2020-12-15 PROCEDURE — 3331090002 HH PPS REVENUE DEBIT

## 2020-12-15 PROCEDURE — 3331090001 HH PPS REVENUE CREDIT

## 2020-12-15 PROCEDURE — G0151 HHCP-SERV OF PT,EA 15 MIN: HCPCS

## 2020-12-16 ENCOUNTER — HOME CARE VISIT (OUTPATIENT)
Dept: SCHEDULING | Facility: HOME HEALTH | Age: 65
End: 2020-12-16
Payer: MEDICARE

## 2020-12-16 VITALS — HEART RATE: 89 BPM | TEMPERATURE: 97.9 F | OXYGEN SATURATION: 99 %

## 2020-12-16 PROCEDURE — 3331090001 HH PPS REVENUE CREDIT

## 2020-12-16 PROCEDURE — G0152 HHCP-SERV OF OT,EA 15 MIN: HCPCS

## 2020-12-16 PROCEDURE — 3331090002 HH PPS REVENUE DEBIT

## 2020-12-17 PROCEDURE — 3331090002 HH PPS REVENUE DEBIT

## 2020-12-17 PROCEDURE — 3331090001 HH PPS REVENUE CREDIT

## 2020-12-18 ENCOUNTER — HOME CARE VISIT (OUTPATIENT)
Dept: SCHEDULING | Facility: HOME HEALTH | Age: 65
End: 2020-12-18
Payer: MEDICARE

## 2020-12-18 VITALS
OXYGEN SATURATION: 97 % | RESPIRATION RATE: 18 BRPM | TEMPERATURE: 97.4 F | HEART RATE: 87 BPM | SYSTOLIC BLOOD PRESSURE: 125 MMHG | DIASTOLIC BLOOD PRESSURE: 80 MMHG

## 2020-12-18 PROCEDURE — G0151 HHCP-SERV OF PT,EA 15 MIN: HCPCS

## 2020-12-18 PROCEDURE — 3331090001 HH PPS REVENUE CREDIT

## 2020-12-18 PROCEDURE — 3331090002 HH PPS REVENUE DEBIT

## 2020-12-19 PROCEDURE — 3331090002 HH PPS REVENUE DEBIT

## 2020-12-19 PROCEDURE — 3331090001 HH PPS REVENUE CREDIT

## 2020-12-20 PROCEDURE — 3331090001 HH PPS REVENUE CREDIT

## 2020-12-20 PROCEDURE — 3331090002 HH PPS REVENUE DEBIT

## 2020-12-21 PROCEDURE — 3331090002 HH PPS REVENUE DEBIT

## 2020-12-21 PROCEDURE — 3331090001 HH PPS REVENUE CREDIT

## 2020-12-22 PROCEDURE — 3331090001 HH PPS REVENUE CREDIT

## 2020-12-22 PROCEDURE — 3331090002 HH PPS REVENUE DEBIT

## 2020-12-23 ENCOUNTER — HOME CARE VISIT (OUTPATIENT)
Dept: SCHEDULING | Facility: HOME HEALTH | Age: 65
End: 2020-12-23
Payer: MEDICARE

## 2020-12-23 VITALS
HEART RATE: 94 BPM | RESPIRATION RATE: 16 BRPM | SYSTOLIC BLOOD PRESSURE: 120 MMHG | TEMPERATURE: 97.4 F | OXYGEN SATURATION: 96 % | DIASTOLIC BLOOD PRESSURE: 70 MMHG

## 2020-12-23 PROCEDURE — 3331090002 HH PPS REVENUE DEBIT

## 2020-12-23 PROCEDURE — 3331090001 HH PPS REVENUE CREDIT

## 2020-12-23 PROCEDURE — G0151 HHCP-SERV OF PT,EA 15 MIN: HCPCS

## 2020-12-24 PROCEDURE — 3331090001 HH PPS REVENUE CREDIT

## 2020-12-24 PROCEDURE — 3331090002 HH PPS REVENUE DEBIT

## 2020-12-25 PROCEDURE — 3331090002 HH PPS REVENUE DEBIT

## 2020-12-25 PROCEDURE — 3331090001 HH PPS REVENUE CREDIT

## 2020-12-26 PROCEDURE — 3331090002 HH PPS REVENUE DEBIT

## 2020-12-26 PROCEDURE — 3331090001 HH PPS REVENUE CREDIT

## 2020-12-27 PROCEDURE — 3331090001 HH PPS REVENUE CREDIT

## 2020-12-27 PROCEDURE — 3331090002 HH PPS REVENUE DEBIT

## 2020-12-28 PROCEDURE — 3331090002 HH PPS REVENUE DEBIT

## 2020-12-28 PROCEDURE — 3331090001 HH PPS REVENUE CREDIT

## 2020-12-29 ENCOUNTER — TELEPHONE (OUTPATIENT)
Dept: NEUROLOGY | Age: 65
End: 2020-12-29

## 2020-12-29 ENCOUNTER — HOME CARE VISIT (OUTPATIENT)
Dept: SCHEDULING | Facility: HOME HEALTH | Age: 65
End: 2020-12-29
Payer: MEDICARE

## 2020-12-29 VITALS
RESPIRATION RATE: 16 BRPM | SYSTOLIC BLOOD PRESSURE: 120 MMHG | TEMPERATURE: 97.7 F | HEART RATE: 101 BPM | OXYGEN SATURATION: 98 % | DIASTOLIC BLOOD PRESSURE: 70 MMHG

## 2020-12-29 PROCEDURE — 3331090001 HH PPS REVENUE CREDIT

## 2020-12-29 PROCEDURE — 3331090002 HH PPS REVENUE DEBIT

## 2020-12-29 PROCEDURE — G0151 HHCP-SERV OF PT,EA 15 MIN: HCPCS

## 2020-12-29 NOTE — TELEPHONE ENCOUNTER
----- Message from Analisa Martin sent at 12/29/2020 10:17 AM EST -----  Regarding: Office/Telephone  General Message/Vendor Calls    Caller's first and last name:  Kyree Zurita with Dr. Poncho Pittman office      Reason for call: would like to schedule a NP for diagnosis code  cervical issues      Callback required yes/no and why: yes      Best contact number(s):[phone]      Details to clarify the request:      Analisa Martin

## 2021-01-07 ENCOUNTER — HOSPITAL ENCOUNTER (OUTPATIENT)
Dept: GENERAL RADIOLOGY | Age: 66
Discharge: HOME | End: 2021-01-07
Attending: PHYSICIAN ASSISTANT
Payer: MEDICARE

## 2021-01-07 ENCOUNTER — TRANSCRIBE ORDER (OUTPATIENT)
Dept: GENERAL RADIOLOGY | Age: 66
End: 2021-01-07

## 2021-01-07 DIAGNOSIS — U07.1 COVID-19: Primary | ICD-10-CM

## 2021-01-07 DIAGNOSIS — U07.1 COVID-19: ICD-10-CM

## 2021-01-07 PROCEDURE — 71046 X-RAY EXAM CHEST 2 VIEWS: CPT

## 2021-04-17 ENCOUNTER — HOSPITAL ENCOUNTER (EMERGENCY)
Age: 66
Discharge: HOME | End: 2021-04-17
Attending: EMERGENCY MEDICINE
Payer: MEDICARE

## 2021-04-17 VITALS
HEIGHT: 67 IN | TEMPERATURE: 98.1 F | HEART RATE: 90 BPM | WEIGHT: 255 LBS | BODY MASS INDEX: 40.02 KG/M2 | SYSTOLIC BLOOD PRESSURE: 117 MMHG | DIASTOLIC BLOOD PRESSURE: 70 MMHG | RESPIRATION RATE: 18 BRPM | OXYGEN SATURATION: 96 %

## 2021-04-17 DIAGNOSIS — M54.42 ACUTE LEFT-SIDED LOW BACK PAIN WITH LEFT-SIDED SCIATICA: Primary | ICD-10-CM

## 2021-04-17 PROCEDURE — 74011250637 HC RX REV CODE- 250/637: Performed by: NURSE PRACTITIONER

## 2021-04-17 PROCEDURE — A9270 NON-COVERED ITEM OR SERVICE: HCPCS | Performed by: NURSE PRACTITIONER

## 2021-04-17 PROCEDURE — 99283 EMERGENCY DEPT VISIT LOW MDM: CPT

## 2021-04-17 PROCEDURE — 96372 THER/PROPH/DIAG INJ SC/IM: CPT

## 2021-04-17 PROCEDURE — 74011250636 HC RX REV CODE- 250/636: Performed by: NURSE PRACTITIONER

## 2021-04-17 PROCEDURE — 74011636637 HC RX REV CODE- 636/637: Performed by: NURSE PRACTITIONER

## 2021-04-17 RX ORDER — PREDNISONE 20 MG/1
40 TABLET ORAL DAILY
Qty: 8 TAB | Refills: 0 | Status: SHIPPED | OUTPATIENT
Start: 2021-04-18 | End: 2021-04-22

## 2021-04-17 RX ORDER — HYDROCODONE BITARTRATE AND ACETAMINOPHEN 5; 325 MG/1; MG/1
1 TABLET ORAL
Status: COMPLETED | OUTPATIENT
Start: 2021-04-17 | End: 2021-04-17

## 2021-04-17 RX ORDER — METHOCARBAMOL 750 MG/1
750 TABLET, FILM COATED ORAL
Qty: 21 TAB | Refills: 0 | Status: SHIPPED | OUTPATIENT
Start: 2021-04-17 | End: 2021-04-24

## 2021-04-17 RX ORDER — DIAZEPAM 2 MG/1
2 TABLET ORAL ONCE
Status: COMPLETED | OUTPATIENT
Start: 2021-04-17 | End: 2021-04-17

## 2021-04-17 RX ORDER — KETOROLAC TROMETHAMINE 30 MG/ML
30 INJECTION, SOLUTION INTRAMUSCULAR; INTRAVENOUS
Status: COMPLETED | OUTPATIENT
Start: 2021-04-17 | End: 2021-04-17

## 2021-04-17 RX ORDER — HYDROCODONE BITARTRATE AND ACETAMINOPHEN 5; 325 MG/1; MG/1
1 TABLET ORAL
Qty: 12 TAB | Refills: 0 | Status: SHIPPED | OUTPATIENT
Start: 2021-04-17 | End: 2021-04-20

## 2021-04-17 RX ADMIN — DIAZEPAM 2 MG: 2 TABLET ORAL at 17:45

## 2021-04-17 RX ADMIN — KETOROLAC TROMETHAMINE 30 MG: 30 INJECTION, SOLUTION INTRAMUSCULAR at 17:45

## 2021-04-17 RX ADMIN — PREDNISONE 50 MG: 10 TABLET ORAL at 17:44

## 2021-04-17 RX ADMIN — HYDROCODONE BITARTRATE AND ACETAMINOPHEN 1 TABLET: 5; 325 TABLET ORAL at 17:44

## 2021-04-17 NOTE — ED TRIAGE NOTES
Pt arrives to ED via wheelchair accompanied by  with c/o lower back pain radiating into buttocks with numbness onset Wednesday. Denies injury, bladder or bowel incontinence.

## 2021-04-17 NOTE — DISCHARGE INSTRUCTIONS
Please consider following up with pain management to discuss treatment options for recurrent sciatica. Please STOP taking cyclobenzaprine (flexeril) and START taking norco, robaxin, and prednisone for low back pain. If your blood sugar levels go to > 250 stop taking the prednisone. List of Pain Management Specialists:    Vance Goodwin M.D. [phone] Pain Management  Elisa Cid M.D. [phone] Physical Medicine and Leonardo Rodriguez M.D. [phone] Physical Medicine and Nayeli Griffin M.D. [phone] Pain Management  Mauricio De Oliveira M.D. [phone] Pain Management    Dr. Catalina West, [facility]8 [address], [address]  [phone]    Pain Management Center                            Dr. Margot Denver, G MD DO  [address], [address]  [phone]                                                  [phone]    Dr. Leola Weaver.  [address]                                     [place] [phone]                                                                            Dr. Nate Arce, [facility] [address] [facility]  [address] [place] [address]  [facility], [address] [facility], 16[address] [facility], [address]  www. [employer]                                            [phone]                                                                                                                          Dr. Ena Davis, [address]  [place], [facility] [address]       [phone]              Dr. Manpreet Munoz  [facility], [address] [place] [address]  21       Dr. Jose Jenkins 53, [facility]. [address] [facility]. [address], [address] [address]  [phone]  [phone]   128 [facility]  7785 [facility], [address] [facility]  [phone]    Pain Management, Physical Medicine & Rehabilitation  Dr. Diomedes Bajwa Do, [address] [place] [address]    Pain Management  Dr. Steffi Wright  [address] [facility], [address] [facility]    Physical Medicine & Rehabilitation, Pain Management  Rhona Russell MD  [address]    Nicol Sam DO, PMR  [address] [address] [place] [address]  [phone]      Please also consider natural alternatives to pain relief such as physical therapy, massage therapy, acupuncture, chiropractors, reflexology, and trigger point injections. [employer]  [email or web address]

## 2021-04-17 NOTE — ED PROVIDER NOTES
This is a 70-year-old female with past medical history including anxiety, arthritis, CAD, fibromyalgia, diarrhea, chronic pain, sleep apnea who presents ER today for evaluation of back pain and leg pain. Patient states that she started develop atraumatic right lower back pain and left sided buttock and anterior hip pain on Wednesday of this past week which felt consistent with her prior sciatica. She states that the pain is also associated with a sensation of \"feeling like my leg is falling asleep\" that does not extend below the level of the knee and is located anteriorly in origin. Pain aggravated by ambulation and changes in position; pain not alleviated by Tylenol and Flexeril. She denies any history of back surgeries and denies any saddle numbness, bowel/bladder disturbances, and foot drop.            Past Medical History:   Diagnosis Date    Anxiety     Arrhythmia     per pt, treated with med    Arthritis     Asthma     CAD (coronary artery disease)     EF=33 1/3    Chronic pain     fibromyalgia    COVID-19     Diarrhea 3/24/2017    Epigastric pain 3/24/2017    Fibromyalgia     GERD (gastroesophageal reflux disease)     ibs    Gout     Heart failure (HCC)     cardiomyopathy    Hypertension     Psychiatric disorder     h/o severe depression    Stroke ([facility] [address]     possible TIA    Unspecified sleep apnea     wears CPAP       Past Surgical History:   Procedure Laterality Date    COLONOSCOPY N/A 8/23/2016    COLONOSCOPY performed by Tahmina Mabry MD at [facility] ENDOSCOPY    COLONOSCOPY N/A 2/21/2020    COLONOSCOPY performed by Joshua Carreno MD at [place] [address] N/A 3/24/2017    FLEXIBLE SIGMOIDOSCOPY  performed by Kasey Graham MD at [facility] AMBULATORY OR    HX APPENDECTOMY      HX ENDOSCOPY      HX GYN      hysterectomy    HX HEENT      left eye surgery    HX KNEE ARTHROSCOPY Right     HX KNEE REPLACEMENT Right     HX ORTHOPAEDIC Right     plantar fascitis repair with implant    HX OTHER SURGICAL      hand surgery left (carpal tunnel, reconstruction of small finger)    SIGMOIDOSCOPY,BIOPSY  3/24/2017         VASCULAR SURGERY PROCEDURE UNLIST      stent in bilateral legs         Family History:   Problem Relation Age of Onset    Diabetes Mother        Social History     Socioeconomic History    Marital status:      Spouse name: Not on file    Number of children: Not on file    Years of education: Not on file    Highest education level: Not on file   Occupational History    Not on file   Social Needs    Financial resource strain: Not on file    Food insecurity     Worry: Not on file     Inability: Not on file    Transportation needs     Medical: Not on file     Non-medical: Not on file   Tobacco Use    Smoking status: Never Smoker    Smokeless tobacco: Never Used   Substance and Sexual Activity    Alcohol use: No    Drug use: No     Types: OTC, Prescription    Sexual activity: Never   Lifestyle    Physical activity     Days per week: Not on file     Minutes per session: Not on file    Stress: Not on file   Relationships    Social connections     Talks on phone: Not on file     Gets together: Not on file     Attends Latter-day service: Not on file     Active member of club or organization: Not on file     Attends meetings of clubs or organizations: Not on file     Relationship status: Not on file    Intimate partner violence     Fear of current or ex partner: Not on file     Emotionally abused: Not on file     Physically abused: Not on file     Forced sexual activity: Not on file   Other Topics Concern    Not on file   Social History Narrative    Not on file         ALLERGIES: Codeine, Demerol [meperidine], Ivp [fd and c blue no.1], Minoxidil, Penicillamine, and Percocet [oxycodone-acetaminophen]    Review of Systems   Constitutional: Negative for fever. HENT: Negative for sore throat. Eyes: Negative for visual disturbance. Respiratory: Negative for shortness of breath. Cardiovascular: Negative for palpitations. Gastrointestinal: Negative for vomiting. Genitourinary: Negative for dysuria. Musculoskeletal: Positive for back pain, gait problem and myalgias. Skin: Negative for rash. Neurological: Negative for dizziness. Psychiatric/Behavioral: Negative for dysphoric mood. Vitals:    04/17/21 1617   BP: 117/70   Pulse: 90   Resp: 18   Temp: 98.1 °F (36.7 °C)   SpO2: 96%   Weight: 115.7 kg (255 lb)   Height: 5' 7\" (1.702 m)            Physical Exam  Vitals signs and nursing note reviewed. Constitutional:       General: She is not in acute distress. Appearance: She is obese. She is not ill-appearing. HENT:      Head: Normocephalic. Nose: Nose normal.   Eyes:      General: No scleral icterus. Extraocular Movements: Extraocular movements intact. Neck:      Musculoskeletal: Normal range of motion and neck supple. Cardiovascular:      Rate and Rhythm: Normal rate and regular rhythm. Pulmonary:      Effort: Pulmonary effort is normal.   Abdominal:      General: There is no distension. Tenderness: There is no guarding. Musculoskeletal:      Lumbar back: She exhibits decreased range of motion, tenderness and pain. Comments: Diffuse lower back pain most prominent in the right paraspinal muscles. Negative straight leg raise and cross leg raise. 5/5 strength with plantar flexion dorsiflexion; 4/5 strength with hip flexion which is likely due to pain due to significant grimacing when attempting to complete the motor task. No skin rash / lesions   Skin:     General: Skin is warm and dry. Neurological:      Mental Status: She is alert and oriented to person, place, and time. Mental status is at baseline. Psychiatric:         Mood and Affect: Mood normal.         Behavior: Behavior normal.         Thought Content:  Thought content normal.         Judgment: Judgment normal.          MDM VITAL SIGNS:  Patient Vitals for the past 4 hrs:   Temp Pulse Resp BP SpO2   04/17/21 1617 98.1 °F (36.7 °C) 90 18 117/70 96 %         LABS:  No results found for this or any previous visit (from the past 6 hour(s)). IMAGING:  No orders to display         Medications During Visit:  Medications   predniSONE (DELTASONE) tablet 50 mg (50 mg Oral Given 4/17/21 1744)   ketorolac (TORADOL) injection 30 mg (30 mg IntraMUSCular Given 4/17/21 1745)   diazePAM (VALIUM) tablet 2 mg (2 mg Oral Given 4/17/21 1745)   HYDROcodone-acetaminophen (NORCO) 5-325 mg per tablet 1 Tab (1 Tab Oral Given 4/17/21 1744)         DECISION MAKING:  Porsche Pelaez is a 72 y.o. female who comes in as above. Imaging deferred due to lack of trauma leading to recurrent sciatic symptoms. Plan to treat with Robaxin, Norco, Voltaren gel, and short course of prednisone (states baseline blood sugars are about 110) and follow-up with pain management for recurrent sciatica. The clinical decision making for this encounter included ordering and interpreting the above diagnostic tests with comparison to prior studies that are within our EMR. Past medical and surgical histories were reviewed, as were records from recent outpatient and emergency department visits. The above results discussed and reviewed with the patient. Patient verbalized understanding of the care plan, including any changes to current outpatient medication regimen, discussed disease process, symptom control, and follow-up care. Return precautions reviewed. IMPRESSION:  1. Acute left-sided low back pain with left-sided sciatica        DISPOSITION:  Discharged      Current Discharge Medication List      START taking these medications    Details   predniSONE (DELTASONE) 20 mg tablet Take 40 mg by mouth daily for 4 days.   Qty: 8 Tab, Refills: 0      methocarbamoL (ROBAXIN) 750 mg tablet Take 1 Tab by mouth three (3) times daily as needed for Muscle Spasm(s) for up to 7 days.  Qty: 21 Tab, Refills: 0      HYDROcodone-acetaminophen (Norco) 5-325 mg per tablet Take 1 Tab by mouth every six (6) hours as needed for Pain for up to 3 days. Max Daily Amount: 4 Tabs. Qty: 12 Tab, Refills: 0    Associated Diagnoses: Acute left-sided low back pain with left-sided sciatica              Follow-up Information     Follow up With Specialties Details Why Contact Info    Tabby Ortega MD Internal Medicine Schedule an appointment as soon as possible for a visit  As needed [address] [place] [address]  [phone]      See pain management specialists                The patient is asked to follow-up with their primary care provider in the next several days. They are to call tomorrow for an appointment. The patient is asked to return promptly for any increased concerns or worsening of symptoms. They can return to this emergency department or any other emergency department.

## 2021-05-03 ENCOUNTER — HOSPITAL ENCOUNTER (OUTPATIENT)
Dept: PREADMISSION TESTING | Age: 66
Discharge: HOME | End: 2021-05-03
Payer: MEDICARE

## 2021-05-03 LAB — SARS-COV-2, COV2: NORMAL

## 2021-05-03 PROCEDURE — U0005 INFEC AGEN DETEC AMPLI PROBE: HCPCS

## 2021-05-04 LAB — SARS-COV-2, COV2NT: NOT DETECTED

## 2021-05-07 ENCOUNTER — ANESTHESIA (OUTPATIENT)
Dept: ENDOSCOPY | Age: 66
End: 2021-05-07
Payer: MEDICARE

## 2021-05-07 ENCOUNTER — ANESTHESIA EVENT (OUTPATIENT)
Dept: ENDOSCOPY | Age: 66
End: 2021-05-07
Payer: MEDICARE

## 2021-05-07 ENCOUNTER — HOSPITAL ENCOUNTER (OUTPATIENT)
Age: 66
Setting detail: OUTPATIENT SURGERY
Discharge: HOME | End: 2021-05-07
Attending: SPECIALIST | Admitting: SPECIALIST
Payer: MEDICARE

## 2021-05-07 VITALS
SYSTOLIC BLOOD PRESSURE: 124 MMHG | DIASTOLIC BLOOD PRESSURE: 63 MMHG | OXYGEN SATURATION: 96 % | BODY MASS INDEX: 39.55 KG/M2 | TEMPERATURE: 97.7 F | HEART RATE: 79 BPM | RESPIRATION RATE: 16 BRPM | WEIGHT: 252 LBS | HEIGHT: 67 IN

## 2021-05-07 PROCEDURE — 74011000250 HC RX REV CODE- 250: Performed by: NURSE ANESTHETIST, CERTIFIED REGISTERED

## 2021-05-07 PROCEDURE — 76060000031 HC ANESTHESIA FIRST 0.5 HR: Performed by: SPECIALIST

## 2021-05-07 PROCEDURE — 77030039825 HC MSK NSL PAP SUPERNO2VA VYRM -B: Performed by: ANESTHESIOLOGY

## 2021-05-07 PROCEDURE — 76040000019: Performed by: SPECIALIST

## 2021-05-07 PROCEDURE — 74011250636 HC RX REV CODE- 250/636: Performed by: NURSE ANESTHETIST, CERTIFIED REGISTERED

## 2021-05-07 PROCEDURE — 2709999900 HC NON-CHARGEABLE SUPPLY: Performed by: SPECIALIST

## 2021-05-07 PROCEDURE — 74011250636 HC RX REV CODE- 250/636: Performed by: SPECIALIST

## 2021-05-07 RX ORDER — SODIUM CHLORIDE 9 MG/ML
50 INJECTION, SOLUTION INTRAVENOUS CONTINUOUS
Status: DISCONTINUED | OUTPATIENT
Start: 2021-05-07 | End: 2021-05-07 | Stop reason: HOSPADM

## 2021-05-07 RX ORDER — LIDOCAINE HYDROCHLORIDE 20 MG/ML
INJECTION, SOLUTION EPIDURAL; INFILTRATION; INTRACAUDAL; PERINEURAL AS NEEDED
Status: DISCONTINUED | OUTPATIENT
Start: 2021-05-07 | End: 2021-05-07 | Stop reason: HOSPADM

## 2021-05-07 RX ORDER — PROPOFOL 10 MG/ML
INJECTION, EMULSION INTRAVENOUS AS NEEDED
Status: DISCONTINUED | OUTPATIENT
Start: 2021-05-07 | End: 2021-05-07 | Stop reason: HOSPADM

## 2021-05-07 RX ORDER — SODIUM CHLORIDE 0.9 % (FLUSH) 0.9 %
5-40 SYRINGE (ML) INJECTION EVERY 8 HOURS
Status: DISCONTINUED | OUTPATIENT
Start: 2021-05-07 | End: 2021-05-07 | Stop reason: HOSPADM

## 2021-05-07 RX ORDER — DEXTROMETHORPHAN/PSEUDOEPHED 2.5-7.5/.8
1.2 DROPS ORAL
Status: DISCONTINUED | OUTPATIENT
Start: 2021-05-07 | End: 2021-05-07 | Stop reason: HOSPADM

## 2021-05-07 RX ORDER — SODIUM CHLORIDE 0.9 % (FLUSH) 0.9 %
5-40 SYRINGE (ML) INJECTION AS NEEDED
Status: DISCONTINUED | OUTPATIENT
Start: 2021-05-07 | End: 2021-05-07 | Stop reason: HOSPADM

## 2021-05-07 RX ADMIN — PROPOFOL 100 MG: 10 INJECTION, EMULSION INTRAVENOUS at 13:20

## 2021-05-07 RX ADMIN — LIDOCAINE HYDROCHLORIDE 100 MG: 20 INJECTION, SOLUTION EPIDURAL; INFILTRATION; INTRACAUDAL; PERINEURAL at 13:20

## 2021-05-07 RX ADMIN — SODIUM CHLORIDE 50 ML/HR: 9 INJECTION, SOLUTION INTRAVENOUS at 13:05

## 2021-05-07 NOTE — PROCEDURES
Esophagogastroduodenoscopy Procedure Note      Jean Paul Palacio  1955  [id number]    Indication:  Dyspepsia     Endoscopist: Alta Jessica MD    Referring Provider:  Bunny Yang MD    Sedation:  MAC anesthesia Propofol    Procedure Details:  After infomed consent was obtained for the procedure, with all risks and benefits of procedure explained the patient was taken to the endoscopy suite and placed in the left lateral decubitus position. Following sequential administration of sedation as per above, the endoscope was inserted into the mouth and advanced under direct vision to second portion of the duodenum. A careful inspection was made as the gastroscope was withdrawn, including a retroflexed view of the proximal stomach; findings and interventions are described below. Findings:     Esophagus:   - Normal mucosa visualized throughout the entire esophagus. Z line normal at 41 cm from the incisors. Stomach:   - The gastric mucosa appeared normal without any signficant erythema or erosion. The fundus was found to be normal with no lesions noted on retroflexion. Duodenum:   - The bulb and post bulbar mucosa is normal in appearance to the second portion. The duodenal folds appeared normal.     Therapies:  none    Specimen: none            Complications:   None were encountered during the procedure. EBL: < 10 ml.           Recommendations:   -continue GERD diet  -continue acid suppression      Alta Jessica MD  5/7/2021  1:31 PM

## 2021-05-07 NOTE — PERIOP NOTES
Shikha Navarro  1955  [id number]    Situation:  Verbal report received from: Jael Brooks  Procedure: Procedure(s):  ESOPHAGOGASTRODUODENOSCOPY (EGD)    Background:    Preoperative diagnosis: Chest pain, unspecified type [R07.9]  Dark stools [R19.5]  Diarrhea, unspecified type [R19.7]  Dyskinesia [G24.9]  Dyspepsia [R10.13]  Early satiety [R68.81]  Postoperative diagnosis: Normal    :  Dr. Sachin Justice  Assistant(s): Endoscopy RN-1: Tamiko Pineda RN  Endoscopy RN-2: Mariposa Hilliard RN    Specimens: * No specimens in log *  H. Pylori  no    Assessment:  Intra-procedure medications       Anesthesia gave intra-procedure sedation and medications, see anesthesia flow sheet yes    Intravenous fluids: NS@ KVO     Vital signs stable     Abdominal assessment: round and soft     Recommendation:  Discharge patient per MD order. Family or Friend   Permission to share finding with family or friend yes    Endoscopy discharge instructions have been reviewed and given to patient. The patient verbalized understanding and acceptance of instructions. Dr. Sachin Justice discussed with daughter procedure findings and next steps.

## 2021-05-07 NOTE — ANESTHESIA PREPROCEDURE EVALUATION
Anesthetic History   No history of anesthetic complications            Review of Systems / Medical History  Patient summary reviewed, nursing notes reviewed and pertinent labs reviewed    Pulmonary        Sleep apnea: CPAP  Shortness of breath (@ baseline since COVID 11/2020)  Asthma        Neuro/Psych       CVA  TIA and psychiatric history     Cardiovascular    Hypertension        Dysrhythmias   CAD    Exercise tolerance: <4 METS  Comments: EF 40%   GI/Hepatic/Renal     GERD           Endo/Other        Morbid obesity and arthritis     Other Findings   Comments: Fibromyalgia         Physical Exam    Airway  Mallampati: II  TM Distance: 4 - 6 cm  Neck ROM: normal range of motion   Mouth opening: Normal     Cardiovascular  Regular rate and rhythm,  S1 and S2 normal,  no murmur, click, rub, or gallop             Dental  No notable dental hx       Pulmonary  Breath sounds clear to auscultation               Abdominal  GI exam deferred       Other Findings            Anesthetic Plan    ASA: 3  Anesthesia type: MAC          Induction: Intravenous  Anesthetic plan and risks discussed with: Patient

## 2021-05-07 NOTE — ANESTHESIA POSTPROCEDURE EVALUATION
Procedure(s):  ESOPHAGOGASTRODUODENOSCOPY (EGD). MAC    Anesthesia Post Evaluation        Patient location during evaluation: PACU  Note status: Adequate. Level of consciousness: responsive to verbal stimuli and sleepy but conscious  Pain management: satisfactory to patient  Airway patency: patent  Anesthetic complications: no  Cardiovascular status: acceptable  Respiratory status: acceptable  Hydration status: acceptable  Comments: +Post-Anesthesia Evaluation and Assessment    Patient: Kim Brooks MRN: [id number]  SSN: [id number]   YOB: 1955  Age: 72 y.o. Sex: female      Cardiovascular Function/Vital Signs    /66   Pulse 80   Temp 36.6 °C (97.8 °F)   Resp 15   Ht 5' 7\" (1.702 m)   Wt 114.3 kg (252 lb)   SpO2 100%   Breastfeeding No   BMI 39.47 kg/m²     Patient is status post Procedure(s):  ESOPHAGOGASTRODUODENOSCOPY (EGD). Nausea/Vomiting: Controlled. Postoperative hydration reviewed and adequate. Pain:  Pain Scale 1: Numeric (0 - 10) (05/07/21 1259)  Pain Intensity 1: 0 (05/07/21 1259)   Managed. Neurological Status: At baseline. Mental Status and Level of Consciousness: Arousable. Pulmonary Status:   O2 Device: Nasal mask (05/07/21 1331)   Adequate oxygenation and airway patent. Complications related to anesthesia: None    Post-anesthesia assessment completed. No concerns. Signed By: Liliane Patel MD    5/7/2021  Post anesthesia nausea and vomiting:  controlled      INITIAL Post-op Vital signs: No vitals data found for the desired time range.

## 2021-05-07 NOTE — DISCHARGE INSTRUCTIONS
Melissa Sutton  [id number]  1955    EGD DISCHARGE INSTRUCTIONS  Discomfort:  Sore throat- throat lozenges or warm salt water gargle  redness at IV site- apply warm compress to area; if redness or soreness persist- contact your physician  Gaseous discomfort- walking, belching will help relieve any discomfort  You may not operate a vehicle for 12 hours  You may not engage in an occupation involving machinery or appliances for rest of today. You may not drink alcoholic beverages for at least 12 hours  Avoid making any critical decisions for at least 24 hour  DIET  You may resume your regular diet - however -  remember your colon is empty and a heavy meal will produce gas. Avoid these foods:  vegetables, fried / greasy foods, carbonated drinks  MEDICATIONS        Regarding Aspirin or Nonsteroidal medications specifically, please see below. ACTIVITY  You may resume your normal daily activities. Spend the remainder of the day resting -  avoid any strenuous activity. CALL M.D. ANY SIGN OF   Increasing pain, nausea, vomiting  Abdominal distension (swelling)  New increased bleeding (oral or rectal)  Fever (chills)  Pain in chest area  Bloody discharge from nose or mouth  Shortness of breath     Tylenol as needed for pain.     Follow-up Instructions:   Call Dr. Luisana Pelaez for questions about procedure at telephone #  [phone]              Continue Famotidine 40 mg daily              Restart Plavix today

## 2021-05-07 NOTE — H&P
Gastroenterology Outpatient History and Physical    Patient: Arpita Doyle    Physician: Mery Barrios MD    Vital Signs: Blood pressure 138/81, pulse (!) 102, temperature 97.8 °F (36.6 °C), resp. rate 11, height 5' 7\" (1.702 m), weight 114.3 kg (252 lb), SpO2 98 %, not currently breastfeeding. Allergies: Allergies   Allergen Reactions    Codeine Itching    Demerol [Meperidine] Nausea Only    Ivp [Fd And C Blue No.1] Itching    Minoxidil Itching    Penicillamine Itching and Other (comments)     Drops blood pressure    Percocet [Oxycodone-Acetaminophen] Nausea and Vomiting       Chief Complaint: Dyspepsia, dark stool    History of Present Illness: 73 yo BF with recent episode of dark stool with dyspepsia, r/o PUD, gastritis.     Justification for Procedure: above    History:  Past Medical History:   Diagnosis Date    Anxiety     Arrhythmia     per pt, treated with med    Arthritis     Asthma     CAD (coronary artery disease)     EF=33 1/3    Chronic pain     fibromyalgia    COVID-19     Diarrhea 3/24/2017    Epigastric pain 3/24/2017    Fibromyalgia     GERD (gastroesophageal reflux disease)     ibs    Gout     Heart failure (HCC)     cardiomyopathy    Hypertension     Psychiatric disorder     h/o severe depression    Stroke ([address]     possible TIA    Unspecified sleep apnea     wears CPAP      Past Surgical History:   Procedure Laterality Date    COLONOSCOPY N/A 8/23/2016    COLONOSCOPY performed by Mery Barrios MD at [facility]    COLONOSCOPY N/A 2/21/2020    COLONOSCOPY performed by Arcenio Velázquez MD at [address] N/A 3/24/2017    FLEXIBLE SIGMOIDOSCOPY  performed by Naomi Dewey MD at [facility] AMBULATORY OR    HX APPENDECTOMY      HX ENDOSCOPY      HX GYN      hysterectomy    HX HEENT      left eye surgery    HX KNEE ARTHROSCOPY Right     HX KNEE REPLACEMENT Right     HX ORTHOPAEDIC Right     plantar fascitis repair with implant    HX OTHER SURGICAL      hand surgery left (carpal tunnel, reconstruction of small finger)    SIGMOIDOSCOPY,BIOPSY  3/24/2017         VASCULAR SURGERY PROCEDURE UNLIST      stent in bilateral legs      Social History     Socioeconomic History    Marital status:      Spouse name: Not on file    Number of children: Not on file    Years of education: Not on file    Highest education level: Not on file   Tobacco Use    Smoking status: Never Smoker    Smokeless tobacco: Never Used   Substance and Sexual Activity    Alcohol use: No    Drug use: No     Types: OTC, Prescription    Sexual activity: Never      Family History   Problem Relation Age of Onset    Diabetes Mother        Medications:   Prior to Admission medications    Medication Sig Start Date End Date Taking? Authorizing Provider   bumetanide (BUMEX) 2 mg tablet Take 1 Tab by mouth daily. 12/1/20  Yes Venancio Wills MD   amLODIPine (NORVASC) 5 mg tablet Take 1 Tab by mouth daily. 12/1/20  Yes Venancio Wills MD   albuterol (PROVENTIL HFA, VENTOLIN HFA, PROAIR HFA) 90 mcg/actuation inhaler Take 1 Puff by inhalation every four (4) hours as needed for Wheezing. Yes Provider, Historical   famotidine (PEPCID) 40 mg tablet Take 40 mg by mouth daily. Yes Provider, Historical   sulfaSALAzine (AZULFIDINE) 500 mg tablet Take 500 mg by mouth two (2) times a day. Yes Provider, Historical   montelukast (SINGULAIR) 10 mg tablet Take 10 mg by mouth daily. Yes Provider, Historical   fluticasone propionate (FLONASE) 50 mcg/actuation nasal spray 2 Sprays by Both Nostrils route daily as needed. Yes Provider, Historical   rosuvastatin (CRESTOR) 20 mg tablet Take 20 mg by mouth nightly. Yes Provider, Historical   aspirin delayed-release 81 mg tablet Take 81 mg by mouth daily. Yes Provider, Historical   hydroxychloroquine (PLAQUENIL) 200 mg tablet Take 200 mg by mouth two (2) times a day.    Yes Other, MD Fabio   allopurinol (ZYLOPRIM) 300 mg tablet Take 300 mg by mouth daily. Yes Other, MD Fabio   carvedilol (COREG) 25 mg tablet Take 25 mg by mouth two (2) times daily (with meals). Yes Other, MD Fabio   DULoxetine (CYMBALTA) 60 mg capsule Take 60 mg by mouth daily. Yes Other, MD Fabio   cholecalciferol, vitamin D3, 2,000 unit tab Take 2,000 Units by mouth daily. Yes Other, MD Fabio   cyanocobalamin 1,000 mcg tablet Take 1,000 mcg by mouth daily. Yes Other, MD Fabio   cilostazoL (PLETAL) 100 mg tablet Take 100 mg by mouth Before breakfast and dinner. Take 1 tablet by mouth 2 times a day 30 min before or 2 hours after breakfast and dinner    Provider, Historical   guaiFENesin ER (MUCINEX) 600 mg ER tablet Take 1 Tab by mouth every twelve (12) hours. 11/30/20   Edmund Adrian MD   colchicine 0.6 mg tablet Take 0.6 mg by mouth daily as needed for Gout or Pain. Provider, Historical   gabapentin (NEURONTIN) 300 mg capsule Take 300 mg by mouth three (3) times daily as needed for Pain (arthritis flare ups). Provider, Historical   dulaglutide (TRULICITY) 1.5 HI/3.5 mL sub-q pen 1.5 mg by SubCUTAneous route every Sunday. Provider, Historical   clopidogreL (PLAVIX) 75 mg tab Take 75 mg by mouth daily. Provider, Historical   plecanatide (TRULANCE) 3 mg tab Take 3 mg by mouth daily as needed for Other (constipation). Provider, Historical   lidocaine (LIDODERM) 5 % 1 Patch by TransDERmal route daily as needed. Apply patch to the affected area for 12 hours a day and remove for 12 hours a day. Provider, Historical   diclofenac (VOLTAREN) 1 % gel Apply 2 g to affected area as needed for Pain. apply 2 g to affected area as needed for pain. Provider, Historical       Physical Exam:   General: alert, no distress   HEENT: Head: Normocephalic, no lesions, without obvious abnormality.    Heart: regular rate and rhythm, S1, S2 normal, no murmur, click, rub or gallop   Lungs: chest clear, no wheezing, rales, normal symmetric air entry   Abdominal: soft, NT/ND+ BS Neurological: Grossly normal   Extremities: extremities normal, atraumatic, no cyanosis or edema     Findings/Diagnosis: Dyspepsia, Dark stool    Plan of Care/Planned Procedure: EGD

## 2021-05-07 NOTE — PROGRESS NOTES
Endoscope was pre-cleaned at the bedside immediately following procedure by Chad Li RN. Glasses returned to patient.

## 2021-05-10 NOTE — PERIOP NOTES
Pt sleepy and won't answer questions but shaking her head yes or no. Daughter says \"Oh no, here she goes. \"  She says that her mother is hard headed. 170.18

## 2021-05-14 ENCOUNTER — HOSPITAL ENCOUNTER (OUTPATIENT)
Dept: GENERAL RADIOLOGY | Age: 66
Discharge: HOME | End: 2021-05-14
Attending: INTERNAL MEDICINE
Payer: MEDICARE

## 2021-05-14 ENCOUNTER — TRANSCRIBE ORDER (OUTPATIENT)
Dept: GENERAL RADIOLOGY | Age: 66
End: 2021-05-14

## 2021-05-14 DIAGNOSIS — I42.9 CARDIOMYOPATHY (HCC): ICD-10-CM

## 2021-05-14 DIAGNOSIS — I42.9 CARDIOMYOPATHY (HCC): Primary | ICD-10-CM

## 2021-05-14 PROCEDURE — 71046 X-RAY EXAM CHEST 2 VIEWS: CPT

## 2021-06-10 ENCOUNTER — OFFICE VISIT (OUTPATIENT)
Dept: NEUROLOGY | Age: 66
End: 2021-06-10
Payer: MEDICARE

## 2021-06-10 VITALS
OXYGEN SATURATION: 98 % | SYSTOLIC BLOOD PRESSURE: 110 MMHG | RESPIRATION RATE: 18 BRPM | HEART RATE: 80 BPM | DIASTOLIC BLOOD PRESSURE: 62 MMHG

## 2021-06-10 DIAGNOSIS — M79.2 RADICULAR PAIN IN RIGHT ARM: Primary | ICD-10-CM

## 2021-06-10 DIAGNOSIS — R20.0 RIGHT ARM NUMBNESS: ICD-10-CM

## 2021-06-10 DIAGNOSIS — F40.240 CLAUSTROPHOBIA: Primary | ICD-10-CM

## 2021-06-10 DIAGNOSIS — M54.2 CERVICALGIA: ICD-10-CM

## 2021-06-10 PROCEDURE — 99204 OFFICE O/P NEW MOD 45 MIN: CPT | Performed by: PSYCHIATRY & NEUROLOGY

## 2021-06-10 PROCEDURE — 1101F PT FALLS ASSESS-DOCD LE1/YR: CPT | Performed by: PSYCHIATRY & NEUROLOGY

## 2021-06-10 PROCEDURE — G8417 CALC BMI ABV UP PARAM F/U: HCPCS | Performed by: PSYCHIATRY & NEUROLOGY

## 2021-06-10 PROCEDURE — G8432 DEP SCR NOT DOC, RNG: HCPCS | Performed by: PSYCHIATRY & NEUROLOGY

## 2021-06-10 PROCEDURE — G8400 PT W/DXA NO RESULTS DOC: HCPCS | Performed by: PSYCHIATRY & NEUROLOGY

## 2021-06-10 PROCEDURE — 3017F COLORECTAL CA SCREEN DOC REV: CPT | Performed by: PSYCHIATRY & NEUROLOGY

## 2021-06-10 PROCEDURE — G8536 NO DOC ELDER MAL SCRN: HCPCS | Performed by: PSYCHIATRY & NEUROLOGY

## 2021-06-10 PROCEDURE — 1090F PRES/ABSN URINE INCON ASSESS: CPT | Performed by: PSYCHIATRY & NEUROLOGY

## 2021-06-10 PROCEDURE — G8427 DOCREV CUR MEDS BY ELIG CLIN: HCPCS | Performed by: PSYCHIATRY & NEUROLOGY

## 2021-06-10 RX ORDER — SACUBITRIL AND VALSARTAN 24; 26 MG/1; MG/1
1 TABLET, FILM COATED ORAL 2 TIMES DAILY
COMMUNITY
End: 2021-09-13 | Stop reason: DRUGHIGH

## 2021-06-10 RX ORDER — RABEPRAZOLE SODIUM 20 MG/1
20 TABLET, DELAYED RELEASE ORAL DAILY
COMMUNITY

## 2021-06-10 RX ORDER — SPIRONOLACTONE 25 MG/1
TABLET ORAL DAILY
COMMUNITY

## 2021-06-10 RX ORDER — NAPROXEN 500 MG/1
500 TABLET ORAL AS NEEDED
COMMUNITY

## 2021-06-10 RX ORDER — FLUTICASONE PROPIONATE AND SALMETEROL 500; 50 UG/1; UG/1
1 POWDER RESPIRATORY (INHALATION) EVERY 12 HOURS
COMMUNITY

## 2021-06-10 NOTE — TELEPHONE ENCOUNTER
Patient requesting something for anxiety for her MRI. She was instructed to not drive while on medication.

## 2021-06-10 NOTE — PATIENT INSTRUCTIONS
[facility]   Statement to Patients  April 1, 2014      In an effort to ensure the large volume of patient prescription refills is processed in the most efficient and expeditious manner, we are asking our patients to assist us by calling your Pharmacy for all prescription refills, this will include also your  Mail Order Pharmacy. The pharmacy will contact our office electronically to continue the refill process. Please do not wait until the last minute to call your pharmacy. We need at least 48 hours (2days) to fill prescriptions. We also encourage you to call your pharmacy before going to  your prescription to make sure it is ready. With regard to controlled substance prescription refill requests (narcotic refills) that need to be picked up at our office, we ask your cooperation by providing us with at least 72 hours (3days) notice that you will need a refill. We will not refill narcotic prescription refill requests after 4:00pm on any weekday, Monday through Thursday, or after 2:00pm on Fridays, or on the weekends. We encourage everyone to explore another way of getting your prescription refill request processed using [employer], our patient web portal through our electronic medical record system. [employer] is an efficient and effective way to communicate your medication request directly to the office and  downloadable as an drew on your smart phone . [employer] also features a review functionality that allows you to view your medication list as well as leave messages for your physician. Are you ready to get connected? If so please review the attatched instructions or speak to any of our staff to get you set up right away! Thank you so much for your cooperation. Should you have any questions please contact our Practice Administrator.     The Physicians and Staff,  [facility]

## 2021-06-10 NOTE — PROGRESS NOTES
NEUROLOGY  NEW PATIENT EVALUATION/CONSULTATION       PATIENT NAME: Colleen Coates    MRN: [id number]    REASON FOR CONSULTATION: RUE pain    06/10/21      Previous records (physician notes, laboratory reports, and radiology reports) and imaging studies were reviewed and summarized. My recommendations will be communicated back to the patient's physician(s) via electronic medical record and/or by [address] mail. HISTORY OF PRESENT ILLNESS:  Colleen Coates is a 72 y.o. right handed female presenting for evaluation of RUE pain. Symptoms started 6 months ago, stable since onset and intermittent. She describes symptoms as pain shooting up from the right proximal arm to the right neck worsened with right arm movement. Symptoms may last for several seconds and occur throughout the day. She is also experiencing some numbness/tingling over the right neck region. Denies RUE weakness or paresthesias. She does have a h/o fibromyalgia related pain which is more generalized. No preceding arm or neck injury recently. She is using ice and/or heat as well as gabapentin/Lyrica for pain symptoms in the past with some relief. Of note, she was seen by Dr. Yumiko Arzate in the past for RLE pain and buckling with walking. This has improved s/p knee replacement.        PAST MEDICAL HISTORY:  Past Medical History:   Diagnosis Date    Anxiety     Arrhythmia     per pt, treated with med    Arthritis     Asthma     CAD (coronary artery disease)     EF=33 1/3    Chronic pain     fibromyalgia    COVID-19     Diarrhea 3/24/2017    Epigastric pain 3/24/2017    Fibromyalgia     GERD (gastroesophageal reflux disease)     ibs    Gout     Heart failure (HCC)     cardiomyopathy    Hypertension     Psychiatric disorder     h/o severe depression    Stroke ([facility] [address]     possible TIA    Unspecified sleep apnea     wears CPAP       PAST SURGICAL HISTORY:  Past Surgical History:   Procedure Laterality Date    COLONOSCOPY N/A 8/23/2016 COLONOSCOPY performed by Laine Milan [facility] at [facility] ENDOSCOPY    COLONOSCOPY N/A 2/21/2020    COLONOSCOPY performed by Ajith Bello MD at [place] [address] N/A 3/24/2017    FLEXIBLE SIGMOIDOSCOPY  performed by Ashley Guzman MD at [facility] AMBULATORY OR    HX APPENDECTOMY      HX ENDOSCOPY      HX GYN      hysterectomy    HX HEENT      left eye surgery    HX KNEE ARTHROSCOPY Right     HX KNEE REPLACEMENT Right     HX ORTHOPAEDIC Right     plantar fascitis repair with implant    HX OTHER SURGICAL      hand surgery left (carpal tunnel, reconstruction of small finger)    SIGMOIDOSCOPY,BIOPSY  3/24/2017         VASCULAR SURGERY PROCEDURE UNLIST      stent in bilateral legs       FAMILY HISTORY:   Family History   Problem Relation Age of Onset    Diabetes Mother          SOCIAL HISTORY:  Social History     Socioeconomic History    Marital status:      Spouse name: Not on file    Number of children: Not on file    Years of education: Not on file    Highest education level: Not on file   Tobacco Use    Smoking status: Never Smoker    Smokeless tobacco: Never Used   Substance and Sexual Activity    Alcohol use: No    Drug use: No     Types: OTC, Prescription    Sexual activity: Never     Social Determinants of Health     Financial Resource Strain:     Difficulty of Paying Living Expenses:    Food Insecurity:     Worried About Running Out of Food in the Last Year:     Ran Out of Food in the Last Year:    Transportation Needs:     Lack of Transportation (Medical):      Lack of Transportation (Non-Medical):    Physical Activity:     Days of Exercise per Week:     Minutes of Exercise per Session:    Stress:     Feeling of Stress :    Social Connections:     Frequency of Communication with Friends and Family:     Frequency of Social Gatherings with Friends and Family:     Attends Buddhist Services:     Active Member of Clubs or Organizations:     Attends Club or Organization Meetings:     Marital Status:          MEDICATIONS:   Current Outpatient Medications   Medication Sig Dispense Refill    sacubitriL-valsartan (Entresto) 24-26 mg tablet Take 1 Tablet by mouth two (2) times a day.  spironolactone (ALDACTONE) 25 mg tablet Take  by mouth daily.  bumetanide (BUMEX) 2 mg tablet Take 1 Tab by mouth daily. 30 Tab 0    amLODIPine (NORVASC) 5 mg tablet Take 1 Tab by mouth daily. 30 Tab 0    famotidine (PEPCID) 40 mg tablet Take 40 mg by mouth daily.  sulfaSALAzine (AZULFIDINE) 500 mg tablet Take 500 mg by mouth two (2) times a day.  hydroxychloroquine (PLAQUENIL) 200 mg tablet Take 200 mg by mouth two (2) times a day.  allopurinol (ZYLOPRIM) 300 mg tablet Take 300 mg by mouth daily.  carvedilol (COREG) 25 mg tablet Take 25 mg by mouth two (2) times daily (with meals).  cilostazoL (PLETAL) 100 mg tablet Take 100 mg by mouth Before breakfast and dinner. Take 1 tablet by mouth 2 times a day 30 min before or 2 hours after breakfast and dinner      guaiFENesin ER (MUCINEX) 600 mg ER tablet Take 1 Tab by mouth every twelve (12) hours. 10 Tab 0    albuterol (PROVENTIL HFA, VENTOLIN HFA, PROAIR HFA) 90 mcg/actuation inhaler Take 1 Puff by inhalation every four (4) hours as needed for Wheezing.  colchicine 0.6 mg tablet Take 0.6 mg by mouth daily as needed for Gout or Pain.  gabapentin (NEURONTIN) 300 mg capsule Take 300 mg by mouth three (3) times daily as needed for Pain (arthritis flare ups).  montelukast (SINGULAIR) 10 mg tablet Take 10 mg by mouth daily.  fluticasone propionate (FLONASE) 50 mcg/actuation nasal spray 2 Sprays by Both Nostrils route daily as needed.  rosuvastatin (CRESTOR) 20 mg tablet Take 20 mg by mouth nightly.  dulaglutide (TRULICITY) 1.5 ZX/9.7 mL sub-q pen 1.5 mg by SubCUTAneous route every Sunday.  clopidogreL (PLAVIX) 75 mg tab Take 75 mg by mouth daily.       aspirin delayed-release 81 mg tablet Take 81 mg by mouth daily.  plecanatide (TRULANCE) 3 mg tab Take 3 mg by mouth daily as needed for Other (constipation).  lidocaine (LIDODERM) 5 % 1 Patch by TransDERmal route daily as needed. Apply patch to the affected area for 12 hours a day and remove for 12 hours a day.  diclofenac (VOLTAREN) 1 % gel Apply 2 g to affected area as needed for Pain. apply 2 g to affected area as needed for pain.  DULoxetine (CYMBALTA) 60 mg capsule Take 60 mg by mouth daily.  cholecalciferol, vitamin D3, 2,000 unit tab Take 2,000 Units by mouth daily.  cyanocobalamin 1,000 mcg tablet Take 1,000 mcg by mouth daily. ALLERGIES:  Allergies   Allergen Reactions    Codeine Itching    Demerol [Meperidine] Nausea Only    Ivp [Fd And C Blue No.1] Itching    Minoxidil Itching    Penicillamine Itching and Other (comments)     Drops blood pressure    Percocet [Oxycodone-Acetaminophen] Nausea and Vomiting         REVIEW OF SYSTEMS:  10 point ROS reviewed with patient. Please see scanned document under media. PHYSICAL EXAM:  Vital Signs:   Visit Vitals  /62   Pulse 80   Resp 18   SpO2 98%        General Medical Exam:  General:  Well appearing, comfortable, in no apparent distress. Eyes/ENT: see cranial nerve examination. Neck: No masses appreciated. Full range of motion without tenderness. Respiratory:  Clear to auscultation, good air entry bilaterally. Cardiac:  Regular rate and rhythm, no murmur. GI:  Soft, non-tender, non-distended abdomen. Bowel sounds normal. No masses, organomegaly. Extremities:  No deformities, edema, or skin discoloration. Skin:  No rashes or lesions. Neurological:  · Mental Status:  Alert and oriented to person, place, and time with fluent speech. · Cranial Nerves:   CNII/III/IV/VI: visual fields full to confrontation, EOM-OD exotropia on upgaze (baseline per pt), PERRL, no ptosis or nystagmus.    CN V: Facial sensation intact bilaterally, masseter 5/5   CN VII: Facial muscles symmetric and strong   CN VIII: Hears finger rub well bilaterally, intact vestibular function   CN IX/X: Normal palatal movement   CN XI: Full strength shoulder shrug bilaterally   CN XII: Tongue protrusion full and midline without fasciculation or atrophy  · Motor: Normal tone and muscle bulk with no pronator drift. Individual muscle group testing:  Shoulder abduction:   Left:5/5   Right : 5/5    Shoulder adduction:   Left:5/5   Right : 5/5    Elbow flexion:      Left:5/5   Right : 5/5  Elbow extension:    Left:5/5   Right : 5/5   Wrist flexion:    Left:5/5   Right : 5/5  Wrist extension:    Left:5/5   Right : 5/5  Arm pronation:   Left:5/5   Right : 5/5  Arm supination:   Left:5/5   Right : 5/5    Finger flexion:    Left:5/5   Right : 5/5    Finger extension:   Left:5/5   Right : 5/5   Finger abduction:  Left:5/5   Right : 5/5   Finger adduction:   Left:5/5   Right : 5/5  Hip flexion:     Left:5/5   Right : 5/5 (slightly limited by pain)         Hip extension:   Left:5/5   Right : 5/5    Knee flexion:    Left:5/5   Right : 5/5    Knee extension:   Left:5/5   Right : 5/5    Dorsiflexion:     Left:5/5   Right : 5/5  Plantar flexion:    Left:5/5   Right : 5/5      · MSRs: No crossed adductors or clonus. DTRs trace throughout. Toes downgoing b/l. · Sensation: Decreased pinprick over R deltoid and trapezius, R paraspinals. Otherwise normal and symmetric perception temperature, light touch, proprioception, and vibration; (-) Romberg. · Coordination: No dysmetria. Normal rapid alternating movements; finger-to-nose and heel-to- shin testing are within normal limits. · Gait: Normal native gait      ASSESSMENT:      ICD-10-CM ICD-9-CM    1. Radicular pain in right arm  M79.2 729.2 MRI CERV SPINE WO CONT      EMG LIMITED   2. Cervicalgia  M54.2 723.1 MRI CERV SPINE WO CONT      EMG LIMITED   3.  Right arm numbness  R20.0 782.0 MRI CERV SPINE WO CONT      EMG LIMITED   65 year old pleasant AAF presenting for evaluation of RUE proximal pain radiating to the right cervical paraspinal region x 6 months with paresthesias/numbness over the proximal RUE and cervical region. Will proceed with cervical imaging to exclude any significant neuroforaminal or canal stenosis contributing to above presentation. Electrodiagnostic testing will be pursued to explore the possibility of a right cervical radiculopathy. She does note worsening radicular symptoms with right shoulder manipulation and passive motion. May need to consider orthopedic evaluation as well to exclude potential shoulder pathology contributing to her presentation should neurologic evaluations return normal.     PLAN:  · MRI Cervical WO  · EMG RUE    Follow-up and Dispositions    · Return in about 6 weeks (around 7/22/2021). I have discussed the diagnosis with the patient today and the intended plan as seen in the above orders with both the patient as well as referring provider and/or PCP via electronic correspondence. The patient has received an after-visit summary and questions were answered concerning future plans. Evan Goddard DO  Staff Neurologist  Diplomate, [address] [place] [address] Board of Psychiatry & Neurology

## 2021-06-23 DIAGNOSIS — F40.240 CLAUSTROPHOBIA: Primary | ICD-10-CM

## 2021-06-23 RX ORDER — LORAZEPAM 0.5 MG/1
TABLET ORAL
Qty: 2 TABLET | Refills: 0 | Status: SHIPPED | OUTPATIENT
Start: 2021-06-23

## 2021-06-24 ENCOUNTER — HOSPITAL ENCOUNTER (OUTPATIENT)
Dept: MRI IMAGING | Age: 66
Discharge: HOME | End: 2021-06-24
Attending: PSYCHIATRY & NEUROLOGY
Payer: MEDICARE

## 2021-06-24 DIAGNOSIS — M54.2 CERVICALGIA: ICD-10-CM

## 2021-06-24 DIAGNOSIS — R20.0 RIGHT ARM NUMBNESS: ICD-10-CM

## 2021-06-24 DIAGNOSIS — M79.2 RADICULAR PAIN IN RIGHT ARM: ICD-10-CM

## 2021-06-24 PROCEDURE — 72141 MRI NECK SPINE W/O DYE: CPT

## 2021-06-24 RX ORDER — LORAZEPAM 0.5 MG/1
TABLET ORAL
Qty: 2 TABLET | Refills: 0 | OUTPATIENT
Start: 2021-06-24

## 2021-06-25 ENCOUNTER — OFFICE VISIT (OUTPATIENT)
Dept: NEUROLOGY | Age: 66
End: 2021-06-25
Payer: MEDICARE

## 2021-06-25 DIAGNOSIS — G56.01 CARPAL TUNNEL SYNDROME OF RIGHT WRIST: Primary | ICD-10-CM

## 2021-06-25 DIAGNOSIS — G95.0 SYRINX OF SPINAL CORD (HCC): ICD-10-CM

## 2021-06-25 PROCEDURE — 95886 MUSC TEST DONE W/N TEST COMP: CPT | Performed by: PSYCHIATRY & NEUROLOGY

## 2021-06-25 PROCEDURE — 95910 NRV CNDJ TEST 7-8 STUDIES: CPT | Performed by: PSYCHIATRY & NEUROLOGY

## 2021-06-25 NOTE — PROGRESS NOTES
[address] [facility] Neurology [facility]  [address]  Phone [phone] Fax [phone]  Test Date:  2021    Patient: Agnes Mayen : 1955 Physician: Nika Ortiz DO   Sex: Female Height: 5' 7\" Ref Phys: Nika Ortiz,    ID#: [id number] Weight: 252 lbs. Technician: Cecille Martin     Patient Complaints:  Radicular pain RUE; Cervicalgia    NCV & EMG Findings:  Evaluation of the right median motor nerve showed prolonged distal onset latency (4.6 ms). The right median sensory nerve showed prolonged distal peak latency (4.0 ms) and decreased conduction velocity (Wrist-2nd Digit, 35 m/s). The right median/ulnar (palm) comparison nerve showed abnormal peak latency difference (Median Palm-Ulnar Palm, 0.9 ms). All remaining nerves  were within normal limits. All F Wave latencies were within normal limits. All examined muscles (as indicated in the following table) showed no evidence of electrical instability. Impression:  Extensive electrodiagnostic examination of the right upper extremity and additional nerve conduction studies of the left upper extremity reveals changes most consistent with the followin. A right median neuropathy at or distal to the wrist, consistent with a clinical diagnosis of carpal tunnel syndrome, mild in degree electrically. 2. No evidence of a right cervical motor radiculopathy. ___________________________  Ash Ortiz,         Nerve Conduction Studies  Anti Sensory Summary Table     Stim Site NR Peak (ms) Norm Peak (ms) P-T Amp (µV) Norm P-T Amp Onset (ms) Site1 Site2 Delta-P (ms) Dist (cm) Evens (m/s) Norm Evens (m/s)   Left Median Anti Sensory (2nd Digit)  33°C   Wrist    3.6 <3.6 28.6 >10 2.9 Wrist 2nd Digit 3.6 14.0 39 >39   Right Median Anti Sensory (2nd Digit)  32.5°C   Wrist    4.0 <3.6 25.1 >10 3.4 Wrist 2nd Digit 4.0 14.0 35 >39   Right Radial Anti Sensory (Base 1st Digit)  32.7°C   Wrist    2.2 <3.1 28.0  1.6 Wrist Base 1st Digit 2.2 0.0     Right Ulnar Anti Sensory (5th Digit)  32.5°C   Wrist    3.3 <3.7 16.9 >15.0 2.7 Wrist 5th Digit 3.3 14.0 42 >38     Motor Summary Table     Stim Site NR Onset (ms) Norm Onset (ms) O-P Amp (mV) Norm O-P Amp Site1 Site2 Delta-0 (ms) Dist (cm) Evens (m/s) Norm Evens (m/s)   Left Median Motor (Abd Poll Brev)  33°C   Wrist    4.1 <4.2 6.5 >5 Elbow Wrist 3.7 22.0 59 >50   Elbow    7.8  6.1          Right Median Motor (Abd Poll Brev)  32.4°C   Wrist    4.6 <4.2 6.4 >5 Elbow Wrist 4.4 22.0 50 >50   Elbow    9.0  5.0          Right Ulnar Motor (Abd Dig Minimi)  33.4°C   Wrist    2.8 <4.2 6.5 >3 B Elbow Wrist 4.1 22.0 54 >53   B Elbow    6.9  6.4  A Elbow B Elbow 1.5 10.0 67 >53   A Elbow    8.4  7.5            Comparison Summary Table     Stim Site NR Peak (ms) Norm Peak (ms) P-T Amp (µV) Site1 Site2 Delta-P (ms) Norm Delta (ms)   Left Median/Ulnar Palm Comparison (Wrist - 8cm)  32.9°C   Median Palm    1.8 <2.5 21.4 Median Palm Ulnar Palm 0.3 <0.3   Ulnar Palm    2.1 <2.5 4.2       Right Median/Ulnar Palm Comparison (Wrist - 8cm)  32.6°C   Median Palm    2.2 <2.5 11.7 Median Palm Ulnar Palm 0.9 <0.3   Ulnar Palm    1.3 <2.5 4.3         F Wave Studies     NR F-Lat (ms) Lat Norm (ms) L-R F-Lat (ms) L-R Lat Norm   Right Ulnar (Mrkrs) (Abd Dig Min)  33.6°C      31.07 <36  <2.5     EMG     Side Muscle Nerve Root Ins Act Fibs Psw Amp Dur Poly Recrt Int Sona Gunnels Comment   Right 1stDorInt Ulnar C8-T1 Nml Nml Nml Nml Nml 0 Nml Nml    Right FlexPolLong Median (Ant Int) C7-8 Nml Nml Nml Nml Nml 0 Nml Nml    Right Ext Indicis Radial (Post Int) C7-8 Nml Nml Nml Nml Nml 0 Nml Nml    Right PronatorTeres Median C6-7 Nml Nml Nml Nml Nml 0 Nml Nml    Right Biceps Musculocut C5-6 Nml Nml Nml Nml Nml 0 Nml Nml    Right Triceps Radial C6-7-8 Nml Nml Nml Nml Nml 0 Nml Nml    Right Deltoid Axillary C5-6 Nml Nml Nml Nml Nml 0 Nml Nml    Right Cervical Parasp Low Rami C7-8 Nml Nml Nml         Right Abd Poll Brev Median C8-T1 Nml Nml Nml Nml Nml 0 Nml Nml          Waveforms:

## 2021-07-12 ENCOUNTER — PATIENT MESSAGE (OUTPATIENT)
Dept: NEUROLOGY | Age: 66
End: 2021-07-12

## 2021-07-12 DIAGNOSIS — F40.240 CLAUSTROPHOBIA: Primary | ICD-10-CM

## 2021-07-13 RX ORDER — LORAZEPAM 0.5 MG/1
TABLET ORAL
Qty: 2 TABLET | Refills: 0 | Status: SHIPPED | OUTPATIENT
Start: 2021-07-13

## 2021-07-18 ENCOUNTER — HOSPITAL ENCOUNTER (OUTPATIENT)
Dept: MRI IMAGING | Age: 66
Discharge: HOME | End: 2021-07-18
Attending: PSYCHIATRY & NEUROLOGY
Payer: MEDICARE

## 2021-07-18 DIAGNOSIS — G95.0 SYRINX OF SPINAL CORD (HCC): ICD-10-CM

## 2021-07-18 PROCEDURE — 72157 MRI CHEST SPINE W/O & W/DYE: CPT

## 2021-07-18 PROCEDURE — 72158 MRI LUMBAR SPINE W/O & W/DYE: CPT

## 2021-07-18 PROCEDURE — 72156 MRI NECK SPINE W/O & W/DYE: CPT

## 2021-07-18 PROCEDURE — 74011636320 HC RX REV CODE- 636/320: Performed by: PSYCHIATRY & NEUROLOGY

## 2021-07-18 PROCEDURE — A9576 INJ PROHANCE MULTIPACK: HCPCS | Performed by: PSYCHIATRY & NEUROLOGY

## 2021-07-18 RX ADMIN — GADOTERIDOL 20 ML: 279.3 INJECTION, SOLUTION INTRAVENOUS at 16:38

## 2021-09-13 ENCOUNTER — OFFICE VISIT (OUTPATIENT)
Dept: NEUROLOGY | Age: 66
End: 2021-09-13
Payer: MEDICARE

## 2021-09-13 VITALS
HEART RATE: 76 BPM | WEIGHT: 261 LBS | RESPIRATION RATE: 16 BRPM | HEIGHT: 67 IN | BODY MASS INDEX: 40.97 KG/M2 | DIASTOLIC BLOOD PRESSURE: 74 MMHG | SYSTOLIC BLOOD PRESSURE: 118 MMHG | OXYGEN SATURATION: 96 %

## 2021-09-13 DIAGNOSIS — M54.2 CERVICALGIA: ICD-10-CM

## 2021-09-13 DIAGNOSIS — G95.0 SYRINX OF SPINAL CORD (HCC): Primary | ICD-10-CM

## 2021-09-13 DIAGNOSIS — M79.2 RADICULAR PAIN IN RIGHT ARM: ICD-10-CM

## 2021-09-13 PROCEDURE — G8536 NO DOC ELDER MAL SCRN: HCPCS | Performed by: PSYCHIATRY & NEUROLOGY

## 2021-09-13 PROCEDURE — 1090F PRES/ABSN URINE INCON ASSESS: CPT | Performed by: PSYCHIATRY & NEUROLOGY

## 2021-09-13 PROCEDURE — 3017F COLORECTAL CA SCREEN DOC REV: CPT | Performed by: PSYCHIATRY & NEUROLOGY

## 2021-09-13 PROCEDURE — G8427 DOCREV CUR MEDS BY ELIG CLIN: HCPCS | Performed by: PSYCHIATRY & NEUROLOGY

## 2021-09-13 PROCEDURE — G8400 PT W/DXA NO RESULTS DOC: HCPCS | Performed by: PSYCHIATRY & NEUROLOGY

## 2021-09-13 PROCEDURE — G8510 SCR DEP NEG, NO PLAN REQD: HCPCS | Performed by: PSYCHIATRY & NEUROLOGY

## 2021-09-13 PROCEDURE — G8417 CALC BMI ABV UP PARAM F/U: HCPCS | Performed by: PSYCHIATRY & NEUROLOGY

## 2021-09-13 PROCEDURE — 99214 OFFICE O/P EST MOD 30 MIN: CPT | Performed by: PSYCHIATRY & NEUROLOGY

## 2021-09-13 PROCEDURE — 1101F PT FALLS ASSESS-DOCD LE1/YR: CPT | Performed by: PSYCHIATRY & NEUROLOGY

## 2021-09-13 RX ORDER — SACUBITRIL AND VALSARTAN 49; 51 MG/1; MG/1
TABLET, FILM COATED ORAL 2 TIMES DAILY
COMMUNITY
Start: 2021-08-18

## 2021-09-13 NOTE — PROGRESS NOTES
Neurology Clinic Follow up Note    Patient ID:  Ricky Kwon  [id number]  72 y.o.  1955      Ms. Yuri Mayes is here for follow up today of  Chief Complaint   Patient presents with    Follow-up    Shoulder Pain     right shoulder           Last Appointment With Me:  6/10/2021     \"Kang NANCY Mayes is a 72 y.o. right handed female presenting for evaluation of RUE pain. Symptoms started 6 months ago, stable since onset and intermittent. She describes symptoms as pain shooting up from the right proximal arm to the right neck worsened with right arm movement. Symptoms may last for several seconds and occur throughout the day. She is also experiencing some numbness/tingling over the right neck region. Denies RUE weakness or paresthesias. She does have a h/o fibromyalgia related pain which is more generalized. No preceding arm or neck injury recently. She is using ice and/or heat as well as gabapentin/Lyrica for pain symptoms in the past with some relief. Of note, she was seen by Dr. Brittany Ojeda in the past for RLE pain and buckling with walking. This has improved s/p knee replacement. \"   Interval History:   Saw NSGY for cervical syrinx-surgery discussed. Patient elected to hold off for now. RUE pain, cervicalgia are persistent, unchanged, daily. Denies worsening symptoms, bowel/bladder abnormalities or new weakness. She is prescribed gabapentin for fibromyalgia-does not seem to help her RUE pain. She has tried Lyrica and Cymbalta in the past without benefit. PMHx/ PSHx/ FHx/ SHx:  Reviewed and unchanged previous visit.    Past Medical History:   Diagnosis Date    Anxiety     Arrhythmia     per pt, treated with med    Arthritis     Asthma     CAD (coronary artery disease)     EF=33 1/3    Chronic pain     fibromyalgia    COVID-19     Diarrhea 3/24/2017    Epigastric pain 3/24/2017    Fibromyalgia     GERD (gastroesophageal reflux disease)     ibs    Gout     Heart failure (HCC)     cardiomyopathy    Hypertension     Psychiatric disorder     h/o severe depression    Stroke ([facility] [address]     possible TIA    Unspecified sleep apnea     wears CPAP         ROS:  Comprehensive review of systems negative except for as noted above. Objective:       Meds:  Current Outpatient Medications   Medication Sig Dispense Refill    Entresto 49-51 mg tab tablet two (2) times a day.  LORazepam (ATIVAN) 0.5 mg tablet Take 1 tab 30 minutes prior to imaging. May repeat once if needed. Max daily dose 1mg. 2 Tablet 0    LORazepam (ATIVAN) 0.5 mg tablet Take 1 tab 30 minutes prior to MRI. May repeat once if needed. Max daily dose 1mg. No driving while on medication. 2 Tablet 0    spironolactone (ALDACTONE) 25 mg tablet Take  by mouth daily.  RABEprazole (ACIPHEX) 20 mg TbEC Take 20 mg by mouth daily.  fluticasone propion-salmeteroL (Advair Diskus) 500-50 mcg/dose diskus inhaler Take 1 Puff by inhalation every twelve (12) hours.  naproxen (NAPROSYN) 500 mg tablet Take 500 mg by mouth as needed for Pain.  bumetanide (BUMEX) 2 mg tablet Take 1 Tab by mouth daily. 30 Tab 0    amLODIPine (NORVASC) 5 mg tablet Take 1 Tab by mouth daily. 30 Tab 0    albuterol (PROVENTIL HFA, VENTOLIN HFA, PROAIR HFA) 90 mcg/actuation inhaler Take 1 Puff by inhalation every four (4) hours as needed for Wheezing.  colchicine 0.6 mg tablet Take 0.6 mg by mouth daily as needed for Gout or Pain.  gabapentin (NEURONTIN) 300 mg capsule Take 300 mg by mouth. 300mg TID and 1200mg at bed time      famotidine (PEPCID) 40 mg tablet Take 40 mg by mouth daily.  sulfaSALAzine (AZULFIDINE) 500 mg tablet Take 500 mg by mouth two (2) times a day.  montelukast (SINGULAIR) 10 mg tablet Take 10 mg by mouth daily.  rosuvastatin (CRESTOR) 20 mg tablet Take 20 mg by mouth nightly.  dulaglutide (TRULICITY) 1.5 DW/1.9 mL sub-q pen 1.5 mg by SubCUTAneous route every Sunday.       clopidogreL (PLAVIX) 75 mg tab Take 75 mg by mouth daily.  aspirin delayed-release 81 mg tablet Take 81 mg by mouth daily.  hydroxychloroquine (PLAQUENIL) 200 mg tablet Take 200 mg by mouth two (2) times a day.  allopurinol (ZYLOPRIM) 300 mg tablet Take 300 mg by mouth daily.  plecanatide (TRULANCE) 3 mg tab Take 3 mg by mouth daily as needed for Other (constipation).  lidocaine (LIDODERM) 5 % 1 Patch by TransDERmal route daily as needed. Apply patch to the affected area for 12 hours a day and remove for 12 hours a day.  diclofenac (VOLTAREN) 1 % gel Apply 2 g to affected area as needed for Pain. apply 2 g to affected area as needed for pain.  carvedilol (COREG) 25 mg tablet Take 25 mg by mouth two (2) times daily (with meals).  cholecalciferol, vitamin D3, 2,000 unit tab Take 2,000 Units by mouth daily.  cyanocobalamin 1,000 mcg tablet Take 1,000 mcg by mouth daily. Exam:  Visit Vitals  /74 (BP 1 Location: Right upper arm, BP Patient Position: Sitting)   Pulse 76   Resp 16   Ht 5' 7\" (1.702 m)   Wt 261 lb (118.4 kg)   SpO2 96%   BMI 40.88 kg/m²     NEUROLOGICAL EXAM:  General: Awake, alert, speech fluent  CN: PERRL, EOMI without nystagmus, VFF to confrontation, facial sensation and strength are normal and symmetric, hearing is intact to finger rub bilaterally, palate and tongue movements are intact and symmetric. Motor: Normal tone, bulk and strength bilaterally. Reflexes: Deferred  Coordination: FNF, TRACI, HTS intact. Sensation: LT intact throughout. Gait: Normal-based and steady.       LABS  Results for orders placed or performed during the hospital encounter of 05/03/21   SARS-COV-2   Result Value Ref Range    SARS-CoV-2 Please find results under separate order     NOVEL CORONAVIRUS (COVID-19)   Result Value Ref Range    SARS-CoV-2 Not Detected Not Detected         IMAGING:  MRI Results (most recent):  Results from Hospital Encounter encounter on 07/18/21    MRI LUMB SPINE W WO CONT    Narrative  EXAM: MRI LUMB SPINE W WO CONT      HISTORY: Syringomyelia and syringobulbia  INDICATION: syrinx. Syringomyelia and syringobulbia    COMPARISON: 2020    TECHNIQUE: MR imaging of the lumbar spine was performed using the following  sequences: sagittal T1, T2, STIR;  axial T1, T2 prior to and following contrast  administration. CONTRAST: 20 mL of ProHance. FINDINGS:    There is normal alignment of the lumbar spine. Vertebral body heights are  maintained. Marrow signal is normal.  Tiny hemangioma at L2. Epidural lipomatosis is minimal.  The conus medullaris terminates at L1-L2. Signal and caliber of the distal  spinal cord are within normal limits. There is no pathologic intrathecal  enhancement. Abdominal aorta is normal in caliber. Proximal common iliac vessels are normal  in caliber. Left renal cyst.    Lower thoracic spine: No herniation or stenosis. L1-L2: No herniation or stenosis. L2-L3: No herniation or stenosis. L3-L4: Minimal disc desiccation and disc bulge. Canal is patent. Foramina are  patent. L4-L5: No herniation or stenosis. L5-S1: Minimal facet arthropathy. Disc desiccation. Canal is patent. Minimal  left foraminal stenosis. Impression  Minimal degenerative change. Minimal left foraminal stenosis at L5-S1. No distal cord signal abnormality. No abnormal intrathecal enhancement. EMG:  Impression:  Extensive electrodiagnostic examination of the right upper extremity and additional nerve conduction studies of the left upper extremity reveals changes most consistent with the followin. A right median neuropathy at or distal to the wrist, consistent with a clinical diagnosis of carpal tunnel syndrome, mild in degree electrically. 2. No evidence of a right cervical motor radiculopathy. Assessment:     Encounter Diagnoses     ICD-10-CM ICD-9-CM   1. Syrinx of spinal cord (HCC)  G95.0 336.0   2.  Radicular pain in right arm  M79.2 729.2 3. Cervicalgia  M54.2 67.1   72year old pleasant AAF here for f/u of RUE proximal pain radiating to the right cervical paraspinal region for several months with paresthesias/numbness over the proximal RUE and cervical region. MRI cervical spine was completed showing evidence of a syrinx at C4-6 with focal expansion fo the cord without underlying mass, borderline chiari malformation. She has been evaluated by NSGY since last visit and elected conservative management. No significant abnormalities MRI T/L spine. EMG revealed mild R CTS without cervical radiculopathy unlikely contributing to above presentation. She is currently maintained on gabapentin without significant symptomatic relief. She has failed trials of Lyrica and Cymbalta in the past. Will refer to pain management to explore other options for her chronic neuropathic pain. She was advised to follow up for any new/worsening neurologic symptoms. Plan:   Referral to PMR    Follow-up and Dispositions    · Return if symptoms worsen or fail to improve. I have discussed the diagnosis with the patient today and the intended plan as seen in the above orders with both the patient as well as referring provider and/or PCP via electronic correspondence. The patient has received an after-visit summary and questions were answered concerning future plans.        Signed:  Babar Noel DO  9/13/2021

## 2021-09-13 NOTE — PROGRESS NOTES
Chief Complaint   Patient presents with    Follow-up    Shoulder Pain     right shoulder    no change since last visit     Visit Vitals  /74 (BP 1 Location: Right upper arm, BP Patient Position: Sitting)   Pulse 76   Resp 16   Ht 5' 7\" (1.702 m)   Wt 261 lb (118.4 kg)   SpO2 96%   BMI 40.88 kg/m²

## 2021-10-08 ENCOUNTER — TRANSCRIBE ORDER (OUTPATIENT)
Dept: SCHEDULING | Age: 66
End: 2021-10-08

## 2021-10-08 DIAGNOSIS — M48.061 SPINAL STENOSIS, LUMBAR REGION, WITHOUT NEUROGENIC CLAUDICATION: Primary | ICD-10-CM

## 2021-10-08 DIAGNOSIS — M54.16 LUMBAR RADICULOPATHY: ICD-10-CM

## 2021-10-22 ENCOUNTER — HOSPITAL ENCOUNTER (EMERGENCY)
Age: 66
Discharge: HOME | End: 2021-10-22
Attending: EMERGENCY MEDICINE | Admitting: EMERGENCY MEDICINE
Payer: MEDICARE

## 2021-10-22 ENCOUNTER — APPOINTMENT (OUTPATIENT)
Dept: CT IMAGING | Age: 66
End: 2021-10-22
Attending: EMERGENCY MEDICINE
Payer: MEDICARE

## 2021-10-22 VITALS
HEART RATE: 88 BPM | BODY MASS INDEX: 40.81 KG/M2 | TEMPERATURE: 97.9 F | OXYGEN SATURATION: 96 % | RESPIRATION RATE: 18 BRPM | WEIGHT: 260 LBS | SYSTOLIC BLOOD PRESSURE: 162 MMHG | HEIGHT: 67 IN | DIASTOLIC BLOOD PRESSURE: 83 MMHG

## 2021-10-22 DIAGNOSIS — M54.6 RIGHT-SIDED THORACIC BACK PAIN, UNSPECIFIED CHRONICITY: Primary | ICD-10-CM

## 2021-10-22 LAB
ALBUMIN SERPL-MCNC: 4 G/DL (ref 3.5–5)
ALBUMIN/GLOB SERPL: 1 {RATIO} (ref 1.1–2.2)
ALP SERPL-CCNC: 72 U/L (ref 45–117)
ALT SERPL-CCNC: 25 U/L (ref 12–78)
ANION GAP SERPL CALC-SCNC: 5 MMOL/L (ref 5–15)
APPEARANCE UR: CLEAR
AST SERPL-CCNC: 12 U/L (ref 15–37)
BACTERIA URNS QL MICRO: ABNORMAL /HPF
BASOPHILS # BLD: 0 K/UL (ref 0–0.1)
BASOPHILS NFR BLD: 0 % (ref 0–1)
BILIRUB SERPL-MCNC: 0.3 MG/DL (ref 0.2–1)
BILIRUB UR QL: NEGATIVE
BUN SERPL-MCNC: 15 MG/DL (ref 6–20)
BUN/CREAT SERPL: 14 (ref 12–20)
CALCIUM SERPL-MCNC: 10 MG/DL (ref 8.5–10.1)
CHLORIDE SERPL-SCNC: 107 MMOL/L (ref 97–108)
CO2 SERPL-SCNC: 28 MMOL/L (ref 21–32)
COLOR UR: ABNORMAL
CREAT SERPL-MCNC: 1.1 MG/DL (ref 0.55–1.02)
DIFFERENTIAL METHOD BLD: NORMAL
EOSINOPHIL # BLD: 0.2 K/UL (ref 0–0.4)
EOSINOPHIL NFR BLD: 2 % (ref 0–7)
EPITH CASTS URNS QL MICRO: ABNORMAL /LPF
ERYTHROCYTE [DISTWIDTH] IN BLOOD BY AUTOMATED COUNT: 14.2 % (ref 11.5–14.5)
GLOBULIN SER CALC-MCNC: 4.2 G/DL (ref 2–4)
GLUCOSE SERPL-MCNC: 91 MG/DL (ref 65–100)
GLUCOSE UR STRIP.AUTO-MCNC: NEGATIVE MG/DL
HCT VFR BLD AUTO: 41.1 % (ref 35–47)
HGB BLD-MCNC: 13.3 G/DL (ref 11.5–16)
HGB UR QL STRIP: NEGATIVE
HYALINE CASTS URNS QL MICRO: ABNORMAL /LPF (ref 0–5)
IMM GRANULOCYTES # BLD AUTO: 0 K/UL (ref 0–0.04)
IMM GRANULOCYTES NFR BLD AUTO: 0 % (ref 0–0.5)
KETONES UR QL STRIP.AUTO: NEGATIVE MG/DL
LEUKOCYTE ESTERASE UR QL STRIP.AUTO: ABNORMAL
LIPASE SERPL-CCNC: 109 U/L (ref 73–393)
LYMPHOCYTES # BLD: 1.3 K/UL (ref 0.8–3.5)
LYMPHOCYTES NFR BLD: 15 % (ref 12–49)
MCH RBC QN AUTO: 31.4 PG (ref 26–34)
MCHC RBC AUTO-ENTMCNC: 32.4 G/DL (ref 30–36.5)
MCV RBC AUTO: 96.9 FL (ref 80–99)
MONOCYTES # BLD: 0.8 K/UL (ref 0–1)
MONOCYTES NFR BLD: 9 % (ref 5–13)
NEUTS SEG # BLD: 6.4 K/UL (ref 1.8–8)
NEUTS SEG NFR BLD: 74 % (ref 32–75)
NITRITE UR QL STRIP.AUTO: NEGATIVE
NRBC # BLD: 0 K/UL (ref 0–0.01)
NRBC BLD-RTO: 0 PER 100 WBC
PH UR STRIP: 6.5 [PH] (ref 5–8)
PLATELET # BLD AUTO: 188 K/UL (ref 150–400)
PMV BLD AUTO: 11.5 FL (ref 8.9–12.9)
POTASSIUM SERPL-SCNC: 4.1 MMOL/L (ref 3.5–5.1)
PROT SERPL-MCNC: 8.2 G/DL (ref 6.4–8.2)
PROT UR STRIP-MCNC: NEGATIVE MG/DL
RBC # BLD AUTO: 4.24 M/UL (ref 3.8–5.2)
RBC #/AREA URNS HPF: ABNORMAL /HPF (ref 0–5)
SODIUM SERPL-SCNC: 140 MMOL/L (ref 136–145)
SP GR UR REFRACTOMETRY: 1.02 (ref 1–1.03)
UR CULT HOLD, URHOLD: NORMAL
UROBILINOGEN UR QL STRIP.AUTO: 0.2 EU/DL (ref 0.2–1)
WBC # BLD AUTO: 8.7 K/UL (ref 3.6–11)
WBC URNS QL MICRO: ABNORMAL /HPF (ref 0–4)

## 2021-10-22 PROCEDURE — 74011250636 HC RX REV CODE- 250/636: Performed by: EMERGENCY MEDICINE

## 2021-10-22 PROCEDURE — 80053 COMPREHEN METABOLIC PANEL: CPT

## 2021-10-22 PROCEDURE — 93005 ELECTROCARDIOGRAM TRACING: CPT

## 2021-10-22 PROCEDURE — 96375 TX/PRO/DX INJ NEW DRUG ADDON: CPT

## 2021-10-22 PROCEDURE — 83690 ASSAY OF LIPASE: CPT

## 2021-10-22 PROCEDURE — 74176 CT ABD & PELVIS W/O CONTRAST: CPT

## 2021-10-22 PROCEDURE — 81001 URINALYSIS AUTO W/SCOPE: CPT

## 2021-10-22 PROCEDURE — 99283 EMERGENCY DEPT VISIT LOW MDM: CPT

## 2021-10-22 PROCEDURE — 85025 COMPLETE CBC W/AUTO DIFF WBC: CPT

## 2021-10-22 PROCEDURE — 36415 COLL VENOUS BLD VENIPUNCTURE: CPT

## 2021-10-22 PROCEDURE — 96374 THER/PROPH/DIAG INJ IV PUSH: CPT

## 2021-10-22 RX ORDER — KETOROLAC TROMETHAMINE 30 MG/ML
15 INJECTION, SOLUTION INTRAMUSCULAR; INTRAVENOUS
Status: COMPLETED | OUTPATIENT
Start: 2021-10-22 | End: 2021-10-22

## 2021-10-22 RX ORDER — PREDNISONE 50 MG/1
50 TABLET ORAL DAILY
Qty: 5 TABLET | Refills: 0 | Status: SHIPPED | OUTPATIENT
Start: 2021-10-22 | End: 2021-10-27

## 2021-10-22 RX ORDER — ONDANSETRON 2 MG/ML
4 INJECTION INTRAMUSCULAR; INTRAVENOUS ONCE
Status: COMPLETED | OUTPATIENT
Start: 2021-10-22 | End: 2021-10-22

## 2021-10-22 RX ORDER — CYCLOBENZAPRINE HCL 10 MG
5 TABLET ORAL
Qty: 21 TABLET | Refills: 0 | Status: SHIPPED | OUTPATIENT
Start: 2021-10-22

## 2021-10-22 RX ADMIN — ONDANSETRON 4 MG: 2 INJECTION INTRAMUSCULAR; INTRAVENOUS at 17:09

## 2021-10-22 RX ADMIN — KETOROLAC TROMETHAMINE 15 MG: 30 INJECTION, SOLUTION INTRAMUSCULAR; INTRAVENOUS at 17:09

## 2021-10-22 NOTE — ED PROVIDER NOTES
Date of Service:  10/22/22    Patient:  Slava Valladares    Chief Complaint:  Flank Pain       HPI:  Slava Valladares is a 77 y.o.  female who presents for evaluation of right sided back and flank pain. She describes the pain as an electrical feeling that is grabbing. 10/10 pain, non radiating. Nothing makes it better. Moving makes it worse. Nausea x1, no vomiting. No Diarrhea or constipation. No CP/SOB. No fevers. This pain started this morning. History of \"gas\" pain that is persistent, unchanged. This is a new pain. History of appendectomy and hysterectomy. No dysuria, no hematuria.               Past Medical History:   Diagnosis Date    Anxiety     Arrhythmia     per pt, treated with med    Arthritis     Asthma     CAD (coronary artery disease)     EF=33 1/3    Chronic pain     fibromyalgia    COVID-19     Diarrhea 3/24/2017    Epigastric pain 3/24/2017    Fibromyalgia     GERD (gastroesophageal reflux disease)     ibs    Gout     Heart failure (HCC)     cardiomyopathy    Hypertension     Psychiatric disorder     h/o severe depression    Stroke ([facility] [address]     possible TIA    Unspecified sleep apnea     wears CPAP       Past Surgical History:   Procedure Laterality Date    COLONOSCOPY N/A 8/23/2016    COLONOSCOPY performed by Meche Wetzel MD at [facility] ENDOSCOPY    COLONOSCOPY N/A 2/21/2020    COLONOSCOPY performed by Kane Mar MD at [place] [address] N/A 3/24/2017    FLEXIBLE SIGMOIDOSCOPY  performed by Fior Marie MD at [facility] AMBULATORY OR    HX APPENDECTOMY      HX ENDOSCOPY      HX GYN      hysterectomy    HX HEENT      left eye surgery    HX KNEE ARTHROSCOPY Right     HX KNEE REPLACEMENT Right     HX ORTHOPAEDIC Right     plantar fascitis repair with implant    HX OTHER SURGICAL      hand surgery left (carpal tunnel, reconstruction of small finger)    SIGMOIDOSCOPY,BIOPSY  3/24/2017         VASCULAR SURGERY PROCEDURE UNLIST      stent in bilateral legs Family History:   Problem Relation Age of Onset    Diabetes Mother        Social History     Socioeconomic History    Marital status:      Spouse name: Not on file    Number of children: Not on file    Years of education: Not on file    Highest education level: Not on file   Occupational History    Not on file   Tobacco Use    Smoking status: Never Smoker    Smokeless tobacco: Never Used   Substance and Sexual Activity    Alcohol use: No    Drug use: No     Types: OTC, Prescription    Sexual activity: Never   Other Topics Concern    Not on file   Social History Narrative    Not on file     Social Determinants of Health     Financial Resource Strain:     Difficulty of Paying Living Expenses:    Food Insecurity:     Worried About Running Out of Food in the Last Year:     [address] in the Last Year:    Transportation Needs:     Lack of Transportation (Medical):  Lack of Transportation (Non-Medical):    Physical Activity:     Days of Exercise per Week:     Minutes of Exercise per Session:    Stress:     Feeling of Stress :    Social Connections:     Frequency of Communication with Friends and Family:     Frequency of Social Gatherings with Friends and Family:     Attends Scientologist Services:     Active Member of Clubs or Organizations:     Attends Club or Organization Meetings:     Marital Status:    Intimate Partner Violence:     Fear of Current or Ex-Partner:     Emotionally Abused:     Physically Abused:     Sexually Abused: ALLERGIES: Codeine, Demerol [meperidine], Ivp [fd and c blue no.1], Minoxidil, Pcn [penicillins], Penicillamine, and Percocet [oxycodone-acetaminophen]    Review of Systems   Constitutional: Negative for fever. HENT: Negative for hearing loss. Eyes: Negative for visual disturbance. Respiratory: Negative for shortness of breath. Cardiovascular: Negative for chest pain. Gastrointestinal: Negative for abdominal pain. Genitourinary: Negative for flank pain. Musculoskeletal: Positive for back pain. Skin: Negative for rash. Neurological: Negative for dizziness and light-headedness. Psychiatric/Behavioral: Negative for confusion. Vitals:    10/22/21 1547   BP: (!) 162/83   Pulse: 88   Resp: 18   Temp: 97.9 °F (36.6 °C)   SpO2: 96%   Weight: 117.9 kg (260 lb)   Height: 5' 7\" (1.702 m)            Physical Exam  Vitals and nursing note reviewed. Constitutional:       General: She is in acute distress. Appearance: Normal appearance. HENT:      Head: Normocephalic and atraumatic. Nose: Nose normal.      Mouth/Throat:      Mouth: Mucous membranes are moist.   Eyes:      General: No scleral icterus. Cardiovascular:      Rate and Rhythm: Normal rate. Pulmonary:      Effort: Pulmonary effort is normal. No respiratory distress. Abdominal:      General: Abdomen is flat. Tenderness: There is no abdominal tenderness. Musculoskeletal:         General: Normal range of motion. Skin:     General: Skin is warm. Capillary Refill: Capillary refill takes less than 2 seconds. Neurological:      Mental Status: She is alert and oriented to person, place, and time.    Psychiatric:         Mood and Affect: Mood normal.          MDM     VITAL SIGNS:  Patient Vitals for the past 4 hrs:   Temp Pulse Resp BP SpO2   10/22/21 1547 97.9 °F (36.6 °C) 88 18 (!) 162/83 96 %         LABS:  Recent Results (from the past 6 hour(s))   EKG, 12 LEAD, INITIAL    Collection Time: 10/22/21  4:05 PM   Result Value Ref Range    Ventricular Rate 87 BPM    Atrial Rate 87 BPM    P-R Interval 186 ms    QRS Duration 118 ms    Q-T Interval 370 ms    QTC Calculation (Bezet) 445 ms    Calculated P Axis 44 degrees    Calculated R Axis 22 degrees    Calculated T Axis 20 degrees    Diagnosis       Normal sinus rhythm  Possible Left atrial enlargement  Left ventricular hypertrophy with QRS widening ( Luis Angel product )  Nonspecific T wave abnormality  Abnormal ECG  When compared with ECG of 20-NOV-2020 15:33,  No significant change was found     URINALYSIS W/MICROSCOPIC    Collection Time: 10/22/21  4:32 PM   Result Value Ref Range    Color YELLOW/STRAW      Appearance CLEAR CLEAR      Specific gravity 1.018 1.003 - 1.030      pH (UA) 6.5 5.0 - 8.0      Protein Negative NEG mg/dL    Glucose Negative NEG mg/dL    Ketone Negative NEG mg/dL    Bilirubin Negative NEG      Blood Negative NEG      Urobilinogen 0.2 0.2 - 1.0 EU/dL    Nitrites Negative NEG      Leukocyte Esterase SMALL (A) NEG      WBC 5-10 0 - 4 /hpf    RBC 0-5 0 - 5 /hpf    Epithelial cells MODERATE (A) FEW /lpf    Bacteria 1+ (A) NEG /hpf    Hyaline cast 0-2 0 - 5 /lpf   URINE CULTURE HOLD SAMPLE    Collection Time: 10/22/21  4:32 PM    Specimen: Serum   Result Value Ref Range    Urine culture hold        Urine on hold in Microbiology dept for 2 days. If unpreserved urine is submitted, it cannot be used for addtional testing after 24 hours, recollection will be required. CBC WITH AUTOMATED DIFF    Collection Time: 10/22/21  4:58 PM   Result Value Ref Range    WBC 8.7 3.6 - 11.0 K/uL    RBC 4.24 3.80 - 5.20 M/uL    HGB 13.3 11.5 - 16.0 g/dL    HCT 41.1 35.0 - 47.0 %    MCV 96.9 80.0 - 99.0 FL    MCH 31.4 26.0 - 34.0 PG    MCHC 32.4 30.0 - 36.5 g/dL    RDW 14.2 11.5 - 14.5 %    PLATELET 276 706 - 391 K/uL    MPV 11.5 8.9 - 12.9 FL    NRBC 0.0 0  WBC    ABSOLUTE NRBC 0.00 0.00 - 0.01 K/uL    NEUTROPHILS 74 32 - 75 %    LYMPHOCYTES 15 12 - 49 %    MONOCYTES 9 5 - 13 %    EOSINOPHILS 2 0 - 7 %    BASOPHILS 0 0 - 1 %    IMMATURE GRANULOCYTES 0 0.0 - 0.5 %    ABS. NEUTROPHILS 6.4 1.8 - 8.0 K/UL    ABS. LYMPHOCYTES 1.3 0.8 - 3.5 K/UL    ABS. MONOCYTES 0.8 0.0 - 1.0 K/UL    ABS. EOSINOPHILS 0.2 0.0 - 0.4 K/UL    ABS. BASOPHILS 0.0 0.0 - 0.1 K/UL    ABS. IMM.  GRANS. 0.0 0.00 - 0.04 K/UL    DF AUTOMATED     METABOLIC PANEL, COMPREHENSIVE    Collection Time: 10/22/21  4:58 PM   Result Value Ref Range    Sodium 140 136 - 145 mmol/L    Potassium 4.1 3.5 - 5.1 mmol/L    Chloride 107 97 - 108 mmol/L    CO2 28 21 - 32 mmol/L    Anion gap 5 5 - 15 mmol/L    Glucose 91 65 - 100 mg/dL    BUN 15 6 - 20 MG/DL    Creatinine 1.10 (H) 0.55 - 1.02 MG/DL    BUN/Creatinine ratio 14 12 - 20      GFR est AA >60 >60 ml/min/1.73m2    GFR est non-AA 50 (L) >60 ml/min/1.73m2    Calcium 10.0 8.5 - 10.1 MG/DL    Bilirubin, total 0.3 0.2 - 1.0 MG/DL    ALT (SGPT) 25 12 - 78 U/L    AST (SGOT) 12 (L) 15 - 37 U/L    Alk. phosphatase 72 45 - 117 U/L    Protein, total 8.2 6.4 - 8.2 g/dL    Albumin 4.0 3.5 - 5.0 g/dL    Globulin 4.2 (H) 2.0 - 4.0 g/dL    A-G Ratio 1.0 (L) 1.1 - 2.2     LIPASE    Collection Time: 10/22/21  4:58 PM   Result Value Ref Range    Lipase 109 73 - 393 U/L        IMAGING:  CT ABD PELV WO CONT   Final Result   1. Nonobstructive left nephrolithiasis. 2. Colonic diverticula. 3. No acute finding. Medications During Visit:  Medications   ketorolac (TORADOL) injection 15 mg (15 mg IntraVENous Given 10/22/21 1709)   ondansetron (ZOFRAN) injection 4 mg (4 mg IntraVENous Given 10/22/21 1709)         DECISION MAKING:  Isabella Delaney is a 77 y.o. female who comes in as above. Here, patient appears well. Diagnostics as above do not reveal any cause for her discomfort. She is well-appearing. Will treat as though it is musculoskeletal and have the patient follow-up with PCP. IMPRESSION:  1. Right-sided thoracic back pain, unspecified chronicity        DISPOSITION:  Discharged      Discharge Medication List as of 10/22/2021  6:27 PM      START taking these medications    Details   predniSONE (DELTASONE) 50 mg tablet Take 1 Tablet by mouth daily for 5 days. , Normal, Disp-5 Tablet, R-0      cyclobenzaprine (FLEXERIL) 10 mg tablet Take 0.5 Tablets by mouth three (3) times daily as needed for Muscle Spasm(s). , Normal, Disp-21 Tablet, R-0         CONTINUE these medications which have NOT CHANGED Details   Entresto 49-51 mg tab tablet two (2) times a day., Historical Med, PRAKASH      !! LORazepam (ATIVAN) 0.5 mg tablet Take 1 tab 30 minutes prior to imaging. May repeat once if needed. Max daily dose 1mg., Normal, Disp-2 Tablet, R-0      !! LORazepam (ATIVAN) 0.5 mg tablet Take 1 tab 30 minutes prior to MRI. May repeat once if needed. Max daily dose 1mg. No driving while on medication. , Normal, Disp-2 Tablet, R-0      spironolactone (ALDACTONE) 25 mg tablet Take  by mouth daily. , Historical Med      RABEprazole (ACIPHEX) 20 mg TbEC Take 20 mg by mouth daily. , Historical Med      fluticasone propion-salmeteroL (Advair Diskus) 500-50 mcg/dose diskus inhaler Take 1 Puff by inhalation every twelve (12) hours. , Historical Med      naproxen (NAPROSYN) 500 mg tablet Take 500 mg by mouth as needed for Pain., Historical Med      bumetanide (BUMEX) 2 mg tablet Take 1 Tab by mouth daily. , Print, Disp-30 Tab,R-0      amLODIPine (NORVASC) 5 mg tablet Take 1 Tab by mouth daily. , Print, Disp-30 Tab,R-0      albuterol (PROVENTIL HFA, VENTOLIN HFA, PROAIR HFA) 90 mcg/actuation inhaler Take 1 Puff by inhalation every four (4) hours as needed for Wheezing., Historical Med      colchicine 0.6 mg tablet Take 0.6 mg by mouth daily as needed for Gout or Pain., Historical Med      gabapentin (NEURONTIN) 300 mg capsule Take 300 mg by mouth. 300mg TID and 1200mg at bed time, Historical Med      famotidine (PEPCID) 40 mg tablet Take 40 mg by mouth daily. , Historical Med      sulfaSALAzine (AZULFIDINE) 500 mg tablet Take 500 mg by mouth two (2) times a day., Historical Med      montelukast (SINGULAIR) 10 mg tablet Take 10 mg by mouth daily. , Historical Med      rosuvastatin (CRESTOR) 20 mg tablet Take 20 mg by mouth nightly., Historical Med      dulaglutide (TRULICITY) 1.5 YC/1.3 mL sub-q pen 1.5 mg by SubCUTAneous route every Sunday., Historical Med      clopidogreL (PLAVIX) 75 mg tab Take 75 mg by mouth daily. , Historical Med aspirin delayed-release 81 mg tablet Take 81 mg by mouth daily. , Historical Med      hydroxychloroquine (PLAQUENIL) 200 mg tablet Take 200 mg by mouth two (2) times a day., Historical Med      allopurinol (ZYLOPRIM) 300 mg tablet Take 300 mg by mouth daily. , Historical Med      plecanatide (TRULANCE) 3 mg tab Take 3 mg by mouth daily as needed for Other (constipation). , Historical Med      lidocaine (LIDODERM) 5 % 1 Patch by TransDERmal route daily as needed. Apply patch to the affected area for 12 hours a day and remove for 12 hours a day. , Historical Med      diclofenac (VOLTAREN) 1 % gel Apply 2 g to affected area as needed for Pain. apply 2 g to affected area as needed for pain., Historical Med      carvedilol (COREG) 25 mg tablet Take 25 mg by mouth two (2) times daily (with meals). , Historical Med      cholecalciferol, vitamin D3, 2,000 unit tab Take 2,000 Units by mouth daily. , Historical Med      cyanocobalamin 1,000 mcg tablet Take 1,000 mcg by mouth daily. , Historical Med       !! - Potential duplicate medications found. Please discuss with provider. Follow-up Information     Follow up With Specialties Details Why Contact Info    Shara Anton MD Internal Medicine Schedule an appointment as soon as possible for a visit   [address] [place] [address] [address]  [phone]              The patient is asked to follow-up with their primary care provider in the next several days. They are to call tomorrow for an appointment. The patient is asked to return promptly for any increased concerns or worsening of symptoms. They can return to this emergency department or any other emergency department.     Procedures

## 2021-10-22 NOTE — ED TRIAGE NOTES
Triage note:  Pt with complaints left shoulder pain starting about one week ago across her chest. Pain then going to her right shoulder and now in right flank. Pt is sharp in nature and comes and goes, worse with movement. + nausea.

## 2021-10-24 LAB
ATRIAL RATE: 87 BPM
CALCULATED P AXIS, ECG09: 44 DEGREES
CALCULATED R AXIS, ECG10: 22 DEGREES
CALCULATED T AXIS, ECG11: 20 DEGREES
DIAGNOSIS, 93000: NORMAL
P-R INTERVAL, ECG05: 186 MS
Q-T INTERVAL, ECG07: 370 MS
QRS DURATION, ECG06: 118 MS
QTC CALCULATION (BEZET), ECG08: 445 MS
VENTRICULAR RATE, ECG03: 87 BPM

## 2022-02-17 ENCOUNTER — HOSPITAL ENCOUNTER (OUTPATIENT)
Dept: PREADMISSION TESTING | Age: 67
Discharge: HOME | End: 2022-02-17
Payer: MEDICARE

## 2022-02-17 PROCEDURE — U0005 INFEC AGEN DETEC AMPLI PROBE: HCPCS

## 2022-02-18 LAB
SARS-COV-2, XPLCVT: NOT DETECTED
SOURCE, COVRS: NORMAL

## 2022-02-18 RX ORDER — ETHYL ALCOHOL 70 %
SOLUTION, NON-ORAL TOPICAL AS NEEDED
COMMUNITY

## 2022-02-18 RX ORDER — ERGOCALCIFEROL 1.25 MG/1
50000 CAPSULE ORAL
COMMUNITY

## 2022-02-21 ENCOUNTER — ANESTHESIA EVENT (OUTPATIENT)
Dept: ENDOSCOPY | Age: 67
End: 2022-02-21
Payer: MEDICARE

## 2022-02-21 ENCOUNTER — ANESTHESIA (OUTPATIENT)
Dept: ENDOSCOPY | Age: 67
End: 2022-02-21
Payer: MEDICARE

## 2022-02-21 ENCOUNTER — HOSPITAL ENCOUNTER (OUTPATIENT)
Age: 67
Setting detail: OUTPATIENT SURGERY
Discharge: HOME | End: 2022-02-21
Attending: SPECIALIST | Admitting: SPECIALIST
Payer: MEDICARE

## 2022-02-21 VITALS
DIASTOLIC BLOOD PRESSURE: 66 MMHG | BODY MASS INDEX: 41.12 KG/M2 | WEIGHT: 262 LBS | RESPIRATION RATE: 14 BRPM | HEIGHT: 67 IN | HEART RATE: 71 BPM | OXYGEN SATURATION: 98 % | SYSTOLIC BLOOD PRESSURE: 136 MMHG | TEMPERATURE: 98.5 F

## 2022-02-21 PROCEDURE — 2709999900 HC NON-CHARGEABLE SUPPLY: Performed by: SPECIALIST

## 2022-02-21 PROCEDURE — 74011000250 HC RX REV CODE- 250: Performed by: NURSE ANESTHETIST, CERTIFIED REGISTERED

## 2022-02-21 PROCEDURE — 76060000031 HC ANESTHESIA FIRST 0.5 HR: Performed by: SPECIALIST

## 2022-02-21 PROCEDURE — 88305 TISSUE EXAM BY PATHOLOGIST: CPT

## 2022-02-21 PROCEDURE — 74011250636 HC RX REV CODE- 250/636: Performed by: NURSE ANESTHETIST, CERTIFIED REGISTERED

## 2022-02-21 PROCEDURE — 77030039825 HC MSK NSL PAP SUPERNO2VA VYRM -B: Performed by: NURSE ANESTHETIST, CERTIFIED REGISTERED

## 2022-02-21 PROCEDURE — 77030013992 HC SNR POLYP ENDOSC BSC -B: Performed by: SPECIALIST

## 2022-02-21 PROCEDURE — 74011250636 HC RX REV CODE- 250/636: Performed by: SPECIALIST

## 2022-02-21 PROCEDURE — 76040000019: Performed by: SPECIALIST

## 2022-02-21 RX ORDER — PROPOFOL 10 MG/ML
INJECTION, EMULSION INTRAVENOUS AS NEEDED
Status: DISCONTINUED | OUTPATIENT
Start: 2022-02-21 | End: 2022-02-21 | Stop reason: HOSPADM

## 2022-02-21 RX ORDER — SODIUM CHLORIDE 9 MG/ML
50 INJECTION, SOLUTION INTRAVENOUS CONTINUOUS
Status: DISCONTINUED | OUTPATIENT
Start: 2022-02-21 | End: 2022-02-21 | Stop reason: HOSPADM

## 2022-02-21 RX ORDER — LIDOCAINE HYDROCHLORIDE 20 MG/ML
INJECTION, SOLUTION EPIDURAL; INFILTRATION; INTRACAUDAL; PERINEURAL AS NEEDED
Status: DISCONTINUED | OUTPATIENT
Start: 2022-02-21 | End: 2022-02-21 | Stop reason: HOSPADM

## 2022-02-21 RX ORDER — SODIUM CHLORIDE 0.9 % (FLUSH) 0.9 %
5-40 SYRINGE (ML) INJECTION AS NEEDED
Status: DISCONTINUED | OUTPATIENT
Start: 2022-02-21 | End: 2022-02-21 | Stop reason: HOSPADM

## 2022-02-21 RX ORDER — DEXTROMETHORPHAN/PSEUDOEPHED 2.5-7.5/.8
1.2 DROPS ORAL
Status: DISCONTINUED | OUTPATIENT
Start: 2022-02-21 | End: 2022-02-21 | Stop reason: HOSPADM

## 2022-02-21 RX ORDER — SODIUM CHLORIDE 0.9 % (FLUSH) 0.9 %
5-40 SYRINGE (ML) INJECTION EVERY 8 HOURS
Status: DISCONTINUED | OUTPATIENT
Start: 2022-02-21 | End: 2022-02-21 | Stop reason: HOSPADM

## 2022-02-21 RX ADMIN — PROPOFOL 50 MG: 10 INJECTION, EMULSION INTRAVENOUS at 10:08

## 2022-02-21 RX ADMIN — SODIUM CHLORIDE 50 ML/HR: 0.9 INJECTION, SOLUTION INTRAVENOUS at 09:30

## 2022-02-21 RX ADMIN — PROPOFOL 50 MG: 10 INJECTION, EMULSION INTRAVENOUS at 10:05

## 2022-02-21 RX ADMIN — PROPOFOL 50 MG: 10 INJECTION, EMULSION INTRAVENOUS at 09:56

## 2022-02-21 RX ADMIN — PROPOFOL 50 MG: 10 INJECTION, EMULSION INTRAVENOUS at 09:59

## 2022-02-21 RX ADMIN — PROPOFOL 50 MG: 10 INJECTION, EMULSION INTRAVENOUS at 10:02

## 2022-02-21 RX ADMIN — LIDOCAINE HYDROCHLORIDE 40 MG: 20 INJECTION, SOLUTION EPIDURAL; INFILTRATION; INTRACAUDAL; PERINEURAL at 09:56

## 2022-02-21 NOTE — PROCEDURES
Colonoscopy Procedure Note    Indications:   Personal history of colon polyps (screening only)    Referring Physician: Sofy Rai MD  Anesthesia/Sedation: MAC anesthesia Propofol  Endoscopist:  Dr. Andrade Records    Procedure in Detail:  Informed consent was obtained for the procedure, including sedation. Risks of perforation, hemorrhage, adverse drug reaction, and aspiration were discussed. The patient was placed in the left lateral decubitus position. Based on the pre-procedure assessment, including review of the patient's medical history, medications, allergies, and review of systems, she had been deemed to be an appropriate candidate for moderate sedation; she was therefore sedated with the medications listed above. The patient was monitored continuously with ECG tracing, pulse oximetry, blood pressure monitoring, and direct observations. A rectal examination was performed. The [id number] was inserted into the rectum and advanced under direct vision to the cecum, which was identified by the ileocecal valve and appendiceal orifice. The quality of the colonic preparation was adequate. A careful inspection was made as the colonoscope was withdrawn, including a retroflexed view of the rectum; findings and interventions are described below. Appropriate photodocumentation was obtained. Findings:   1. Scope advanced to the cecum. 2.  Preparation was adequate. 3.  Diffuse moderate severity diverticulosis in sigmoid and descending colon. 4.  Sessile 5 mm polyp in transverse s/p cold snare removal.       Sessile 4 mm polyp in descending s/p cold snare removal  5. Small internal hemorrhoids. Therapies:  See above    Specimen: Specimens were collected as described above and sent to pathology. Complications: None were encountered during the procedure. EBL: < 10 ml. Recommendations:     - Repeat colonoscopy in 5 years.       Signed By: Cyn Gil MD February 21, 2022

## 2022-02-21 NOTE — H&P
Gastroenterology Outpatient History and Physical    Patient: [facility]    Physician: Prasanna Patel MD    Vital Signs: Blood pressure (!) 128/96, pulse 76, temperature 98.1 °F (36.7 °C), resp. rate 18, height 5' 7\" (1.702 m), weight 118.8 kg (262 lb), SpO2 96 %, not currently breastfeeding. Allergies:    Allergies   Allergen Reactions    Codeine Itching    Demerol [Meperidine] Nausea Only    Ivp [Fd And C Blue No.1] Itching    Minoxidil Itching    Pcn [Penicillins] Other (comments)     Low blood pressure    Penicillamine Itching and Other (comments)     Drops blood pressure    Percocet [Oxycodone-Acetaminophen] Nausea and Vomiting     Can take tylenol        Chief Complaint: H/O Polyps, Rectal bleeding    History of Present Illness: above    Justification for Procedure: above    History:  Past Medical History:   Diagnosis Date    Anxiety     Arrhythmia     per pt, treated with med    Arthritis     Asthma     CAD (coronary artery disease)     EF=33 1/3    Chronic pain     fibromyalgia    COVID-19     Diarrhea 3/24/2017    Epigastric pain 3/24/2017    Fibromyalgia     GERD (gastroesophageal reflux disease)     ibs    Gout     Heart failure (HCC)     cardiomyopathy    Hypertension     Psychiatric disorder     h/o severe depression    Stroke ([address]     possible TIA    Unspecified sleep apnea     wears CPAP      Past Surgical History:   Procedure Laterality Date    COLONOSCOPY N/A 8/23/2016    COLONOSCOPY performed by [facility] Patel MD at [facility] ENDOSCOPY    COLONOSCOPY N/A 2/21/2020    COLONOSCOPY performed by Natalya Villarreal MD at [facility] ENDOSCOPY    FLEXIBLE SIGMOIDOSCOPY N/A 3/24/2017    FLEXIBLE SIGMOIDOSCOPY  performed by Vladimir Mahajan MD at [facility] AMBULATORY OR    HX APPENDECTOMY      HX ENDOSCOPY      HX GYN      hysterectomy    HX HEENT      left eye surgery/muscle    HX HEENT      dental extractions    HX KNEE ARTHROSCOPY Right     HX KNEE REPLACEMENT Right     HX ORTHOPAEDIC Right     plantar fascitis repair with implant    HX OTHER SURGICAL      hand surgery left (carpal tunnel, reconstruction of small finger)    HX TONSILLECTOMY      SIGMOIDOSCOPY,BIOPSY  3/24/2017         VASCULAR SURGERY PROCEDURE UNLIST      stent in bilateral legs      Social History     Socioeconomic History    Marital status:    Tobacco Use    Smoking status: Never Smoker    Smokeless tobacco: Never Used   Vaping Use    Vaping Use: Never used   Substance and Sexual Activity    Alcohol use: No    Drug use: No     Types: OTC, Prescription    Sexual activity: Never      Family History   Problem Relation Age of Onset    Diabetes Mother        Medications:   Prior to Admission medications    Medication Sig Start Date End Date Taking? Authorizing Provider   tiotropium (Spiriva with HandiHaler) 18 mcg inhalation capsule Take 1 Capsule by inhalation daily. Yes Provider, Historical   ergocalciferol (Vitamin D2) 1,250 mcg (50,000 unit) capsule Take 50,000 Units by mouth every Wednesday. Yes Provider, Historical   cyclobenzaprine (FLEXERIL) 10 mg tablet Take 0.5 Tablets by mouth three (3) times daily as needed for Muscle Spasm(s). 10/22/21  Yes Nadine Wilson,    Entresto 49-51 mg tab tablet two (2) times a day. 8/18/21  Yes Provider, Historical   LORazepam (ATIVAN) 0.5 mg tablet Take 1 tab 30 minutes prior to imaging. May repeat once if needed. Max daily dose 1mg. 7/13/21  Yes Vivi De La Paz DO   LORazepam (ATIVAN) 0.5 mg tablet Take 1 tab 30 minutes prior to MRI. May repeat once if needed. Max daily dose 1mg. No driving while on medication. 6/23/21  Yes Vivi De La Paz DO   spironolactone (ALDACTONE) 25 mg tablet Take  by mouth daily. Yes Provider, Historical   RABEprazole (ACIPHEX) 20 mg TbEC Take 20 mg by mouth daily.    Yes Provider, Historical   fluticasone propion-salmeteroL (Advair Diskus) 500-50 mcg/dose diskus inhaler Take 1 Puff by inhalation every twelve (12) hours. Yes Provider, Historical   bumetanide (BUMEX) 2 mg tablet Take 1 Tab by mouth daily. 12/1/20  Yes Senthil Collins MD   albuterol (PROVENTIL HFA, VENTOLIN HFA, PROAIR HFA) 90 mcg/actuation inhaler Take 1 Puff by inhalation every four (4) hours as needed for Wheezing. Yes Provider, Historical   colchicine 0.6 mg tablet Take 0.6 mg by mouth daily as needed for Gout or Pain. Yes Provider, Historical   gabapentin (NEURONTIN) 300 mg capsule Take 300 mg by mouth. 300mg TID and 1200mg at bed time   Yes Provider, Historical   famotidine (PEPCID) 40 mg tablet Take 40 mg by mouth daily. Yes Provider, Historical   sulfaSALAzine (AZULFIDINE) 500 mg tablet Take 500 mg by mouth two (2) times a day. Yes Provider, Historical   montelukast (SINGULAIR) 10 mg tablet Take 10 mg by mouth daily. Yes Provider, Historical   rosuvastatin (CRESTOR) 20 mg tablet Take 20 mg by mouth nightly. Yes Provider, Historical   dulaglutide (TRULICITY) 1.5 ZY/7.0 mL sub-q pen 1.5 mg by SubCUTAneous route every Sunday. Yes Provider, Historical   clopidogreL (PLAVIX) 75 mg tab Take 75 mg by mouth daily. Yes Provider, Historical   aspirin delayed-release 81 mg tablet Take 81 mg by mouth daily. Yes Provider, Historical   hydroxychloroquine (PLAQUENIL) 200 mg tablet Take 200 mg by mouth two (2) times a day. Yes Other, MD Fabio   allopurinol (ZYLOPRIM) 300 mg tablet Take 300 mg by mouth daily. Yes Other, MD Fabio   plecanatide (TRULANCE) 3 mg tab Take 3 mg by mouth daily as needed for Other (constipation). Yes Provider, Historical   diclofenac (VOLTAREN) 1 % gel Apply 2 g to affected area as needed for Pain. apply 2 g to affected area as needed for pain. Yes Provider, Historical   carvedilol (COREG) 25 mg tablet Take 25 mg by mouth two (2) times daily (with meals). Yes Other, MD Fabio   cyanocobalamin 1,000 mcg tablet Take 1,000 mcg by mouth daily.    Yes Other, MD Fabio   vitamin a & d (A&D) ointment Apply  to affected area as needed for Skin Irritation. Patient not taking: Reported on 2/21/2022    Provider, Historical   naproxen (NAPROSYN) 500 mg tablet Take 500 mg by mouth as needed for Pain. Patient not taking: Reported on 2/21/2022    Provider, Historical   amLODIPine (NORVASC) 5 mg tablet Take 1 Tab by mouth daily. Patient not taking: Reported on 2/18/2022 12/1/20   Mae Loza MD   lidocaine (LIDODERM) 5 % 1 Patch by TransDERmal route daily as needed. Apply patch to the affected area for 12 hours a day and remove for 12 hours a day. Provider, Historical   cholecalciferol, vitamin D3, 2,000 unit tab Take 2,000 Units by mouth daily. Patient not taking: Reported on 2/18/2022    Other, MD Fabio       Physical Exam:   General: alert, no distress   HEENT: Head: Normocephalic, no lesions, without obvious abnormality.    Heart: regular rate and rhythm, S1, S2 normal, no murmur, click, rub or gallop   Lungs: chest clear, no wheezing, rales, normal symmetric air entry   Abdominal: soft, NT/ND+ BS   Neurological: Grossly normal   Extremities: extremities normal, atraumatic, no cyanosis or edema     Findings/Diagnosis: Hematochezia, H/O Polyp    Plan of Care/Planned Procedure: Colonoscopy

## 2022-02-21 NOTE — DISCHARGE INSTRUCTIONS
Carole Castaneda  [id number]  1955    COLON DISCHARGE INSTRUCTIONS  Discomfort:  Redness at IV site- apply warm compress to area; if redness or soreness persist- contact your physician  There may be a slight amount of blood passed from the rectum  Gaseous discomfort- walking, belching will help relieve any discomfort  You may not operate a vehicle for 12 hours  You may not engage in an occupation involving machinery or appliances for rest of today  You may not drink alcoholic beverages for at least 12 hours  Avoid making any critical decisions for at least 24 hour  DIET:   Regular diet. - however -  remember your colon is empty and a heavy meal will produce gas. Avoid these foods:  vegetables, fried / greasy foods, carbonated drinks for today. MEDICATIONS:        Regarding Aspirin or Nonsteroidal medications, please see below. ACTIVITY:  You may resume your normal daily activities it is recommended that you spend the remainder of the day resting -  avoid any strenuous activity. CALL M.D. ANY SIGN OF:  Increasing pain, nausea, vomiting  Abdominal distension (swelling)  New increased bleeding (oral or rectal)  Fever (chills)  Pain in chest area  Bloody discharge from nose or mouth  Shortness of breath    Tylenol as needed for pain.       Follow-up Instructions:   Call Dr. Marquis Joshi for Results of procedure / biopsy in 4-5 days at Telephone #  [phone]              Repeat Colonoscopy in 5 years

## 2022-02-21 NOTE — ANESTHESIA POSTPROCEDURE EVALUATION
Procedure(s):  COLONOSCOPY  ENDOSCOPIC POLYPECTOMY. MAC    Anesthesia Post Evaluation        Patient location during evaluation: PACU  Note status: Adequate. Level of consciousness: responsive to verbal stimuli and sleepy but conscious  Pain management: satisfactory to patient  Airway patency: patent  Anesthetic complications: no  Cardiovascular status: acceptable  Respiratory status: acceptable  Hydration status: acceptable  Comments: +Post-Anesthesia Evaluation and Assessment    Patient: Nathan Cooper MRN: [id number]  SSN: [id number]   YOB: 1955  Age: 77 y.o. Sex: female      Cardiovascular Function/Vital Signs    /66   Pulse 71   Temp 36.9 °C (98.5 °F)   Resp 14   Ht 5' 7\" (1.702 m)   Wt 118.8 kg (262 lb)   SpO2 98%   Breastfeeding No   BMI 41.04 kg/m²     Patient is status post Procedure(s):  COLONOSCOPY  ENDOSCOPIC POLYPECTOMY. Nausea/Vomiting: Controlled. Postoperative hydration reviewed and adequate. Pain:  Pain Scale 1: Numeric (0 - 10) (02/21/22 1030)  Pain Intensity 1: 0 (02/21/22 1030)   Managed. Neurological Status: At baseline. Mental Status and Level of Consciousness: Arousable. Pulmonary Status:   O2 Device: None (Room air) (02/21/22 1030)   Adequate oxygenation and airway patent. Complications related to anesthesia: None    Post-anesthesia assessment completed. No concerns. Signed By: Genna Guo MD    2/21/2022  Post anesthesia nausea and vomiting:  controlled      INITIAL Post-op Vital signs:   Vitals Value Taken Time   /66 02/21/22 1039   Temp 36.9 °C (98.5 °F) 02/21/22 1022   Pulse 67 02/21/22 1040   Resp 14 02/21/22 1040   SpO2 96 % 02/21/22 1040   Vitals shown include unvalidated device data.

## 2022-02-21 NOTE — ROUTINE PROCESS
Gay Nohemy  1955  [id number]    Situation:  Verbal report received from: DEVAUGHN Munson  Procedure: Procedure(s):  COLONOSCOPY  ENDOSCOPIC POLYPECTOMY    Background:    Preoperative diagnosis: RECTAL BLEEDING  DIVERTICULAR DISEASE  IBS  INTERNAL BLEEDING HEMORRHOIDS  PERSONAL HX COLON POLYPS  Postoperative diagnosis: EGD: Diverticulosis, polyps, hemorrhoids    :  Dr. Rui Pimentel  Assistant(s): Endoscopy Technician-1: Abdifatah Pryor  Endoscopy RN-1: Bela Alicia RN    Specimens:   ID Type Source Tests Collected by Time Destination   1 : Polyp Preservative Colon, Transverse  Dora Alvarez MD 2/21/2022 1005 Pathology   2 : Polyp Preservative Colon, Descending  Dora Avlarez MD 2/21/2022 1013 Pathology     H. Pylori  no    Assessment:  Intra-procedure medications   Anesthesia gave intra-procedure sedation and medications, see anesthesia flow sheet yes    Intravenous fluids: NS@ KVO     Vital signs stable     Abdominal assessment: round and soft     Recommendation:  Discharge patient per MD order.   Family  Permission to share finding with family or friend yes

## 2022-03-18 PROBLEM — R06.02 SOB (SHORTNESS OF BREATH): Status: ACTIVE | Noted: 2020-11-18

## 2022-03-19 PROBLEM — R19.7 DIARRHEA: Status: ACTIVE | Noted: 2017-03-24

## 2022-03-19 PROBLEM — R10.13 EPIGASTRIC PAIN: Status: ACTIVE | Noted: 2017-03-24

## 2022-06-01 ENCOUNTER — HOSPITAL ENCOUNTER (OUTPATIENT)
Dept: GENERAL RADIOLOGY | Age: 67
Discharge: HOME | End: 2022-06-01
Attending: INTERNAL MEDICINE
Payer: MEDICARE

## 2022-06-01 ENCOUNTER — TRANSCRIBE ORDER (OUTPATIENT)
Dept: GENERAL RADIOLOGY | Age: 67
End: 2022-06-01

## 2022-06-01 DIAGNOSIS — R05.9 COUGH: Primary | ICD-10-CM

## 2022-06-01 DIAGNOSIS — R05.9 COUGH: ICD-10-CM

## 2022-06-01 PROCEDURE — 71046 X-RAY EXAM CHEST 2 VIEWS: CPT

## 2022-06-09 ENCOUNTER — TRANSCRIBE ORDER (OUTPATIENT)
Dept: SCHEDULING | Age: 67
End: 2022-06-09

## 2022-06-09 DIAGNOSIS — N18.2 CHRONIC KIDNEY DISEASE, STAGE II (MILD): ICD-10-CM

## 2022-06-09 DIAGNOSIS — N17.9 ACUTE KIDNEY FAILURE, UNSPECIFIED (HCC): ICD-10-CM

## 2022-06-09 DIAGNOSIS — N39.0 URINARY TRACT INFECTIOUS DISEASE: Primary | ICD-10-CM

## 2022-06-14 ENCOUNTER — HOSPITAL ENCOUNTER (OUTPATIENT)
Dept: GENERAL RADIOLOGY | Age: 67
Discharge: HOME | End: 2022-06-14
Attending: INTERNAL MEDICINE
Payer: MEDICARE

## 2022-06-14 ENCOUNTER — TRANSCRIBE ORDER (OUTPATIENT)
Dept: GENERAL RADIOLOGY | Age: 67
End: 2022-06-14

## 2022-06-14 DIAGNOSIS — R05.9 COUGH: Primary | ICD-10-CM

## 2022-06-14 DIAGNOSIS — R05.9 COUGH: ICD-10-CM

## 2022-06-14 PROCEDURE — 71046 X-RAY EXAM CHEST 2 VIEWS: CPT

## 2022-06-20 ENCOUNTER — TRANSCRIBE ORDER (OUTPATIENT)
Dept: SCHEDULING | Age: 67
End: 2022-06-20

## 2022-06-20 DIAGNOSIS — J45.51 SEVERE PERSISTENT ASTHMA WITH EXACERBATION: Primary | ICD-10-CM

## 2022-06-22 ENCOUNTER — HOSPITAL ENCOUNTER (OUTPATIENT)
Dept: ULTRASOUND IMAGING | Age: 67
Discharge: HOME | End: 2022-06-22
Attending: INTERNAL MEDICINE
Payer: MEDICARE

## 2022-06-22 DIAGNOSIS — N39.0 URINARY TRACT INFECTIOUS DISEASE: ICD-10-CM

## 2022-06-22 DIAGNOSIS — N17.9 ACUTE KIDNEY FAILURE, UNSPECIFIED (HCC): ICD-10-CM

## 2022-06-22 DIAGNOSIS — N18.2 CHRONIC KIDNEY DISEASE, STAGE II (MILD): ICD-10-CM

## 2022-06-22 PROCEDURE — 76770 US EXAM ABDO BACK WALL COMP: CPT

## 2022-07-01 ENCOUNTER — TRANSCRIBE ORDER (OUTPATIENT)
Dept: SCHEDULING | Age: 67
End: 2022-07-01

## 2022-07-01 DIAGNOSIS — J45.51 SEVERE PERSISTENT ASTHMA WITH EXACERBATION: ICD-10-CM

## 2022-07-01 DIAGNOSIS — R06.02 SHORTNESS OF BREATH: Primary | ICD-10-CM

## 2022-07-12 ENCOUNTER — HOSPITAL ENCOUNTER (OUTPATIENT)
Dept: CT IMAGING | Age: 67
Discharge: HOME | End: 2022-07-12
Attending: INTERNAL MEDICINE
Payer: MEDICARE

## 2022-07-12 DIAGNOSIS — R06.02 SHORTNESS OF BREATH: ICD-10-CM

## 2022-07-12 DIAGNOSIS — J45.51 SEVERE PERSISTENT ASTHMA WITH EXACERBATION: ICD-10-CM

## 2022-07-12 PROCEDURE — 71250 CT THORAX DX C-: CPT

## 2022-07-25 ENCOUNTER — HOSPITAL ENCOUNTER (OUTPATIENT)
Age: 67
Setting detail: OUTPATIENT SURGERY
Discharge: HOME | End: 2022-07-25
Attending: INTERNAL MEDICINE | Admitting: INTERNAL MEDICINE
Payer: MEDICARE

## 2022-07-25 VITALS
RESPIRATION RATE: 23 BRPM | SYSTOLIC BLOOD PRESSURE: 124 MMHG | HEART RATE: 77 BPM | OXYGEN SATURATION: 93 % | DIASTOLIC BLOOD PRESSURE: 71 MMHG

## 2022-07-25 PROCEDURE — 74011250636 HC RX REV CODE- 250/636: Performed by: INTERNAL MEDICINE

## 2022-07-25 PROCEDURE — 76040000019: Performed by: INTERNAL MEDICINE

## 2022-07-25 PROCEDURE — 74011000250 HC RX REV CODE- 250: Performed by: INTERNAL MEDICINE

## 2022-07-25 RX ORDER — FENTANYL CITRATE 50 UG/ML
INJECTION, SOLUTION INTRAMUSCULAR; INTRAVENOUS
Status: DISCONTINUED
Start: 2022-07-25 | End: 2022-07-25 | Stop reason: HOSPADM

## 2022-07-25 RX ORDER — DIPHENHYDRAMINE HYDROCHLORIDE 50 MG/ML
INJECTION, SOLUTION INTRAMUSCULAR; INTRAVENOUS
Status: DISCONTINUED
Start: 2022-07-25 | End: 2022-07-25 | Stop reason: HOSPADM

## 2022-07-25 RX ORDER — FLUMAZENIL 0.1 MG/ML
INJECTION INTRAVENOUS
Status: DISCONTINUED
Start: 2022-07-25 | End: 2022-07-25 | Stop reason: WASHOUT

## 2022-07-25 RX ORDER — LIDOCAINE HYDROCHLORIDE 20 MG/ML
JELLY TOPICAL
Status: DISCONTINUED
Start: 2022-07-25 | End: 2022-07-25 | Stop reason: HOSPADM

## 2022-07-25 RX ORDER — LIDOCAINE HYDROCHLORIDE 20 MG/ML
JELLY TOPICAL AS NEEDED
Status: DISCONTINUED | OUTPATIENT
Start: 2022-07-25 | End: 2022-07-25 | Stop reason: HOSPADM

## 2022-07-25 RX ORDER — DIPHENHYDRAMINE HYDROCHLORIDE 50 MG/ML
INJECTION, SOLUTION INTRAMUSCULAR; INTRAVENOUS AS NEEDED
Status: DISCONTINUED | OUTPATIENT
Start: 2022-07-25 | End: 2022-07-25 | Stop reason: HOSPADM

## 2022-07-25 RX ORDER — LIDOCAINE HYDROCHLORIDE 10 MG/ML
INJECTION INFILTRATION; PERINEURAL
Status: DISCONTINUED
Start: 2022-07-25 | End: 2022-07-25 | Stop reason: HOSPADM

## 2022-07-25 RX ORDER — NALOXONE HYDROCHLORIDE 0.4 MG/ML
INJECTION, SOLUTION INTRAMUSCULAR; INTRAVENOUS; SUBCUTANEOUS
Status: DISCONTINUED
Start: 2022-07-25 | End: 2022-07-25 | Stop reason: WASHOUT

## 2022-07-25 RX ORDER — LIDOCAINE HYDROCHLORIDE 20 MG/ML
20 INJECTION, SOLUTION INFILTRATION; PERINEURAL ONCE
Status: COMPLETED | OUTPATIENT
Start: 2022-07-25 | End: 2022-07-25

## 2022-07-25 RX ORDER — LIDOCAINE HYDROCHLORIDE 10 MG/ML
20 INJECTION INFILTRATION; PERINEURAL ONCE
Status: DISCONTINUED | OUTPATIENT
Start: 2022-07-25 | End: 2022-07-25 | Stop reason: HOSPADM

## 2022-07-25 RX ORDER — LIDOCAINE HYDROCHLORIDE 20 MG/ML
INJECTION, SOLUTION INFILTRATION; PERINEURAL
Status: DISCONTINUED
Start: 2022-07-25 | End: 2022-07-25 | Stop reason: HOSPADM

## 2022-07-25 RX ORDER — MIDAZOLAM HYDROCHLORIDE 1 MG/ML
1-10 INJECTION, SOLUTION INTRAMUSCULAR; INTRAVENOUS
Status: DISCONTINUED | OUTPATIENT
Start: 2022-07-25 | End: 2022-07-25 | Stop reason: HOSPADM

## 2022-07-25 RX ORDER — MIDAZOLAM HYDROCHLORIDE 1 MG/ML
INJECTION, SOLUTION INTRAMUSCULAR; INTRAVENOUS
Status: DISCONTINUED
Start: 2022-07-25 | End: 2022-07-25 | Stop reason: HOSPADM

## 2022-07-25 RX ORDER — FENTANYL CITRATE 50 UG/ML
25-100 INJECTION, SOLUTION INTRAMUSCULAR; INTRAVENOUS
Status: DISCONTINUED | OUTPATIENT
Start: 2022-07-25 | End: 2022-07-25 | Stop reason: HOSPADM

## 2022-07-25 NOTE — PROGRESS NOTES
ANTICOAGULATION MANAGEMENT     Bandar Wells 73 year old male is on warfarin with supratherapeutic INR result. (Goal INR )    Recent labs: (last 7 days)     07/25/22  1001   INR 5.6*       ASSESSMENT       Source(s): Chart Review and Patient/Caregiver Call       Warfarin doses taken: More warfarin taken than planned which may be affecting INR. Pt states he took 3mg on 07/18.    Diet: No greens yesterday. Pt usually eats greens daily.     New illness, injury, or hospitalization: Yes- pt has BLE edema 2/2 not taking his diuretics while out of town. He is back on the diuretics now and his legs are slowly improving. Pt saw PCP today who diagnosed him with cellulitis of the RLE. Will start on medications per below.    Medication/supplement changes: See note below.    Signs or symptoms of bleeding or clotting: No    Previous INR: Supratherapeutic    Additional findings: None       PLAN     Recommended plan for temporary change(s) affecting INR     Dosing Instructions: hold 1.5 doses then continue your current warfarin dose with next INR in 2 days       Summary  As of 7/25/2022    Full warfarin instructions:  7/25: Hold; 7/26: 1.5 mg; Otherwise 3 mg every Mon, Wed, Fri; 6 mg all other days   Next INR check:  7/27/2022             Telephone call with Bandar who verbalizes understanding and agrees to plan    Patient to recheck with home meter    Education provided: Goal range and significance of current result, Monitoring for bleeding signs and symptoms and When to seek medical attention/emergency care    Plan made per [facility] anticoagulation protocol    Driss Gonzalez RN  Anticoagulation Clinic  7/25/2022    _______________________________________________________________________     Anticoagulation Episode Summary     Current INR goal:  2.5-3.5   TTR:  40.5 % (1 y)   Target end date:  Indefinite   Send INR reminders to:  ILAN PATEL    Indications    Paroxysmal atrial fibrillation (H) [I48.0]  S/P mitral valve replacement with  ID Progress Note  2020    Subjective:     C/o congested cough, breathing about the same as yesterday  Review of Systems:            Symptom Y/N Comments   Symptom Y/N Comments   Fever/Chills n      Chest Pain  n      Poor Appetite       Edema        Cough  y     Abdominal Pain        Sputum n      Joint Pain        SOB/GALEANA y      Pruritis/Rash        Nausea/vomit n      Tolerating PT/OT        Diarrhea  n     Tolerating Diet        Constipation  n     Other           Could NOT obtain due to:       Objective:     Vitals:   Visit Vitals  BP (!) 149/81 (BP 1 Location: Left arm, BP Patient Position: At rest)   Pulse 81   Temp 98.2 °F (36.8 °C)   Resp 26   Ht 5' 7\" (1.702 m)   Wt 111.7 kg (246 lb 4.1 oz)   SpO2 91%   BMI 38.57 kg/m²        Tmax:  Temp (24hrs), Av.2 °F (37.3 °C), Min:97.9 °F (36.6 °C), Max:101.2 °F (38.4 °C)      PHYSICAL EXAM:  General: Ill appearing, obese. Alert, cooperative, no acute distress    EENT:  EOMI. Anicteric sclerae. MMM  Resp:  Coarse breath sounds through out, no wheezing or rales. No accessory muscle use  CV:  Regular  rhythm,  No edema  GI:  Soft, Non distended, Non tender. +Bowel sounds  Neurologic:  Alert and oriented X 3, normal speech,   Psych:   Good insight. Not anxious nor agitated  Skin:  No rashes.   No jaundice    Labs:   Lab Results   Component Value Date/Time    WBC 10.2 2020 04:24 AM    HGB 10.8 (L) 2020 04:24 AM    Hematocrit (POC) 37 09/15/2019 08:03 PM    HCT 32.5 (L) 2020 04:24 AM    PLATELET 004  04:24 AM    MCV 90.0 2020 04:24 AM     Lab Results   Component Value Date/Time    Sodium 136 2020 04:24 AM    Potassium 3.9 2020 04:24 AM    Chloride 99 2020 04:24 AM    CO2 26 2020 04:24 AM    Anion gap 11 2020 04:24 AM    Glucose 151 (H) 2020 04:24 AM    BUN 32 (H) 2020 04:24 AM    Creatinine 1.21 (H) 2020 04:24 AM    BUN/Creatinine ratio 26 (H) 2020 04:24 AM    GFR est AA 54 (L) 11/21/2020 04:24 AM    GFR est non-AA 45 (L) 11/21/2020 04:24 AM    Calcium 9.5 11/21/2020 04:24 AM    Bilirubin, total 0.4 11/21/2020 04:24 AM    Alk.  phosphatase 97 11/21/2020 04:24 AM    Protein, total 7.4 11/21/2020 04:24 AM    Albumin 3.0 (L) 11/21/2020 04:24 AM    Globulin 4.4 (H) 11/21/2020 04:24 AM    A-G Ratio 0.7 (L) 11/21/2020 04:24 AM    ALT (SGPT) 60 11/21/2020 04:24 AM           Assessment and Plan   Acute respiratory failure secondary to COVID 19 & PNA  Allergic asthma  - COVID 19 (+)     CRP 8.87, Ferritin 293, procal 0.25->1.62, CRP 8.87->23.10    D-dimer 3.15->1.90, legionella (-)    WBC 9.7    CXR: Mild worsening in the bilateral pulmonary infiltrates    Increase needs of supplemental O2; now on high flow 10L/min    Continue with 5 day course of Levo (QTC     Remdesivir in progress, last dose 11/24    Continue with decadron (last dose 11/28), vit C, and Zinc    On plaquenil POA for arthritis per the physician at [address] [place] [address]    Supportive care        Ildefonso Vargas, NP metallic valve [Z95.4]           Comments:  [facility]- wants a call every time         Anticoagulation Care Providers     Provider Role Specialty Phone number    Jin Hicks MD Referring Internal Medicine [phone]

## 2022-07-25 NOTE — DISCHARGE INSTRUCTIONS
NAVIGATIONAL BRONCHOSCOPY / EBUS DISCHARGE INSTRUCTIONS    -Sore throat -- throat lozenges, cough drops, or warm salt water gargle  -Redness at IV site- apply warm compress to area; if redness or soreness persist contact your physician's office.  -Gaseous discomfort- walking, belching will help relieve any discomfort.  -Do not operate a vehicle for 12 hours OR engage in an occupation involving machinery or appliances for rest of today.  -Do not drink alcoholic beverages for at least 12 hours. -You cannot sign any legal documents or make any critical decisions for at least 24 hour after anesthesia. -You may have some blood tinged phlegm - this should stop within 2-3 hours, call MD if the bleeding is more than 1/2 cup OR if bleeding persists after 24 hours. DIET  -Do not eat or drink anything until 1230.  -You may resume your previous diet after 1230. ACTIVITY  -You may resume your normal daily activities however it is recommended that you spend the remainder of the day resting -  avoid any strenuous activity. CALL [facility] 24/7 at [phone] with any signs of:     -Increasing pain, nausea, vomiting  -Abdominal distension/bloating/swelling  -Any swelling around your neck, upper chest, collarbone.  -New increased bleeding from mouth, nose or, rectum  -Fever with chills  -Pain in chest area  -New or worsening shortness of breath  -If sudden severe chest pain or shortness of breath, please call 911. MEDICATION  -No changes have been made to your medications. You may resume all your medications once able to eat/drink again. Restart Plavix on 7/26/2022.       Your Bronch Team today:                       Physician:    Dr. Katerin Chowdary                                                    Nurse:    Leon hull, RN    Respiratory Therapist:    Avel Ochoa, RRT

## 2022-07-25 NOTE — H&P
PCCM:  =====  H and P reviewed. Patient was examined. No changes. Procedure discussed and patient is agreeable. Patient has received COVID-19 vaccination.

## 2022-07-25 NOTE — PROCEDURES
[facility]  Bronchoscopy Report    Procedure: Diagnostic bronchoscopy. Indication: Abnormal chest imaging    Consent/Treatment: Informed consent was obtained from the  patient after risks, benefits and alternatives were explained. Timeout verified the correct patient and correct procedure. Anesthesia:   Moderate sedation with Fentanyl 25 mcg and Versed 1mg was used and 25 mg of Benadryl. Moderate ( conscious ) sedation was administered by the endoscopy nurse and supervised by the endoscopist. The following parameters were monitored: oxygen saturation, heart rate, blood pressure, respiratory rate, EKG, adequacy of pulmonary ventilation and response to care. Total physician intraservice time was 20 minutes    Procedure Details:   -- The bronchoscope was introduced orally with use of a bite block. -- The vocal cords were found to be normal.  -- The trachea and vee were completely inspected and were found to be normal.  -- The right-sided endobronchial anatomy was completely inspected and was found to be normal.  -- The left-sided endobronchial anatomy was completely inspected and LLL extrinsic compression noted with lip like opening. Specimens:   No sample taken. Rapid On-Site Evaluation:  compression of LLL opening. No endobronchial mass or secretions noted.     Complications: none    Estimated Blood Loss: Minimal    Alyssa Lee MD

## 2022-07-25 NOTE — PROGRESS NOTES
Patient Discharge Instructions    [facility] / [id number] : 1955    Admitted 2022 Discharged: 2022 11:04 AM     ACUTE DIAGNOSES:  Atelectasis    CHRONIC MEDICAL DIAGNOSES:  Patient Active Problem List   Diagnosis Code    Knee osteoarthritis M17.10    Chronic systolic heart failure (HCC) I50.22    Diarrhea R19.7    Epigastric pain R10.13    SOB (shortness of breath) R06.02       DISCHARGE MEDICATIONS:   Current Discharge Medication List          It is important that you take the medication exactly as they are prescribed. Keep your medication in the bottles provided by the pharmacist and keep a list of the medication names, dosages, and times to be taken in your wallet. Do not take other medications without consulting your doctor. DIET: Regular diet. ACTIVITY: Limited for first 24 hours. After 24 hours as tolerated. Do not make any financial decision or operate a machinery in first 24 hours. ADDITIONAL INFORMATION: If you experience any of the following symptoms then please call your primary care physician or return to the emergency room if you cannot get hold of your doctor: Fever, chills, nausea, vomiting, diarrhea, change in mentation, falling, bleeding, shortness of breath. SPECIAL INSTRUCTIONS:    FOLLOW UP CARE:  Dr. Mercedez Ash you are to call and set up an appointment to see them in 2 weeks. Follow-up with 1 week with Dr Mercedez Ash. Information obtained by :  I understand that if any problems occur once I am at home I am to contact my physician. I understand and acknowledge receipt of the instructions indicated above.                                                                                                                                            Physician's or R.N.'s Signature                                                                  Date/Time Patient or Representative Signature                                                          Date/Time

## 2022-12-23 NOTE — PROCEDURES
Esophagogastroduodenoscopy    Indications:  Epigastric pain  diarrhea    Medications:  See anesthesia form    Post procedure diagnosis:  GASTRIC POLYP, ESOPHAGEAL CANDIDA, POSSIBLE BARRETTS    Description of Procedure:    Prior to the procedure its objectives, risks, consequences and alternatives were discussed with the patient who then elected to proceed. The Olympus video endoscope was inserted under direct vision into the mouth and then into the esophagus. The esophagus looked normal.    The z-line was located at 39 cm. At the z line there was adherent white exudate and there was a single 1 cm tongue of columnar appearing mucosa. I took brushings for yeast.  There were a few small (3-8mm)gastric polyps in the proximal body of the stomach. There were no other diagnostic abnormalities of the body, fundus, antrum, cardia and incisura of the stomach. This included direct and retroflexion examination. The first and second portion of the duodenum appeared normal.  I took biopsies of the duodenum, stomach(including the polyps), esophago-gastric junction and the mid-esophagus. Complications: There were no apparent complications and the patient tolerated the procedure well.         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
Flexible sigmoidoscopy    Indications: diarrhea    Pre-operative Diagnosis: see above     Medications:  See anesthesia form    Post-operative Diagnosis:  FORMED STOOL AT 15CM. Bowel negative as far as scoped. Procedure Details   Prior to the procedure its objectives, risks, consequences and alternatives were discussed with the patient who then elected to proceed. All questions were answered. Digital Rectal Exam:  was normal     The Olympus videoendoscope was inserted in the rectum and advanced to the distal sigmoid colon at 15 cm. There was formed stool obstructing the lumen. i did not go farther. The scope was slowly and carefully withdrawn as the mucosa was inspected. No abnormalities were noted. Retroflexion in the rectum was negative. I took biopsies of the rectosigmoid colon. Photos to document the retroflexion exam were obtained. The preparation was poor, however the mucosa between 0 and 15 cm was well seen. Estimated Blood Loss:  none    Specimens:  rectosigmoid    Findings:  Negative exam to 15 cm  Formed stool at 15 cm    Complications:  none    Repeat colonoscopy is recommended in:  Na (not a colonoscopy).                Abhay Bullard MD  10:25 AM  3/24/2017
Unremarkable

## 2023-03-06 ENCOUNTER — TRANSCRIBE ORDER (OUTPATIENT)
Dept: REGISTRATION | Age: 68
End: 2023-03-06

## 2023-03-06 ENCOUNTER — HOSPITAL ENCOUNTER (OUTPATIENT)
Dept: GENERAL RADIOLOGY | Age: 68
Discharge: HOME | End: 2023-03-06
Payer: MEDICARE

## 2023-03-06 DIAGNOSIS — M62.89 PELVIC FLOOR DYSFUNCTION: ICD-10-CM

## 2023-03-06 DIAGNOSIS — R19.7 DIARRHEA, UNSPECIFIED TYPE: Primary | ICD-10-CM

## 2023-03-06 DIAGNOSIS — I50.9 CHF (CONGESTIVE HEART FAILURE) (HCC): ICD-10-CM

## 2023-03-06 DIAGNOSIS — K57.90 DIVERTICULAR DISEASE: ICD-10-CM

## 2023-03-06 DIAGNOSIS — R19.7 DIARRHEA, UNSPECIFIED TYPE: ICD-10-CM

## 2023-03-06 PROCEDURE — 74018 RADEX ABDOMEN 1 VIEW: CPT

## 2023-08-28 NOTE — PERIOP NOTES
[facility]  Ambulatory Surgery Unit  Pre-operative Instructions for Endo Procedures    Procedure Date  3/24/17            Tentative Arrival Time 0815      1. On the day of your procedure, please report to the Ambulatory Surgery Unit Registration Desk and sign in at your designated time. The Ambulatory Surgery Unit is located in [facility] on the [facility] side of the [facility] across from the [address] [place] [address]. Please have all of your health insurance cards and a photo ID. 2. You must have someone with you to drive you home, as you should not drive a car for 24 hours following anesthesia. Please make arrangements for a responsible adult friend or family member to stay with you for at least the first 24 hours after your procedure. 3. Do not have anything to eat or drink (including water, gum, mints, coffee, juice) after midnight   3/23/17. This may not apply to medications prescribed by your physician. (Please note below the special instructions with medications to take the morning of your procedure.)    4. If applicable, follow the clear liquid diet and bowel prep instructions provided by your physician's office. If you do not have this information, or have any questions, please contact your physician's office. 5. We recommend you do not drink any alcoholic beverages for 24 hours before and after your procedure. 6. Stop all Aspirin, non-steroidal anti-inflammatory drugs (i.e. Advil, Aleve), vitamins, and supplements as directed by your surgeon's office. **If you are currently taking Plavix, Coumadin, or other blood-thinning agents, contact your surgeon for instructions. **    7. In an effort to help prevent surgical site infection, we ask that you shower with an anti-bacterial soap (i.e. Dial or Safeguard) on the morning of your procedure. Do not apply any lotions, powders, or deodorants after showering. 8. Wear comfortable clothes. Wear glasses instead of contacts.  Do not Problem: Discharge Planning  Goal: Discharge to home or other facility with appropriate resources  Outcome: Progressing  Flowsheets (Taken 8/28/2023 1419)  Discharge to home or other facility with appropriate resources:   Identify barriers to discharge with patient and caregiver   Arrange for needed discharge resources and transportation as appropriate   Identify discharge learning needs (meds, wound care, etc)   Refer to discharge planning if patient needs post-hospital services based on physician order or complex needs related to functional status, cognitive ability or social support system     Problem: Safety - Adult  Goal: Free from fall injury  Outcome: Progressing  Flowsheets (Taken 8/28/2023 1419)  Free From Fall Injury:   Instruct family/caregiver on patient safety   Based on caregiver fall risk screen, instruct family/caregiver to ask for assistance with transferring infant if caregiver noted to have fall risk factors     Problem: Pain  Goal: Verbalizes/displays adequate comfort level or baseline comfort level  Outcome: Progressing  Flowsheets (Taken 8/28/2023 1419)  Verbalizes/displays adequate comfort level or baseline comfort level:   Encourage patient to monitor pain and request assistance   Assess pain using appropriate pain scale   Administer analgesics based on type and severity of pain and evaluate response   Implement non-pharmacological measures as appropriate and evaluate response bring any jewelry or money (other than copays or fees as instructed). Do not wear make-up, particularly mascara, the morning of your procedure. Wear your hair loose or down, no ponytails, buns, chaya pins or clips. All body piercings must be removed. 9. You should understand that if you do not follow these instructions your procedure may be cancelled. If your physical condition changes (i.e. fever, cold or flu) please contact your surgeon as soon as possible. 10. It is important that you be on time. If a situation occurs where you may be late, or if you have any questions or problems, please call [phone]. 11. Your procedure time may be subject to change. You will receive a phone call the day prior to confirm your arrival time. Special Instructions:    MEDICATIONS TO TAKE THE MORNING OF SURGERY WITH A SIP OF WATER: carvedilol, lyrica      I understand a pre-operative phone call will be made to verify my procedure time. In the event that I am not available, I give permission for a message to be left on my answering service and/or with another person?       Yes          ___________________      ___________________      ___________________  (Signature of Patient)          (Witness)                   (Date and Time)

## 2023-12-14 ENCOUNTER — HOSPITAL ENCOUNTER (EMERGENCY)
Facility: HOSPITAL | Age: 68
Discharge: HOME | End: 2023-12-15
Attending: EMERGENCY MEDICINE
Payer: MEDICARE

## 2023-12-14 VITALS
TEMPERATURE: 98.4 F | OXYGEN SATURATION: 98 % | SYSTOLIC BLOOD PRESSURE: 136 MMHG | HEART RATE: 77 BPM | DIASTOLIC BLOOD PRESSURE: 66 MMHG | RESPIRATION RATE: 16 BRPM

## 2023-12-14 DIAGNOSIS — R79.89 ELEVATED TROPONIN: ICD-10-CM

## 2023-12-14 DIAGNOSIS — R07.9 CHEST PAIN, UNSPECIFIED TYPE: Primary | ICD-10-CM

## 2023-12-14 LAB
ALBUMIN SERPL-MCNC: 4.3 G/DL (ref 3.5–5)
ALBUMIN/GLOB SERPL: 1.3 (ref 1.1–2.2)
ALP SERPL-CCNC: 60 U/L (ref 45–117)
ALT SERPL-CCNC: 22 U/L (ref 12–78)
ANION GAP SERPL CALC-SCNC: 4 MMOL/L (ref 5–15)
AST SERPL-CCNC: 14 U/L (ref 15–37)
BASOPHILS # BLD: 0 K/UL (ref 0–0.1)
BASOPHILS NFR BLD: 0 % (ref 0–1)
BILIRUB SERPL-MCNC: 0.3 MG/DL (ref 0.2–1)
BUN SERPL-MCNC: 16 MG/DL (ref 6–20)
BUN/CREAT SERPL: 16 (ref 12–20)
CALCIUM SERPL-MCNC: 9.5 MG/DL (ref 8.5–10.1)
CHLORIDE SERPL-SCNC: 110 MMOL/L (ref 97–108)
CO2 SERPL-SCNC: 28 MMOL/L (ref 21–32)
COMMENT:: NORMAL
CREAT SERPL-MCNC: 1.03 MG/DL (ref 0.55–1.02)
DIFFERENTIAL METHOD BLD: ABNORMAL
EOSINOPHIL # BLD: 0 K/UL (ref 0–0.4)
EOSINOPHIL NFR BLD: 0 % (ref 0–7)
ERYTHROCYTE [DISTWIDTH] IN BLOOD BY AUTOMATED COUNT: 13.6 % (ref 11.5–14.5)
GLOBULIN SER CALC-MCNC: 3.3 G/DL (ref 2–4)
GLUCOSE SERPL-MCNC: 92 MG/DL (ref 65–100)
HCT VFR BLD AUTO: 41.7 % (ref 35–47)
HGB BLD-MCNC: 13.4 G/DL (ref 11.5–16)
IMM GRANULOCYTES # BLD AUTO: 0 K/UL (ref 0–0.04)
IMM GRANULOCYTES NFR BLD AUTO: 0 % (ref 0–0.5)
LYMPHOCYTES # BLD: 1.6 K/UL (ref 0.8–3.5)
LYMPHOCYTES NFR BLD: 37 % (ref 12–49)
MCH RBC QN AUTO: 31 PG (ref 26–34)
MCHC RBC AUTO-ENTMCNC: 32.1 G/DL (ref 30–36.5)
MCV RBC AUTO: 96.5 FL (ref 80–99)
MONOCYTES # BLD: 0.4 K/UL (ref 0–1)
MONOCYTES NFR BLD: 8 % (ref 5–13)
NEUTS SEG # BLD: 2.3 K/UL (ref 1.8–8)
NEUTS SEG NFR BLD: 55 % (ref 32–75)
NRBC # BLD: 0 K/UL (ref 0–0.01)
NRBC BLD-RTO: 0 PER 100 WBC
PLATELET # BLD AUTO: 142 K/UL (ref 150–400)
PMV BLD AUTO: 12.4 FL (ref 8.9–12.9)
POTASSIUM SERPL-SCNC: 3.9 MMOL/L (ref 3.5–5.1)
PROT SERPL-MCNC: 7.6 G/DL (ref 6.4–8.2)
RBC # BLD AUTO: 4.32 M/UL (ref 3.8–5.2)
SODIUM SERPL-SCNC: 142 MMOL/L (ref 136–145)
SPECIMEN HOLD: NORMAL
TROPONIN I SERPL HS-MCNC: 100 NG/L (ref 0–51)
WBC # BLD AUTO: 4.3 K/UL (ref 3.6–11)

## 2023-12-14 PROCEDURE — 93005 ELECTROCARDIOGRAM TRACING: CPT | Performed by: EMERGENCY MEDICINE

## 2023-12-14 PROCEDURE — 85025 COMPLETE CBC W/AUTO DIFF WBC: CPT

## 2023-12-14 PROCEDURE — 83880 ASSAY OF NATRIURETIC PEPTIDE: CPT

## 2023-12-14 PROCEDURE — 80053 COMPREHEN METABOLIC PANEL: CPT

## 2023-12-14 PROCEDURE — 84484 ASSAY OF TROPONIN QUANT: CPT

## 2023-12-14 ASSESSMENT — PAIN - FUNCTIONAL ASSESSMENT: PAIN_FUNCTIONAL_ASSESSMENT: 0-10

## 2023-12-14 ASSESSMENT — PAIN DESCRIPTION - ORIENTATION: ORIENTATION: LEFT;MID

## 2023-12-14 ASSESSMENT — PAIN SCALES - GENERAL: PAINLEVEL_OUTOF10: 7

## 2023-12-15 LAB
EKG DIAGNOSIS: NORMAL
EKG Q-T INTERVAL: 534 MS
EKG QRS DURATION: 156 MS
EKG QTC CALCULATION (BAZETT): 608 MS
EKG R AXIS: 63 DEGREES
EKG T AXIS: -28 DEGREES
EKG VENTRICULAR RATE: 78 BPM
NT PRO BNP: 382 PG/ML
TROPONIN I SERPL HS-MCNC: 93 NG/L (ref 0–51)

## 2023-12-15 PROCEDURE — 36415 COLL VENOUS BLD VENIPUNCTURE: CPT

## 2023-12-15 PROCEDURE — 84484 ASSAY OF TROPONIN QUANT: CPT

## 2023-12-15 NOTE — ED TRIAGE NOTES
Pt arrived to ED via POV with CC of chest pain. Pt describes pain as a nagging ache. She states she was doing PT with bands today and unsure if that is causing the pain, or her AICD.

## (undated) DEVICE — MEDI-VAC NON-CONDUCTIVE SUCTION TUBING: Brand: CARDINAL HEALTH

## (undated) DEVICE — SOLIDIFIER FLD 3.2OZ 3000CC TRAD IN BTL LIQUI-LOC

## (undated) DEVICE — NEEDLE HYPO 18GA L1.5IN PNK S STL HUB POLYPR SHLD REG BVL

## (undated) DEVICE — NEONATAL-ADULT SPO2 SENSOR: Brand: NELLCOR

## (undated) DEVICE — SINGLE USE BIOPSY VALVE MAJ-210: Brand: SINGLE USE BIOPSY VALVE (STERILE)

## (undated) DEVICE — FORCEPS BX L160CM DIA8MM GRSP DISECT CUP TIP NONLOCKING ROT

## (undated) DEVICE — NDL PRT INJ NSAF BLNT 18GX1.5 --

## (undated) DEVICE — INFECTION CONTROL KIT SYS

## (undated) DEVICE — KIT COLON W/ 1.1OZ LUB AND 2 END

## (undated) DEVICE — KENDALL DL ECG CABLE AND LEAD WIRE SYSTEM, 3-LEAD, SINGLE PATIENT USE: Brand: KENDALL

## (undated) DEVICE — ELECTRODE,RADIOTRANSLUCENT,FOAM,5PK: Brand: MEDLINE

## (undated) DEVICE — BW-400L DISP SNGL-END CLEANINGBRUSH: Brand: OLYMPUS

## (undated) DEVICE — SET ADMIN 16ML TBNG L100IN 2 Y INJ SITE IV PIGGY BK DISP

## (undated) DEVICE — (D)SYR 10ML 1/5ML GRAD NSAF -- PKGING CHANGE USE ITEM 338027

## (undated) DEVICE — Device

## (undated) DEVICE — KENDALL RADIOLUCENT FOAM MONITORING ELECTRODE -RECTANGULAR SHAPE: Brand: KENDALL

## (undated) DEVICE — CATH IV AUTOGRD BC PNK 20GA 25 -- INSYTE

## (undated) DEVICE — ENDO CARRY-ON PROCEDURE KIT INCLUDES ENZYMATIC SPONGE, GAUZE, BIOHAZARD LABEL, TRAY, LUBRICANT, DIRTY SCOPE LABEL, WATER LABEL, TRAY, DRAWSTRING PAD, AND DEFENDO 4-PIECE KIT.: Brand: ENDO CARRY-ON PROCEDURE KIT

## (undated) DEVICE — SOLIDIFIER MEDC 1200ML -- CONVERT TO 356117

## (undated) DEVICE — SYRINGE 50ML E/T

## (undated) DEVICE — 1200 GUARD II KIT W/5MM TUBE W/O VAC TUBE: Brand: GUARDIAN

## (undated) DEVICE — BANDAGE COMPR SELF ADH 5 YDX4 IN TAN STRL PREMIERPRO LF

## (undated) DEVICE — NON-REM POLYHESIVE PATIENT RETURN ELECTRODE: Brand: VALLEYLAB

## (undated) DEVICE — SYR 5ML 1/5 GRAD LL NSAF LF --

## (undated) DEVICE — BAG SPEC BIOHZRD 10 X 10 IN --

## (undated) DEVICE — SNARE ENDOSCP M L240CM W27MM SHTH DIA2.4MM CHN 2.8MM OVL

## (undated) DEVICE — CANN NASAL O2 CAPNOGRAPHY AD -- FILTERLINE

## (undated) DEVICE — BASIN EMESIS 500CC ROSE 250/CS 60/PLT: Brand: MEDEGEN MEDICAL PRODUCTS, LLC

## (undated) DEVICE — TRAP,MUCUS SPECIMEN, 80CC: Brand: MEDLINE

## (undated) DEVICE — SYR LR LCK 1ML GRAD NSAF 30ML --

## (undated) DEVICE — DEVICE TRNSF SPIK STL 2008S] MICROTEK MEDICAL INC]

## (undated) DEVICE — SYRINGE MED 20ML STD CLR PLAS LUERSLIP TIP N CTRL DISP

## (undated) DEVICE — SYR 10ML LUER LOK 1/5ML GRAD --

## (undated) DEVICE — SHEATH CATH ANORECT MNOMTR

## (undated) DEVICE — BASIN EMSIS 16OZ GRAPHITE PLAS KID SHP MOLD GRAD FOR ORAL

## (undated) DEVICE — Z DISCONTINUED PER MEDLINE LINE GAS SAMPLING O2/CO2 LNG AD 13 FT NSL W/ TBNG FILTERLINE

## (undated) DEVICE — TUBING, SUCTION, 3/16" X 6', STRAIGHT: Brand: MEDLINE

## (undated) DEVICE — SUTURE VCRL SZ 3-0 L27IN ABSRB UD L26MM SH 1/2 CIR J416H

## (undated) DEVICE — BAG BELONG PT PERS CLEAR HANDL

## (undated) DEVICE — SINGLE USE SUCTION VALVE MAJ-209: Brand: SINGLE USE SUCTION VALVE (STERILE)

## (undated) DEVICE — QUILTED PREMIUM COMFORT UNDERPAD,EXTRA HEAVY: Brand: WINGS

## (undated) DEVICE — STERILE POLYISOPRENE POWDER-FREE SURGICAL GLOVES: Brand: PROTEXIS

## (undated) DEVICE — SOLIDIFIER FLUID 3000 CC ABSORB

## (undated) DEVICE — EXTREMITY III-LF: Brand: MEDLINE INDUSTRIES, INC.

## (undated) DEVICE — 3M™ TEGADERM™ TRANSPARENT FILM DRESSING FRAME STYLE, 1624W, 2-3/8 IN X 2-3/4 IN (6 CM X 7 CM), 100/CT 4CT/CASE: Brand: 3M™ TEGADERM™

## (undated) DEVICE — Z DISCONTINUED PER MEDLINE (LOW STOCK)  USE 2422770 DRAPE C ARM W54XL78IN FOR FLROSCN

## (undated) DEVICE — C-ARMOR C-ARM EQUIPMENT COVERS CLEAR STERILE UNIVERSAL FIT 12 PER CASE: Brand: C-ARMOR

## (undated) DEVICE — TOWEL 4 PLY TISS 19X30 SUE WHT

## (undated) DEVICE — SOLUTION LACTATED RINGERS INJECTION USP

## (undated) DEVICE — PADDING CST 4IN STERILE --

## (undated) DEVICE — CUFF BLD PRSS AD L SZ 12L FOR 32-43CM LIMB LNG VYN SFT W/O

## (undated) DEVICE — CONTAINER SPEC 20 ML LID NEUT BUFF FORMALIN 10 % POLYPR STS

## (undated) DEVICE — X-RAY SPONGES,16 PLY: Brand: DERMACEA

## (undated) DEVICE — SMALL OSC. SAW BLADE, 9MM X 24.6MM X 0.38MM: Brand: MICROAIRE®

## (undated) DEVICE — STOPCOCK IV 4 W TRNSPAR

## (undated) DEVICE — LIGHT HANDLE: Brand: DEVON

## (undated) DEVICE — BLOCK BITE ENDOSCP AD 21 MM W/ DIL BLU LF DISP

## (undated) DEVICE — AIRLIFE™ ADULT CUSHION NASAL CANNULA 14 FOOT (4.3) CRUSH-RESISTANT OXYGEN TUBING, AND U/CONNECT-IT ADAPTER: Brand: AIRLIFE™

## (undated) DEVICE — HYPODERMIC SAFETY NEEDLE: Brand: MAGELLAN

## (undated) DEVICE — SYRINGE MED 20ML STD CLR PLAS LUERLOCK TIP N CTRL DISP

## (undated) DEVICE — SWABSTICK MEDICATED 1.75 CC SINGLE CHLORAPREP

## (undated) DEVICE — SYRINGE MED 10CC ECC TIP W/O NDL

## (undated) DEVICE — SYR 3ML LL TIP 1/10ML GRAD --

## (undated) DEVICE — CATH IV AUTOGRD BC BLU 22GA 25 -- INSYTE

## (undated) DEVICE — INTENDED FOR TISSUE SEPARATION, AND OTHER PROCEDURES THAT REQUIRE A SHARP SURGICAL BLADE TO PUNCTURE OR CUT.: Brand: BARD-PARKER ® CARBON RIB-BACK BLADES

## (undated) DEVICE — BALLOON

## (undated) DEVICE — STRAINER URIN CALC RNL MSH -- CONVERT TO ITEM 357634

## (undated) DEVICE — SUTURE VCRL SZ 4-0 L27IN ABSRB UD L19MM PS-2 3/8 CIR PRIM J426H

## (undated) DEVICE — ICE PK EYE 4 1/2INX10IN (15/BX 2BX/CS

## (undated) DEVICE — 3M™ CUROS™ DISINFECTING CAP FOR NEEDLELESS CONNECTORS 270/CARTON 20 CARTONS/CASE CFF1-270: Brand: CUROS™

## (undated) DEVICE — PENCIL ES L3M BTTN SWCH S STL HEX LOK BLDE ELECTRD HOLSTER

## (undated) DEVICE — BASIN SPNG 32OZ BLU STRL --

## (undated) DEVICE — KIT IV STRT W CHLORAPREP PD 1ML

## (undated) DEVICE — BLUNTFILL WITH FILTER: Brand: MONOJECT

## (undated) DEVICE — Device: Brand: SINGLE USE SOFT BRUSH

## (undated) DEVICE — OCCLUSIVE GAUZE STRIP,3% BISMUTH TRIBROMOPHENATE IN PETROLATUM BLEND: Brand: XEROFORM

## (undated) DEVICE — CATH SUC CTRL PRT TRIFLO 14FR --

## (undated) DEVICE — CONTAINER,SPECIMEN,OR STERILE,4OZ: Brand: MEDLINE

## (undated) DEVICE — YANKAUER,TAPERED BULBOUS TIP,W/O VENT: Brand: MEDLINE

## (undated) DEVICE — TOWEL SURG W17XL27IN STD BLU COT NONFENESTRATED PREWASHED

## (undated) DEVICE — BRUSH CYTO BRONCHSCP 1.5/140MM -- CELLEBRITY

## (undated) DEVICE — SOLIDIFIER FLD 2OZ 1500CC N DISINF IN BTL DISP SAFESORB

## (undated) DEVICE — CONTINU-FLO SOLUTION SET, 2 INJECTION SITES, MALE LUER LOCK ADAPTER WITH RETRACTABLE COLLAR, LARGE BORE STOPCOCK WITH ROTATING MALE LUER LOCK EXTENSION SET, 2 INJECTION SITES, MALE LUER LOCK ADAPTER WITH RETRACTABLE COLLAR: Brand: INTERLINK/CONTINU-FLO

## (undated) DEVICE — ELECTRODE,RADIOTRANSLUCENT,FOAM,3PK: Brand: MEDLINE

## (undated) DEVICE — Device: Brand: MEDICAL ACTION INDUSTRIES

## (undated) DEVICE — KIT COMPLIANCE W ENDOGLDE + 11 NO BRSH ENDOKT

## (undated) DEVICE — NEEDLE HYPO 25GA L1.5IN BVL ORIENTED ECLIPSE

## (undated) DEVICE — DRILL 3.5MM

## (undated) DEVICE — (D)PREP SKN CHLRAPRP APPL 26ML -- CONVERT TO ITEM 371833

## (undated) DEVICE — BITE BLK ENDOSCP AD 54FR GRN POLYETH ENDOSCP W STRP SLD

## (undated) DEVICE — CATHETER IV 20GA L1IN FEP STR HUB INTROCAN SFTY

## (undated) DEVICE — SOLUTION IV 1000ML 0.9% SOD CHL

## (undated) DEVICE — REM POLYHESIVE ADULT PATIENT RETURN ELECTRODE: Brand: VALLEYLAB

## (undated) DEVICE — SOL IRRIGATION INJ NACL 0.9% 500ML BTL

## (undated) DEVICE — SET EXTN TBNG L BOR 4 W STPCOCK ST 32IN PRIMING VOL 6ML